# Patient Record
Sex: FEMALE | Race: WHITE | NOT HISPANIC OR LATINO | Employment: FULL TIME | URBAN - METROPOLITAN AREA
[De-identification: names, ages, dates, MRNs, and addresses within clinical notes are randomized per-mention and may not be internally consistent; named-entity substitution may affect disease eponyms.]

---

## 2017-02-06 ENCOUNTER — OFFICE VISIT (OUTPATIENT)
Dept: RHEUMATOLOGY | Facility: CLINIC | Age: 52
End: 2017-02-06
Payer: COMMERCIAL

## 2017-02-06 ENCOUNTER — HOSPITAL ENCOUNTER (OUTPATIENT)
Dept: RADIOLOGY | Facility: HOSPITAL | Age: 52
Discharge: HOME | End: 2017-02-06
Attending: INTERNAL MEDICINE
Payer: COMMERCIAL

## 2017-02-06 VITALS
SYSTOLIC BLOOD PRESSURE: 125 MMHG | WEIGHT: 187.38 LBS | BODY MASS INDEX: 34.48 KG/M2 | HEIGHT: 62 IN | DIASTOLIC BLOOD PRESSURE: 80 MMHG | HEART RATE: 99 BPM

## 2017-02-06 DIAGNOSIS — E78.2 COMBINED HYPERLIPIDEMIA ASSOCIATED WITH TYPE 2 DIABETES MELLITUS: ICD-10-CM

## 2017-02-06 DIAGNOSIS — M06.4 INFLAMMATORY POLYARTHRITIS: ICD-10-CM

## 2017-02-06 DIAGNOSIS — M79.7 FIBROMYALGIA: ICD-10-CM

## 2017-02-06 DIAGNOSIS — R76.8 POSITIVE ANA (ANTINUCLEAR ANTIBODY): ICD-10-CM

## 2017-02-06 DIAGNOSIS — R91.1 PULMONARY NODULE: ICD-10-CM

## 2017-02-06 DIAGNOSIS — E11.69 COMBINED HYPERLIPIDEMIA ASSOCIATED WITH TYPE 2 DIABETES MELLITUS: ICD-10-CM

## 2017-02-06 DIAGNOSIS — M06.4 INFLAMMATORY POLYARTHRITIS: Primary | ICD-10-CM

## 2017-02-06 DIAGNOSIS — Z72.0 TOBACCO USE: ICD-10-CM

## 2017-02-06 PROCEDURE — 2022F DILAT RTA XM EVC RTNOPTHY: CPT | Mod: S$GLB,,, | Performed by: INTERNAL MEDICINE

## 2017-02-06 PROCEDURE — 71020 XR CHEST PA AND LATERAL: CPT | Mod: TC,PO

## 2017-02-06 PROCEDURE — 3074F SYST BP LT 130 MM HG: CPT | Mod: S$GLB,,, | Performed by: INTERNAL MEDICINE

## 2017-02-06 PROCEDURE — 3060F POS MICROALBUMINURIA REV: CPT | Mod: S$GLB,,, | Performed by: INTERNAL MEDICINE

## 2017-02-06 PROCEDURE — 99205 OFFICE O/P NEW HI 60 MIN: CPT | Mod: 25,S$GLB,, | Performed by: INTERNAL MEDICINE

## 2017-02-06 PROCEDURE — 77077 JOINT SURVEY SINGLE VIEW: CPT | Mod: TC,PO

## 2017-02-06 PROCEDURE — 3079F DIAST BP 80-89 MM HG: CPT | Mod: S$GLB,,, | Performed by: INTERNAL MEDICINE

## 2017-02-06 PROCEDURE — 90732 PPSV23 VACC 2 YRS+ SUBQ/IM: CPT | Mod: S$GLB,,, | Performed by: INTERNAL MEDICINE

## 2017-02-06 PROCEDURE — 90471 IMMUNIZATION ADMIN: CPT | Mod: S$GLB,,, | Performed by: INTERNAL MEDICINE

## 2017-02-06 PROCEDURE — 99999 PR PBB SHADOW E&M-EST. PATIENT-LVL III: CPT | Mod: PBBFAC,,, | Performed by: INTERNAL MEDICINE

## 2017-02-06 PROCEDURE — 3044F HG A1C LEVEL LT 7.0%: CPT | Mod: S$GLB,,, | Performed by: INTERNAL MEDICINE

## 2017-02-06 PROCEDURE — 77077 JOINT SURVEY SINGLE VIEW: CPT | Mod: 26,,, | Performed by: RADIOLOGY

## 2017-02-06 PROCEDURE — 71020 XR CHEST PA AND LATERAL: CPT | Mod: 26,,, | Performed by: RADIOLOGY

## 2017-02-06 NOTE — PROGRESS NOTES
Administered 0.5cc Pneumococcal  Vaccination to  left Deltoid. Pt. Tolerated well. No acute reaction noted to site. Pt. Instructed on S/S to report. Pt. Verbalized understanding.    Lot: [id number]  Exp: 11/17

## 2017-02-06 NOTE — LETTER
February 6, 2017      Debora Hills MD  [id number] [place] [address] Dr Carissa [place] [address]           [facility] - Rheumatology  [address] [facility] [place] [address]  Phone: [phone]  Fax: [phone]          Patient: Diana Escobar   MR Number: [id number]   YOB: 1965   Date of Visit: 2/6/2017       Dear Dr. Debora Hills:    Thank you for referring Diana Escobar to me for evaluation. Attached you will find relevant portions of my assessment and plan of care.    If you have questions, please do not hesitate to call me. I look forward to following Diana Escobar along with you.    Sincerely,    Amber Engle MD    Enclosure  CC:  No Recipients    If you would like to receive this communication electronically, please contact [email or web address] or [phone] to request more information on [employer] access.    For providers and/or their staff who would like to refer a patient to [facility], please contact us through our one-stop-shop provider referral line, [facility], at [phone].    If you feel you have received this communication in error or would no longer like to receive these types of communications, please e-mail [email or web address]

## 2017-02-06 NOTE — MR AVS SNAPSHOT
[facility] Rheumatology  [address] [facility] [place] [address]  Phone: [phone]  Fax: [phone]                  Diana Escobar   2017 1:30 PM   Office Visit    Description:  Female : 1965   Provider:  Amber Engle MD   Department:  [facility] - Rheumatology           Reason for Visit     chronic hand pain     rt leg pain           Diagnoses this Visit        Comments    Inflammatory polyarthritis    -  Primary     Combined hyperlipidemia associated with type 2 diabetes mellitus         Tobacco use         Pulmonary nodule                To Do List           Future Appointments        Provider Department Dept Phone    2017 3:45 PM [facility] XR2 [facility] [phone]    2017 8:20 AM SPECIMEN, [facility] [phone]    2017 8:45 AM LABORATORY, [facility] [phone]    3/2/2017 11:30 AM Amber Engle MD [facility] Rheumatology [phone]    2017 8:20 AM [facility], [address] [facility] [phone]      Goals (5 Years of Data)     None      [facility]     [facility] Nurse Care Line -  Assistance  Registered nurses in the [facility] provide clinical advisement, health education, appointment booking, and other advisory services.  Call for this free service at [phone].             Medications           Message regarding Medications     Verify the changes and/or additions to your medication regime listed below are the same as discussed with your clinician today.  If any of these changes or additions are incorrect, please notify your healthcare provider.             Verify that the below list of medications is an accurate representation of the medications you are currently taking.  If none reported, the list may be blank. If incorrect, please contact your healthcare provider. Carry this list with you in case of emergency.           Current  "Medications     albuterol 90 mcg/actuation inhaler Inhale 2 puffs into the lungs every 6 (six) hours as needed for Wheezing.    amlodipine (NORVASC) 5 MG tablet Take 5 mg by mouth.    blood sugar diagnostic Strp Glucose testing twice daily.    blood-glucose meter Misc Use as directed.    gabapentin (NEURONTIN) 300 MG capsule Take 1 capsule (300 mg total) by mouth 2 (two) times daily.    insulin needles, disposable, (PEN NEEDLE) 31 X 1/4 " Ndle Twice daily injections.    lancets (LANCETS,ULTRA THIN) Misc Glucose testing daily.    levothyroxine (SYNTHROID) 50 MCG tablet Take 1 tablet (50 mcg total) by mouth once daily.    metformin (GLUCOPHAGE-XR) 500 MG 24 hr tablet Take 1 tablet (500 mg total) by mouth 2 (two) times daily with meals.    pravastatin (PRAVACHOL) 20 MG tablet Take 1 tablet (20 mg total) by mouth once daily.    venlafaxine (EFFEXOR-XR) 150 MG Cp24 Take 1 capsule (150 mg total) by mouth once daily.    hydrocodone-acetaminophen 7.5-325mg (NORCO) 7.5-325 mg per tablet Take 1 tablet by mouth every 6 (six) hours as needed for Pain.           Clinical Reference Information           Your Vitals Were     BP Pulse Height Weight BMI    125/80 99 5' 2" (1.575 m) 85 kg (187 lb 6.3 oz) 34.27 kg/m2      Blood Pressure          Most Recent Value    BP  125/80      Allergies as of 2/6/2017     Mobic [Meloxicam]    Topamax [Topiramate]      Immunizations Administered on Date of Encounter - 2/6/2017     Name Date Dose VIS Date Route    Pneumococcal Polysaccharide - 23 Valent  Incomplete 0.5 mL 4/24/2015 Intramuscular      Orders Placed During Today's Visit      Normal Orders This Visit    Pneumococcal Polysaccharide Vaccine (23 Valent) (SQ/IM)     Future Labs/Procedures Expected by Expires    Cyclic citrul peptide antibody, IgG  2/6/2017 4/7/2018    HEPATITIS PANEL, ACUTE  2/6/2017 4/7/2018    Quantiferon Gold TB  2/6/2017 4/7/2018    Rheumatoid factor  2/6/2017 2/6/2018    X-Ray Chest PA And Lateral  2/6/2017 2/6/2018 "    XR Arthritis Survey  2/6/2017 2/6/2018    Recurring Lab Work Interval Expires    C-reactive protein  Every 12 Weeks until 4/7/2018 4/7/2018    CBC auto differential   2/6/2021    Comprehensive metabolic panel   2/6/2021    Sedimentation rate, manual   2/6/2021      [facility] Sign-Up     Activating your [facility] account is as easy as 1-2-3!     1) Visit [email or web address], select Sign Up Now, enter this activation code and your date of birth, then select Next.  Activation code not generated  Current Patient Portal Status: Account disabled      2) Create a username and password to use when you visit [facility] in the future and select a security question in case you lose your password and select Next.    3) Enter your e-mail address and click Sign Up!    Additional Information  If you have questions, please e-mail [email or web address] or call [phone] to talk to our [facility] staff. Remember, [facility] is NOT to be used for urgent needs. For medical emergencies, dial 911.         Smoking Cessation     If you would like to quit smoking:   You may be eligible for free services if you are a [place] resident and started smoking cigarettes before September 1, 1988.  Call the Smoking Cessation Trust ([facility]) toll free at (688) [phone] or (806) 126-4104.   Call 8-374-QUIT-NOW if you do not meet the above criteria.            Language Assistance Services     ATTENTION: Language assistance services are available, free of charge. Please call [phone].      ATENCIÓN: Si habla español, tiene a duarte disposición servicios gratuitos de asistencia lingüística. Llame al [phone].     [facility] Ý: N?u b?n nói Ti?ng Vi?t, có các d?ch v? h? tr? ngôn ng? mi?n phí dành cho b?n. G?i s? [phone].         [facility] complies with applicable Federal civil rights laws and does not discriminate on the basis of race, color, national origin, age, disability, or sex.

## 2017-02-06 NOTE — PROGRESS NOTES
Subjective:       Patient ID: Diana Escobar is a 51 y.o. female.    Chief Complaint: chronic hand pain and rt leg pain    HPI   Referred by PCP for chronic hand pain but her symptoms actually got much worse 1 month ago. She was previously seen by Dr. Cook and was found to be CCP positive but had no joint symptoms at the time. She was diagnosed with fibromyalgia.  She also has hypertension, active tobacco user, NIDDM, HL, asthma, hypothyroidism, depression.    She was in her usual state of health (works at [employer]) until about 1 month ago she developed stiffness in the hands worse in the morning and then pain throughout the day. She isn't able to make a fist in the morning. Morning stiffness lasts a couple of hours. She has numbness in her R hand over the thumb, 2nd finger with writing. She has noticed swelling that occurs in between MCPS and PIPS. Also developed knots in her DIP joints. No pain or swelling around the wrist but she does have CMC joint pain. She also has new R calf pain that hurts all day and at night it throbs and she cannot sleep. She has tried tylenol arthritis without improvement. She tried naproxen and advil and they don't work for the hands or the leg. She tried heat and helps with the R leg some. Hasn't used any creams.     She did have trigger finger in both of her thumbs at one time. She had injections to them done by orthopedic hand surgery.    She has chronic low back pain with radiation into the R hip. She cannot lay on the right hip anymore. If she lays on her side it makes the pain shoot into the leg.  She had a pain management doctor for her back in [place]. She has had epidural injections and also had a nerve ablation.     She had next surgery 6 months ago with ruptured disk.     Review of Systems   Constitutional: Positive for fatigue. Negative for activity change and unexpected weight change.   HENT: Positive for mouth sores.         Ringing in the ears, new    Fever blisters on  "the gums, new. None in buccal or pallete.        Eyes:        Dry eyes for years, seeing eye doctor. She may get on restasis.     No dry mouth       Respiratory: Negative.  Negative for shortness of breath (has chronic asthma).    Cardiovascular: Positive for leg swelling (chronic). Negative for chest pain.   Endocrine:        No allopecia     Genitourinary: Negative for dysuria and hematuria.   Musculoskeletal: Positive for arthralgias and myalgias.   Skin: Negative for color change and rash.   Neurological: Positive for weakness (she can  a cup of coffee and it will fall. ) and numbness (left 4th and 5th digit numb since surgery, R hand new numbness in the thumb and 2nd digit).   Hematological: Negative for adenopathy.   Psychiatric/Behavioral: The patient is nervous/anxious (depressed due to decreased a bility to be active ).              Social hx:  [employer]  3 healthy children  Smoker    No fmhx of rheumatoid arthritis, IBD, psoriasis, SLE    Objective:     Visit Vitals    /80    Pulse 99    Ht 5' 2" (1.575 m)    Wt 85 kg (187 lb 6.3 oz)    BMI 34.27 kg/m2        Physical Exam   Vitals reviewed.  Constitutional: She is oriented to person, place, and time and well-developed, well-nourished, and in no distress. No distress.   HENT:   Head: Normocephalic and atraumatic.   Right Ear: External ear normal.   Left Ear: External ear normal.   Mouth/Throat: Oropharynx is clear and moist.   Eyes: Pupils are equal, round, and reactive to light. Right eye exhibits no discharge. Left eye exhibits no discharge. No scleral icterus.   Eyes injected    Neck: Neck supple.   Slight decrease ROM   Cardiovascular: Normal rate, regular rhythm and normal heart sounds.    Pulmonary/Chest: Effort normal and breath sounds normal. No respiratory distress.   Abdominal: Soft. She exhibits no distension (mild tenderness epigastrium). There is tenderness.   Neurological: She is alert and oriented to person, place, and time. " No cranial nerve deficit. She exhibits normal muscle tone.   Skin: Skin is warm and dry. No rash noted. She is not diaphoretic. No erythema.     Psychiatric: Mood, memory, affect and judgment normal.   Musculoskeletal: Normal range of motion. She exhibits tenderness. She exhibits no edema or deformity.   Left hand swollen and tender MCP 2-5, PIP 2-5  Right hand diffuse tender MCPs with mild swelling 2-3  Bilateral CMC joint pain and tenderness  Elbows shoulders, knees move normal  Swelling of the toes with +MTP squeeze test             LABS   Ref. Range 11/10/2016 09:34   Sodium Latest Ref Range: 136 - 145 mmol/L 144   Potassium Latest Ref Range: 3.5 - 5.1 mmol/L 4.3   Chloride Latest Ref Range: 95 - 110 mmol/L 106   CO2 Latest Ref Range: 23 - 29 mmol/L 30 (H)   Anion Gap Latest Ref Range: 8 - 16 mmol/L 8   BUN, Bld Latest Ref Range: 6 - 20 mg/dL 10   Creatinine Latest Ref Range: 0.5 - 1.4 mg/dL 0.8   eGFR if non African American Latest Ref Range: >60 mL/min/1.73 m^2 >60.0   eGFR if African American Latest Ref Range: >60 mL/min/1.73 m^2 >60.0   Glucose Latest Ref Range: 70 - 110 mg/dL 115 (H)   Calcium Latest Ref Range: 8.7 - 10.5 mg/dL 9.5   Alkaline Phosphatase Latest Ref Range: 55 - 135 U/L 89   Total Protein Latest Ref Range: 6.0 - 8.4 g/dL 7.2   Albumin Latest Ref Range: 3.5 - 5.2 g/dL 4.0   Total Bilirubin Latest Ref Range: 0.1 - 1.0 mg/dL 0.4   AST Latest Ref Range: 10 - 40 U/L 16   ALT Latest Ref Range: 10 - 44 U/L 20   Triglycerides Latest Ref Range: 30 - 150 mg/dL 190 (H)     Outside records reviewed  +ccp 32.76  +jomar 1:160 speckled   Negative proflie  -scl 70, centromere   crp 0.2     Outside Imaging Reviewed    MRI c spine without contrast   DJD, DDD    CT Chest without Contrast 5/2015  The lungs are essentially clear and free of infiltrates.  There is a  small 3 mm pleural-based nodule laterally in the left lower lobe but there are  no significant pulmonary nodule identified.  No significant  interstitial lung  disease.  There are no mediastinal masses or adenopathy.  The adrenal glands  are not enlarged.    I personally viewed the xrays with my impressions below:  Foot xrays with calcaneal spurs, bunions    c-spine xray 2012 normal     5/2016 cxr wnl    Assessment:       1. Inflammatory polyarthritis    2. Combined hyperlipidemia associated with type 2 diabetes mellitus    3. Tobacco use    4. Pulmonary nodule    5. Positive STEVE (antinuclear antibody)    6. Fibromyalgia        High suspicion for rheumatoid arthritis, will complete work-up.     DM/HL- all will be worse with prednisone therapy    Tobacco use- associated with rheumatoid arthritis     Pulmonary nodule 5/2016 - smoker, needs monitoring by PCP    Positive STEVE- negative profile checked by Dr. Cook. No evidence of SLE but will obtain further labwork.     Fibromyalgia- not active today, arthritis is active     Plan:   cxr  Arthritis survey  Cbc, cmp, sed rate, crp, rheumatoid factor, ccp  Acute hepatitis panel   Quant gold at next vsiit     Discussed likely RA diagnosis and gave hand out on RA. Pathogenesis discussed and so was the association with smoking.   Hand out given on MTX, once we get bloodwork I will likely start prednisone 10mg daily and then MTX in 2 weeks    Monitor for CTD evolution given positive TSEVE    Pneumovax today    Quit smoking!    RTC in 2 weeks with quant gold    MB      This was a complex visit. Multiple issues were addressed. 60 minutes spent,   including time needed to review the records, the patient evaluation,   documentations, face-to-face discussion with the patient. More than 50% of the   time was spent on counseling and coordination of care. Level V visit.

## 2017-02-07 ENCOUNTER — LAB VISIT (OUTPATIENT)
Dept: LAB | Facility: HOSPITAL | Age: 52
End: 2017-02-07
Attending: INTERNAL MEDICINE
Payer: COMMERCIAL

## 2017-02-07 DIAGNOSIS — M06.4 INFLAMMATORY POLYARTHRITIS: ICD-10-CM

## 2017-02-07 PROCEDURE — 86480 TB TEST CELL IMMUN MEASURE: CPT

## 2017-02-07 PROCEDURE — 36415 COLL VENOUS BLD VENIPUNCTURE: CPT | Mod: PO

## 2017-02-08 ENCOUNTER — TELEPHONE (OUTPATIENT)
Dept: RHEUMATOLOGY | Facility: CLINIC | Age: 52
End: 2017-02-08

## 2017-02-08 DIAGNOSIS — M00.89: Primary | ICD-10-CM

## 2017-02-08 DIAGNOSIS — A74.89: Primary | ICD-10-CM

## 2017-02-08 DIAGNOSIS — M13.0 POLYARTHRITIS: ICD-10-CM

## 2017-02-08 RX ORDER — METHYLPREDNISOLONE 4 MG/1
TABLET ORAL
Qty: 1 PACKAGE | Refills: 0 | Status: SHIPPED | OUTPATIENT
Start: 2017-02-08 | End: 2017-02-15 | Stop reason: DRUGHIGH

## 2017-02-08 NOTE — TELEPHONE ENCOUNTER
Called her to report negative sed rate, crp, normal inflammatory markers. She did have some swelling on exam I believe so she may be seronegative.   I will give her medrol dosepack trial  She will let me know how it does  i will still see her in 2-4 weeks.   MB

## 2017-02-09 LAB
MITOGEN NIL: >10 IU/ML
NIL: 0.03 IU/ML
TB ANTIGEN NIL: 0 IU/ML
TB ANTIGEN: 0.03 IU/ML
TB GOLD: NEGATIVE

## 2017-02-13 DIAGNOSIS — E78.5 HYPERLIPIDEMIA WITH TARGET LDL LESS THAN 100: ICD-10-CM

## 2017-02-13 RX ORDER — METFORMIN HYDROCHLORIDE 500 MG/1
TABLET, EXTENDED RELEASE ORAL
Qty: 60 TABLET | Refills: 6 | Status: SHIPPED | OUTPATIENT
Start: 2017-02-13 | End: 2017-10-08 | Stop reason: SDUPTHER

## 2017-02-13 RX ORDER — PRAVASTATIN SODIUM 20 MG/1
TABLET ORAL
Qty: 30 TABLET | Refills: 6 | Status: SHIPPED | OUTPATIENT
Start: 2017-02-13 | End: 2017-12-30 | Stop reason: SDUPTHER

## 2017-02-14 ENCOUNTER — PATIENT MESSAGE (OUTPATIENT)
Dept: RHEUMATOLOGY | Facility: CLINIC | Age: 52
End: 2017-02-14

## 2017-02-15 DIAGNOSIS — M19.90 INFLAMMATORY ARTHRITIS: Primary | ICD-10-CM

## 2017-02-15 RX ORDER — PREDNISONE 5 MG/1
10 TABLET ORAL DAILY
Qty: 60 TABLET | Refills: 0 | Status: SHIPPED | OUTPATIENT
Start: 2017-02-15 | End: 2017-02-25

## 2017-02-15 NOTE — PROGRESS NOTES
She responded to medrol dosepack with improved swelling and pain in the hands.  Will start prednisone 10mg until follow-up to discuss dmard therapy next visit 3/2  MB

## 2017-03-01 ENCOUNTER — TELEPHONE (OUTPATIENT)
Dept: RHEUMATOLOGY | Facility: CLINIC | Age: 52
End: 2017-03-01

## 2017-03-01 NOTE — TELEPHONE ENCOUNTER
----- Message from Elaine Olivera sent at 3/1/2017  8:46 AM CST -----  Contact: [employer]  Calling regarding pt diagnose code and clinical notes, please call Oralia @ [phone].

## 2017-03-02 ENCOUNTER — OFFICE VISIT (OUTPATIENT)
Dept: RHEUMATOLOGY | Facility: CLINIC | Age: 52
End: 2017-03-02
Payer: COMMERCIAL

## 2017-03-02 ENCOUNTER — LAB VISIT (OUTPATIENT)
Dept: LAB | Facility: HOSPITAL | Age: 52
End: 2017-03-02
Attending: INTERNAL MEDICINE
Payer: COMMERCIAL

## 2017-03-02 ENCOUNTER — TELEPHONE (OUTPATIENT)
Dept: INTERNAL MEDICINE | Facility: CLINIC | Age: 52
End: 2017-03-02

## 2017-03-02 VITALS
HEART RATE: 92 BPM | SYSTOLIC BLOOD PRESSURE: 130 MMHG | BODY MASS INDEX: 35.09 KG/M2 | DIASTOLIC BLOOD PRESSURE: 93 MMHG | WEIGHT: 190.69 LBS | HEIGHT: 62 IN

## 2017-03-02 DIAGNOSIS — E78.2 COMBINED HYPERLIPIDEMIA ASSOCIATED WITH TYPE 2 DIABETES MELLITUS: ICD-10-CM

## 2017-03-02 DIAGNOSIS — R76.8 POSITIVE ANA (ANTINUCLEAR ANTIBODY): ICD-10-CM

## 2017-03-02 DIAGNOSIS — E11.69 COMBINED HYPERLIPIDEMIA ASSOCIATED WITH TYPE 2 DIABETES MELLITUS: ICD-10-CM

## 2017-03-02 DIAGNOSIS — Z72.0 TOBACCO USE: ICD-10-CM

## 2017-03-02 DIAGNOSIS — M06.4 INFLAMMATORY POLYARTHRITIS: ICD-10-CM

## 2017-03-02 DIAGNOSIS — M06.09 RHEUMATOID ARTHRITIS OF MULTIPLE SITES WITH NEGATIVE RHEUMATOID FACTOR: Primary | ICD-10-CM

## 2017-03-02 DIAGNOSIS — I15.2 HYPERTENSION ASSOCIATED WITH DIABETES: ICD-10-CM

## 2017-03-02 DIAGNOSIS — Z86.73 HX-TIA (TRANSIENT ISCHEMIC ATTACK): ICD-10-CM

## 2017-03-02 DIAGNOSIS — M79.7 FIBROMYALGIA: ICD-10-CM

## 2017-03-02 DIAGNOSIS — E11.59 HYPERTENSION ASSOCIATED WITH DIABETES: ICD-10-CM

## 2017-03-02 DIAGNOSIS — Z79.899 HIGH RISK MEDICATION USE: ICD-10-CM

## 2017-03-02 DIAGNOSIS — M06.09 RHEUMATOID ARTHRITIS OF MULTIPLE SITES WITH NEGATIVE RHEUMATOID FACTOR: ICD-10-CM

## 2017-03-02 LAB
ALBUMIN SERPL BCP-MCNC: 4.1 G/DL
ALP SERPL-CCNC: 80 U/L
ALT SERPL W/O P-5'-P-CCNC: 26 U/L
ANION GAP SERPL CALC-SCNC: 10 MMOL/L
AST SERPL-CCNC: 15 U/L
BASOPHILS # BLD AUTO: 0.06 K/UL
BASOPHILS NFR BLD: 0.7 %
BILIRUB SERPL-MCNC: 0.3 MG/DL
BUN SERPL-MCNC: 13 MG/DL
C3 SERPL-MCNC: 158 MG/DL
C4 SERPL-MCNC: 28 MG/DL
CALCIUM SERPL-MCNC: 9.9 MG/DL
CHLORIDE SERPL-SCNC: 102 MMOL/L
CO2 SERPL-SCNC: 30 MMOL/L
CREAT SERPL-MCNC: 0.8 MG/DL
CRP SERPL-MCNC: 1.7 MG/L
DIFFERENTIAL METHOD: ABNORMAL
EOSINOPHIL # BLD AUTO: 0.1 K/UL
EOSINOPHIL NFR BLD: 1.3 %
ERYTHROCYTE [DISTWIDTH] IN BLOOD BY AUTOMATED COUNT: 14.4 %
ERYTHROCYTE [SEDIMENTATION RATE] IN BLOOD BY WESTERGREN METHOD: 2 MM/HR
EST. GFR  (AFRICAN AMERICAN): >60 ML/MIN/1.73 M^2
EST. GFR  (NON AFRICAN AMERICAN): >60 ML/MIN/1.73 M^2
GLUCOSE SERPL-MCNC: 126 MG/DL
HCT VFR BLD AUTO: 47.1 %
HGB BLD-MCNC: 15.1 G/DL
LYMPHOCYTES # BLD AUTO: 1.6 K/UL
LYMPHOCYTES NFR BLD: 18.1 %
MCH RBC QN AUTO: 31.9 PG
MCHC RBC AUTO-ENTMCNC: 32.1 %
MCV RBC AUTO: 100 FL
MONOCYTES # BLD AUTO: 0.3 K/UL
MONOCYTES NFR BLD: 3.9 %
NEUTROPHILS # BLD AUTO: 6.6 K/UL
NEUTROPHILS NFR BLD: 76 %
PLATELET # BLD AUTO: 159 K/UL
PMV BLD AUTO: 12.2 FL
POTASSIUM SERPL-SCNC: 4.3 MMOL/L
PROT SERPL-MCNC: 7.6 G/DL
RBC # BLD AUTO: 4.73 M/UL
SODIUM SERPL-SCNC: 142 MMOL/L
WBC # BLD AUTO: 8.68 K/UL

## 2017-03-02 PROCEDURE — 2022F DILAT RTA XM EVC RTNOPTHY: CPT | Mod: S$GLB,,, | Performed by: INTERNAL MEDICINE

## 2017-03-02 PROCEDURE — 86160 COMPLEMENT ANTIGEN: CPT

## 2017-03-02 PROCEDURE — 3044F HG A1C LEVEL LT 7.0%: CPT | Mod: S$GLB,,, | Performed by: INTERNAL MEDICINE

## 2017-03-02 PROCEDURE — 99215 OFFICE O/P EST HI 40 MIN: CPT | Mod: S$GLB,,, | Performed by: INTERNAL MEDICINE

## 2017-03-02 PROCEDURE — 3060F POS MICROALBUMINURIA REV: CPT | Mod: S$GLB,,, | Performed by: INTERNAL MEDICINE

## 2017-03-02 PROCEDURE — 86880 COOMBS TEST DIRECT: CPT

## 2017-03-02 PROCEDURE — 3075F SYST BP GE 130 - 139MM HG: CPT | Mod: S$GLB,,, | Performed by: INTERNAL MEDICINE

## 2017-03-02 PROCEDURE — 86147 CARDIOLIPIN ANTIBODY EA IG: CPT | Mod: 59

## 2017-03-02 PROCEDURE — 99999 PR PBB SHADOW E&M-EST. PATIENT-LVL III: CPT | Mod: PBBFAC,,, | Performed by: INTERNAL MEDICINE

## 2017-03-02 PROCEDURE — 85651 RBC SED RATE NONAUTOMATED: CPT | Mod: PO

## 2017-03-02 PROCEDURE — 86160 COMPLEMENT ANTIGEN: CPT | Mod: 59

## 2017-03-02 PROCEDURE — 36415 COLL VENOUS BLD VENIPUNCTURE: CPT | Mod: PO

## 2017-03-02 PROCEDURE — 3080F DIAST BP >= 90 MM HG: CPT | Mod: S$GLB,,, | Performed by: INTERNAL MEDICINE

## 2017-03-02 PROCEDURE — 1160F RVW MEDS BY RX/DR IN RCRD: CPT | Mod: S$GLB,,, | Performed by: INTERNAL MEDICINE

## 2017-03-02 PROCEDURE — 85025 COMPLETE CBC W/AUTO DIFF WBC: CPT | Mod: PO

## 2017-03-02 PROCEDURE — 80053 COMPREHEN METABOLIC PANEL: CPT | Mod: PO

## 2017-03-02 PROCEDURE — 86140 C-REACTIVE PROTEIN: CPT

## 2017-03-02 PROCEDURE — 86162 COMPLEMENT TOTAL (CH50): CPT

## 2017-03-02 PROCEDURE — 86146 BETA-2 GLYCOPROTEIN ANTIBODY: CPT | Mod: 59

## 2017-03-02 RX ORDER — MAGNESIUM 250 MG
TABLET ORAL 2 TIMES DAILY
COMMUNITY
End: 2018-08-21 | Stop reason: ALTCHOICE

## 2017-03-02 RX ORDER — PREDNISONE 5 MG/1
7.5 TABLET ORAL DAILY
Qty: 135 TABLET | Refills: 0 | Status: SHIPPED | OUTPATIENT
Start: 2017-03-02 | End: 2017-03-12

## 2017-03-02 RX ORDER — METHOTREXATE 2.5 MG/1
15 TABLET ORAL
Qty: 24 TABLET | Refills: 0 | Status: SHIPPED | OUTPATIENT
Start: 2017-03-02 | End: 2017-03-31 | Stop reason: SDUPTHER

## 2017-03-02 RX ORDER — PREDNISONE 10 MG/1
10 TABLET ORAL 2 TIMES DAILY
COMMUNITY
End: 2017-03-02 | Stop reason: ALTCHOICE

## 2017-03-02 RX ORDER — BUTALBITAL, ACETAMINOPHEN AND CAFFEINE 50; 325; 40 MG/1; MG/1; MG/1
1 TABLET ORAL EVERY 4 HOURS PRN
COMMUNITY
End: 2017-06-08

## 2017-03-02 RX ORDER — UBIDECARENONE 75 MG
500 CAPSULE ORAL DAILY
COMMUNITY
End: 2018-08-21 | Stop reason: ALTCHOICE

## 2017-03-02 RX ORDER — CLOPIDOGREL BISULFATE 75 MG/1
75 TABLET ORAL DAILY
COMMUNITY

## 2017-03-02 RX ORDER — GLUCOSAMINE HCL 500 MG
1 TABLET ORAL DAILY
COMMUNITY
End: 2018-08-21 | Stop reason: ALTCHOICE

## 2017-03-02 RX ORDER — FOLIC ACID 1 MG/1
1 TABLET ORAL DAILY
Qty: 90 TABLET | Refills: 1 | Status: SHIPPED | OUTPATIENT
Start: 2017-03-02 | End: 2017-04-27 | Stop reason: SDUPTHER

## 2017-03-02 NOTE — TELEPHONE ENCOUNTER
----- Message from Elizabeth Eckert sent at 3/2/2017  8:44 AM CST -----  Contact: [employer] ( Tari farrell)  Oralia ( Tari farrell) is requesting a call from nurse to f/u with clinical notes on the pt.            Please call Oralia farrell) back at [phone]

## 2017-03-02 NOTE — TELEPHONE ENCOUNTER
Called zac with disability and patient had a stroke last week so they are looking for ICD codes to apply for shortterm disability. I discussed that I have an appointment with the patient today and will fax them my notes if possible.     Claim number   [id number]    [phone]    Attn: zac

## 2017-03-02 NOTE — PATIENT INSTRUCTIONS
1. Methotrexate 6 tablets weekly, folic acid daily  2. Watch for side effects, let me know if there are problems.   3. Read hand out of the next medicine we may have to use and we may have to start plaquenil.   4. Decrease prednisone to 7.5mg  For the next month until the next set of labs.   5. Buy over the counter Vitamin D3/D2 and start taking 2000IU daily. No calcium tablets. Take calcium through your diet.

## 2017-03-02 NOTE — MR AVS SNAPSHOT
[facility] Rheumatology  [address] [facility] [place] [address]  Phone: [phone]  Fax: [phone]                  Diana Escobar   3/2/2017 11:30 AM   Office Visit    Description:  Female : 1965   Provider:  Amber Engle MD   Department:  [facility] - Rheumatology           Reason for Visit     inflammatory polyarthritis     Positive STEVE     Fibromyalgia           Diagnoses this Visit        Comments    Rheumatoid arthritis of multiple sites with negative rheumatoid factor    -  Primary     Positive STEVE (antinuclear antibody)                To Do List           Future Appointments        Provider Department Dept Phone    3/2/2017 12:50 PM SPECIMEN, [facility] [phone]    3/3/2017 9:40 AM Jory Alcocer DO [facility] - Internal Medicine [phone]    3/30/2017 11:10 AM LABORATORY, [address] [facility] [phone]    2017 10:40 AM LABORATORY, [address] [facility] [phone]    2017 8:20 AM LABORATORY, [address] [facility] [phone]      Goals (5 Years of Data)     None       These Medications        Disp Refills Start End    predniSONE (DELTASONE) 5 MG tablet 135 tablet 0 3/2/2017 3/12/2017    Take 1.5 tablets (7.5 mg total) by mouth once daily. - Oral    Pharmacy: [facility] [address] [facility] [place], [place] - [address] [address] Ph #: [phone]       methotrexate 2.5 MG Tab 24 tablet 0 3/2/2017 3/2/2018    Take 6 tablets (15 mg total) by mouth every 7 days. - Oral    Pharmacy: [facility] [address] [facility] [place], [place] [facility] [address] [address] Ph #: [phone]       folic acid (FOLVITE) 1 MG tablet 90 tablet 1 3/2/2017 3/2/2018    Take 1 tablet (1 mg total) by mouth once daily. - Oral    Pharmacy: [facility] [address] [facility] [place], [place] [facility] [address] [address] [facility] Ph #: [phone]         [facility] On Call     [facility] On Call Nurse Care Line -  Assistance  Registered nurses in the [facility]  "Center provide clinical advisement, health education, appointment booking, and other advisory services.  Call for this free service at [phone].             Medications           Message regarding Medications     Verify the changes and/or additions to your medication regime listed below are the same as discussed with your clinician today.  If any of these changes or additions are incorrect, please notify your healthcare provider.        START taking these NEW medications        Refills    predniSONE (DELTASONE) 5 MG tablet 0    Sig: Take 1.5 tablets (7.5 mg total) by mouth once daily.    Class: Normal    Route: Oral    methotrexate 2.5 MG Tab 0    Sig: Take 6 tablets (15 mg total) by mouth every 7 days.    Class: Normal    Route: Oral    folic acid (FOLVITE) 1 MG tablet 1    Sig: Take 1 tablet (1 mg total) by mouth once daily.    Class: Normal    Route: Oral           Verify that the below list of medications is an accurate representation of the medications you are currently taking.  If none reported, the list may be blank. If incorrect, please contact your healthcare provider. Carry this list with you in case of emergency.           Current Medications     albuterol 90 mcg/actuation inhaler Inhale 2 puffs into the lungs every 6 (six) hours as needed for Wheezing.    amlodipine (NORVASC) 5 MG tablet Take 5 mg by mouth.    blood sugar diagnostic Strp Glucose testing twice daily.    blood-glucose meter Misc Use as directed.    butalbital-acetaminophen-caffeine -40 mg (FIORICET, ESGIC) -40 mg per tablet Take 1 tablet by mouth every 4 (four) hours as needed for Pain.    clopidogrel (PLAVIX) 75 mg tablet Take 75 mg by mouth once daily.    cyanocobalamin 500 MCG tablet Take 500 mcg by mouth once daily.    gabapentin (NEURONTIN) 300 MG capsule Take 1 capsule (300 mg total) by mouth 2 (two) times daily.    insulin needles, disposable, (PEN NEEDLE) 31 X 1/4 " Ndle Twice daily injections.    lancets " (LANCETS,ULTRA THIN) Misc Glucose testing daily.    levothyroxine (SYNTHROID) 50 MCG tablet Take 1 tablet (50 mcg total) by mouth once daily.    magnesium 250 mg Tab Take by mouth 2 (two) times daily.    metformin (GLUCOPHAGE-XR) 500 MG 24 hr tablet TAKE ONE TABLET BY MOUTH TWICE DAILY WITH MEALS    pravastatin (PRAVACHOL) 20 MG tablet TAKE ONE TABLET BY MOUTH ONCE DAILY    UBIDECARENONE (COENZYME Q10) 100 mg Tab Take 1 tablet by mouth once daily. Take 300 mg daily    venlafaxine (EFFEXOR-XR) 150 MG Cp24 Take 1 capsule (150 mg total) by mouth once daily.    folic acid (FOLVITE) 1 MG tablet Take 1 tablet (1 mg total) by mouth once daily.    hydrocodone-acetaminophen 7.5-325mg (NORCO) 7.5-325 mg per tablet Take 1 tablet by mouth every 6 (six) hours as needed for Pain.    methotrexate 2.5 MG Tab Take 6 tablets (15 mg total) by mouth every 7 days.    predniSONE (DELTASONE) 5 MG tablet Take 1.5 tablets (7.5 mg total) by mouth once daily.           Clinical Reference Information           Your Vitals Were     BP                   130/93           Blood Pressure          Most Recent Value    BP  (!)  130/93      Allergies as of 3/2/2017     Mobic [Meloxicam]    Topamax [Topiramate]      Immunizations Administered on Date of Encounter - 3/2/2017     None      Orders Placed During Today's Visit     Future Labs/Procedures Expected by Expires    BETA-2 GLYCOPROTEIN ANTIBODIES  3/2/2017 5/1/2018    Cardiolipin antibody  3/2/2017 5/1/2018    Complement, total  3/2/2017 5/1/2018    Direct antiglobulin test  3/2/2017 5/1/2018    DXA Bone Density Spine And Hip  3/2/2017 3/2/2018    Recurring Lab Work Interval Expires    C3 complement   5/1/2018    C4 complement   5/1/2018    Protein / creatinine ratio, urine   5/1/2018    Urinalysis   5/1/2018      Instructions    1. Methotrexate 6 tablets weekly, folic acid daily  2. Watch for side effects, let me know if there are problems.   3. Read hand out of the next medicine we may have to  use and we may have to start plaquenil.   4. Decrease prednisone to 7.5mg  For the next month until the next set of labs.   5. Buy over the counter Vitamin D3/D2 and start taking 2000IU daily. No calcium tablets. Take calcium through your diet.          Smoking Cessation     If you would like to quit smoking:   You may be eligible for free services if you are a [place] resident and started smoking cigarettes before September 1, 1988.  Call the Smoking Cessation Trust ([facility]) toll free at ([phone] or [phone].   Call -369-QUIT-NOW if you do not meet the above criteria.            Language Assistance Services     ATTENTION: Language assistance services are available, free of charge. Please call [phone].      ATENCIÓN: Si letila vaishnavi, tiene a duarte disposición servicios gratuitos de asistencia lingüística. Llame al [phone].     CHÚ Ý: N?u b?n nói Ti?ng Vi?t, có các d?ch v? h? tr? ngôn ng? mi?n phí dành cho b?n. G?i s? [phone].         [facility] - Rheumatology complies with applicable Federal civil rights laws and does not discriminate on the basis of race, color, national origin, age, disability, or sex.

## 2017-03-03 ENCOUNTER — OFFICE VISIT (OUTPATIENT)
Dept: INTERNAL MEDICINE | Facility: CLINIC | Age: 52
End: 2017-03-03
Payer: COMMERCIAL

## 2017-03-03 VITALS
HEIGHT: 62 IN | BODY MASS INDEX: 35.01 KG/M2 | TEMPERATURE: 99 F | SYSTOLIC BLOOD PRESSURE: 122 MMHG | DIASTOLIC BLOOD PRESSURE: 72 MMHG | WEIGHT: 190.25 LBS | HEART RATE: 94 BPM | OXYGEN SATURATION: 98 %

## 2017-03-03 DIAGNOSIS — R51.9 INTERMITTENT HEADACHE: Primary | ICD-10-CM

## 2017-03-03 DIAGNOSIS — Z86.73 HISTORY OF TIA (TRANSIENT ISCHEMIC ATTACK): ICD-10-CM

## 2017-03-03 DIAGNOSIS — E66.9 OBESITY (BMI 30.0-34.9): ICD-10-CM

## 2017-03-03 DIAGNOSIS — I15.2 HYPERTENSION ASSOCIATED WITH DIABETES: ICD-10-CM

## 2017-03-03 DIAGNOSIS — E11.69 COMBINED HYPERLIPIDEMIA ASSOCIATED WITH TYPE 2 DIABETES MELLITUS: ICD-10-CM

## 2017-03-03 DIAGNOSIS — E11.59 HYPERTENSION ASSOCIATED WITH DIABETES: ICD-10-CM

## 2017-03-03 DIAGNOSIS — E78.2 COMBINED HYPERLIPIDEMIA ASSOCIATED WITH TYPE 2 DIABETES MELLITUS: ICD-10-CM

## 2017-03-03 DIAGNOSIS — Z72.0 TOBACCO USE: ICD-10-CM

## 2017-03-03 LAB
CARDIOLIPIN IGG SER IA-ACNC: <9.4 GPL
CARDIOLIPIN IGM SER IA-ACNC: <9.4 MPL
DAT IGG-SP REAG RBC-IMP: NORMAL

## 2017-03-03 PROCEDURE — 3074F SYST BP LT 130 MM HG: CPT | Mod: S$GLB,,, | Performed by: INTERNAL MEDICINE

## 2017-03-03 PROCEDURE — 99214 OFFICE O/P EST MOD 30 MIN: CPT | Mod: S$GLB,,, | Performed by: INTERNAL MEDICINE

## 2017-03-03 PROCEDURE — 2022F DILAT RTA XM EVC RTNOPTHY: CPT | Mod: S$GLB,,, | Performed by: INTERNAL MEDICINE

## 2017-03-03 PROCEDURE — 3044F HG A1C LEVEL LT 7.0%: CPT | Mod: S$GLB,,, | Performed by: INTERNAL MEDICINE

## 2017-03-03 PROCEDURE — 3060F POS MICROALBUMINURIA REV: CPT | Mod: S$GLB,,, | Performed by: INTERNAL MEDICINE

## 2017-03-03 PROCEDURE — 3078F DIAST BP <80 MM HG: CPT | Mod: S$GLB,,, | Performed by: INTERNAL MEDICINE

## 2017-03-03 PROCEDURE — 99999 PR PBB SHADOW E&M-EST. PATIENT-LVL III: CPT | Mod: PBBFAC,,, | Performed by: INTERNAL MEDICINE

## 2017-03-03 PROCEDURE — 1160F RVW MEDS BY RX/DR IN RCRD: CPT | Mod: S$GLB,,, | Performed by: INTERNAL MEDICINE

## 2017-03-03 RX ORDER — LANCETS
EACH MISCELLANEOUS
Qty: 50 EACH | Refills: 11 | Status: SHIPPED | OUTPATIENT
Start: 2017-03-03 | End: 2018-02-14 | Stop reason: SDUPTHER

## 2017-03-03 RX ORDER — CHOLECALCIFEROL (VITAMIN D3) 25 MCG
2000 TABLET ORAL DAILY
COMMUNITY
End: 2019-05-08

## 2017-03-03 RX ORDER — DEXTROSE 4 G
TABLET,CHEWABLE ORAL
Qty: 1 EACH | Refills: 0 | Status: SHIPPED | OUTPATIENT
Start: 2017-03-03 | End: 2018-02-14 | Stop reason: SDUPTHER

## 2017-03-03 NOTE — MR AVS SNAPSHOT
[facility] - Internal Medicine  [address]  [place] [address]  Phone: [phone]  Fax: [phone]                  Diana Escobar   3/3/2017 9:40 AM   Office Visit    Description:  Female : 1965   Provider:  Jory Alcocer DO   Department:  [facility] - Internal Medicine           Reason for Visit     Hospital Follow Up     Medication Refill           Diagnoses this Visit        Comments    Intermittent headache    -  Primary     History of TIA (transient ischemic attack)         Hypertension associated with diabetes         Combined hyperlipidemia associated with type 2 diabetes mellitus         Tobacco use         Obesity (BMI 30.0-34.9)                To Do List           Future Appointments        Provider Department Dept Phone    3/30/2017 11:10 AM LABORATORY, [address] [facility] [phone]    2017 10:40 AM LABORATORY, [address] [facility] [phone]    2017 8:20 AM LABORATORY, [address] [facility] [phone]    2017 9:00 AM Jory Alcocer DO [facility] - Internal Medicine [phone]    2017 9:00 AM [facility] [phone]      Goals (5 Years of Data)     None       These Medications        Disp Refills Start End    blood-glucose meter Misc 1 each 0 3/3/2017     Use as directed.    Pharmacy: [facility] [place], [place] - [address] [address] Ph #: [phone]       blood sugar diagnostic Strp 50 strip 11 3/3/2017     Glucose testing daily.    Pharmacy: [facility] [place]  [address] [facility] Ph #: [phone]       lancets (LANCETS,ULTRA THIN) Misc 50 each 11 3/3/2017     Glucose testing daily.    Pharmacy: [facility] [place]  [address] [facility] Ph #: [phone]         [facility]     [facility] On Call Nurse Care Line -  Assistance  Registered nurses in the [facility] Center provide clinical advisement,  health education, appointment booking, and other advisory services.  Call for this free service at [phone].             Medications           Message regarding Medications     Verify the changes and/or additions to your medication regime listed below are the same as discussed with your clinician today.  If any of these changes or additions are incorrect, please notify your healthcare provider.        CHANGE how you are taking these medications     Start Taking Instead of    blood sugar diagnostic Strp blood sugar diagnostic Strp    Dosage:  Glucose testing daily. Dosage:  Glucose testing twice daily.    Reason for Change:  Reorder       STOP taking these medications     hydrocodone-acetaminophen 7.5-325mg (NORCO) 7.5-325 mg per tablet Take 1 tablet by mouth every 6 (six) hours as needed for Pain.           Verify that the below list of medications is an accurate representation of the medications you are currently taking.  If none reported, the list may be blank. If incorrect, please contact your healthcare provider. Carry this list with you in case of emergency.           Current Medications     albuterol 90 mcg/actuation inhaler Inhale 2 puffs into the lungs every 6 (six) hours as needed for Wheezing.    amlodipine (NORVASC) 5 MG tablet Take 5 mg by mouth.    butalbital-acetaminophen-caffeine -40 mg (FIORICET, ESGIC) -40 mg per tablet Take 1 tablet by mouth every 4 (four) hours as needed for Pain.    clopidogrel (PLAVIX) 75 mg tablet Take 75 mg by mouth once daily.    cyanocobalamin 500 MCG tablet Take 500 mcg by mouth once daily.    folic acid (FOLVITE) 1 MG tablet Take 1 tablet (1 mg total) by mouth once daily.    gabapentin (NEURONTIN) 300 MG capsule Take 1 capsule (300 mg total) by mouth 2 (two) times daily.    levothyroxine (SYNTHROID) 50 MCG tablet Take 1 tablet (50 mcg total) by mouth once daily.    magnesium 250 mg Tab Take by mouth 2 (two) times daily.    metformin (GLUCOPHAGE-XR) 500 MG  "24 hr tablet TAKE ONE TABLET BY MOUTH TWICE DAILY WITH MEALS    methotrexate 2.5 MG Tab Take 6 tablets (15 mg total) by mouth every 7 days.    pravastatin (PRAVACHOL) 20 MG tablet TAKE ONE TABLET BY MOUTH ONCE DAILY    predniSONE (DELTASONE) 5 MG tablet Take 1.5 tablets (7.5 mg total) by mouth once daily.    pyridoxine, vitamin B6, (VITAMIN B-6) 200 mg Tab Take 200 mg by mouth once daily.    RIBOFLAVIN (VITAMIN B-2 ORAL) Take 100 mg by mouth once daily.    UBIDECARENONE (COENZYME Q10) 100 mg Tab Take 1 tablet by mouth once daily. Take 300 mg daily    venlafaxine (EFFEXOR-XR) 150 MG Cp24 Take 1 capsule (150 mg total) by mouth once daily.    vitamin D 1000 units Tab Take 2,000 Units by mouth once daily.    blood sugar diagnostic Strp Glucose testing daily.    blood-glucose meter Misc Use as directed.    insulin needles, disposable, (PEN NEEDLE) 31 X 1/4 " Ndle Twice daily injections.    lancets (LANCETS,ULTRA THIN) Misc Glucose testing daily.           Clinical Reference Information           Your Vitals Were     BP Pulse Temp Height    122/72 (BP Location: Left arm, Patient Position: Sitting, BP Method: Manual) 94 99.3 °F (37.4 °C) (Tympanic) 5' 2" (1.575 m)    Weight SpO2 BMI    86.3 kg (190 lb 4.1 oz) 98% 34.8 kg/m2      Blood Pressure          Most Recent Value    BP  122/72      Allergies as of 3/3/2017     Mobic [Meloxicam]    Topamax [Topiramate]      Immunizations Administered on Date of Encounter - 3/3/2017     None      Smoking Cessation     If you would like to quit smoking:   You may be eligible for free services if you are a [place] resident and started smoking cigarettes before September 1, 1988.  Call the Smoking Cessation Trust ([facility]) toll free at (254) 613-1747 or [phone] [phone].   Call 1-800-QUIT-NOW if you do not meet the above criteria.            Language Assistance Services     ATTENTION: Language assistance services are available, free of charge. Please call [phone].      ATENCIÓN: Si " habla vaishnavi, tiene a duarte disposición servicios gratuitos de asistencia lingüística. Llame al [phone].     CHÚ Ý: N?u b?n nói Ti?ng Vi?t, có các d?ch v? h? tr? ngôn ng? mi?n phí dành cho b?n. G?i s? [phone].         [facility] complies with applicable Federal civil rights laws and does not discriminate on the basis of race, color, national origin, age, disability, or sex.

## 2017-03-03 NOTE — PROGRESS NOTES
Subjective:       Patient ID: Diana Escobar is a 51 y.o. female.    Chief Complaint: Hospital Follow Up    HPI Comments: Diana Escobar  51 y.o. White female    Patient presents with:  Hospital Follow Up    HPI: Here today for a hospital follow up. She was hospitalized at [facility] from 2/21 to 2/24/17 for a headache and TIA.   She continues to have an intermittent headache. She was prescribed Fioricet and states it helps at times. Her headache is on the right side of her head.   When she presented to the hospital she had right face, arm and leg numbness and tingling. Those symptoms have resolved. She has [facility] up [facility]. Imaging done at [facility] was negative for a CVA or carotid artery disease. She was started on Plavix.   HTN--stable on amlodipine.   Diabetes--controlled on metformin.   HLD--compliant with pravastatin.                      LDLCALC                  117.0               11/10/2016            She continues to smoke. She wants to quit. She was in smoking cessation but could not go to appointments.     Past Medical History:  Asthma  Chronic low back pain  Degenerative disc disease, lumbar  Depression  Diabetes mellitus  Fibromyalgia  High cholesterol  Hypertension  Hypothyroidism  MRSA (methicillin resistant Staphylococcus aur*  TIA  Vitamin D deficiency disease    Current Outpatient Prescriptions on File Prior to Visit:  albuterol 90 mcg/actuation inhaler, Inhale 2 puffs into the lungs every 6 (six) hours as needed for Wheezing., Disp: 18 g, Rfl: 6  amlodipine (NORVASC) 5 MG tablet, Take 5 mg by mouth., Disp: , Rfl:   butalbital-acetaminophen-caffeine -40 mg (FIORICET, ESGIC) -40 mg per tablet, Take 1 tablet by mouth every 4 (four) hours as needed for Pain., Disp: , Rfl:   clopidogrel (PLAVIX) 75 mg tablet, Take 75 mg by mouth once daily., Disp: , Rfl:   cyanocobalamin 500 MCG tablet, Take 500 mcg by mouth once daily., Disp: , Rfl:   folic acid  "(FOLVITE) 1 MG tablet, Take 1 tablet (1 mg total) by mouth once daily., Disp: 90 tablet, Rfl: 1  gabapentin (NEURONTIN) 300 MG capsule, Take 1 capsule (300 mg total) by mouth 2 (two) times daily., Disp: 60 capsule, Rfl: 3  levothyroxine (SYNTHROID) 50 MCG tablet, Take 1 tablet (50 mcg total) by mouth once daily., Disp: 30 tablet, Rfl: 11  magnesium 250 mg Tab, Take by mouth 2 (two) times daily., Disp: , Rfl:   metformin (GLUCOPHAGE-XR) 500 MG 24 hr tablet, TAKE ONE TABLET BY MOUTH TWICE DAILY WITH MEALS, Disp: 60 tablet, Rfl: 6  methotrexate 2.5 MG Tab, Take 6 tablets (15 mg total) by mouth every 7 days., Disp: 24 tablet, Rfl: 0  pravastatin (PRAVACHOL) 20 MG tablet, TAKE ONE TABLET BY MOUTH ONCE DAILY, Disp: 30 tablet, Rfl: 6  predniSONE (DELTASONE) 5 MG tablet, Take 1.5 tablets (7.5 mg total) by mouth once daily., Disp: 135 tablet, Rfl: 0  UBIDECARENONE (COENZYME Q10) 100 mg Tab, Take 1 tablet by mouth once daily. Take 300 mg daily, Disp: , Rfl:   venlafaxine (EFFEXOR-XR) 150 MG Cp24, Take 1 capsule (150 mg total) by mouth once daily., Disp: 30 capsule, Rfl: 3  insulin needles, disposable, (PEN NEEDLE) 31 X 1/4 " Ndle, Twice daily injections., Disp: 100 each, Rfl: 6    Allergies:  Review of patient's allergies indicates:   -- Mobic (meloxicam)    -- Topamax (topiramate)       Review of Systems   Constitutional: Negative for fever and unexpected weight change.   Eyes: Negative for visual disturbance.   Respiratory: Negative for shortness of breath.    Cardiovascular: Negative for chest pain.   Gastrointestinal: Negative for abdominal pain.   Genitourinary: Negative for dysuria.   Musculoskeletal: Negative for gait problem.   Neurological: Positive for headaches. Negative for dizziness, weakness and numbness.       Objective:      Physical Exam   Constitutional: She is oriented to person, place, and time. She appears well-developed and well-nourished. No distress.   Eyes: EOM are normal. Pupils are equal, round, and " reactive to light. No scleral icterus.   Neck: No tracheal deviation present.   Cardiovascular: Normal rate, regular rhythm and normal heart sounds.    Pulmonary/Chest: Effort normal and breath sounds normal. No respiratory distress.   Abdominal: Soft. Bowel sounds are normal.   Neurological: She is alert and oriented to person, place, and time. She has normal strength. No cranial nerve deficit or sensory deficit. She exhibits normal muscle tone. Gait normal.   Skin: Skin is warm and dry.   Psychiatric: She has a normal mood and affect.   Vitals reviewed.      Assessment:       1. Intermittent headache    2. History of TIA (transient ischemic attack)    3. Hypertension associated with diabetes    4. Combined hyperlipidemia associated with type 2 diabetes mellitus    5. Tobacco use    6. Obesity (BMI 30.0-34.9)        Plan:       Diana was seen today for hospital follow up.    Diagnoses and all orders for this visit:    Intermittent headache  -     Monitor  -     Fioricet as needed  -     F/U with neurology    History of TIA (transient ischemic attack)  -     Continue Plavix and pravastatin   -     Need to improve lipids  -     Smoking cessation    Hypertension associated with diabetes  -     blood-glucose meter Misc; Use as directed.  -     blood sugar diagnostic Strp; Glucose testing daily.  -     lancets (LANCETS,ULTRA THIN) Misc; Glucose testing daily.    Combined hyperlipidemia associated with type 2 diabetes mellitus  -     blood-glucose meter Misc; Use as directed.  -     blood sugar diagnostic Strp; Glucose testing daily.  -     lancets (LANCETS,ULTRA THIN) Misc; Glucose testing daily.  -     LDL goal of less than 100 (or even 70)    Tobacco use  -     Discussed smoking cessation  -     Recommend trying program again    Obesity (BMI 30.0-34.9)  -     Counseled regarding diet, exercise and weight loss    RTC as previously scheduled.          Received MPI report from [facility]: normal study done on 3/16/17. See  scanned document.

## 2017-03-03 NOTE — LETTER
March 3, 2017                 [facility]  Internal Medicine  [address] [place] [address]  Phone: [phone]  Fax: [phone]   March 3, 2017     Patient: Diana Escobar   YOB: 1965   Date of Visit: 3/3/2017       To Whom it May Concern:    iDana Escobar was seen in my clinic on 3/3/2017. She may return to work on 3/11/2017 and may return with no restrictions.    If you have any questions or concerns, please don't hesitate to call.    Sincerely,         Jory Alcocer, DO

## 2017-03-04 LAB
B2 GLYCOPROT1 IGA SER QL: <9 SAU
B2 GLYCOPROT1 IGG SER QL: <9 SGU
B2 GLYCOPROT1 IGM SER QL: <9 SMU

## 2017-03-06 LAB — CH50 SERPL-ACNC: 149 CAE (ref 54–144)

## 2017-03-08 ENCOUNTER — HOSPITAL ENCOUNTER (OUTPATIENT)
Facility: HOSPITAL | Age: 52
Discharge: HOME | End: 2017-03-09
Attending: EMERGENCY MEDICINE | Admitting: INTERNAL MEDICINE
Payer: COMMERCIAL

## 2017-03-08 DIAGNOSIS — E78.2 COMBINED HYPERLIPIDEMIA ASSOCIATED WITH TYPE 2 DIABETES MELLITUS: ICD-10-CM

## 2017-03-08 DIAGNOSIS — R20.0 SENSORY LOSS: ICD-10-CM

## 2017-03-08 DIAGNOSIS — R53.1 RIGHT SIDED WEAKNESS: ICD-10-CM

## 2017-03-08 DIAGNOSIS — R68.84 JAW PAIN: Primary | ICD-10-CM

## 2017-03-08 DIAGNOSIS — R20.0 NUMBNESS: ICD-10-CM

## 2017-03-08 DIAGNOSIS — E11.69 COMBINED HYPERLIPIDEMIA ASSOCIATED WITH TYPE 2 DIABETES MELLITUS: ICD-10-CM

## 2017-03-08 DIAGNOSIS — E11.59 HYPERTENSION ASSOCIATED WITH DIABETES: ICD-10-CM

## 2017-03-08 DIAGNOSIS — Z72.0 TOBACCO USE: ICD-10-CM

## 2017-03-08 DIAGNOSIS — H53.2 DIPLOPIA: ICD-10-CM

## 2017-03-08 DIAGNOSIS — R20.2 PARESTHESIA: ICD-10-CM

## 2017-03-08 DIAGNOSIS — Z79.899 HIGH RISK MEDICATION USE: ICD-10-CM

## 2017-03-08 DIAGNOSIS — Z86.73 HX-TIA (TRANSIENT ISCHEMIC ATTACK): ICD-10-CM

## 2017-03-08 DIAGNOSIS — M79.7 FIBROMYALGIA: ICD-10-CM

## 2017-03-08 DIAGNOSIS — F33.9 MAJOR DEPRESSION, RECURRENT, CHRONIC: ICD-10-CM

## 2017-03-08 DIAGNOSIS — I15.2 HYPERTENSION ASSOCIATED WITH DIABETES: ICD-10-CM

## 2017-03-08 DIAGNOSIS — R91.1 PULMONARY NODULE: ICD-10-CM

## 2017-03-08 DIAGNOSIS — G45.9 TRANSIENT CEREBRAL ISCHEMIC ATTACK: ICD-10-CM

## 2017-03-08 DIAGNOSIS — R76.8 POSITIVE ANA (ANTINUCLEAR ANTIBODY): ICD-10-CM

## 2017-03-08 PROBLEM — R51.9 HA (HEADACHE): Status: ACTIVE | Noted: 2017-03-08

## 2017-03-08 LAB
ANION GAP SERPL CALC-SCNC: 9 MMOL/L
BASOPHILS # BLD AUTO: 0.05 K/UL
BASOPHILS NFR BLD: 0.6 %
BUN SERPL-MCNC: 15 MG/DL
CALCIUM SERPL-MCNC: 9.3 MG/DL
CHLORIDE SERPL-SCNC: 105 MMOL/L
CO2 SERPL-SCNC: 27 MMOL/L
CREAT SERPL-MCNC: 0.7 MG/DL
DIFFERENTIAL METHOD: ABNORMAL
EOSINOPHIL # BLD AUTO: 0.1 K/UL
EOSINOPHIL NFR BLD: 1.5 %
ERYTHROCYTE [DISTWIDTH] IN BLOOD BY AUTOMATED COUNT: 14.9 %
EST. GFR  (AFRICAN AMERICAN): >60 ML/MIN/1.73 M^2
EST. GFR  (NON AFRICAN AMERICAN): >60 ML/MIN/1.73 M^2
GLUCOSE SERPL-MCNC: 142 MG/DL
HCT VFR BLD AUTO: 44.9 %
HGB BLD-MCNC: 14.4 G/DL
INR PPP: 0.9
LYMPHOCYTES # BLD AUTO: 1.4 K/UL
LYMPHOCYTES NFR BLD: 16.4 %
MCH RBC QN AUTO: 31.2 PG
MCHC RBC AUTO-ENTMCNC: 32.1 %
MCV RBC AUTO: 97 FL
MONOCYTES # BLD AUTO: 0.4 K/UL
MONOCYTES NFR BLD: 4.7 %
NEUTROPHILS # BLD AUTO: 6.8 K/UL
NEUTROPHILS NFR BLD: 76.8 %
PLATELET # BLD AUTO: 163 K/UL
PMV BLD AUTO: 12.5 FL
POCT GLUCOSE: 76 MG/DL (ref 70–110)
POTASSIUM SERPL-SCNC: 4.3 MMOL/L
PROTHROMBIN TIME: 9.8 SEC
RBC # BLD AUTO: 4.62 M/UL
SODIUM SERPL-SCNC: 141 MMOL/L
TROPONIN I SERPL DL<=0.01 NG/ML-MCNC: 0.01 NG/ML
TROPONIN I SERPL DL<=0.01 NG/ML-MCNC: <0.006 NG/ML
WBC # BLD AUTO: 8.78 K/UL

## 2017-03-08 PROCEDURE — 99243 OFF/OP CNSLTJ NEW/EST LOW 30: CPT | Mod: GT,,, | Performed by: PSYCHIATRY & NEUROLOGY

## 2017-03-08 PROCEDURE — 93010 ELECTROCARDIOGRAM REPORT: CPT | Mod: ,,, | Performed by: INTERNAL MEDICINE

## 2017-03-08 PROCEDURE — 80048 BASIC METABOLIC PNL TOTAL CA: CPT

## 2017-03-08 PROCEDURE — G0378 HOSPITAL OBSERVATION PER HR: HCPCS

## 2017-03-08 PROCEDURE — 83036 HEMOGLOBIN GLYCOSYLATED A1C: CPT

## 2017-03-08 PROCEDURE — 25000003 PHARM REV CODE 250: Performed by: NURSE PRACTITIONER

## 2017-03-08 PROCEDURE — 84484 ASSAY OF TROPONIN QUANT: CPT | Mod: 91

## 2017-03-08 PROCEDURE — 85025 COMPLETE CBC W/AUTO DIFF WBC: CPT

## 2017-03-08 PROCEDURE — 93005 ELECTROCARDIOGRAM TRACING: CPT

## 2017-03-08 PROCEDURE — 84484 ASSAY OF TROPONIN QUANT: CPT

## 2017-03-08 PROCEDURE — 25000003 PHARM REV CODE 250: Performed by: EMERGENCY MEDICINE

## 2017-03-08 PROCEDURE — 85610 PROTHROMBIN TIME: CPT

## 2017-03-08 PROCEDURE — 36415 COLL VENOUS BLD VENIPUNCTURE: CPT

## 2017-03-08 PROCEDURE — 99285 EMERGENCY DEPT VISIT HI MDM: CPT | Mod: 25

## 2017-03-08 RX ORDER — CLOPIDOGREL BISULFATE 75 MG/1
75 TABLET ORAL DAILY
Status: DISCONTINUED | OUTPATIENT
Start: 2017-03-09 | End: 2017-03-09 | Stop reason: HOSPADM

## 2017-03-08 RX ORDER — NITROGLYCERIN 0.4 MG/1
0.4 TABLET SUBLINGUAL EVERY 5 MIN PRN
Status: DISCONTINUED | OUTPATIENT
Start: 2017-03-08 | End: 2017-03-09 | Stop reason: HOSPADM

## 2017-03-08 RX ORDER — INSULIN ASPART 100 [IU]/ML
0-5 INJECTION, SOLUTION INTRAVENOUS; SUBCUTANEOUS
Status: DISCONTINUED | OUTPATIENT
Start: 2017-03-08 | End: 2017-03-09 | Stop reason: HOSPADM

## 2017-03-08 RX ORDER — IPRATROPIUM BROMIDE AND ALBUTEROL SULFATE 2.5; .5 MG/3ML; MG/3ML
3 SOLUTION RESPIRATORY (INHALATION) EVERY 6 HOURS PRN
Status: DISCONTINUED | OUTPATIENT
Start: 2017-03-08 | End: 2017-03-09 | Stop reason: HOSPADM

## 2017-03-08 RX ORDER — DIPHENHYDRAMINE HCL 25 MG
25 CAPSULE ORAL
Status: DISCONTINUED | OUTPATIENT
Start: 2017-03-08 | End: 2017-03-08

## 2017-03-08 RX ORDER — PRAVASTATIN SODIUM 20 MG/1
20 TABLET ORAL DAILY
Status: DISCONTINUED | OUTPATIENT
Start: 2017-03-09 | End: 2017-03-09 | Stop reason: HOSPADM

## 2017-03-08 RX ORDER — ASPIRIN 325 MG
325 TABLET ORAL
Status: COMPLETED | OUTPATIENT
Start: 2017-03-08 | End: 2017-03-08

## 2017-03-08 RX ORDER — IBUPROFEN 200 MG
16 TABLET ORAL
Status: DISCONTINUED | OUTPATIENT
Start: 2017-03-08 | End: 2017-03-09 | Stop reason: HOSPADM

## 2017-03-08 RX ORDER — IBUPROFEN 200 MG
24 TABLET ORAL
Status: DISCONTINUED | OUTPATIENT
Start: 2017-03-08 | End: 2017-03-09 | Stop reason: HOSPADM

## 2017-03-08 RX ORDER — NITROGLYCERIN 0.4 MG/1
0.4 TABLET SUBLINGUAL
Status: COMPLETED | OUTPATIENT
Start: 2017-03-08 | End: 2017-03-08

## 2017-03-08 RX ORDER — LEVOTHYROXINE SODIUM 50 UG/1
50 TABLET ORAL
Status: DISCONTINUED | OUTPATIENT
Start: 2017-03-09 | End: 2017-03-09 | Stop reason: HOSPADM

## 2017-03-08 RX ORDER — GLUCAGON 1 MG
1 KIT INJECTION
Status: DISCONTINUED | OUTPATIENT
Start: 2017-03-08 | End: 2017-03-09 | Stop reason: HOSPADM

## 2017-03-08 RX ORDER — FAMOTIDINE 20 MG/1
20 TABLET, FILM COATED ORAL 2 TIMES DAILY
Status: DISCONTINUED | OUTPATIENT
Start: 2017-03-08 | End: 2017-03-09 | Stop reason: HOSPADM

## 2017-03-08 RX ORDER — BUTALBITAL, ACETAMINOPHEN AND CAFFEINE 50; 325; 40 MG/1; MG/1; MG/1
1 TABLET ORAL EVERY 4 HOURS PRN
Status: DISCONTINUED | OUTPATIENT
Start: 2017-03-08 | End: 2017-03-09 | Stop reason: HOSPADM

## 2017-03-08 RX ADMIN — NITROGLYCERIN 0.4 MG: 0.4 TABLET SUBLINGUAL at 01:03

## 2017-03-08 RX ADMIN — FAMOTIDINE 20 MG: 20 TABLET ORAL at 09:03

## 2017-03-08 RX ADMIN — BUTALBITAL, ACETAMINOPHEN, AND CAFFEINE 1 TABLET: 50; 325; 40 TABLET ORAL at 09:03

## 2017-03-08 RX ADMIN — ASPIRIN 325 MG ORAL TABLET 325 MG: 325 PILL ORAL at 01:03

## 2017-03-08 NOTE — ED NOTES
Patient sitting up in bed, no acute distress noted, awake, alert, and oriented x 3, calm, respirations even and unlabored, and skin is warm and dry. Patient verbalized understanding of status and plan of care. Patient denies needs at this time. Side rails up x 2, call light in reach, bed low and locked. Family at bedside. Will continue to monitor.

## 2017-03-08 NOTE — CONSULTS
[facility]  Tele-Consult    Consultation started: 3/8/2017 at 3:12 PM   Consulting Provider:  Slade  The patient location is [facility]  Spoke hospital nurse at bedside with patient assisting consultant.     Subjective:   Subjective   History of Present Illness:  Diana Escobar is a 51 y.o. female who presents with R sided weakness.    At 1030 this morning started having R sided numbness around her face, then marched down her arm to her hand, over 45 minutes to an hour. Also c/o difficulty w/  and holding objects with her right hand, walking difficulty because her right side is also heavy. Headache started slightly before these symptoms, described as an ice cream headache, in headache and severe for several minutes and then ease up and go away. Never had headaches like this prior to her TIA event a few weeks ago. History of migraine headaches 20 years ago, w/o aura.  These symptoms have occurred before, but more extreme, went to hospital a few weeks ago with right side weakness and sensory loss. There are no identified triggers or modifying factors. There have been no recurrent events. There are no other associated symptoms.    Wake up Stroke?: no  Last known normal:  1000  Recent bleeding noted: no  Does the patient take any Blood Thinners? no     H&P was obtained rom patient.  Past Medical History: TIA, DM, HTN, HLD, hypothyroidism    Medications: No current facility-administered medications for this encounter.     Current Outpatient Prescriptions:     amlodipine (NORVASC) 5 MG tablet, Take 5 mg by mouth., Disp: , Rfl:     clopidogrel (PLAVIX) 75 mg tablet, Take 75 mg by mouth once daily., Disp: , Rfl:     cyanocobalamin 500 MCG tablet, Take 500 mcg by mouth once daily., Disp: , Rfl:     folic acid (FOLVITE) 1 MG tablet, Take 1 tablet (1 mg total) by mouth once daily., Disp: 90 tablet, Rfl: 1    gabapentin (NEURONTIN) 300 MG capsule, Take 1 capsule  "(300 mg total) by mouth 2 (two) times daily., Disp: 60 capsule, Rfl: 3    levothyroxine (SYNTHROID) 50 MCG tablet, Take 1 tablet (50 mcg total) by mouth once daily., Disp: 30 tablet, Rfl: 11    magnesium 250 mg Tab, Take by mouth 2 (two) times daily., Disp: , Rfl:     metformin (GLUCOPHAGE-XR) 500 MG 24 hr tablet, TAKE ONE TABLET BY MOUTH TWICE DAILY WITH MEALS, Disp: 60 tablet, Rfl: 6    methotrexate 2.5 MG Tab, Take 6 tablets (15 mg total) by mouth every 7 days., Disp: 24 tablet, Rfl: 0    pravastatin (PRAVACHOL) 20 MG tablet, TAKE ONE TABLET BY MOUTH ONCE DAILY, Disp: 30 tablet, Rfl: 6    predniSONE (DELTASONE) 5 MG tablet, Take 1.5 tablets (7.5 mg total) by mouth once daily., Disp: 135 tablet, Rfl: 0    pyridoxine, vitamin B6, (VITAMIN B-6) 200 mg Tab, Take 200 mg by mouth once daily., Disp: , Rfl:     RIBOFLAVIN (VITAMIN B-2 ORAL), Take 100 mg by mouth once daily., Disp: , Rfl:     UBIDECARENONE (COENZYME Q10) 100 mg Tab, Take 1 tablet by mouth once daily. Take 300 mg daily, Disp: , Rfl:     venlafaxine (EFFEXOR-XR) 150 MG Cp24, Take 1 capsule (150 mg total) by mouth once daily., Disp: 30 capsule, Rfl: 3    vitamin D 1000 units Tab, Take 2,000 Units by mouth once daily., Disp: , Rfl:     albuterol 90 mcg/actuation inhaler, Inhale 2 puffs into the lungs every 6 (six) hours as needed for Wheezing., Disp: 18 g, Rfl: 6    blood sugar diagnostic Strp, Glucose testing daily., Disp: 50 strip, Rfl: 11    blood-glucose meter Misc, Use as directed., Disp: 1 each, Rfl: 0    butalbital-acetaminophen-caffeine -40 mg (FIORICET, ESGIC) -40 mg per tablet, Take 1 tablet by mouth every 4 (four) hours as needed for Pain., Disp: , Rfl:     insulin needles, disposable, (PEN NEEDLE) 31 X 1/4 " Ndle, Twice daily injections., Disp: 100 each, Rfl: 6    lancets (LANCETS,ULTRA THIN) Misc, Glucose testing daily., Disp: 50 each, Rfl: 11    Allergies:   Review of patient's allergies indicates:   Allergen " Reactions    Mobic [meloxicam]     Topamax [topiramate]        Objective:     Vitals:   Vitals:    17 1445   BP:    Pulse: 81   Resp: (!) 21   Temp:         Objective   Physical Exam:  General: well developed, well nourished   HENT: Head:normocephalic and atraumatic   HENT: Ears: right ear normal and left ear normal  Nose: normal nares and no discharge  Eyes:conjunctivae/corneas clear. PERRL.  Neck: normal, no obvious masses and trachea to midline  Lungs: normal respiratory effort  Cardiovascular: Heart: regular rate and rhythm   Cardiovascular: Extremities: no cyanosis, no edema and no clubbing  Abdomen: appears normal and not distended  Skin:  skin color and texture normal, no rash and no bruises  Musculoskeletal: normal range of motion in all extremities       Imaging Notes: No hemmorhage. No mass effect. No early infarct signs. Impression performed at: 1545    NIH Stroke Scale:  Interval: baseline (upon arrival/admit)  Level of Consciousness: 0 - alert  LOC Questions: 0 - answers both correctly  LOC Commands: 0 - performs both correctly  Best Gaze: 0 - normal  Visual: 0 - no visual loss  Facial Palsy: 0 - normal  Motor Left Arm: 0 - no drift  Motor Right Arm: 0 - no drift  Motor Left Le - no drift  Motor Right Le - no drift  Limb Ataxia: 0 - absent  Sensory: 1 - mild to moderate loss  Best Language: 0 - no aphasia  Dysarthria: 0 - normal articulation  Extinction and Inattention: 0 - no neglect  NIH Stroke Scale Total: 1        Assessment - Diagnosis - Goals:     Plan     Diagnosis/Impression: TIA/acute cerebrovascular insufficiency vs. migraine w/ aura vs. small vessel infarct    TPA Recommendation: TPA not recommended due to Outside of treament window    Recommendation: NPO until after pass dysphagia screen. Diagnostic studies: CTA Head to assess vasculature , CTA Neck/Arch to assess vasculature, HgbA1C to assess blood glucose levels, Lipid Profile to assess cholesterol levels, MRI head without  contrast to assess brain parenchyma, Trans-thoracic cardiac echo to assess cardiac function/status  Consults: Rehab Consult; Occupational Therapy, Rehab Consult; Physical Therapy and Rehab Consult; Speech Therapy  Antithrombotics: Aspirin: 325 mg oral daily  Statins: Atorvastatin- 40 mg oral daily    Disposition Recommendation:  admit to inpatient       Possible Interventional Revascularization Candidate? No; No significant neurological deficit    Recommended the emergency room physician to have a brief discussion with the patient and/or family if available regarding the risks and benefits of treatment, and to briefly document the occurrence of that discussion in his clinical encounter note.     The attending portion of this evaluation, treatment, and documentation was performed per Ayan Rosado via audiovisual.      Billing code:  (non-intervention mild to moderate stroke, TIA, some mimics)    · This patient has a critical neurological condition/illness, with some potential for high morbidity and mortality.  · There is a moderate probability for acute neurological change leading to clinical and possibly life-threatening deterioration requiring highest level of physician preparedness for urgent intervention.  · Care was coordinated with other physicians involved in the patient's care.  · Radiologic studies and laboratory data were reviewed and interpreted, and plan of care was re-assessed based on the results.  Diagnosis, treatment options and prognosis may have been discussed with the patient and/or family members or caregiver.      Consultation ended: 3/8/2017 at 1600    Kwesi Rosado MD  Vascular Neurology

## 2017-03-08 NOTE — ED PROVIDER NOTES
SCRIBE #1 NOTE: I, Lawrence Gonzalez, am scribing for, and in the presence of, Compa Cervantes MD. I have scribed the entire note.      History      Chief Complaint   Patient presents with    Numbness     pt c/o numbness/tingling to RUE, dizziness, and sensitivity to sound; x30 min; pmhx TIA 2weeks ago       Review of patient's allergies indicates:   Allergen Reactions    Mobic [meloxicam]     Topamax [topiramate]         HPI   HPI    3/8/2017, 1:38 PM   History obtained from the patient      History of Present Illness: Diana Escobar is a 51 y.o. female patient who presents to the Emergency Department for numbness which onset gradually 2 hours PTA. Symptoms are constant and moderate in severity. Sx are exacerbated by nothing and relieved by nothing. Pt reports a numb sensation to her RUE and R side of her face. Associated sxs include R sided jaw pain and heaviness to her RLE. Pt states she has these same sxs 2 weeks ago ad reported to [facility] where she was admitted for 4 days and dx with a TIA via neurologist. Patient denies any fever, N/V/D, chills, weakness, dizziness, lightheadedness, speech difficulty, HA, gait problem, CP, SOB, abd pain and all other sxs at this time. Pt has Hx of smoking, DM, HTN, and cardiac dysrhythmia. No further complaints or concerns at this time.       Arrival mode: Personal vehicle     PCP: Jory Alcocer DO       Past Medical History:  Past Medical History:   Diagnosis Date    Asthma     Chronic low back pain     Degenerative disc disease, lumbar     Depression     Diabetes mellitus     Fibromyalgia     High cholesterol     Hypertension     Hypothyroidism     MRSA (methicillin resistant Staphylococcus aureus) carrier     Stroke     TIA    Vitamin D deficiency disease        Past Surgical History:  Past Surgical History:   Procedure Laterality Date    HYSTERECTOMY      left ankle surgery      NECK SURGERY  06/2016    ROTATOR CUFF REPAIR      TONSILLECTOMY            Family History:  Family History   Problem Relation Age of Onset    Cancer Mother     Stroke Mother     Heart disease Mother     Diabetes Mother     Heart disease Father     COPD Father        Social History:  Social History     Social History Main Topics    Smoking status: Current Some Day Smoker     Packs/day: 0.50     Types: Cigarettes    Smokeless tobacco: Never Used    Alcohol use No    Drug use: No    Sexual activity: Yes       ROS   Review of Systems   Constitutional: Negative for chills and fever.   HENT: Negative for congestion and sore throat.         (+)R sided jaw pain   Respiratory: Negative for chest tightness and shortness of breath.    Cardiovascular: Negative for chest pain.   Gastrointestinal: Negative for abdominal pain, nausea and vomiting.   Musculoskeletal: Negative for back pain and neck pain.   Skin: Negative for rash.   Neurological: Positive for numbness (Rue/R face). Negative for dizziness, speech difficulty, weakness, light-headedness and headaches.        (+)heaviness to RLE   Psychiatric/Behavioral: Negative for agitation and confusion.   All other systems reviewed and are negative.      Physical Exam    Initial Vitals   BP Pulse Resp Temp SpO2   03/08/17 1238 03/08/17 1238 03/08/17 1238 03/08/17 1238 03/08/17 1238   138/91 100 18 98 °F (36.7 °C) 92 %      Physical Exam  Nursing Notes and Vital Signs Reviewed.  Constitutional: Patient is in no acute distress. Awake and alert. Well-developed and well-nourished.  Head: Atraumatic. Normocephalic.  Eyes: PERRL. EOM intact. Conjunctivae are not pale. No scleral icterus.  ENT: Mucous membranes are moist. Oropharynx is clear and symmetric.    Neck: Supple. Full ROM. No lymphadenopathy.  Cardiovascular: Regular rate. Regular rhythm. No murmurs, rubs, or gallops. Distal pulses are 2+ and symmetric.  Pulmonary/Chest: No respiratory distress. Clear to auscultation bilaterally. No wheezing, rales, or rhonchi.  Abdominal: Soft and  "non-distended.  There is no tenderness.  No rebound, guarding, or rigidity. Good bowel sounds.  Musculoskeletal: Moves all extremities. No obvious deformities. No edema. No calf tenderness.  Skin: Warm and dry.  Neurological: Patient is alert and oriented to person, place and time. Pupils ERRL and EOM normal. Cranial nerves II-XII are intact. Strength is full bilaterally; it is equal and 5/5 in bilateral upper and lower extremities. There is no pronator drift of outstretched arms. Decreased sensation noted to the R upper aspect of the forehead.  No dysmetria on finger to nose, heel to shin. Pt is able to lift both legs off of the bed. Slight drift noted to the RLE when leg was lifted off of the bed, but the RLE did not touch the bed. Speech is clear and normal.   Psychiatric: Normal affect. Good eye contact. Appropriate in content.    ED Course    Procedures  ED Vital Signs:  Vitals:    03/08/17 1238 03/08/17 1316 03/08/17 1344 03/08/17 1445   BP: (!) 138/91 123/77 124/78    Pulse: 100 86 88 81   Resp: 18 17 19 (!) 21   Temp: 98 °F (36.7 °C)      TempSrc: Oral      SpO2: (!) 92% 95% 98% 95%   Weight: 85.3 kg (188 lb)      Height: 5' 2" (1.575 m)          Abnormal Lab Results:  Labs Reviewed   BASIC METABOLIC PANEL - Abnormal; Notable for the following:        Result Value    Glucose 142 (*)     All other components within normal limits   CBC W/ AUTO DIFFERENTIAL - Abnormal; Notable for the following:     MCH 31.2 (*)     RDW 14.9 (*)     Gran% 76.8 (*)     Lymph% 16.4 (*)     All other components within normal limits   PROTIME-INR   TROPONIN I        All Lab Results:  Results for orders placed or performed during the hospital encounter of 03/08/17   Basic metabolic panel   Result Value Ref Range    Sodium 141 136 - 145 mmol/L    Potassium 4.3 3.5 - 5.1 mmol/L    Chloride 105 95 - 110 mmol/L    CO2 27 23 - 29 mmol/L    Glucose 142 (H) 70 - 110 mg/dL    BUN, Bld 15 6 - 20 mg/dL    Creatinine 0.7 0.5 - 1.4 mg/dL    " Calcium 9.3 8.7 - 10.5 mg/dL    Anion Gap 9 8 - 16 mmol/L    eGFR if African American >60 >60 mL/min/1.73 m^2    eGFR if non African American >60 >60 mL/min/1.73 m^2   Protime-INR   Result Value Ref Range    Prothrombin Time 9.8 9.0 - 12.5 sec    INR 0.9 0.8 - 1.2   Troponin I   Result Value Ref Range    Troponin I <0.006 0.000 - 0.026 ng/mL   CBC auto differential   Result Value Ref Range    WBC 8.78 3.90 - 12.70 K/uL    RBC 4.62 4.00 - 5.40 M/uL    Hemoglobin 14.4 12.0 - 16.0 g/dL    Hematocrit 44.9 37.0 - 48.5 %    MCV 97 82 - 98 fL    MCH 31.2 (H) 27.0 - 31.0 pg    MCHC 32.1 32.0 - 36.0 %    RDW 14.9 (H) 11.5 - 14.5 %    Platelets 163 150 - 350 K/uL    MPV 12.5 9.2 - 12.9 fL    Gran # 6.8 1.8 - 7.7 K/uL    Lymph # 1.4 1.0 - 4.8 K/uL    Mono # 0.4 0.3 - 1.0 K/uL    Eos # 0.1 0.0 - 0.5 K/uL    Baso # 0.05 0.00 - 0.20 K/uL    Gran% 76.8 (H) 38.0 - 73.0 %    Lymph% 16.4 (L) 18.0 - 48.0 %    Mono% 4.7 4.0 - 15.0 %    Eosinophil% 1.5 0.0 - 8.0 %    Basophil% 0.6 0.0 - 1.9 %    Differential Method Automated          Imaging Results:  Imaging Results         X-Ray Chest 1 View (Final result) Result time:  03/08/17 14:04:31    Final result by Kriss Combs MD (03/08/17 14:04:31)    Impression:     No acute cardiopulmonary disease.            Electronically signed by: KRISS COMBS MD  Date:     03/08/17  Time:    14:04     Narrative:    Exam: XR CHEST 1 VIEW    Clinical History: Anesthesias of the skin.     Findings:     The lungs are clear. The cardiac silhouette is within normal limits.            CT Head Without Contrast (Edited Result - FINAL) Result time:  03/08/17 13:14:04    Addendum 1 of 1 by Kriss Combs MD (03/08/17 13:14:04)    Exam: Findings reported to Dr. Wyman over the phone at 1310 hours on March 8, 2017.      Electronically signed by: KRISS COMBS MD  Date:     03/08/17  Time:    13:14           Final result by Kriss Combs MD (03/08/17 13:06:04)    Impression:             No CT evidence of acute  intracranial abnormality.        All CT scans at this facility use dose modulation, iterative reconstruction and/or weight based dosing when appropriate to reduce radiation dose to as low as reasonably achievable.       Electronically signed by: KRISS COMBS MD  Date:     03/08/17  Time:    13:06     Narrative:    O. Exam: CT HEAD WITHOUT CONTRAST    Clinical History:   Acute head pain.  Initial encounter.headache       Technique: Axial CT imaging was performed through the head without intravenous contrast.     Findings:    No hydrocephalus, midline shift, mass effect, or acute intracranial hemorrhage. Cortical sulcal pattern and gray-white matter differentiation is normal. Basilar cisterns and posterior fossa are normal. The visualized paranasal sinuses and mastoid air cells are clear. The skull is intact.                      The Emergency Provider reviewed the vital signs and test results, which are outlined above.    ED Discussion     1:09 PM: Dr. Cervantes discussed the pt's case with Dr. Combs (Radiology) who states pt's CT is negative for a stroke.    1:21 PM: Pt states her numbness completely resolved as she was having the CT done. Pt is stating she is not allergic to ASA.    2:51 PM: Re-evaluated pt. Pt is laying comfortably in ED bed and in NAD.  Pt states the jaw pain was relieved with nitro. Pt states the decreased sensation/numbness to the R side of her face is still improving. Pt still reports heaviness to her RLE.  D/w pt all pertinent results. D/w pt plan to get medical records from [facility] when pt was admitted and tele stroke consult will be ordered. D/w pt any concerns expressed at this time. Answered all questions. Pt expresses understanding at this time.    3:31 PM: Dr. Cervantes discussed the pt's case with Dr. Rosado (Neurology at [facility] in [place]) who recommends no interventional of TPA at this time.    3:43 PM: Re-evaluated pt. I have discussed test results, shared treatment plan, and the need  for admission with patient and family at bedside. Pt and family express understanding at this time and agree with all information. All questions answered. Pt and family have no further questions or concerns at this time. Pt is ready for admit.    3:53 PM: Discussed case with Stacia ([facility]). Dr. Fermin agrees with current care and management of pt and accepts admission.   Admitting Service: Hospital medicine   Admitting Physician: Dr. Fermin  Admit to: Obs      ED Medication(s):  Medications   aspirin tablet 325 mg (325 mg Oral Given 3/8/17 1339)   nitroGLYCERIN SL tablet 0.4 mg (0.4 mg Sublingual Given 3/8/17 1339)       New Prescriptions    No medications on file             Medical Decision Making    Medical Decision Making:   Clinical Tests:   Lab Tests: Reviewed and Ordered  Radiological Study: Ordered and Reviewed  Medical Tests: Ordered and Reviewed     Additional MDM:   Smoking Cessation: The patient was counseled on tobacco related  health complications.        Scribe Attestation:   Scribe #1: I performed the above scribed service and the documentation accurately describes the services I performed. I attest to the accuracy of the note.    Attending:   Physician Attestation Statement for Scribe #1: I, Compa Cervantes MD, personally performed the services described in this documentation, as scribed by Lawrence Gonzalez, in my presence, and it is both accurate and complete.          Clinical Impression       ICD-10-CM ICD-9-CM   1. Jaw pain R68.84 784.92   2. Numbness R20.0 782.0   3. Sensory loss R20.0 782.0       Disposition:   Disposition: Placed in Observation  Condition: Stable         Compa Cervantes MD  03/08/17 [id number]

## 2017-03-08 NOTE — IP AVS SNAPSHOT
[address] [place] [address]           Patient Discharge Instructions     Our goal is to set you up for success. This packet includes information on your condition, medications, and your home care. It will help you to care for yourself so you don't get sicker and need to go back to the hospital.     Please ask your nurse if you have any questions.        There are many details to remember when preparing to leave the hospital. Here is what you will need to do:    1. Take your medicine. If you are prescribed medications, review your Medication List in the following pages. You may have new medications to  at the pharmacy and others that you'll need to stop taking. Review the instructions for how and when to take your medications. Talk with your doctor or nurses if you are unsure of what to do.     2. Go to your follow-up appointments. Specific follow-up information is listed in the following pages. Your may be contacted by a transition nurse or clinical provider about future appointments. Be sure we have all of the phone numbers to reach you, if needed. Please contact your provider's office if you are unable to make an appointment.     3. Watch for warning signs. Your doctor or nurse will give you detailed warning signs to watch for and when to call for assistance. These instructions may also include educational information about your condition. If you experience any of warning signs to your health, call your doctor.               ** Verify the list of medication(s) below is accurate and up to date. Carry this with you in case of emergency. If your medications have changed, please notify your healthcare provider.             Medication List      CONTINUE taking these medications        Additional Info                      albuterol 90 mcg/actuation inhaler   Quantity:  18 g   Refills:  6   Dose:  2 puff    Instructions:  Inhale 2 puffs into the lungs every 6  (six) hours as needed for Wheezing.     Begin Date    AM    Noon    PM    Bedtime       amlodipine 5 MG tablet   Commonly known as:  NORVASC   Refills:  0   Dose:  5 mg    Instructions:  Take 5 mg by mouth.     Begin Date    AM    Noon    PM    Bedtime       blood sugar diagnostic Strp   Quantity:  50 strip   Refills:  11    Instructions:  Glucose testing daily.     Begin Date    AM    Noon    PM    Bedtime       blood-glucose meter Misc   Quantity:  1 each   Refills:  0    Instructions:  Use as directed.     Begin Date    AM    Noon    PM    Bedtime       butalbital-acetaminophen-caffeine -40 mg -40 mg per tablet   Commonly known as:  FIORICET, ESGIC   Refills:  0   Dose:  1 tablet    Last time this was given:  1 tablet on 3/8/2017  9:13 PM   Instructions:  Take 1 tablet by mouth every 4 (four) hours as needed for Pain.     Begin Date    AM    Noon    PM    Bedtime       clopidogrel 75 mg tablet   Commonly known as:  PLAVIX   Refills:  0   Dose:  75 mg    Last time this was given:  75 mg on 3/9/2017  9:10 AM   Instructions:  Take 75 mg by mouth once daily.     Begin Date    AM    Noon    PM    Bedtime       coenzyme Q10 100 mg Tab   Refills:  0   Dose:  1 tablet    Instructions:  Take 1 tablet by mouth once daily. Take 300 mg daily     Begin Date    AM    Noon    PM    Bedtime       cyanocobalamin 500 MCG tablet   Refills:  0   Dose:  500 mcg    Instructions:  Take 500 mcg by mouth once daily.     Begin Date    AM    Noon    PM    Bedtime       folic acid 1 MG tablet   Commonly known as:  FOLVITE   Quantity:  90 tablet   Refills:  1   Dose:  1 mg    Instructions:  Take 1 tablet (1 mg total) by mouth once daily.     Begin Date    AM    Noon    PM    Bedtime       gabapentin 300 MG capsule   Commonly known as:  NEURONTIN   Quantity:  60 capsule   Refills:  3   Dose:  300 mg    Instructions:  Take 1 capsule (300 mg total) by mouth 2 (two) times daily.     Begin Date    AM    Noon    PM    Bedtime        "lancets Misc   Commonly known as:  LANCETS,ULTRA THIN   Quantity:  50 each   Refills:  11    Instructions:  Glucose testing daily.     Begin Date    AM    Noon    PM    Bedtime       levothyroxine 50 MCG tablet   Commonly known as:  SYNTHROID   Quantity:  30 tablet   Refills:  11   Dose:  50 mcg    Last time this was given:  50 mcg on 3/9/2017  6:00 AM   Instructions:  Take 1 tablet (50 mcg total) by mouth once daily.     Begin Date    AM    Noon    PM    Bedtime       magnesium 250 mg Tab   Refills:  0    Instructions:  Take by mouth 2 (two) times daily.     Begin Date    AM    Noon    PM    Bedtime       metformin 500 MG 24 hr tablet   Commonly known as:  GLUCOPHAGE-XR   Quantity:  60 tablet   Refills:  6    Instructions:  TAKE ONE TABLET BY MOUTH TWICE DAILY WITH MEALS     Begin Date    AM    Noon    PM    Bedtime       methotrexate 2.5 MG Tab   Quantity:  24 tablet   Refills:  0   Dose:  15 mg    Instructions:  Take 6 tablets (15 mg total) by mouth every 7 days.     Begin Date    AM    Noon    PM    Bedtime       pen needle, diabetic 31 gauge x 1/4" Ndle   Commonly known as:  PEN NEEDLE   Quantity:  100 each   Refills:  6    Instructions:  Twice daily injections.     Begin Date    AM    Noon    PM    Bedtime       pravastatin 20 MG tablet   Commonly known as:  PRAVACHOL   Quantity:  30 tablet   Refills:  6    Last time this was given:  20 mg on 3/9/2017  9:10 AM   Instructions:  TAKE ONE TABLET BY MOUTH ONCE DAILY     Begin Date    AM    Noon    PM    Bedtime       predniSONE 5 MG tablet   Commonly known as:  DELTASONE   Quantity:  135 tablet   Refills:  0   Dose:  7.5 mg    Instructions:  Take 1.5 tablets (7.5 mg total) by mouth once daily.     Begin Date    AM    Noon    PM    Bedtime       venlafaxine 150 MG Cp24   Commonly known as:  EFFEXOR-XR   Quantity:  30 capsule   Refills:  3   Dose:  150 mg    Instructions:  Take 1 capsule (150 mg total) by mouth once daily.     Begin Date    AM    Noon    PM    " Bedtime       VITAMIN B-2 ORAL   Refills:  0   Dose:  100 mg    Instructions:  Take 100 mg by mouth once daily.     Begin Date    AM    Noon    PM    Bedtime       VITAMIN B-6 200 mg Tab   Refills:  0   Dose:  200 mg   Generic drug:  pyridoxine (vitamin B6)    Instructions:  Take 200 mg by mouth once daily.     Begin Date    AM    Noon    PM    Bedtime       vitamin D 1000 units Tab   Refills:  0   Dose:  2000 Units    Instructions:  Take 2,000 Units by mouth once daily.     Begin Date    AM    Noon    PM    Bedtime                  Please bring to all follow up appointments:    1. A copy of your discharge instructions.  2. All medicines you are currently taking in their original bottles.  3. Identification and insurance card.    Please arrive 15 minutes ahead of scheduled appointment time.    Please call 24 hours in advance if you must reschedule your appointment and/or time.        Your Scheduled Appointments     Mar 13, 2017  3:20 PM CDT   Hospital Follow Up with DO Mahamed Mott - Internal Medicine ([facility])    [address] [place] [id number]   [phone]            Mar 30, 2017 11:10 AM CDT   Non-Fasting Lab with [facility]ATORY, [address] [facility]    [address] [place] [id number]   [phone]            Apr 27, 2017 10:40 AM CDT   Non-Fasting Lab with LABORATORY, [address] [facility]    [address] [place] [id number]   [phone]            May 05, 2017  8:20 AM CDT   Fasting Lab with LABORATORY, [address] [facility]    [address] [place] [id number]   [phone]            May 08, 2017  9:00 AM CDT   Established Patient Visit with DO [facility] - Internal Medicine ([facility])    [address] [place] [id number]   [phone]              Follow-up Information     Follow up with  Jory Alcocer DO. Schedule an appointment as soon as possible for a visit in 3 days.    Specialty:  Internal Medicine    Why:  hospital follow up    Contact information:    [address] [place] [id number] [phone]          Follow up with KRISTEN Lao MD. Schedule an appointment as soon as possible for a visit on 3/17/2017.    Specialty:  Cardiology    Why:  Appt scheduled at 1:45PM hospital follow up (evaluation for implantable loop recorder for arrhythmias)    Contact information:    [address]  [address]  [facility]  [place] [id number] [phone]          Follow up with Junior Craft MD. Schedule an appointment as soon as possible for a visit in 1 week.    Specialty:  Neurology    Why:  hospital follow up    Contact information:    [address]  [address]  [place] [id number] [phone]          Discharge Instructions     Future Orders    Activity as tolerated     Call MD for:  difficulty breathing or increased cough     Call MD for:  increased confusion or weakness     Call MD for:  persistent dizziness, light-headedness, or visual disturbances     Diet general     Questions:    Total calories:      Fat restriction, if any:      Protein restriction, if any:      Na restriction, if any:      Fluid restriction:      Additional restrictions:          Primary Diagnosis     Your primary diagnosis was:  Weakness Of Right Side Of Body      Admission Information     Date & Time Provider Department CSN    3/8/2017 12:40 PM Babar Fermin MD [facility] [id number]      Care Providers     Provider Role Specialty Primary office phone    Babar Fermin MD Attending Provider Internal Medicine [phone]    Ayan Rosado MD Consulting Physician  Neurology [phone]    Babar Fermin MD Consulting Physician  Internal Medicine [phone]    Agustin Haskins MD Consulting Physician  Neurology [phone]    Babar JOYNER  "MD Jeny Team Attending  Internal Medicine [phone]      Your Vitals Were     BP Pulse Temp Resp Height Weight    110/65 (BP Location: Right arm, Patient Position: Lying, BP Method: Automatic) 78 98 °F (36.7 °C) (Oral) 18 5' 2" (1.575 m) 89.7 kg (197 lb 11.2 oz)    SpO2 BMI             92% 36.16 kg/m2         Recent Lab Values        5/12/2014 11/19/2014 3/2/2015 9/4/2015 5/26/2016 11/10/2016            4:16 AM  8:55 AM  9:14 AM  9:11 AM 11:00 AM  9:34 AM      A1C 5.9 6.4 (H) 6.0 6.5 (H) 5.9 6.2      Comment for A1C at  9:34 AM on 11/10/2016:  According to ADA guidelines, hemoglobin A1C <7.0% represents  optimal control in non-pregnant diabetic patients.  Different  metrics may apply to specific populations.   Standards of Medical Care in Diabetes - 2016.  For the purpose of screening for the presence of diabetes:  <5.7%     Consistent with the absence of diabetes  5.7-6.4%  Consistent with increasing risk for diabetes   (prediabetes)  >or=6.5%  Consistent with diabetes  Currently no consensus exists for use of hemoglobin A1C  for diagnosis of diabetes for children.        Pending Labs     Order Current Status    Hemoglobin A1c In process      Allergies as of 3/9/2017        Reactions    Mobic [Meloxicam]     Topamax [Topiramate]       [facility]     [facility] Care Line - 24/7 Assistance  Unless otherwise directed by your provider, please contact [facility], our nurse care line that is available for 24/7 assistance.     Registered nurses in the [facility] provide clinical advisement, health education, appointment booking, and other advisory services.  Call for this free service at [phone].        Advance Directives     An advance directive is a document which, in the event you are no longer able to make decisions for yourself, tells your healthcare team what kind of treatment you do or do not want to receive, or who you would like to make those decisions for you.  If " you do not currently have an advance directive, [facility] encourages you to create one.  For more information call:  [phone]), [phone] ([phone]),  or log on to [email or web address].        Language Assistance Services     ATTENTION: Language assistance services are available, free of charge. Please call [phone].      ATENCIÓN: Si habla español, tiene a duarte disposición servicios gratuitos de asistencia lingüística. Llame al [phone].     CHÚ Ý: N?u b?n nói Ti?ng Vi?t, có các d?ch v? h? tr? ngôn ng? mi?n phí dành cho b?n. G?i s? [phone].        Diabetes Discharge Instructions                                    [facility] complies with applicable Federal civil rights laws and does not discriminate on the basis of race, color, national origin, age, disability, or sex.

## 2017-03-09 VITALS
DIASTOLIC BLOOD PRESSURE: 69 MMHG | OXYGEN SATURATION: 93 % | SYSTOLIC BLOOD PRESSURE: 105 MMHG | RESPIRATION RATE: 18 BRPM | HEIGHT: 62 IN | HEART RATE: 74 BPM | BODY MASS INDEX: 36.38 KG/M2 | TEMPERATURE: 98 F | WEIGHT: 197.69 LBS

## 2017-03-09 PROBLEM — R53.1 RIGHT SIDED WEAKNESS: Status: ACTIVE | Noted: 2017-03-09

## 2017-03-09 LAB
ALBUMIN SERPL BCP-MCNC: 3.7 G/DL
ALP SERPL-CCNC: 78 U/L
ALT SERPL W/O P-5'-P-CCNC: 21 U/L
ANION GAP SERPL CALC-SCNC: 9 MMOL/L
AST SERPL-CCNC: 14 U/L
BASOPHILS # BLD AUTO: 0.04 K/UL
BASOPHILS NFR BLD: 0.4 %
BILIRUB SERPL-MCNC: 0.2 MG/DL
BUN SERPL-MCNC: 13 MG/DL
CALCIUM SERPL-MCNC: 9.3 MG/DL
CHLORIDE SERPL-SCNC: 104 MMOL/L
CO2 SERPL-SCNC: 30 MMOL/L
CREAT SERPL-MCNC: 0.7 MG/DL
DIASTOLIC DYSFUNCTION: NO
DIFFERENTIAL METHOD: ABNORMAL
EOSINOPHIL # BLD AUTO: 0.2 K/UL
EOSINOPHIL NFR BLD: 2.3 %
ERYTHROCYTE [DISTWIDTH] IN BLOOD BY AUTOMATED COUNT: 15 %
EST. GFR  (AFRICAN AMERICAN): >60 ML/MIN/1.73 M^2
EST. GFR  (NON AFRICAN AMERICAN): >60 ML/MIN/1.73 M^2
GLUCOSE SERPL-MCNC: 81 MG/DL
HCT VFR BLD AUTO: 46 %
HGB BLD-MCNC: 14.9 G/DL
LYMPHOCYTES # BLD AUTO: 3.6 K/UL
LYMPHOCYTES NFR BLD: 38.9 %
MCH RBC QN AUTO: 31.8 PG
MCHC RBC AUTO-ENTMCNC: 32.4 %
MCV RBC AUTO: 98 FL
MITRAL VALVE REGURGITATION: NORMAL
MONOCYTES # BLD AUTO: 0.7 K/UL
MONOCYTES NFR BLD: 7.5 %
NEUTROPHILS # BLD AUTO: 4.7 K/UL
NEUTROPHILS NFR BLD: 50.9 %
PLATELET # BLD AUTO: 160 K/UL
PMV BLD AUTO: 12.3 FL
POCT GLUCOSE: 102 MG/DL (ref 70–110)
POCT GLUCOSE: 176 MG/DL (ref 70–110)
POTASSIUM SERPL-SCNC: 4.5 MMOL/L
PROT SERPL-MCNC: 6.7 G/DL
RBC # BLD AUTO: 4.69 M/UL
RETIRED EF AND QEF - SEE NOTES: 60 (ref 55–65)
SODIUM SERPL-SCNC: 143 MMOL/L
TROPONIN I SERPL DL<=0.01 NG/ML-MCNC: 0.01 NG/ML
WBC # BLD AUTO: 9.21 K/UL

## 2017-03-09 PROCEDURE — G0378 HOSPITAL OBSERVATION PER HR: HCPCS

## 2017-03-09 PROCEDURE — 80053 COMPREHEN METABOLIC PANEL: CPT

## 2017-03-09 PROCEDURE — 25000003 PHARM REV CODE 250: Performed by: NURSE PRACTITIONER

## 2017-03-09 PROCEDURE — A9585 GADOBUTROL INJECTION: HCPCS | Performed by: INTERNAL MEDICINE

## 2017-03-09 PROCEDURE — 96374 THER/PROPH/DIAG INJ IV PUSH: CPT

## 2017-03-09 PROCEDURE — 82962 GLUCOSE BLOOD TEST: CPT

## 2017-03-09 PROCEDURE — 99204 OFFICE O/P NEW MOD 45 MIN: CPT | Mod: ,,, | Performed by: INTERNAL MEDICINE

## 2017-03-09 PROCEDURE — 36415 COLL VENOUS BLD VENIPUNCTURE: CPT

## 2017-03-09 PROCEDURE — 85025 COMPLETE CBC W/AUTO DIFF WBC: CPT

## 2017-03-09 PROCEDURE — 25500020 PHARM REV CODE 255: Performed by: INTERNAL MEDICINE

## 2017-03-09 PROCEDURE — 93306 TTE W/DOPPLER COMPLETE: CPT | Mod: 26,,, | Performed by: INTERNAL MEDICINE

## 2017-03-09 PROCEDURE — 99219 PR INITIAL OBSERVATION CARE,LEVL II: CPT | Mod: ,,, | Performed by: PSYCHIATRY & NEUROLOGY

## 2017-03-09 PROCEDURE — 84484 ASSAY OF TROPONIN QUANT: CPT

## 2017-03-09 RX ORDER — ALPRAZOLAM 1 MG/1
1 TABLET ORAL
Status: COMPLETED | OUTPATIENT
Start: 2017-03-09 | End: 2017-03-09

## 2017-03-09 RX ORDER — GADOBUTROL 604.72 MG/ML
8 INJECTION INTRAVENOUS
Status: COMPLETED | OUTPATIENT
Start: 2017-03-09 | End: 2017-03-09

## 2017-03-09 RX ADMIN — CLOPIDOGREL BISULFATE 75 MG: 75 TABLET ORAL at 09:03

## 2017-03-09 RX ADMIN — PRAVASTATIN SODIUM 20 MG: 20 TABLET ORAL at 09:03

## 2017-03-09 RX ADMIN — FAMOTIDINE 20 MG: 20 TABLET ORAL at 09:03

## 2017-03-09 RX ADMIN — LEVOTHYROXINE SODIUM 50 MCG: 50 TABLET ORAL at 06:03

## 2017-03-09 RX ADMIN — ALPRAZOLAM 1 MG: 1 TABLET ORAL at 11:03

## 2017-03-09 RX ADMIN — GADOBUTROL 8 ML: 604.72 INJECTION INTRAVENOUS at 12:03

## 2017-03-09 NOTE — ASSESSMENT & PLAN NOTE
-resolved post Nitro  -Cardiology consulted in ED  -ASA,Plavix, and Statin continued  -nitrates prn  -will continue to monitor  -will follow serial troponin results

## 2017-03-09 NOTE — CONSULTS
"[facility] -   Neurology  Consult Note    Patient Name: Diana Escobar  MRN: [id number]  Admission Date: 3/8/2017  Hospital Length of Stay: 0 days  Code Status: Full Code   Attending Provider: Babar Fermin MD   Consulting Provider: Agustin Haskins MD  Primary Care Physician: Jory Alcocer DO  Principal Problem:<principal problem not specified>    Consults  Subjective:     Chief Complaint:  Numbness of the right side of face and right arm with intermittent headache     HPI: The patient states that she has been experiencing intermittent sudden sharp jabs of pain that she feels primarily in the right temple area.  In fact, the patient had been admitted to [facility] for evaluation of these complaints together with an event characterized by numbness and weakness of the right side of the body primarily in the arm and hand but also in the right leg.  According to medical report received, the patient was extensively evaluated including MRI, MRA, carotid ultrasound, and echocardiogram for that event.  She was also seen in follow-up by the neurology consultant after discharge from the hospital who indicated that the most likely diagnosis was TIA.    The patient presents at this time with a very similar type of history.  The patient states that she noted the onset of numbness on the right side of the face that extended into the right shoulder and upper arm area.  At the same time, she felt that her right leg was heavy when attempting to stand and walk.  Because of the acute onset of the symptoms, she presented to the emergency room.  She was evaluated by urgency room as well as by the telestroke consultant.  TPA was not felt to be indicated as the patient seemed to be improving.  An emergent CT scan of the brain did not show any acute abnormality.    The patient states that she has been experiencing a sudden sharp headache pain which she describes as a "brain freeze" that she feels in " the right temple area intermittently.  The intense pain lasted perhaps 1 minute or less.  When this event occurred on this occasion she had another episode of the numbness and tingling that had precipitated a previous admission to the hospital.  The patient indicates that she also felt that there was a disturbance in vision in the right eye.  She describes this as spots in her right eye that is present even when her eyes are closed.  She however has not attempted to cover one eye or the other to determine if this is in her right eye or in her right visual field.  The patient also seems to have difficulty with verbal expression during the event.  The patient states that she thinks she knows the word that she wants to say but cannot articulate the word.  Because of the intensity of these symptoms on this occasion, is the reason that she came back to the hospital for evaluation.    On the morning of this consultation, the patient is now asymptomatic.  She denies any headache at this time.  She's not aware of any visual field disturbance.  She is not aware of any weakness, awkwardness, or clumsiness of the right side of the body.    Of significance in her past history is a history given of having had migraine headache that was present until the birth of her child about 20 years ago.  However the patient does not recall any neurological symptoms associated with her migraine history.    Past Medical History:   Diagnosis Date    Asthma     Chronic low back pain     Degenerative disc disease, lumbar     Depression     Diabetes mellitus     Fibromyalgia     High cholesterol     Hypertension     Hypothyroidism     MRSA (methicillin resistant Staphylococcus aureus) carrier     Stroke     TIA    Vitamin D deficiency disease        Past Surgical History:   Procedure Laterality Date    HYSTERECTOMY      left ankle surgery      NECK SURGERY  06/2016    ROTATOR CUFF REPAIR      TONSILLECTOMY         Review of  patient's allergies indicates:   Allergen Reactions    Mobic [meloxicam]     Topamax [topiramate]        Current Neurological Medications: See list below    No current facility-administered medications on file prior to encounter.      Current Outpatient Prescriptions on File Prior to Encounter   Medication Sig    amlodipine (NORVASC) 5 MG tablet Take 5 mg by mouth.    clopidogrel (PLAVIX) 75 mg tablet Take 75 mg by mouth once daily.    cyanocobalamin 500 MCG tablet Take 500 mcg by mouth once daily.    folic acid (FOLVITE) 1 MG tablet Take 1 tablet (1 mg total) by mouth once daily.    gabapentin (NEURONTIN) 300 MG capsule Take 1 capsule (300 mg total) by mouth 2 (two) times daily.    levothyroxine (SYNTHROID) 50 MCG tablet Take 1 tablet (50 mcg total) by mouth once daily.    magnesium 250 mg Tab Take by mouth 2 (two) times daily.    metformin (GLUCOPHAGE-XR) 500 MG 24 hr tablet TAKE ONE TABLET BY MOUTH TWICE DAILY WITH MEALS    methotrexate 2.5 MG Tab Take 6 tablets (15 mg total) by mouth every 7 days.    pravastatin (PRAVACHOL) 20 MG tablet TAKE ONE TABLET BY MOUTH ONCE DAILY    predniSONE (DELTASONE) 5 MG tablet Take 1.5 tablets (7.5 mg total) by mouth once daily.    pyridoxine, vitamin B6, (VITAMIN B-6) 200 mg Tab Take 200 mg by mouth once daily.    RIBOFLAVIN (VITAMIN B-2 ORAL) Take 100 mg by mouth once daily.    UBIDECARENONE (COENZYME Q10) 100 mg Tab Take 1 tablet by mouth once daily. Take 300 mg daily    venlafaxine (EFFEXOR-XR) 150 MG Cp24 Take 1 capsule (150 mg total) by mouth once daily.    vitamin D 1000 units Tab Take 2,000 Units by mouth once daily.    albuterol 90 mcg/actuation inhaler Inhale 2 puffs into the lungs every 6 (six) hours as needed for Wheezing.    blood sugar diagnostic Strp Glucose testing daily.    blood-glucose meter Misc Use as directed.    butalbital-acetaminophen-caffeine -40 mg (FIORICET, ESGIC) -40 mg per tablet Take 1 tablet by mouth every 4  "(four) hours as needed for Pain.    insulin needles, disposable, (PEN NEEDLE) 31 X 1/4 " Ndle Twice daily injections.    lancets (LANCETS,ULTRA THIN) Misc Glucose testing daily.      Family History     Problem Relation (Age of Onset)    COPD Father    Cancer Mother    Diabetes Mother    Heart disease Mother, Father    Stroke Mother        Social History Main Topics    Smoking status: Current Some Day Smoker     Packs/day: 0.50     Types: Cigarettes    Smokeless tobacco: Never Used    Alcohol use No    Drug use: No    Sexual activity: Yes     Review of Systems     ROS:  GENERAL: No fever, chills, fatigability or weight loss.  SKIN: No rashes, itching or changes in color or texture of skin.  HEAD: No  recent head trauma.  EYES:  No photophobia, ocular pain or diplopia.  EARS: Denies ear pain, discharge or vertigo.  NOSE: No loss of smell, no epistaxis or postnasal drip.  MOUTH & THROAT: No hoarseness or change in voice. No excessive gum bleeding.  NODES: Denies swollen glands.  CHEST: Denies NAVA, cyanosis, wheezing, cough and sputum production.  CARDIOVASCULAR: Denies chest pain, PND, orthopnea or reduced exercise tolerance.  ABDOMEN: Appetite fine. No weight loss. Denies diarrhea, abdominal pain, hematemesis or blood in stool.  URINARY: No flank pain, dysuria or hematuria.  PERIPHERAL VASCULAR: No claudication or cyanosis.  MUSCULOSKELETAL: No joint stiffness or swelling. Denies back pain.  The patient has been treated in the past for fibromyalgia.  NEUROLOGIC: No history of seizures, or unexplained loss of consciousness.      Objective:     Vital Signs (Most Recent):  Temp: 98.4 °F (36.9 °C) (03/09/17 0712)  Pulse: 69 (03/09/17 0712)  Resp: 18 (03/09/17 0712)  BP: 104/74 (03/09/17 0712)  SpO2: 95 % (03/09/17 0712) Vital Signs (24h Range):  Temp:  [97.5 °F (36.4 °C)-98.4 °F (36.9 °C)] 98.4 °F (36.9 °C)  Pulse:  [] 69  Resp:  [14-21] 18  SpO2:  [92 %-98 %] 95 %  BP: (104-146)/(61-91) 104/74     Weight: " 89.7 kg (197 lb 11.2 oz)  Body mass index is 36.16 kg/(m^2).    Physical Exam   PE:   APPEARANCE: Well nourished, well developed, in no acute distress.    HEAD: Normocephalic, atraumatic.  EYES: PERRL. EOMI.  Non-icteric sclerae.    EARS: TM's intact. Light reflex normal. No retraction or perforation.    NOSE: Mucosa pink. Airway clear.  MOUTH & THROAT: No tonsillar enlargement. No pharyngeal erythema or exudate. No stridor.  NECK: Supple. No bruits.  CHEST: Lungs clear to auscultation.  CARDIOVASCULAR: Regular rhythm without significant murmurs.  ABDOMEN: Bowel sounds normal. Not distended. Soft. No tenderness or masses.  MUSCULOSKELETAL:  No bony deformity seen.  Muscle tone and muscle mass are normal both upper and both lower extremities.  NEUROLOGIC:   Mental Status:  The patient is well oriented to person, time, place, and situation.  The patient is attentive to the environment and cooperative for the exam.  Cranial Nerves: II-XII grossly intact.  Visual fields are intact by confrontation.  The patient perceives the color red test object to be the same shade of red with each eye.  Fundoscopic exam is normal.  No hemorrhage, exudate or papilledema is present. The extraocular muscles are intact in the cardinal directions of gaze.  No ptosis is present.  Facial sensation is intact to temperature and light touch sensation in all 3 divisions bilaterally.  Masseter function is equally strong.  Facial features are symmetrical.  Hearing is intact to voice and finger rub.  Speech is normal in fluency, diction, and phrasing.  Tongue protrudes in the midline.    Gait and Station:  Romberg is negative.  Good alternate armswing with normal gait.  Motor:  No downdrift of either arm when held at shoulder level.  Manual muscle testing of proximal and distal muscles of both upper and lower extremities is normal. Muscle mass is normal.  Muscle tone is normal.  Sensory:  Intact both upper and lower extremities to pin prick,  touch, and vibration.  Cerebellar:  Finger to nose done well.  Alternating movements intact.  No involuntary movements or tremor seen.  Reflexes:  Stretch reflexes are 2+ both upper and lower extremities.  Plantar stimulation is flexor bilaterally and no pathological reflexes are seen             Significant Labs:   BMP:   Recent Labs  Lab 03/08/17  1327 03/09/17  0236   * 81    143   K 4.3 4.5    104   CO2 27 30*   BUN 15 13   CREATININE 0.7 0.7   CALCIUM 9.3 9.3     CBC:   Recent Labs  Lab 03/08/17  1327 03/09/17  0236   WBC 8.78 9.21   HGB 14.4 14.9   HCT 44.9 46.0    160     CMP:   Recent Labs  Lab 03/08/17  1327 03/09/17  0236   * 81    143   K 4.3 4.5    104   CO2 27 30*   BUN 15 13   CREATININE 0.7 0.7   CALCIUM 9.3 9.3   PROT  --  6.7   ALBUMIN  --  3.7   BILITOT  --  0.2   ALKPHOS  --  78   AST  --  14   ALT  --  21   ANIONGAP 9 9   EGFRNONAA >60 >60     All pertinent lab results from the past 24 hours have been reviewed.    Significant Imaging: I have reviewed all pertinent imaging results/findings within the past 24 hours.    Assessment and Plan:     1.  Transient right facial numbness and right-sided weakness with differential diagnosis to include complicated migraine and/ or TIA  Active Diagnoses:    Diagnosis Date Noted POA    Jaw pain [R68.84] 03/08/2017 Yes    Paresthesia [R20.2] 03/08/2017 Unknown    Right sided weakness [M62.81] 03/08/2017 Unknown    HA (headache) [R51] 03/08/2017 Unknown    Hx-TIA (transient ischemic attack) [Z86.73] 03/02/2017 Not Applicable    Tobacco use [Z72.0] 07/11/2016 Yes    Combined hyperlipidemia associated with type 2 diabetes mellitus [E11.69, E78.2] 11/26/2014 Yes    Hypertension associated with diabetes [E11.59, I10] 06/18/2014 Yes      Problems Resolved During this Admission:    Diagnosis Date Noted Date Resolved POA       VTE Risk Mitigation         Ordered     Medium Risk of VTE  Once      03/08/17 1602      Place sequential compression device  Until discontinued      03/08/17 7131        DISCUSSION:  Again the patient presents with a very similar type of presentation as she has recently done at [facility].  By her history, the same differential diagnosis is pertinent as was present in her previous admission.  The patient's diagnostic workup at that time was entirely negative for normal.  I do not think we need to repeat MRI or MRA at this time since this was done within the last 2 weeks.  The only study that may be missing is a transesophageal echocardiogram.  The patient certainly has multiple risk factors for stroke including obesity, smoking history, and family history of stroke and heart disease.  We would need to evaluate her lipid profile and consider adding a statin to her medications.  If the patient is not taking aspirin 81 mg daily this should also be added.  I did discuss with the patient that if the symptoms returned and were persistent without showing any signs of improvement that she should again come to the emergency room for consideration of TPA.    Thank you for your consult. I will follow-up with patient. Please contact us if you have any additional questions.    Agustin Haskins MD  Neurology  [facility] -

## 2017-03-09 NOTE — ASSESSMENT & PLAN NOTE
-resolved  -[place] [facility] [place]  -neurovascular checks  -will continue to monitor

## 2017-03-09 NOTE — H&P
[facility] Hospital Medicine  History & Physical    Patient Name: Diana Escobar  MRN: [id number]  Admission Date: 3/8/2017  Attending Physician: Babar Fermin MD   Primary Care Provider: Jory Alcocer DO         Patient information was obtained from patient and ER records.     Subjective:     Principal Problem: Right sided weakness    Chief Complaint:   Chief Complaint   Patient presents with    Numbness     pt c/o numbness/tingling to RUE, dizziness, and sensitivity to sound; x30 min; pmhx TIA 2weeks ago        HPI: Diana Escobar is a 51 y.o. female patient with PMHx of DM, HTN, HLD, Arthritis, Tobacco abuse, and fibromyalgia who presents to the Emergency Department for numbness to RUE and R side of face which onset gradually 2 hours PTA. Symptoms are constant and moderate in severity. Sx are exacerbated by nothing and relieved by nothing. Associated sxs include R sided jaw pain, R shoulder pain, HA, and heaviness to her RLE. Pt states that she was admitted to [facility] x 4 days for similar sxs presentation 2 weeks ago.  Pt diagnosed with a TIA per neurologist. Patient denies any fever, N/V/D, chills, weakness, dizziness, lightheadedness, speech difficulty, HA, gait problem, CP, SOB, abd pain and all other sxs at this time. Pt also reports cardiac dysrhythmia. Pt smokes approximately 1 pack of cigarettes daily.  Symptoms have since resolved with no further complaints or concerns at this time.  Pt reports compliance with prescribed medication regime and noncompliance with dietary restrictions.    Past Medical History:   Diagnosis Date    Asthma     Chronic low back pain     Degenerative disc disease, lumbar     Depression     Diabetes mellitus     Fibromyalgia     High cholesterol     Hypertension     Hypothyroidism     MRSA (methicillin resistant Staphylococcus aureus) carrier     Stroke     TIA    Vitamin D deficiency disease        Past Surgical History:   Procedure  Laterality Date    HYSTERECTOMY      left ankle surgery      NECK SURGERY  06/2016    ROTATOR CUFF REPAIR      TONSILLECTOMY         Review of patient's allergies indicates:   Allergen Reactions    Mobic [meloxicam]     Topamax [topiramate]        No current facility-administered medications on file prior to encounter.      Current Outpatient Prescriptions on File Prior to Encounter   Medication Sig    amlodipine (NORVASC) 5 MG tablet Take 5 mg by mouth.    clopidogrel (PLAVIX) 75 mg tablet Take 75 mg by mouth once daily.    cyanocobalamin 500 MCG tablet Take 500 mcg by mouth once daily.    folic acid (FOLVITE) 1 MG tablet Take 1 tablet (1 mg total) by mouth once daily.    gabapentin (NEURONTIN) 300 MG capsule Take 1 capsule (300 mg total) by mouth 2 (two) times daily.    levothyroxine (SYNTHROID) 50 MCG tablet Take 1 tablet (50 mcg total) by mouth once daily.    magnesium 250 mg Tab Take by mouth 2 (two) times daily.    metformin (GLUCOPHAGE-XR) 500 MG 24 hr tablet TAKE ONE TABLET BY MOUTH TWICE DAILY WITH MEALS    methotrexate 2.5 MG Tab Take 6 tablets (15 mg total) by mouth every 7 days.    pravastatin (PRAVACHOL) 20 MG tablet TAKE ONE TABLET BY MOUTH ONCE DAILY    predniSONE (DELTASONE) 5 MG tablet Take 1.5 tablets (7.5 mg total) by mouth once daily.    pyridoxine, vitamin B6, (VITAMIN B-6) 200 mg Tab Take 200 mg by mouth once daily.    RIBOFLAVIN (VITAMIN B-2 ORAL) Take 100 mg by mouth once daily.    UBIDECARENONE (COENZYME Q10) 100 mg Tab Take 1 tablet by mouth once daily. Take 300 mg daily    venlafaxine (EFFEXOR-XR) 150 MG Cp24 Take 1 capsule (150 mg total) by mouth once daily.    vitamin D 1000 units Tab Take 2,000 Units by mouth once daily.    albuterol 90 mcg/actuation inhaler Inhale 2 puffs into the lungs every 6 (six) hours as needed for Wheezing.    blood sugar diagnostic Strp Glucose testing daily.    blood-glucose meter Misc Use as directed.     "butalbital-acetaminophen-caffeine -40 mg (FIORICET, ESGIC) -40 mg per tablet Take 1 tablet by mouth every 4 (four) hours as needed for Pain.    insulin needles, disposable, (PEN NEEDLE) 31 X 1/4 " Ndle Twice daily injections.    lancets (LANCETS,ULTRA THIN) Misc Glucose testing daily.     Family History     Problem Relation (Age of Onset)    COPD Father    Cancer Mother    Diabetes Mother    Heart disease Mother, Father    Stroke Mother        Social History Main Topics    Smoking status: Current Some Day Smoker     Packs/day: 0.50     Types: Cigarettes    Smokeless tobacco: Never Used    Alcohol use No    Drug use: No    Sexual activity: Yes     Review of Systems   Constitutional: Negative for appetite change, chills, fatigue and fever.   HENT: Negative for congestion, sinus pressure, sore throat and trouble swallowing.    Eyes: Positive for visual disturbance. Negative for redness and itching.        R eye during HA   Respiratory: Negative for cough, chest tightness and shortness of breath.    Cardiovascular: Negative for chest pain, palpitations and leg swelling.   Gastrointestinal: Negative for abdominal pain, diarrhea, nausea and vomiting.   Endocrine: Negative for cold intolerance and heat intolerance.   Genitourinary: Negative for decreased urine volume, difficulty urinating, dysuria, flank pain, frequency and urgency.   Musculoskeletal: Positive for arthralgias and myalgias. Negative for back pain.   Skin: Negative for color change and wound.   Allergic/Immunologic: Negative for environmental allergies and food allergies.   Neurological: Positive for dizziness, numbness and headaches. Negative for syncope and weakness.        RLE and R side of face  HA located behind R eye   Hematological: Does not bruise/bleed easily.   Psychiatric/Behavioral: Negative for agitation, confusion and sleep disturbance. The patient is not nervous/anxious.      Objective:     Vital Signs (Most Recent):  Temp: " 98 °F (36.7 °C) (03/08/17 1238)  Pulse: 78 (03/08/17 1828)  Resp: 16 (03/08/17 1828)  BP: 118/74 (03/08/17 1828)  SpO2: 98 % (03/08/17 1828) Vital Signs (24h Range):  Temp:  [98 °F (36.7 °C)] 98 °F (36.7 °C)  Pulse:  [] 78  Resp:  [16-21] 16  SpO2:  [92 %-98 %] 98 %  BP: (118-138)/(74-91) 118/74     Weight: 85.3 kg (188 lb)  Body mass index is 34.39 kg/(m^2).    Physical Exam   Constitutional: She is oriented to person, place, and time. She appears well-developed and well-nourished.   HENT:   Head: Normocephalic.   Nose: Nose normal.   Mouth/Throat: Oropharynx is clear and moist.   Eyes: Conjunctivae and EOM are normal.   Neck: Normal range of motion. Neck supple. No JVD present.   Cardiovascular: Normal rate, regular rhythm, normal heart sounds and intact distal pulses.  Exam reveals no friction rub.    No murmur heard.  Pulmonary/Chest: Effort normal and breath sounds normal. No respiratory distress. She has no wheezes. She has no rales.   Abdominal: Soft. Bowel sounds are normal. She exhibits no distension. There is no tenderness.   Musculoskeletal: Normal range of motion. She exhibits no edema or tenderness.   Neurological: She is alert and oriented to person, place, and time.   Skin: Skin is warm and dry.   Psychiatric: She has a normal mood and affect. Her behavior is normal. Thought content normal.        Significant Labs:   CBC:   Recent Labs  Lab 03/08/17  1327   WBC 8.78   HGB 14.4   HCT 44.9        CMP:   Recent Labs  Lab 03/08/17  1327      K 4.3      CO2 27   *   BUN 15   CREATININE 0.7   CALCIUM 9.3   ANIONGAP 9   EGFRNONAA >60       Significant Imaging:   Imaging Results         X-Ray Chest 1 View (Final result) Result time:  03/08/17 14:04:31    Final result by Kriss Combs MD (03/08/17 14:04:31)    Impression:     No acute cardiopulmonary disease.            Electronically signed by: KRISS COMBS MD  Date:     03/08/17  Time:    14:04     Narrative:    Exam: XR CHEST  1 VIEW    Clinical History: Anesthesias of the skin.     Findings:     The lungs are clear. The cardiac silhouette is within normal limits.            CT Head Without Contrast (Edited Result - FINAL) Result time:  03/08/17 13:14:04    Addendum 1 of 1 by Kriss Combs MD (03/08/17 13:14:04)    Exam: Findings reported to Dr. Wyman over the phone at 1310 hours on March 8, 2017.      Electronically signed by: KRISS COMBS MD  Date:     03/08/17  Time:    13:14           Final result by Kriss Combs MD (03/08/17 13:06:04)    Impression:             No CT evidence of acute intracranial abnormality.        All CT scans at this facility use dose modulation, iterative reconstruction and/or weight based dosing when appropriate to reduce radiation dose to as low as reasonably achievable.       Electronically signed by: KRISS COMBS MD  Date:     03/08/17  Time:    13:06     Narrative:    O. Exam: CT HEAD WITHOUT CONTRAST    Clinical History:   Acute head pain.  Initial encounter.headache       Technique: Axial CT imaging was performed through the head without intravenous contrast.     Findings:    No hydrocephalus, midline shift, mass effect, or acute intracranial hemorrhage. Cortical sulcal pattern and gray-white matter differentiation is normal. Basilar cisterns and posterior fossa are normal. The visualized paranasal sinuses and mastoid air cells are clear. The skull is intact.                Assessment/Plan:     Hypertension associated with diabetes  -will allow permissive HTN    Combined hyperlipidemia associated with type 2 diabetes mellitus  -statin continued  -Accuchecks and SSI continued    Tobacco use  -Duonebs prn  -Nicotine patch prn  -pt counseled on smoking cessation with understanding verbalized    Hx-TIA (transient ischemic attack)  -dx 2 weeks ago with similar sxs presentation at [facility]   -ASA, Plavix, and Statin continued  -neuro checks  -CT of head showed no acute intracranial abnormality    Jaw  pain  -resolved post Nitro  -Cardiology consulted in ED  -ASA,Plavix, and Statin continued  -nitrates prn  -will continue to monitor  -will follow serial troponin results    Paresthesia  -resolved  -Case discussed with Neurology at [facility] with no TPA intervention recommended  -neurovascular checks  -will continue to monitor    Right sided weakness  --resolved  -pt admitted to Observation Unit  -Neurology consulted  -hx of multiple TIAs with most recent dx of TIA 2 weeks ago s/p admission to [facility] 2/21/17-2/24/17  -CTA of neck showed no significant stenosis during recent admit to [facility]  -MRI/MRA of brain unremarkable with no acute infarcts and image slightly degraded by motion ([facility])  -MRI of cervical spine showed significant artifact (C5-C7) with CT myelogram recommended ([facility])  -Echo on 02/21/17 showed EF 60% with normal LV function and systolic function ([facility])  -neuro checks  -ASA and Plavix continued  -orthostatic      HA (headache)  -Fiorcet continued  -sxs improvement noted with HA control  -CT of head unremarkable      VTE Risk Mitigation         Ordered     Medium Risk of VTE  Once      03/08/17 1602     Place sequential compression device  Until discontinued      03/08/17 1602        Twila Huitron NP  Department of Hospital Medicine   [facility]

## 2017-03-09 NOTE — PLAN OF CARE
Problem: Patient Care Overview  Goal: Plan of Care Review  Free of falls/injury during shift  Pain managed effectively w/ PRN meds  DM monitored and managed  NSR on telemetry  Neurologically intact  Serial troponin negative  Safety interventions in place

## 2017-03-09 NOTE — DISCHARGE SUMMARY
[facility] Hospital Medicine  Discharge Summary      Patient Name: Diana Escobar  MRN: [id number]  Admission Date: 3/8/2017  Hospital Length of Stay: 0 days  Discharge Date and Time:  03/09/2017 4:11 PM  Attending Physician: Babar Fermin MD   Discharging Provider: JONATHAN Bird  Primary Care Provider: Jory Alcocer DO      HPI:   Diana Escobar is a 51 y.o. female patient with PMHx of DM, HTN, HLD, Arthritis, Tobacco abuse, and fibromyalgia who presents to the Emergency Department for numbness to RUE and R side of face which onset gradually 2 hours PTA. Symptoms are constant and moderate in severity. Sx are exacerbated by nothing and relieved by nothing. Associated sxs include R sided jaw pain, R shoulder pain, HA, and heaviness to her RLE. Pt states that she was admitted to [facility] x 4 days for similar sxs presentation 2 weeks ago.  Pt diagnosed with a TIA per neurologist. Patient denies any fever, N/V/D, chills, weakness, dizziness, lightheadedness, speech difficulty, HA, gait problem, CP, SOB, abd pain and all other sxs at this time. Pt also reports cardiac dysrhythmia. Pt smokes approximately 1 pack of cigarettes daily.  Symptoms have since resolved with no further complaints or concerns at this time.  Pt reports compliance with prescribed medication regime and noncompliance with dietary restrictions.    * No surgery found *      Indwelling Lines/Drains at time of discharge:   Lines/Drains/Airways          No matching active lines, drains, or airways        Hospital Course:   Diana Escobar is a 51 year old female admitted for right sided weakness. Associated sxs include right sided jaw pain, right shoulder pain, HA, and heaviness to her RLE. Pt states that she was admitted to [facility] on 02/21/17 for similar symptom presentation.  Extensive workup for CVA, all imaging was unremarkable. Pt diagnosed with a TIA during that time. Pt presented to [facility] ED for same symptom compliant  this admission. CT head and MRI brain w/wo contrast both unremarkable. Cardiology and Neurology consulted. Cardiology recommending 2D ECHO, bubble study and being evaluated outpatient for implantable loop recorder for arrhythmias. 2D ECHO color doppler and bubble study unremarkable, EF 60-65%. Pt to follow up with Dr. Jesus Lao (cardiology) on 03/10/17 at 1:45 PM for hospital follow up. Pt will also follow up with Dr. Junior Craft (neurology) after discharge for hospital follow up. Symptoms have resolved since admission. Pt to continue home medications with addition of ASA. Pt seen and examined on the date of discharge and determined suitable for discharge.      Consults:   Consults         Status Ordering Provider     Inpatient consult to Cardiology  Once     Provider:  Iam Garay MD    Completed SUSHILA MORENO     Inpatient consult to Neurology  Once     Provider:  Agustin Haskins MD    Completed BEE CASTANON     Inpatient consult to Stroke Telemedicine  Once     Provider:  Ayan Rosado MD    Completed SUSHILA MORENO          Significant Diagnostic Studies: Labs:   BMP:   Recent Labs  Lab 03/08/17  1327 03/09/17  0236   * 81    143   K 4.3 4.5    104   CO2 27 30*   BUN 15 13   CREATININE 0.7 0.7   CALCIUM 9.3 9.3    and CBC   Recent Labs  Lab 03/08/17  1327 03/09/17  0236   WBC 8.78 9.21   HGB 14.4 14.9   HCT 44.9 46.0    160     Radiology:   Imaging Results         MRI Brain W WO Contrast (Final result) Result time:  03/09/17 14:51:13    Final result by Meño Braga MD (03/09/17 14:51:13)    Impression:        Minimal small vessel disease without evidence of acute ischemic changes.  No evidence of a mass or abnormal enhancement.  No orbital abnormality can be seen on images obtained through the orbits.  No abnormality identified in the cavernous sinus region, brainstem or occipital cortex.      Electronically signed by: MEÑO BRAGA MD  Date:      03/09/17  Time:    14:51     Narrative:    MRI of the brain with and without contrast.    Date: 03/09/17 12:14:48    Contrast: 8 ml of Gadavist.    History:  blurred vision to right eye    Standard multiplanar pre and post contrast sequences of the brain.      Several foci of increased signal in the deep white matter of both frontal lobes consistent with minimal small vessel disease.  No diffusion weighted sequence abnormality is identified.  After IV contrast and there is no evidence of abnormal enhancement.  There is no evidence of an abnormal mass or mass effect.            X-Ray Chest 1 View (Final result) Result time:  03/08/17 14:04:31    Final result by Kriss Combs MD (03/08/17 14:04:31)    Impression:     No acute cardiopulmonary disease.            Electronically signed by: KRISS COMBS MD  Date:     03/08/17  Time:    14:04     Narrative:    Exam: XR CHEST 1 VIEW    Clinical History: Anesthesias of the skin.     Findings:     The lungs are clear. The cardiac silhouette is within normal limits.            CT Head Without Contrast (Edited Result - FINAL) Result time:  03/08/17 13:14:04    Addendum 1 of 1 by Kriss Combs MD (03/08/17 13:14:04)    Exam: Findings reported to Dr. Wyman over the phone at 1310 hours on March 8, 2017.      Electronically signed by: KRISS COMBS MD  Date:     03/08/17  Time:    13:14           Final result by Kriss Combs MD (03/08/17 13:06:04)    Impression:             No CT evidence of acute intracranial abnormality.        All CT scans at this facility use dose modulation, iterative reconstruction and/or weight based dosing when appropriate to reduce radiation dose to as low as reasonably achievable.       Electronically signed by: KRISS COMBS MD  Date:     03/08/17  Time:    13:06     Narrative:    O. Exam: CT HEAD WITHOUT CONTRAST    Clinical History:   Acute head pain.  Initial encounter.headache       Technique: Axial CT imaging was performed through the head  without intravenous contrast.     Findings:    No hydrocephalus, midline shift, mass effect, or acute intracranial hemorrhage. Cortical sulcal pattern and gray-white matter differentiation is normal. Basilar cisterns and posterior fossa are normal. The visualized paranasal sinuses and mastoid air cells are clear. The skull is intact.              Pending Diagnostic Studies:     None        Final Active Diagnoses:    Diagnosis Date Noted POA    PRINCIPAL PROBLEM:  Right sided weakness [M62.81] 03/08/2017 Unknown    Jaw pain [R68.84] 03/08/2017 Yes    Paresthesia [R20.2] 03/08/2017 Unknown    HA (headache) [R51] 03/08/2017 Unknown    Hx-TIA (transient ischemic attack) [Z86.73] 03/02/2017 Not Applicable    Tobacco use [Z72.0] 07/11/2016 Yes    Combined hyperlipidemia associated with type 2 diabetes mellitus [E11.69, E78.2] 11/26/2014 Yes    Hypertension associated with diabetes [E11.59, I10] 06/18/2014 Yes      Problems Resolved During this Admission:    Diagnosis Date Noted Date Resolved POA        Discharged Condition: stable    Disposition: Home or Self Care    Follow Up:  Follow-up Information     Follow up with Jory Alcocer DO. Schedule an appointment as soon as possible for a visit in 3 days.    Specialty:  Internal Medicine    Why:  hospital follow up    Contact information:    [id number] [place] [address] [address] [place] [address] [phone]          Follow up with KRISTEN Lao MD. Schedule an appointment as soon as possible for a visit on 3/17/2017.    Specialty:  Cardiology    Why:  Appt scheduled at 1:45PM hospital follow up (evaluation for implantable loop recorder for arrhythmias)    Contact information:    [address]  [address]  [facility]  [place] [address] [phone]          Follow up with Junior Craft MD. Schedule an appointment as soon as possible for a visit in 1 week.    Specialty:  Neurology    Why:  hospital follow up    Contact information:     [address]  [address]  [facility] [id number] [phone]          Patient Instructions:     Diet general     Activity as tolerated     Call MD for:  increased confusion or weakness     Call MD for:  persistent dizziness, light-headedness, or visual disturbances     Call MD for:  difficulty breathing or increased cough       Medications:  Reconciled Home Medications:   Current Discharge Medication List      CONTINUE these medications which have NOT CHANGED    Details   amlodipine (NORVASC) 5 MG tablet Take 5 mg by mouth.      clopidogrel (PLAVIX) 75 mg tablet Take 75 mg by mouth once daily.      cyanocobalamin 500 MCG tablet Take 500 mcg by mouth once daily.      folic acid (FOLVITE) 1 MG tablet Take 1 tablet (1 mg total) by mouth once daily.  Qty: 90 tablet, Refills: 1      gabapentin (NEURONTIN) 300 MG capsule Take 1 capsule (300 mg total) by mouth 2 (two) times daily.  Qty: 60 capsule, Refills: 3    Associated Diagnoses: Muscle cramps at night      levothyroxine (SYNTHROID) 50 MCG tablet Take 1 tablet (50 mcg total) by mouth once daily.  Qty: 30 tablet, Refills: 11    Associated Diagnoses: Hypothyroidism (acquired)      magnesium 250 mg Tab Take by mouth 2 (two) times daily.      metformin (GLUCOPHAGE-XR) 500 MG 24 hr tablet TAKE ONE TABLET BY MOUTH TWICE DAILY WITH MEALS  Qty: 60 tablet, Refills: 6      methotrexate 2.5 MG Tab Take 6 tablets (15 mg total) by mouth every 7 days.  Qty: 24 tablet, Refills: 0    Associated Diagnoses: Rheumatoid arthritis of multiple sites with negative rheumatoid factor      pravastatin (PRAVACHOL) 20 MG tablet TAKE ONE TABLET BY MOUTH ONCE DAILY  Qty: 30 tablet, Refills: 6    Associated Diagnoses: Hyperlipidemia with target LDL less than 100      predniSONE (DELTASONE) 5 MG tablet Take 1.5 tablets (7.5 mg total) by mouth once daily.  Qty: 135 tablet, Refills: 0    Associated Diagnoses: Rheumatoid arthritis of multiple sites with negative rheumatoid factor     "  pyridoxine, vitamin B6, (VITAMIN B-6) 200 mg Tab Take 200 mg by mouth once daily.      RIBOFLAVIN (VITAMIN B-2 ORAL) Take 100 mg by mouth once daily.      UBIDECARENONE (COENZYME Q10) 100 mg Tab Take 1 tablet by mouth once daily. Take 300 mg daily      venlafaxine (EFFEXOR-XR) 150 MG Cp24 Take 1 capsule (150 mg total) by mouth once daily.  Qty: 30 capsule, Refills: 3    Associated Diagnoses: Major depression, recurrent, chronic      vitamin D 1000 units Tab Take 2,000 Units by mouth once daily.      albuterol 90 mcg/actuation inhaler Inhale 2 puffs into the lungs every 6 (six) hours as needed for Wheezing.  Qty: 18 g, Refills: 6      blood sugar diagnostic Strp Glucose testing daily.  Qty: 50 strip, Refills: 11    Associated Diagnoses: Hypertension associated with diabetes; Combined hyperlipidemia associated with type 2 diabetes mellitus      blood-glucose meter Misc Use as directed.  Qty: 1 each, Refills: 0    Associated Diagnoses: Hypertension associated with diabetes; Combined hyperlipidemia associated with type 2 diabetes mellitus      butalbital-acetaminophen-caffeine -40 mg (FIORICET, ESGIC) -40 mg per tablet Take 1 tablet by mouth every 4 (four) hours as needed for Pain.      insulin needles, disposable, (PEN NEEDLE) 31 X 1/4 " Ndle Twice daily injections.  Qty: 100 each, Refills: 6    Associated Diagnoses: Hypertension associated with diabetes      lancets (LANCETS,ULTRA THIN) Misc Glucose testing daily.  Qty: 50 each, Refills: 11    Associated Diagnoses: Hypertension associated with diabetes; Combined hyperlipidemia associated with type 2 diabetes mellitus           Time spent on the discharge of patient: 35 minutes    JONATHAN Bird  Department of Hospital Medicine  [facility]  "

## 2017-03-09 NOTE — ED NOTES
Patient sitting up in bed, no acute distress noted, awake, alert, and oriented x 3, calm, respirations even and unlabored, and skin is warm and dry. Patient verbalized understanding of status and plan of care. Patient denies needs at this time. Side rails up x 2, call light in reach, bed low and locked.  Will continue to monitor.

## 2017-03-09 NOTE — NURSING
Patient assessed for diabetes educational needs following chart review  She reports was diagnosed 1 year ago   She has a daughter who was diagnosed at the age of 10with Type 1 diabetes  And has attended diabetes education classes in the past  She has a home glucose meter and checks 2 times daily with averages 130-150  She is consistent with t aking her diabetes meds   She has good recall on teach back but reinforced info on target glucose values/hyper/hypoglycemia  answered questions, but no educational needs identified at this time

## 2017-03-09 NOTE — PROGRESS NOTES
Pt arrived to floor via stretcher per ER RN; awake and alert; ambulated to bed; oriented to room and call light; telemetry monitor applied; educated on hourly rounding; full assess per flowsheets

## 2017-03-09 NOTE — CONSULTS
[facility] -   Cardiology  Consult Note    Patient Name: Diana Escobar  MRN: [id number]  Admission Date: 3/8/2017  Hospital Length of Stay: 0 days  Code Status: Full Code   Attending Provider: Babar Fermin MD   Consulting Provider: Raul Rodriguez MD  Primary Care Physician: Jory Alcocer DO  Principal Problem:<principal problem not specified>    Patient information was obtained from patient, past medical records and ER records.     Inpatient consult to Cardiology  Consult performed by: RAUL RODRIGUEZ  Consult ordered by: SUSHILA MORENO  Reason for consult: Jaw discomfort        Subjective:     Chief Complaint:  Numbness     HPI: Pt seen for consultation of jaw discomfort.  Pt has h/o TIA, most recently few weeks ago, tx at [facility] with workup performed.  Dx with TIA, put on plavix.  No h/o cad, chf.  Now admitted with TIA like sxs.  sxs yesterday involved right facial numbness and inability to express words.  Also had right upper extremity/lower extremity weakness, numbness, heavy feeling.  sxs resolved.  No cp sxs.  ecg is normal.  Negative troponin levels.  Neuro consulted for possible CVA, out of window for tpa.  Reportedly also had some right jaw discomfort and sl ntg helped resolve it.  Smokes daily.  CT head no acute cva.    Past Medical History:   Diagnosis Date    Asthma     Chronic low back pain     Degenerative disc disease, lumbar     Depression     Diabetes mellitus     Fibromyalgia     High cholesterol     Hypertension     Hypothyroidism     MRSA (methicillin resistant Staphylococcus aureus) carrier     Stroke     TIA    Vitamin D deficiency disease        Past Surgical History:   Procedure Laterality Date    HYSTERECTOMY      left ankle surgery      NECK SURGERY  06/2016    ROTATOR CUFF REPAIR      TONSILLECTOMY         Review of patient's allergies indicates:   Allergen Reactions    Mobic [meloxicam]     Topamax [topiramate]        No current  "facility-administered medications on file prior to encounter.      Current Outpatient Prescriptions on File Prior to Encounter   Medication Sig    amlodipine (NORVASC) 5 MG tablet Take 5 mg by mouth.    clopidogrel (PLAVIX) 75 mg tablet Take 75 mg by mouth once daily.    cyanocobalamin 500 MCG tablet Take 500 mcg by mouth once daily.    folic acid (FOLVITE) 1 MG tablet Take 1 tablet (1 mg total) by mouth once daily.    gabapentin (NEURONTIN) 300 MG capsule Take 1 capsule (300 mg total) by mouth 2 (two) times daily.    levothyroxine (SYNTHROID) 50 MCG tablet Take 1 tablet (50 mcg total) by mouth once daily.    magnesium 250 mg Tab Take by mouth 2 (two) times daily.    metformin (GLUCOPHAGE-XR) 500 MG 24 hr tablet TAKE ONE TABLET BY MOUTH TWICE DAILY WITH MEALS    methotrexate 2.5 MG Tab Take 6 tablets (15 mg total) by mouth every 7 days.    pravastatin (PRAVACHOL) 20 MG tablet TAKE ONE TABLET BY MOUTH ONCE DAILY    predniSONE (DELTASONE) 5 MG tablet Take 1.5 tablets (7.5 mg total) by mouth once daily.    pyridoxine, vitamin B6, (VITAMIN B-6) 200 mg Tab Take 200 mg by mouth once daily.    RIBOFLAVIN (VITAMIN B-2 ORAL) Take 100 mg by mouth once daily.    UBIDECARENONE (COENZYME Q10) 100 mg Tab Take 1 tablet by mouth once daily. Take 300 mg daily    venlafaxine (EFFEXOR-XR) 150 MG Cp24 Take 1 capsule (150 mg total) by mouth once daily.    vitamin D 1000 units Tab Take 2,000 Units by mouth once daily.    albuterol 90 mcg/actuation inhaler Inhale 2 puffs into the lungs every 6 (six) hours as needed for Wheezing.    blood sugar diagnostic Strp Glucose testing daily.    blood-glucose meter Misc Use as directed.    butalbital-acetaminophen-caffeine -40 mg (FIORICET, ESGIC) -40 mg per tablet Take 1 tablet by mouth every 4 (four) hours as needed for Pain.    insulin needles, disposable, (PEN NEEDLE) 31 X 1/4 " Ndle Twice daily injections.    lancets (LANCETS,ULTRA THIN) Misc Glucose testing " daily.     Family History     Problem Relation (Age of Onset)    COPD Father    Cancer Mother    Diabetes Mother    Heart disease Mother, Father    Stroke Mother        Social History Main Topics    Smoking status: Current Some Day Smoker     Packs/day: 0.50     Types: Cigarettes    Smokeless tobacco: Never Used    Alcohol use No    Drug use: No    Sexual activity: Yes     Review of Systems   Constitution: Positive for malaise/fatigue.   HENT: Positive for headaches.    Eyes: Negative.    Cardiovascular: Negative.    Respiratory: Negative.    Endocrine: Negative.    Hematologic/Lymphatic: Negative.    Skin: Negative.    Musculoskeletal: Positive for arthritis.   Gastrointestinal: Negative.    Neurological: Positive for difficulty with concentration, focal weakness and numbness.   Psychiatric/Behavioral: Negative.    Allergic/Immunologic: Negative.      Objective:     Vital Signs (Most Recent):  Temp: 98.4 °F (36.9 °C) (03/09/17 0712)  Pulse: 69 (03/09/17 0712)  Resp: 18 (03/09/17 0712)  BP: 104/74 (03/09/17 0712)  SpO2: 95 % (03/09/17 0712) Vital Signs (24h Range):  Temp:  [97.5 °F (36.4 °C)-98.4 °F (36.9 °C)] 98.4 °F (36.9 °C)  Pulse:  [] 69  Resp:  [14-21] 18  SpO2:  [92 %-98 %] 95 %  BP: (104-146)/(61-91) 104/74     Weight: 89.7 kg (197 lb 11.2 oz)  Body mass index is 36.16 kg/(m^2).    SpO2: 95 %  O2 Device (Oxygen Therapy): room air    No intake or output data in the 24 hours ending 03/09/17 1034    Lines/Drains/Airways     Peripheral Intravenous Line                 Peripheral IV - Single Lumen 03/08/17 1327 Right Antecubital less than 1 day                Physical Exam   Constitutional: She is oriented to person, place, and time. She appears well-developed and well-nourished. No distress.   HENT:   Head: Normocephalic and atraumatic.   Eyes: EOM are normal. Pupils are equal, round, and reactive to light.   Neck: Normal range of motion. Neck supple. Normal carotid pulses, no hepatojugular reflux and  no JVD present. Carotid bruit is not present. No tracheal deviation present. No thyromegaly present.   Cardiovascular: Normal rate, regular rhythm, S1 normal, S2 normal and intact distal pulses.  Exam reveals no gallop, no S3 and no S4.    No murmur heard.  Pulses:       Radial pulses are 2+ on the right side, and 2+ on the left side.   Pulmonary/Chest: Effort normal and breath sounds normal. No respiratory distress. She has no wheezes. She has no rales.   Abdominal: Soft. Bowel sounds are normal. She exhibits no distension and no mass. There is no tenderness. There is no rebound and no guarding.   Musculoskeletal: She exhibits no edema.   Lymphadenopathy:     She has no cervical adenopathy.   Neurological: She is alert and oriented to person, place, and time.   Grossly intact   Skin: Skin is warm and dry. She is not diaphoretic.   Psychiatric: She has a normal mood and affect.   Nursing note and vitals reviewed.      Significant Labs:   CMP   Recent Labs  Lab 03/08/17  1327 03/09/17  0236    143   K 4.3 4.5    104   CO2 27 30*   * 81   BUN 15 13   CREATININE 0.7 0.7   CALCIUM 9.3 9.3   PROT  --  6.7   ALBUMIN  --  3.7   BILITOT  --  0.2   ALKPHOS  --  78   AST  --  14   ALT  --  21   ANIONGAP 9 9   ESTGFRAFRICA >60 >60   EGFRNONAA >60 >60   , CBC   Recent Labs  Lab 03/08/17  1327 03/09/17  0236   WBC 8.78 9.21   HGB 14.4 14.9   HCT 44.9 46.0    160    and Troponin   Recent Labs  Lab 03/08/17  1327 03/08/17 2023 03/09/17  0236   TROPONINI <0.006 0.008 0.010       I have reviewed all pertinent labs and cardiac studies.      Assessment and Plan:     Active Diagnoses:    Diagnosis Date Noted POA    Jaw pain [R68.84] 03/08/2017 Yes    Paresthesia [R20.2] 03/08/2017 Unknown    Right sided weakness [M62.81] 03/08/2017 Unknown    HA (headache) [R51] 03/08/2017 Unknown    Hx-TIA (transient ischemic attack) [Z86.73] 03/02/2017 Not Applicable    Tobacco use [Z72.0] 07/11/2016 Yes    Combined  hyperlipidemia associated with type 2 diabetes mellitus [E11.69, E78.2] 11/26/2014 Yes    Hypertension associated with diabetes [E11.59, I10] 06/18/2014 Yes      Problems Resolved During this Admission:    Diagnosis Date Noted Date Resolved POA       VTE Risk Mitigation         Ordered     Medium Risk of VTE  Once      03/08/17 1602     Place sequential compression device  Until discontinued      03/08/17 1602          SXS SUGGEST NEUROLOGICAL ETIOLOGY SUCH AS TIA.  NO TYPICAL ANGINAL SXS.  NORMAL ECG.  NORMAL TROPONIN LEVELS.  DO NOT THINK THERE IS ANY ACS INVOLVED AT PRESENT TIME.  WILL HAVE ECHO DONE TODAY WITH CONTRAST, BUBBLE STUDY.  AGREE WITH ASA, PLAVIX.  F/U WITH NEURO RECS.  MAY BENEFIT FROM IMPLANTABLE LOOP RECORDER TO EXCLUDE ARRHYTHMIAS.  WILL SET UP CONSULTATION WITH EP TO SEE IF THEY THINK IT WOULD BE BENEFICIAL OR NOT.  CONTINUE CURRENT MEDS.  F/U IN CLINIC AFTER DISCHARGE.       Thank you for your consult.    Iam Garay MD  Cardiology   [facility] -

## 2017-03-09 NOTE — ASSESSMENT & PLAN NOTE
-Chuck prn  -Nicotine patch prn  -pt counseled on smoking cessation with understanding verbalized

## 2017-03-09 NOTE — ASSESSMENT & PLAN NOTE
--resolved  -pt admitted to Observation Unit  -Neurology consulted  -hx of multiple TIAs with most recent dx of TIA 2 weeks ago s/p admission to [facility] 2/21/17-2/24/17  -CTA of neck showed no significant stenosis during recent admit to [facility]  -MRI/MRA of brain unremarkable with no acute infarcts and image slightly degraded by motion (OLOL)  -MRI of cervical spine showed significant artifact (C5-C7) with CT myelogram recommended ([facility])  -Echo on 02/21/17 showed EF 60% with normal LV function and systolic function (OLOL)  -neuro checks  -ASA and Plavix continued  -orthostatic

## 2017-03-09 NOTE — ASSESSMENT & PLAN NOTE
-dx 2 weeks ago with similar sxs presentation at [facility]   -ASA, Plavix, and Statin continued  -neuro checks  -CT of head showed no acute intracranial abnormality

## 2017-03-09 NOTE — PLAN OF CARE
CM met with patient with daughter at the bedside regarding discharge planning.  Patient reports she lives with her  and daughter who can assist in her care if warranted.  Patient reports address listed on facesheet is accurate.  Patient reports being hospitalized two weeks ago at [facility] for similar symptoms.  Patient reports she was discharged on 2/24/2017 after being in the hospital for 4 days.  Patient reports a strong family support.  Patient reports she has an appointment the Dr. Lao (cardiologist) scheduled for tomorrow.  No post hospital needs identified at this time.      03/09/17 1054   Discharge Assessment   Assessment Type Discharge Planning Assessment   Confirmed/corrected address and phone number on facesheet? Yes   Assessment information obtained from? Patient   Expected Length of Stay (days) 1   Communicated expected length of stay with patient/caregiver yes   Prior to hospitilization cognitive status: Alert/Oriented   Prior to hospitalization functional status: Independent   Current cognitive status: Alert/Oriented   Current Functional Status: Independent   Arrived From home or self-care   Lives With child(timi), adult;spouse   Able to Return to Prior Arrangements yes   Is patient able to care for self after discharge? Yes   How many people do you have in your home that can help with your care after discharge? 2   Who are your caregiver(s) and their phone number(s)? Zurdo Escobar [phone];  Demi (daughter) [phone]   Patient's perception of discharge disposition home or selfcare   Readmission Within The Last 30 Days no previous admission in last 30 days   Patient currently being followed by outpatient case management? No   Patient currently receives home health services? No   Does the patient currently use HME? No   Patient currently receives private duty nursing? No   Patient currently receives any other outside agency services? No   Equipment Currently Used at Home none   Do you have  any problems affording any of your prescribed medications? No   Is the patient taking medications as prescribed? yes   Do you have any financial concerns preventing you from receiving the healthcare you need? No   Does the patient have transportation to healthcare appointments? Yes   Transportation Available family or friend will provide   On Dialysis? No   Does the patient receive services at the [facility]mad[facility] Clinic? No   Are there any open cases? No   Discharge Plan A Home   Discharge Plan B Home   Patient/Family In Agreement With Plan yes

## 2017-03-09 NOTE — SUBJECTIVE & OBJECTIVE
Past Medical History:   Diagnosis Date    Asthma     Chronic low back pain     Degenerative disc disease, lumbar     Depression     Diabetes mellitus     Fibromyalgia     High cholesterol     Hypertension     Hypothyroidism     MRSA (methicillin resistant Staphylococcus aureus) carrier     Stroke     TIA    Vitamin D deficiency disease        Past Surgical History:   Procedure Laterality Date    HYSTERECTOMY      left ankle surgery      NECK SURGERY  06/2016    ROTATOR CUFF REPAIR      TONSILLECTOMY         Review of patient's allergies indicates:   Allergen Reactions    Mobic [meloxicam]     Topamax [topiramate]        No current facility-administered medications on file prior to encounter.      Current Outpatient Prescriptions on File Prior to Encounter   Medication Sig    amlodipine (NORVASC) 5 MG tablet Take 5 mg by mouth.    clopidogrel (PLAVIX) 75 mg tablet Take 75 mg by mouth once daily.    cyanocobalamin 500 MCG tablet Take 500 mcg by mouth once daily.    folic acid (FOLVITE) 1 MG tablet Take 1 tablet (1 mg total) by mouth once daily.    gabapentin (NEURONTIN) 300 MG capsule Take 1 capsule (300 mg total) by mouth 2 (two) times daily.    levothyroxine (SYNTHROID) 50 MCG tablet Take 1 tablet (50 mcg total) by mouth once daily.    magnesium 250 mg Tab Take by mouth 2 (two) times daily.    metformin (GLUCOPHAGE-XR) 500 MG 24 hr tablet TAKE ONE TABLET BY MOUTH TWICE DAILY WITH MEALS    methotrexate 2.5 MG Tab Take 6 tablets (15 mg total) by mouth every 7 days.    pravastatin (PRAVACHOL) 20 MG tablet TAKE ONE TABLET BY MOUTH ONCE DAILY    predniSONE (DELTASONE) 5 MG tablet Take 1.5 tablets (7.5 mg total) by mouth once daily.    pyridoxine, vitamin B6, (VITAMIN B-6) 200 mg Tab Take 200 mg by mouth once daily.    RIBOFLAVIN (VITAMIN B-2 ORAL) Take 100 mg by mouth once daily.    UBIDECARENONE (COENZYME Q10) 100 mg Tab Take 1 tablet by mouth once daily. Take 300 mg daily     "venlafaxine (EFFEXOR-XR) 150 MG Cp24 Take 1 capsule (150 mg total) by mouth once daily.    vitamin D 1000 units Tab Take 2,000 Units by mouth once daily.    albuterol 90 mcg/actuation inhaler Inhale 2 puffs into the lungs every 6 (six) hours as needed for Wheezing.    blood sugar diagnostic Strp Glucose testing daily.    blood-glucose meter Misc Use as directed.    butalbital-acetaminophen-caffeine -40 mg (FIORICET, ESGIC) -40 mg per tablet Take 1 tablet by mouth every 4 (four) hours as needed for Pain.    insulin needles, disposable, (PEN NEEDLE) 31 X 1/4 " Ndle Twice daily injections.    lancets (LANCETS,ULTRA THIN) Misc Glucose testing daily.     Family History     Problem Relation (Age of Onset)    COPD Father    Cancer Mother    Diabetes Mother    Heart disease Mother, Father    Stroke Mother        Social History Main Topics    Smoking status: Current Some Day Smoker     Packs/day: 0.50     Types: Cigarettes    Smokeless tobacco: Never Used    Alcohol use No    Drug use: No    Sexual activity: Yes     Review of Systems   Constitutional: Negative for appetite change, chills, fatigue and fever.   HENT: Negative for congestion, sinus pressure, sore throat and trouble swallowing.    Eyes: Positive for visual disturbance. Negative for redness and itching.        R eye during HA   Respiratory: Negative for cough, chest tightness and shortness of breath.    Cardiovascular: Negative for chest pain, palpitations and leg swelling.   Gastrointestinal: Negative for abdominal pain, diarrhea, nausea and vomiting.   Endocrine: Negative for cold intolerance and heat intolerance.   Genitourinary: Negative for decreased urine volume, difficulty urinating, dysuria, flank pain, frequency and urgency.   Musculoskeletal: Positive for arthralgias and myalgias. Negative for back pain.   Skin: Negative for color change and wound.   Allergic/Immunologic: Negative for environmental allergies and food allergies. "   Neurological: Positive for dizziness, numbness and headaches. Negative for syncope and weakness.        RLE and R side of face  HA located behind R eye   Hematological: Does not bruise/bleed easily.   Psychiatric/Behavioral: Negative for agitation, confusion and sleep disturbance. The patient is not nervous/anxious.      Objective:     Vital Signs (Most Recent):  Temp: 98 °F (36.7 °C) (03/08/17 1238)  Pulse: 78 (03/08/17 1828)  Resp: 16 (03/08/17 1828)  BP: 118/74 (03/08/17 1828)  SpO2: 98 % (03/08/17 1828) Vital Signs (24h Range):  Temp:  [98 °F (36.7 °C)] 98 °F (36.7 °C)  Pulse:  [] 78  Resp:  [16-21] 16  SpO2:  [92 %-98 %] 98 %  BP: (118-138)/(74-91) 118/74     Weight: 85.3 kg (188 lb)  Body mass index is 34.39 kg/(m^2).    Physical Exam   Constitutional: She is oriented to person, place, and time. She appears well-developed and well-nourished.   HENT:   Head: Normocephalic.   Nose: Nose normal.   Mouth/Throat: Oropharynx is clear and moist.   Eyes: Conjunctivae and EOM are normal.   Neck: Normal range of motion. Neck supple. No JVD present.   Cardiovascular: Normal rate, regular rhythm, normal heart sounds and intact distal pulses.  Exam reveals no friction rub.    No murmur heard.  Pulmonary/Chest: Effort normal and breath sounds normal. No respiratory distress. She has no wheezes. She has no rales.   Abdominal: Soft. Bowel sounds are normal. She exhibits no distension. There is no tenderness.   Musculoskeletal: Normal range of motion. She exhibits no edema or tenderness.   Neurological: She is alert and oriented to person, place, and time.   Skin: Skin is warm and dry.   Psychiatric: She has a normal mood and affect. Her behavior is normal. Thought content normal.        Significant Labs:   CBC:   Recent Labs  Lab 03/08/17  1327   WBC 8.78   HGB 14.4   HCT 44.9        CMP:   Recent Labs  Lab 03/08/17  1327      K 4.3      CO2 27   *   BUN 15   CREATININE 0.7   CALCIUM 9.3    ANIONGAP 9   EGFRNONAA >60       Significant Imaging:   Imaging Results         X-Ray Chest 1 View (Final result) Result time:  03/08/17 14:04:31    Final result by Kriss Combs MD (03/08/17 14:04:31)    Impression:     No acute cardiopulmonary disease.            Electronically signed by: KRISS COMBS MD  Date:     03/08/17  Time:    14:04     Narrative:    Exam: XR CHEST 1 VIEW    Clinical History: Anesthesias of the skin.     Findings:     The lungs are clear. The cardiac silhouette is within normal limits.            CT Head Without Contrast (Edited Result - FINAL) Result time:  03/08/17 13:14:04    Addendum 1 of 1 by Kriss Combs MD (03/08/17 13:14:04)    Exam: Findings reported to Dr. Wyman over the phone at 1310 hours on March 8, 2017.      Electronically signed by: KRISS COMBS MD  Date:     03/08/17  Time:    13:14           Final result by Kriss Combs MD (03/08/17 13:06:04)    Impression:             No CT evidence of acute intracranial abnormality.        All CT scans at this facility use dose modulation, iterative reconstruction and/or weight based dosing when appropriate to reduce radiation dose to as low as reasonably achievable.       Electronically signed by: KRISS COMBS MD  Date:     03/08/17  Time:    13:06     Narrative:    O. Exam: CT HEAD WITHOUT CONTRAST    Clinical History:   Acute head pain.  Initial encounter.headache       Technique: Axial CT imaging was performed through the head without intravenous contrast.     Findings:    No hydrocephalus, midline shift, mass effect, or acute intracranial hemorrhage. Cortical sulcal pattern and gray-white matter differentiation is normal. Basilar cisterns and posterior fossa are normal. The visualized paranasal sinuses and mastoid air cells are clear. The skull is intact.

## 2017-03-10 ENCOUNTER — TELEPHONE (OUTPATIENT)
Dept: CARDIOLOGY | Facility: CLINIC | Age: 52
End: 2017-03-10

## 2017-03-10 LAB
ESTIMATED AVG GLUCOSE: 134 MG/DL
HBA1C MFR BLD HPLC: 6.3 %

## 2017-03-10 NOTE — TELEPHONE ENCOUNTER
----- Message from JONATHAN Newby sent at 3/9/2017 10:44 AM CST -----  Patient needs to see Dr. Gale as new patient consult for loop recorder implant    Thanks

## 2017-03-11 DIAGNOSIS — F33.9 MAJOR DEPRESSION, RECURRENT, CHRONIC: ICD-10-CM

## 2017-03-13 ENCOUNTER — TELEPHONE (OUTPATIENT)
Dept: CARDIOLOGY | Facility: CLINIC | Age: 52
End: 2017-03-13

## 2017-03-13 NOTE — TELEPHONE ENCOUNTER
If she is already established with a Cardiologist, she needs to follow with them, unless she has plans on switching providers. I don't recall her telling us that she was established with another group

## 2017-03-13 NOTE — TELEPHONE ENCOUNTER
----- Message from Adri Miguel sent at 3/13/2017  7:10 AM CDT -----  Contact: Patient  Patient returned call. She can be contacted at [phone].    Thanks,  Adri

## 2017-03-13 NOTE — TELEPHONE ENCOUNTER
Returned pt call states she went to see her cardiologist Friday at [facility] states he ordered nuclear stress test and told pt to hold off on loop recorder implant once he receives stress test results.

## 2017-03-14 RX ORDER — VENLAFAXINE HYDROCHLORIDE 150 MG/1
CAPSULE, EXTENDED RELEASE ORAL
Qty: 30 CAPSULE | Refills: 6 | Status: SHIPPED | OUTPATIENT
Start: 2017-03-14 | End: 2017-10-27 | Stop reason: SDUPTHER

## 2017-03-23 ENCOUNTER — PATIENT MESSAGE (OUTPATIENT)
Dept: INTERNAL MEDICINE | Facility: CLINIC | Age: 52
End: 2017-03-23

## 2017-03-23 ENCOUNTER — PATIENT MESSAGE (OUTPATIENT)
Dept: RHEUMATOLOGY | Facility: CLINIC | Age: 52
End: 2017-03-23

## 2017-03-24 ENCOUNTER — PATIENT MESSAGE (OUTPATIENT)
Dept: RHEUMATOLOGY | Facility: CLINIC | Age: 52
End: 2017-03-24

## 2017-03-27 ENCOUNTER — PATIENT MESSAGE (OUTPATIENT)
Dept: INTERNAL MEDICINE | Facility: CLINIC | Age: 52
End: 2017-03-27

## 2017-03-28 ENCOUNTER — TELEPHONE (OUTPATIENT)
Dept: INTERNAL MEDICINE | Facility: CLINIC | Age: 52
End: 2017-03-28

## 2017-03-28 RX ORDER — LORAZEPAM 0.5 MG/1
0.5 TABLET ORAL EVERY 12 HOURS PRN
Qty: 30 TABLET | Refills: 0 | Status: SHIPPED | OUTPATIENT
Start: 2017-03-28 | End: 2018-02-14 | Stop reason: SDUPTHER

## 2017-03-28 NOTE — TELEPHONE ENCOUNTER
Pt was informed Dr. Alcocer recommended Lorazepam 0.5 mg every 12 hours as needed and offers her condolences and pt verbalized understanding

## 2017-03-28 NOTE — TELEPHONE ENCOUNTER
----- Message from Amy Costello sent at 3/27/2017 11:48 AM CDT -----  Contact: pt  Pt states that is very important that she speaks to someone concerning medication that she is currently taking...[phone] (home)

## 2017-03-28 NOTE — TELEPHONE ENCOUNTER
Verbal order giver to [place] pharmist at [facility] for Lorazepam 0.5 mg every 12 hours as needed, 30 tablets, no refills and verbalization was understood.

## 2017-03-28 NOTE — TELEPHONE ENCOUNTER
Pt states her grandmother  unexpected and her anxiety and stress level is high and would like to know is there anything you can prescribed to take along with her Effexor

## 2017-03-29 NOTE — TELEPHONE ENCOUNTER
Prescription should have been faxed yesterday (lorazepam). Please make sure it was sent. If not, call in.

## 2017-03-30 ENCOUNTER — LAB VISIT (OUTPATIENT)
Dept: LAB | Facility: HOSPITAL | Age: 52
End: 2017-03-30
Attending: INTERNAL MEDICINE
Payer: COMMERCIAL

## 2017-03-30 DIAGNOSIS — M06.4 INFLAMMATORY POLYARTHRITIS: ICD-10-CM

## 2017-03-30 LAB
ALBUMIN SERPL BCP-MCNC: 4 G/DL
ALP SERPL-CCNC: 81 U/L
ALT SERPL W/O P-5'-P-CCNC: 22 U/L
ANION GAP SERPL CALC-SCNC: 7 MMOL/L
AST SERPL-CCNC: 16 U/L
BASOPHILS # BLD AUTO: 0.12 K/UL
BASOPHILS NFR BLD: 1.3 %
BILIRUB SERPL-MCNC: 0.3 MG/DL
BUN SERPL-MCNC: 11 MG/DL
CALCIUM SERPL-MCNC: 9.4 MG/DL
CHLORIDE SERPL-SCNC: 104 MMOL/L
CO2 SERPL-SCNC: 30 MMOL/L
CREAT SERPL-MCNC: 0.7 MG/DL
CRP SERPL-MCNC: 2.1 MG/L
DIFFERENTIAL METHOD: ABNORMAL
EOSINOPHIL # BLD AUTO: 0.2 K/UL
EOSINOPHIL NFR BLD: 1.6 %
ERYTHROCYTE [DISTWIDTH] IN BLOOD BY AUTOMATED COUNT: 15.2 %
ERYTHROCYTE [SEDIMENTATION RATE] IN BLOOD BY WESTERGREN METHOD: 2 MM/HR
EST. GFR  (AFRICAN AMERICAN): >60 ML/MIN/1.73 M^2
EST. GFR  (NON AFRICAN AMERICAN): >60 ML/MIN/1.73 M^2
GLUCOSE SERPL-MCNC: 99 MG/DL
HCT VFR BLD AUTO: 43.8 %
HGB BLD-MCNC: 14.3 G/DL
LYMPHOCYTES # BLD AUTO: 3.1 K/UL
LYMPHOCYTES NFR BLD: 33.4 %
MCH RBC QN AUTO: 32.4 PG
MCHC RBC AUTO-ENTMCNC: 32.6 %
MCV RBC AUTO: 99 FL
MONOCYTES # BLD AUTO: 0.6 K/UL
MONOCYTES NFR BLD: 6.9 %
NEUTROPHILS # BLD AUTO: 5.2 K/UL
NEUTROPHILS NFR BLD: 56.8 %
PLATELET # BLD AUTO: 181 K/UL
PMV BLD AUTO: 11.7 FL
POTASSIUM SERPL-SCNC: 4.7 MMOL/L
PROT SERPL-MCNC: 7 G/DL
RBC # BLD AUTO: 4.42 M/UL
SODIUM SERPL-SCNC: 141 MMOL/L
WBC # BLD AUTO: 9.17 K/UL

## 2017-03-30 PROCEDURE — 85025 COMPLETE CBC W/AUTO DIFF WBC: CPT

## 2017-03-30 PROCEDURE — 85651 RBC SED RATE NONAUTOMATED: CPT

## 2017-03-30 PROCEDURE — 36415 COLL VENOUS BLD VENIPUNCTURE: CPT

## 2017-03-30 PROCEDURE — 80053 COMPREHEN METABOLIC PANEL: CPT

## 2017-03-30 PROCEDURE — 86140 C-REACTIVE PROTEIN: CPT

## 2017-03-31 ENCOUNTER — TELEPHONE (OUTPATIENT)
Dept: RHEUMATOLOGY | Facility: CLINIC | Age: 52
End: 2017-03-31

## 2017-03-31 DIAGNOSIS — M06.09 RHEUMATOID ARTHRITIS OF MULTIPLE SITES WITH NEGATIVE RHEUMATOID FACTOR: ICD-10-CM

## 2017-03-31 RX ORDER — METHOTREXATE 2.5 MG/1
20 TABLET ORAL
Qty: 32 TABLET | Refills: 3 | Status: SHIPPED | OUTPATIENT
Start: 2017-03-31 | End: 2017-04-27 | Stop reason: SDUPTHER

## 2017-03-31 NOTE — TELEPHONE ENCOUNTER
Called pt to discuss labs below:    Results for CHING RAGSDALE (MRN [id number]) as of 3/31/2017 09:19   Ref. Range 3/30/2017 11:22   WBC Latest Ref Range: 3.90 - 12.70 K/uL 9.17   RBC Latest Ref Range: 4.00 - 5.40 M/uL 4.42   Hemoglobin Latest Ref Range: 12.0 - 16.0 g/dL 14.3   Hematocrit Latest Ref Range: 37.0 - 48.5 % 43.8   MCV Latest Ref Range: 82 - 98 fL 99 (H)   MCH Latest Ref Range: 27.0 - 31.0 pg 32.4 (H)   MCHC Latest Ref Range: 32.0 - 36.0 % 32.6   RDW Latest Ref Range: 11.5 - 14.5 % 15.2 (H)   Platelets Latest Ref Range: 150 - 350 K/uL 181   MPV Latest Ref Range: 9.2 - 12.9 fL 11.7   Gran% Latest Ref Range: 38.0 - 73.0 % 56.8   Gran # Latest Ref Range: 1.8 - 7.7 K/uL 5.2   Lymph% Latest Ref Range: 18.0 - 48.0 % 33.4   Lymph # Latest Ref Range: 1.0 - 4.8 K/uL 3.1   Mono% Latest Ref Range: 4.0 - 15.0 % 6.9   Mono # Latest Ref Range: 0.3 - 1.0 K/uL 0.6   Eosinophil% Latest Ref Range: 0.0 - 8.0 % 1.6   Eos # Latest Ref Range: 0.0 - 0.5 K/uL 0.2   Basophil% Latest Ref Range: 0.0 - 1.9 % 1.3   Baso # Latest Ref Range: 0.00 - 0.20 K/uL 0.12   Sed Rate Latest Ref Range: 0 - 20 mm/Hr 2   Sodium Latest Ref Range: 136 - 145 mmol/L 141   Potassium Latest Ref Range: 3.5 - 5.1 mmol/L 4.7   Chloride Latest Ref Range: 95 - 110 mmol/L 104   CO2 Latest Ref Range: 23 - 29 mmol/L 30 (H)   Anion Gap Latest Ref Range: 8 - 16 mmol/L 7 (L)   BUN, Bld Latest Ref Range: 6 - 20 mg/dL 11   Creatinine Latest Ref Range: 0.5 - 1.4 mg/dL 0.7   eGFR if non African American Latest Ref Range: >60 mL/min/1.73 m^2 >60   eGFR if  Latest Ref Range: >60 mL/min/1.73 m^2 >60   Glucose Latest Ref Range: 70 - 110 mg/dL 99   Calcium Latest Ref Range: 8.7 - 10.5 mg/dL 9.4   Alkaline Phosphatase Latest Ref Range: 55 - 135 U/L 81   Total Protein Latest Ref Range: 6.0 - 8.4 g/dL 7.0   Albumin Latest Ref Range: 3.5 - 5.2 g/dL 4.0   Total Bilirubin Latest Ref Range: 0.1 - 1.0 mg/dL 0.3   AST Latest Ref Range: 10 - 40 U/L 16   ALT Latest  Ref Range: 10 - 44 U/L 22   CRP Latest Ref Range: 0.0 - 8.2 mg/L 2.1     She is taking 6 tablets of the MTX weekly (F), folic acid every day and added vitamin D.   Ulcers in the roof of her mouth  Joints doing well on 7mg of prednisone as well.    Plan:  Wait one week and she will let me know if ulcers go away  And then we will increase to 20mg MTX weekly, continue folic acid daily and Vitamin A for her ulcers.     MB

## 2017-04-03 ENCOUNTER — PATIENT MESSAGE (OUTPATIENT)
Dept: INTERNAL MEDICINE | Facility: CLINIC | Age: 52
End: 2017-04-03

## 2017-04-05 ENCOUNTER — PATIENT MESSAGE (OUTPATIENT)
Dept: RHEUMATOLOGY | Facility: CLINIC | Age: 52
End: 2017-04-05

## 2017-04-10 DIAGNOSIS — R25.2 MUSCLE CRAMPS AT NIGHT: ICD-10-CM

## 2017-04-10 RX ORDER — GABAPENTIN 300 MG/1
300 CAPSULE ORAL 2 TIMES DAILY
Qty: 60 CAPSULE | Refills: 3 | Status: CANCELLED | OUTPATIENT
Start: 2017-04-10 | End: 2018-04-10

## 2017-04-17 ENCOUNTER — TELEPHONE (OUTPATIENT)
Dept: RHEUMATOLOGY | Facility: CLINIC | Age: 52
End: 2017-04-17

## 2017-04-17 ENCOUNTER — HOSPITAL ENCOUNTER (EMERGENCY)
Facility: HOSPITAL | Age: 52
Discharge: HOME | End: 2017-04-17
Attending: EMERGENCY MEDICINE
Payer: COMMERCIAL

## 2017-04-17 VITALS
OXYGEN SATURATION: 93 % | RESPIRATION RATE: 18 BRPM | DIASTOLIC BLOOD PRESSURE: 82 MMHG | BODY MASS INDEX: 34.96 KG/M2 | HEIGHT: 62 IN | TEMPERATURE: 100 F | SYSTOLIC BLOOD PRESSURE: 132 MMHG | WEIGHT: 190 LBS | HEART RATE: 106 BPM

## 2017-04-17 DIAGNOSIS — R11.2 NAUSEA AND VOMITING, INTRACTABILITY OF VOMITING NOT SPECIFIED, UNSPECIFIED VOMITING TYPE: Primary | ICD-10-CM

## 2017-04-17 DIAGNOSIS — R19.7 DIARRHEA, UNSPECIFIED TYPE: ICD-10-CM

## 2017-04-17 PROCEDURE — 99283 EMERGENCY DEPT VISIT LOW MDM: CPT

## 2017-04-17 RX ORDER — PROMETHAZINE HYDROCHLORIDE 25 MG/1
25 TABLET ORAL EVERY 6 HOURS PRN
Qty: 15 TABLET | Refills: 0 | Status: SHIPPED | OUTPATIENT
Start: 2017-04-17 | End: 2018-08-21 | Stop reason: ALTCHOICE

## 2017-04-17 RX ORDER — DIPHENOXYLATE HYDROCHLORIDE AND ATROPINE SULFATE 2.5; .025 MG/1; MG/1
1 TABLET ORAL 4 TIMES DAILY PRN
Qty: 20 TABLET | Refills: 0 | Status: SHIPPED | OUTPATIENT
Start: 2017-04-17 | End: 2017-04-27

## 2017-04-17 NOTE — ED PROVIDER NOTES
SCRIBE #1 NOTE: I, Mahendra Chapa, am scribing for, and in the presence of, Manpreet Abad MD. I have scribed the entire note.      History      Chief Complaint   Patient presents with    GI Problem     nausea vomiting and diarrhea with low grade fever, pt reports her grandkids all had the stomach bug over the weekend, pt also reports cough       Review of patient's allergies indicates:   Allergen Reactions    Mobic [meloxicam]     Topamax [topiramate]         HPI   HPI    4/17/2017, 6:18 PM   History obtained from the patient      History of Present Illness: Diana Escobar is a 51 y.o. female patient who presents to the Emergency Department for an evaluation of emesis which onset suddenly yesterday. Symptoms are constant and moderate in severity. Exacerbated by nothing and relieved by nothing. Associated sxs include fever, nausea, and diarrhea. Patient denies any chills, diaphoresis, dysuria, hematuria, abd pain, CP, SOB, and all other sxs at this time. Pt reports  Her grand kids have a stomach bug. No further complaints or concerns at this time.     Arrival mode: Personal vehicle     PCP: Jory Alcocer DO       Past Medical History:  Past Medical History:   Diagnosis Date    Asthma     Chronic low back pain     Degenerative disc disease, lumbar     Depression     Diabetes mellitus     Fibromyalgia     High cholesterol     Hypertension     Hypothyroidism     MRSA (methicillin resistant Staphylococcus aureus) carrier     Stroke     TIA    Vitamin D deficiency disease        Past Surgical History:  Past Surgical History:   Procedure Laterality Date    HYSTERECTOMY      left ankle surgery      NECK SURGERY  06/2016    ROTATOR CUFF REPAIR      TONSILLECTOMY           Family History:  Family History   Problem Relation Age of Onset    Cancer Mother     Stroke Mother     Heart disease Mother     Diabetes Mother     Heart disease Father     COPD Father        Social History:  Social History      Social History Main Topics    Smoking status: Current Some Day Smoker     Packs/day: 0.50     Types: Cigarettes    Smokeless tobacco: Never Used    Alcohol use No    Drug use: No    Sexual activity: Yes       ROS   Review of Systems   Constitutional: Positive for fever.   HENT: Negative for sore throat.    Respiratory: Negative for cough and shortness of breath.    Cardiovascular: Negative for chest pain.   Gastrointestinal: Positive for diarrhea, nausea and vomiting. Negative for abdominal pain and constipation.   Genitourinary: Negative for dysuria and hematuria.   Musculoskeletal: Negative for back pain.   Skin: Negative for rash.   Neurological: Negative for weakness, light-headedness and headaches.   Hematological: Does not bruise/bleed easily.     Physical Exam    Initial Vitals   BP Pulse Resp Temp SpO2   04/17/17 1734 04/17/17 1734 04/17/17 1734 04/17/17 1734 04/17/17 1734   132/82 106 18 100.1 °F (37.8 °C) 93 %      Physical Exam  Nursing Notes and Vital Signs Reviewed.  Constitutional: Patient is in no acute distress. Awake and alert. Well-developed and well-nourished.  Head: Atraumatic. Normocephalic.  Eyes: PERRL. EOM intact. Conjunctivae are not pale. No scleral icterus.  ENT: Mucous membranes are moist. Oropharynx is clear and symmetric.    Neck: Supple. Full ROM. No lymphadenopathy.  Cardiovascular: Regular rate. Regular rhythm. No murmurs, rubs, or gallops. Distal pulses are 2+ and symmetric.  Pulmonary/Chest: No respiratory distress. Clear to auscultation bilaterally. No wheezing, rales, or rhonchi.  Abdominal: Soft and non-distended.  There is no tenderness.  No rebound, guarding, or rigidity. Good bowel sounds.  Genitourinary: No CVA tenderness  Musculoskeletal: Moves all extremities. No obvious deformities. No edema. No calf tenderness.  Skin: Warm and dry.  Neurological:  Alert, awake, and appropriate.  Normal speech.  No acute focal neurological deficits are appreciated.  Psychiatric:  "Normal affect. Good eye contact. Appropriate in content.    ED Course    Procedures  ED Vital Signs:  Vitals:    04/17/17 1734   BP: 132/82   Pulse: 106   Resp: 18   Temp: 100.1 °F (37.8 °C)   TempSrc: Oral   SpO2: (!) 93%   Weight: 86.2 kg (190 lb)   Height: 5' 2" (1.575 m)              The Emergency Provider reviewed the vital signs and test results, which are outlined above.    ED Discussion      6:21 PM: Reassessed pt at this time. Pt is awake, alert, and in NAD.Discussed with pt all pertinent ED information. Discussed pt dx of nausea and vomiting and plan of tx. Gave pt all f/u and return to the ED instructions. All questions and concerns were addressed at this time. Pt expresses understanding of information and instructions, and is comfortable with plan to discharge. Pt is stable for discharge.    I discussed with patient and/or family/caretaker that evaluation in the ED does not suggest any emergent or life threatening medical conditions requiring immediate intervention beyond what was provided in the ED, and I believe patient is safe for discharge.  Regardless, an unremarkable evaluation in the ED does not preclude the development or presence of a serious of life threatening condition. As such, patient was instructed to return immediately for any worsening or change in current symptoms.    Pre-hypertension/Hypertension: The pt has been informed that they may have pre-hypertension or hypertension based on a blood pressure reading in the ED. I recommend that the pt call the PCP listed on their discharge instructions or a physician of their choice this week to arrange f/u for further evaluation of possible pre-hypertension or hypertension.       ED Medication(s):  Medications - No data to display    Discharge Medication List as of 4/17/2017  6:24 PM      START taking these medications    Details   diphenoxylate-atropine 2.5-0.025 mg (LOMOTIL) 2.5-0.025 mg per tablet Take 1 tablet by mouth 4 (four) times daily " as needed for Diarrhea., Starting 4/17/2017, Until Thu 4/27/17, Print      promethazine (PHENERGAN) 25 MG tablet Take 1 tablet (25 mg total) by mouth every 6 (six) hours as needed for Nausea., Starting 4/17/2017, Until Discontinued, Print             Follow-up Information     Follow up with Jory Alcocer DO In 2 days.    Specialty:  Internal Medicine    Contact information:    [address] [place] [address] DR [place] [id number] [phone]              Medical Decision Making              Scribe Attestation:   Scribe #1: I performed the above scribed service and the documentation accurately describes the services I performed. I attest to the accuracy of the note.    Attending:   Physician Attestation Statement for Scribe #1: I, Manpreet Abad MD, personally performed the services described in this documentation, as scribed by Mahendra Chapa, in my presence, and it is both accurate and complete.          Clinical Impression       ICD-10-CM ICD-9-CM   1. Nausea and vomiting, intractability of vomiting not specified, unspecified vomiting type R11.2 787.01   2. Diarrhea, unspecified type R19.7 787.91       Disposition:   Disposition: Discharged  Condition: Stable         Manpreet Abad MD  04/17/17 2011

## 2017-04-17 NOTE — TELEPHONE ENCOUNTER
----- Message from Penny Dumont sent at 4/17/2017  9:35 AM CDT -----  Contact: Patient  Patient states she is not feeling well, nauseated, whole body hurts, coughing and sore throat. Patient would like to be advised, please call her back at   [phone]. Thank you

## 2017-04-17 NOTE — ED AVS SNAPSHOT
[facility] -   [address]  [place] [address]               Diana Escobar   2017  6:02 PM   ED    Description:  Female : 1965   Department:  [facility] -            Your Care was Coordinated By:     Provider Role From To    Manpreet Abad MD Attending Provider 17 2929 --      Reason for Visit     GI Problem           Diagnoses this Visit        Comments    Nausea and vomiting, intractability of vomiting not specified, unspecified vomiting type    -  Primary     Diarrhea, unspecified type           ED Disposition     ED Disposition Condition Comment    Discharge             To Do List           Follow-up Information     Follow up with Jory Alcocer DO In 2 days.    Specialty:  Internal Medicine    Contact information:    [address] [place] [address] DR [place] [address]  [phone]         These Medications        Disp Refills Start End    promethazine (PHENERGAN) 25 MG tablet 15 tablet 0 2017     Take 1 tablet (25 mg total) by mouth every 6 (six) hours as needed for Nausea. - Oral    Pharmacy: [facility] [place]  [address] [facility] Ph #: [phone]       diphenoxylate-atropine 2.5-0.025 mg (LOMOTIL) 2.5-0.025 mg per tablet 20 tablet 0 2017    Take 1 tablet by mouth 4 (four) times daily as needed for Diarrhea. - Oral    Pharmacy: [address] [place]  [address] [facility] Ph #: [phone]         [facility]     [facility] -  Assistance  Unless otherwise directed by your provider, please contact [facility], our nurse care line that is available for 24/7 assistance.     Registered nurses in the [facility] provide: appointment scheduling, clinical advisement, health education, and other advisory services.  Call: [phone] (toll free)               Medications           Message regarding Medications     Verify the changes  "and/or additions to your medication regime listed below are the same as discussed with your clinician today.  If any of these changes or additions are incorrect, please notify your healthcare provider.        START taking these NEW medications        Refills    promethazine (PHENERGAN) 25 MG tablet 0    Sig: Take 1 tablet (25 mg total) by mouth every 6 (six) hours as needed for Nausea.    Class: Print    Route: Oral    diphenoxylate-atropine 2.5-0.025 mg (LOMOTIL) 2.5-0.025 mg per tablet 0    Sig: Take 1 tablet by mouth 4 (four) times daily as needed for Diarrhea.    Class: Print    Route: Oral           Verify that the below list of medications is an accurate representation of the medications you are currently taking.  If none reported, the list may be blank. If incorrect, please contact your healthcare provider. Carry this list with you in case of emergency.           Current Medications     albuterol 90 mcg/actuation inhaler Inhale 2 puffs into the lungs every 6 (six) hours as needed for Wheezing.    amlodipine (NORVASC) 5 MG tablet Take 5 mg by mouth.    blood sugar diagnostic Strp Glucose testing daily.    blood-glucose meter Misc Use as directed.    butalbital-acetaminophen-caffeine -40 mg (FIORICET, ESGIC) -40 mg per tablet Take 1 tablet by mouth every 4 (four) hours as needed for Pain.    clopidogrel (PLAVIX) 75 mg tablet Take 75 mg by mouth once daily.    cyanocobalamin 500 MCG tablet Take 500 mcg by mouth once daily.    diphenoxylate-atropine 2.5-0.025 mg (LOMOTIL) 2.5-0.025 mg per tablet Take 1 tablet by mouth 4 (four) times daily as needed for Diarrhea.    folic acid (FOLVITE) 1 MG tablet Take 1 tablet (1 mg total) by mouth once daily.    gabapentin (NEURONTIN) 300 MG capsule Take 1 capsule (300 mg total) by mouth 2 (two) times daily.    insulin needles, disposable, (PEN NEEDLE) 31 X 1/4 " Ndle Twice daily injections.    lancets (LANCETS,ULTRA THIN) Misc Glucose testing daily.    " "levothyroxine (SYNTHROID) 50 MCG tablet Take 1 tablet (50 mcg total) by mouth once daily.    lorazepam (ATIVAN) 0.5 MG tablet Take 1 tablet (0.5 mg total) by mouth every 12 (twelve) hours as needed for Anxiety.    magnesium 250 mg Tab Take by mouth 2 (two) times daily.    metformin (GLUCOPHAGE-XR) 500 MG 24 hr tablet TAKE ONE TABLET BY MOUTH TWICE DAILY WITH MEALS    methotrexate 2.5 MG Tab Take 8 tablets (20 mg total) by mouth every 7 days.    pravastatin (PRAVACHOL) 20 MG tablet TAKE ONE TABLET BY MOUTH ONCE DAILY    promethazine (PHENERGAN) 25 MG tablet Take 1 tablet (25 mg total) by mouth every 6 (six) hours as needed for Nausea.    pyridoxine, vitamin B6, (VITAMIN B-6) 200 mg Tab Take 200 mg by mouth once daily.    RIBOFLAVIN (VITAMIN B-2 ORAL) Take 100 mg by mouth once daily.    UBIDECARENONE (COENZYME Q10) 100 mg Tab Take 1 tablet by mouth once daily. Take 300 mg daily    venlafaxine (EFFEXOR-XR) 150 MG Cp24 TAKE ONE CAPSULE BY MOUTH ONCE DAILY    vitamin D 1000 units Tab Take 2,000 Units by mouth once daily.           Clinical Reference Information           Your Vitals Were     BP Pulse Temp Resp Height Weight    132/82 (BP Location: Left arm, Patient Position: Sitting) 106 100.1 °F (37.8 °C) (Oral) 18 5' 2" (1.575 m) 86.2 kg (190 lb)    SpO2 BMI             93% 34.75 kg/m2         Allergies as of 4/17/2017        Reactions    Mobic [Meloxicam]     Topamax [Topiramate]       Immunizations Administered on Date of Encounter - 4/17/2017     None      ED Micro, Lab, POCT     None      ED Imaging Orders     None      Discharge References/Attachments     NAUSEA AND VOMITING, HOW TO CONTROL (ENGLISH)      Your Scheduled Appointments     Apr 27, 2017 10:40 AM CDT   Non-Fasting Lab with LABORATORY, [facility] PARKER   [facility]    [address] [facility] [address]   [phone]            May 05, 2017  8:20 AM CDT   Fasting Lab with LABORATORY, ALEAH PARKER "   [facility]    [address] [facility] [address]   [phone]            May 08, 2017  9:00 AM CDT   Established Patient Visit with Jory Alcocer DO   [facility] ([facility])    [address] [place] [address]   [phone]            Jun 06, 2017 10:15 AM CDT   Non-Fasting Lab with LABORATORY, [facility] ([facility]    [address] [facility] [address]   [phone]            Jun 06, 2017 11:00 AM CDT   Established Patient Visit with Amber Engle MD   [facility] ([facility])    [address] [facility] [address]   [phone]               [facility] -  complies with applicable Federal civil rights laws and does not discriminate on the basis of race, color, national origin, age, disability, or sex.        Language Assistance Services     ATTENTION: Language assistance services are available, free of charge. Please call [phone].      ATENCIÓN: Si habla español, tiene a duarte disposición servicios gratuitos de asistencia lingüística. Llame al [phone].     CHÚ Ý: N?u b?n nói Ti?ng Vi?t, có các d?ch v? h? tr? ngôn ng? mi?n phí dành cho b?n. G?i s? [phone].

## 2017-04-26 ENCOUNTER — PATIENT OUTREACH (OUTPATIENT)
Dept: ADMINISTRATIVE | Facility: HOSPITAL | Age: 52
End: 2017-04-26

## 2017-04-26 NOTE — LETTER
April 26, 2017    [facility]  [address], [place] [address]               [facility]  [address]  [facility] [address]  Phone: [phone] April 26, 2017     Patient: Diana Escobar    YOB: 1965   Date of Visit: 4/26/2017       To Whom It May Concern:    Please send most recent copy of Diabetic Eye Exam that states   Positive or Negative Retinopathy.    If you have any questions or concerns, please don't hesitate to call.     Sincerely,  Dr Jory Alcocer, DO Carmen BRENNER, JOSE ANGEL Care Coordination   [facility]  Phone [phone] ext [phone],  Fax [phone]

## 2017-04-27 ENCOUNTER — TELEPHONE (OUTPATIENT)
Dept: RHEUMATOLOGY | Facility: CLINIC | Age: 52
End: 2017-04-27

## 2017-04-27 ENCOUNTER — LAB VISIT (OUTPATIENT)
Dept: LAB | Facility: HOSPITAL | Age: 52
End: 2017-04-27
Attending: INTERNAL MEDICINE
Payer: COMMERCIAL

## 2017-04-27 DIAGNOSIS — M06.00 SERONEGATIVE RHEUMATOID ARTHRITIS: ICD-10-CM

## 2017-04-27 DIAGNOSIS — R05.9 COUGH: ICD-10-CM

## 2017-04-27 DIAGNOSIS — D75.89 MACROCYTOSIS: ICD-10-CM

## 2017-04-27 DIAGNOSIS — Z79.899 HIGH RISK MEDICATION USE: Primary | ICD-10-CM

## 2017-04-27 DIAGNOSIS — M06.09 RHEUMATOID ARTHRITIS OF MULTIPLE SITES WITH NEGATIVE RHEUMATOID FACTOR: ICD-10-CM

## 2017-04-27 DIAGNOSIS — M06.4 INFLAMMATORY POLYARTHRITIS: ICD-10-CM

## 2017-04-27 LAB
ALBUMIN SERPL BCP-MCNC: 3.8 G/DL
ALP SERPL-CCNC: 78 U/L
ALT SERPL W/O P-5'-P-CCNC: 25 U/L
ANION GAP SERPL CALC-SCNC: 10 MMOL/L
AST SERPL-CCNC: 19 U/L
BASOPHILS # BLD AUTO: 0.1 K/UL
BASOPHILS NFR BLD: 1 %
BILIRUB SERPL-MCNC: 0.3 MG/DL
BUN SERPL-MCNC: 11 MG/DL
CALCIUM SERPL-MCNC: 9.1 MG/DL
CHLORIDE SERPL-SCNC: 102 MMOL/L
CO2 SERPL-SCNC: 29 MMOL/L
CREAT SERPL-MCNC: 0.8 MG/DL
CRP SERPL-MCNC: 3.1 MG/L
DIFFERENTIAL METHOD: ABNORMAL
EOSINOPHIL # BLD AUTO: 0.2 K/UL
EOSINOPHIL NFR BLD: 1.9 %
ERYTHROCYTE [DISTWIDTH] IN BLOOD BY AUTOMATED COUNT: 16.3 %
ERYTHROCYTE [SEDIMENTATION RATE] IN BLOOD BY WESTERGREN METHOD: 3 MM/HR
EST. GFR  (AFRICAN AMERICAN): >60 ML/MIN/1.73 M^2
EST. GFR  (NON AFRICAN AMERICAN): >60 ML/MIN/1.73 M^2
GLUCOSE SERPL-MCNC: 117 MG/DL
HCT VFR BLD AUTO: 42.7 %
HGB BLD-MCNC: 14.1 G/DL
LYMPHOCYTES # BLD AUTO: 3.3 K/UL
LYMPHOCYTES NFR BLD: 33.4 %
MCH RBC QN AUTO: 33 PG
MCHC RBC AUTO-ENTMCNC: 33 %
MCV RBC AUTO: 100 FL
MONOCYTES # BLD AUTO: 0.7 K/UL
MONOCYTES NFR BLD: 7 %
NEUTROPHILS # BLD AUTO: 5.6 K/UL
NEUTROPHILS NFR BLD: 56.7 %
PLATELET # BLD AUTO: 226 K/UL
PMV BLD AUTO: 11.1 FL
POTASSIUM SERPL-SCNC: 4.5 MMOL/L
PROT SERPL-MCNC: 6.8 G/DL
RBC # BLD AUTO: 4.27 M/UL
SODIUM SERPL-SCNC: 141 MMOL/L
WBC # BLD AUTO: 9.89 K/UL

## 2017-04-27 PROCEDURE — 86140 C-REACTIVE PROTEIN: CPT

## 2017-04-27 PROCEDURE — 85025 COMPLETE CBC W/AUTO DIFF WBC: CPT

## 2017-04-27 PROCEDURE — 80053 COMPREHEN METABOLIC PANEL: CPT

## 2017-04-27 PROCEDURE — 36415 COLL VENOUS BLD VENIPUNCTURE: CPT

## 2017-04-27 PROCEDURE — 85651 RBC SED RATE NONAUTOMATED: CPT

## 2017-04-27 RX ORDER — FOLIC ACID 1 MG/1
2 TABLET ORAL DAILY
Qty: 180 TABLET | Refills: 2 | Status: SHIPPED | OUTPATIENT
Start: 2017-04-27 | End: 2018-02-01 | Stop reason: ALTCHOICE

## 2017-04-27 RX ORDER — METHOTREXATE 2.5 MG/1
25 TABLET ORAL
Qty: 40 TABLET | Refills: 3 | Status: SHIPPED | OUTPATIENT
Start: 2017-04-27 | End: 2017-06-06 | Stop reason: SDUPTHER

## 2017-04-27 RX ORDER — PREDNISONE 5 MG/1
7.5 TABLET ORAL DAILY
Qty: 145 TABLET | Refills: 0 | Status: SHIPPED | OUTPATIENT
Start: 2017-04-27 | End: 2017-08-18 | Stop reason: SDUPTHER

## 2017-04-27 NOTE — TELEPHONE ENCOUNTER
----- Message from Amber Engle MD sent at 4/27/2017 12:48 PM CDT -----  Can you schedule cxr for her before friday I just spoke with her [place] [facility].  Thank you  MB

## 2017-04-27 NOTE — TELEPHONE ENCOUNTER
Called to discuss labs, no toxicity noted  She is on MTX 8 tabs every Friday, folic acid 2mg and vitamin A for ulcers that resolved.   Went to ED with sore throat, GI illness and then developed nonproductive cough. She did not stop MTX.   Currently abdominal symptoms resolved but cough still persists, No runny nose  Shortness of breath occasionally but not a lot   No joint issues    Loop recorder put next week     PLAN:  1. Hold the methotrexate until she feels better and after she gets the loop recorder placed and then we will increase up to 25mg weekly, (5tabs am, 5 tabs in pm)  2. Continue prednisone 7.5mg daily for now and then drop to 5mg when she restarts the methotrexate  3. CXR for her cold   4. DEXA Pending    fup after

## 2017-04-28 ENCOUNTER — TELEPHONE (OUTPATIENT)
Dept: RHEUMATOLOGY | Facility: CLINIC | Age: 52
End: 2017-04-28

## 2017-04-28 ENCOUNTER — HOSPITAL ENCOUNTER (OUTPATIENT)
Dept: RADIOLOGY | Facility: HOSPITAL | Age: 52
Discharge: HOME | End: 2017-04-28
Attending: INTERNAL MEDICINE
Payer: COMMERCIAL

## 2017-04-28 DIAGNOSIS — R05.9 COUGH: ICD-10-CM

## 2017-04-28 PROCEDURE — 71020 XR CHEST PA AND LATERAL: CPT | Mod: 26,,, | Performed by: RADIOLOGY

## 2017-04-28 PROCEDURE — 71020 XR CHEST PA AND LATERAL: CPT | Mod: TC

## 2017-04-28 NOTE — TELEPHONE ENCOUNTER
----- Message from Amber Engle MD sent at 4/28/2017 12:09 PM CDT -----  Let her know cxr neg for pneumonia. Hold mtx until she recovers from this cold.

## 2017-05-01 DIAGNOSIS — R25.2 MUSCLE CRAMPS AT NIGHT: ICD-10-CM

## 2017-05-01 RX ORDER — GABAPENTIN 300 MG/1
300 CAPSULE ORAL 2 TIMES DAILY
Qty: 60 CAPSULE | Refills: 3 | Status: SHIPPED | OUTPATIENT
Start: 2017-05-01 | End: 2017-09-08 | Stop reason: SDUPTHER

## 2017-05-12 ENCOUNTER — PATIENT MESSAGE (OUTPATIENT)
Dept: INTERNAL MEDICINE | Facility: CLINIC | Age: 52
End: 2017-05-12

## 2017-05-12 ENCOUNTER — PATIENT MESSAGE (OUTPATIENT)
Dept: RHEUMATOLOGY | Facility: CLINIC | Age: 52
End: 2017-05-12

## 2017-05-30 ENCOUNTER — PATIENT OUTREACH (OUTPATIENT)
Dept: ADMINISTRATIVE | Facility: HOSPITAL | Age: 52
End: 2017-05-30

## 2017-05-30 NOTE — PROGRESS NOTES
Patient will and schedule an appointment with [facility]. Patient will back and I will request records.

## 2017-06-06 ENCOUNTER — OFFICE VISIT (OUTPATIENT)
Dept: RHEUMATOLOGY | Facility: CLINIC | Age: 52
End: 2017-06-06
Payer: COMMERCIAL

## 2017-06-06 ENCOUNTER — LAB VISIT (OUTPATIENT)
Dept: LAB | Facility: HOSPITAL | Age: 52
End: 2017-06-06
Attending: INTERNAL MEDICINE
Payer: COMMERCIAL

## 2017-06-06 VITALS
HEIGHT: 62 IN | HEART RATE: 78 BPM | BODY MASS INDEX: 33.34 KG/M2 | WEIGHT: 181.19 LBS | DIASTOLIC BLOOD PRESSURE: 72 MMHG | SYSTOLIC BLOOD PRESSURE: 104 MMHG

## 2017-06-06 DIAGNOSIS — M06.4 INFLAMMATORY POLYARTHRITIS: ICD-10-CM

## 2017-06-06 DIAGNOSIS — M06.4 INFLAMMATORY POLYARTHRITIS: Primary | ICD-10-CM

## 2017-06-06 DIAGNOSIS — Z79.899 HIGH RISK MEDICATION USE: ICD-10-CM

## 2017-06-06 DIAGNOSIS — M06.09 RHEUMATOID ARTHRITIS OF MULTIPLE SITES WITH NEGATIVE RHEUMATOID FACTOR: ICD-10-CM

## 2017-06-06 DIAGNOSIS — M85.80 OSTEOPENIA, UNSPECIFIED LOCATION: ICD-10-CM

## 2017-06-06 DIAGNOSIS — H04.123 DRY EYES, BILATERAL: ICD-10-CM

## 2017-06-06 DIAGNOSIS — M79.7 FIBROMYALGIA: ICD-10-CM

## 2017-06-06 DIAGNOSIS — M06.00 SERONEGATIVE RHEUMATOID ARTHRITIS: ICD-10-CM

## 2017-06-06 PROBLEM — R76.8 POSITIVE ANA (ANTINUCLEAR ANTIBODY): Status: RESOLVED | Noted: 2017-02-06 | Resolved: 2017-06-06

## 2017-06-06 LAB
ALBUMIN SERPL BCP-MCNC: 4.1 G/DL
ALP SERPL-CCNC: 73 U/L
ALT SERPL W/O P-5'-P-CCNC: 27 U/L
ANION GAP SERPL CALC-SCNC: 8 MMOL/L
AST SERPL-CCNC: 19 U/L
BASOPHILS # BLD AUTO: 0.08 K/UL
BASOPHILS NFR BLD: 0.7 %
BILIRUB SERPL-MCNC: 0.3 MG/DL
BUN SERPL-MCNC: 13 MG/DL
CALCIUM SERPL-MCNC: 9.4 MG/DL
CHLORIDE SERPL-SCNC: 105 MMOL/L
CO2 SERPL-SCNC: 30 MMOL/L
CREAT SERPL-MCNC: 0.8 MG/DL
CRP SERPL-MCNC: 1.6 MG/L
DIFFERENTIAL METHOD: ABNORMAL
EOSINOPHIL # BLD AUTO: 0.2 K/UL
EOSINOPHIL NFR BLD: 1.8 %
ERYTHROCYTE [DISTWIDTH] IN BLOOD BY AUTOMATED COUNT: 15.7 %
ERYTHROCYTE [SEDIMENTATION RATE] IN BLOOD BY WESTERGREN METHOD: 2 MM/HR
EST. GFR  (AFRICAN AMERICAN): >60 ML/MIN/1.73 M^2
EST. GFR  (NON AFRICAN AMERICAN): >60 ML/MIN/1.73 M^2
GLUCOSE SERPL-MCNC: 108 MG/DL
HCT VFR BLD AUTO: 44.5 %
HGB BLD-MCNC: 14.5 G/DL
LYMPHOCYTES # BLD AUTO: 2.3 K/UL
LYMPHOCYTES NFR BLD: 21.5 %
MCH RBC QN AUTO: 33.1 PG
MCHC RBC AUTO-ENTMCNC: 32.6 %
MCV RBC AUTO: 102 FL
MONOCYTES # BLD AUTO: 0.4 K/UL
MONOCYTES NFR BLD: 3.7 %
NEUTROPHILS # BLD AUTO: 7.9 K/UL
NEUTROPHILS NFR BLD: 72.3 %
PLATELET # BLD AUTO: 199 K/UL
PMV BLD AUTO: 11.2 FL
POTASSIUM SERPL-SCNC: 4.4 MMOL/L
PROT SERPL-MCNC: 7.1 G/DL
RBC # BLD AUTO: 4.38 M/UL
SODIUM SERPL-SCNC: 143 MMOL/L
WBC # BLD AUTO: 10.89 K/UL

## 2017-06-06 PROCEDURE — 99215 OFFICE O/P EST HI 40 MIN: CPT | Mod: S$GLB,,, | Performed by: INTERNAL MEDICINE

## 2017-06-06 PROCEDURE — 85025 COMPLETE CBC W/AUTO DIFF WBC: CPT | Mod: PO

## 2017-06-06 PROCEDURE — 99999 PR PBB SHADOW E&M-EST. PATIENT-LVL III: CPT | Mod: PBBFAC,,, | Performed by: INTERNAL MEDICINE

## 2017-06-06 PROCEDURE — 86140 C-REACTIVE PROTEIN: CPT

## 2017-06-06 PROCEDURE — 80053 COMPREHEN METABOLIC PANEL: CPT | Mod: PO

## 2017-06-06 PROCEDURE — 85651 RBC SED RATE NONAUTOMATED: CPT | Mod: PO

## 2017-06-06 PROCEDURE — 36415 COLL VENOUS BLD VENIPUNCTURE: CPT | Mod: PO

## 2017-06-06 RX ORDER — HYDROXYCHLOROQUINE SULFATE 200 MG/1
200 TABLET, FILM COATED ORAL 2 TIMES DAILY
Qty: 60 TABLET | Refills: 3 | Status: SHIPPED | OUTPATIENT
Start: 2017-06-06 | End: 2018-02-01 | Stop reason: ALTCHOICE

## 2017-06-06 RX ORDER — METHOTREXATE 2.5 MG/1
25 TABLET ORAL
Qty: 40 TABLET | Refills: 3 | Status: SHIPPED | OUTPATIENT
Start: 2017-06-06 | End: 2017-11-16 | Stop reason: SDUPTHER

## 2017-06-06 ASSESSMENT — ROUTINE ASSESSMENT OF PATIENT INDEX DATA (RAPID3): MDHAQ FUNCTION SCORE: .3

## 2017-06-06 NOTE — PROGRESS NOTES
"Subjective:       Patient ID: Diana Escobar is a 51 y.o. female.    Chief Complaint: Rheumatoid Arthritis; Positive STEVE; and Fibromyalgia    HPI   Here for routine 3 mo fup for seronegative newly diagnosed rheumatoid arthritis. On MTX 25mg po weekly (split dosing), folic acid and Vtiamin A for ulcers.  We are tapering her prednisone off.   She is also a smoker, hx of questionable TIA, DM, osteopenia.    Since the last visit:  She has been doing much better on MTX and prednisone 7.5mg daily (decreased from 10mg).  No morning stiffness.  Has some sore throat today.   No issues with methotrexate, oral ulcers resolved with vitamin A    Review of Systems   HENT: Positive for sore throat.    Eyes:        Dry eyes chronic     Respiratory: Negative for shortness of breath.    Genitourinary: Negative for hematuria.   Musculoskeletal:        Calf pain significantly improved since increasing gabapentin, starting magnesium, B12.    Skin: Negative for color change.   Hematological: Bruises/bleeds easily (bruising from plavix).           Objective:   /72   Pulse 78   Ht 5' 2" (1.575 m)   Wt 82.2 kg (181 lb 3.5 oz)   BMI 33.15 kg/m²      Physical Exam   Vitals reviewed.  Constitutional: She is oriented to person, place, and time and well-developed, well-nourished, and in no distress. No distress.   HENT:   Head: Normocephalic and atraumatic.   Right Ear: External ear normal.   Left Ear: External ear normal.   Oropharynx slightly dry     Eyes: Right eye exhibits no discharge. Left eye exhibits no discharge. No scleral icterus.   Conjunctiva slightly injected, dry    Neck: Neck supple.   Cardiovascular: Normal rate, regular rhythm and normal heart sounds.         Pulmonary/Chest: Effort normal. No respiratory distress.   Abdominal: Soft. There is no tenderness.   Lymphadenopathy:     She has no cervical adenopathy.   Neurological: She is alert and oriented to person, place, and time. No cranial nerve deficit. She " exhibits normal muscle tone.   Strength 5/5 in UE and LE proximal distributions   Skin: Skin is warm and dry. No rash noted. She is not diaphoretic. No erythema.     Psychiatric: Mood, memory, affect and judgment normal.   Musculoskeletal: Normal range of motion. She exhibits no edema, tenderness or deformity.   +bilateral MTP squeeze test  Pes planus and flat shoes  Tender R calf without swelling  Knees normal without swelling, FROM  Hips normal from  Tender lower back paraspinals  Shoulders move normally  Elbows normal  Wrists normal, nontender and not swollen  MCPS 2-4 with mild synovitis and tenderness (much improved from before, however). Tender PIPS without synovitis. DIPS nontender with possible early heberden nodes.           LABS REVIEWED  -RF, CCP  Hep serologies  Quant gold     Ref. Range 6/6/2017 09:20   WBC Latest Ref Range: 3.90 - 12.70 K/uL 10.89   RBC Latest Ref Range: 4.00 - 5.40 M/uL 4.38   Hemoglobin Latest Ref Range: 12.0 - 16.0 g/dL 14.5   Hematocrit Latest Ref Range: 37.0 - 48.5 % 44.5   MCV Latest Ref Range: 82 - 98 fL 102 (H)   MCH Latest Ref Range: 27.0 - 31.0 pg 33.1 (H)   MCHC Latest Ref Range: 32.0 - 36.0 % 32.6   RDW Latest Ref Range: 11.5 - 14.5 % 15.7 (H)   Platelets Latest Ref Range: 150 - 350 K/uL 199   MPV Latest Ref Range: 9.2 - 12.9 fL 11.2   Gran% Latest Ref Range: 38.0 - 73.0 % 72.3   Gran # Latest Ref Range: 1.8 - 7.7 K/uL 7.9 (H)   Lymph% Latest Ref Range: 18.0 - 48.0 % 21.5   Lymph # Latest Ref Range: 1.0 - 4.8 K/uL 2.3   Mono% Latest Ref Range: 4.0 - 15.0 % 3.7 (L)   Mono # Latest Ref Range: 0.3 - 1.0 K/uL 0.4   Eosinophil% Latest Ref Range: 0.0 - 8.0 % 1.8   Eos # Latest Ref Range: 0.0 - 0.5 K/uL 0.2   Basophil% Latest Ref Range: 0.0 - 1.9 % 0.7   Baso # Latest Ref Range: 0.00 - 0.20 K/uL 0.08   Sodium Latest Ref Range: 136 - 145 mmol/L 143   Potassium Latest Ref Range: 3.5 - 5.1 mmol/L 4.4   Chloride Latest Ref Range: 95 - 110 mmol/L 105   CO2 Latest Ref Range: 23 - 29  mmol/L 30 (H)   Anion Gap Latest Ref Range: 8 - 16 mmol/L 8   BUN, Bld Latest Ref Range: 6 - 20 mg/dL 13   Creatinine Latest Ref Range: 0.5 - 1.4 mg/dL 0.8   eGFR if non African American Latest Ref Range: >60 mL/min/1.73 m^2 >60   eGFR if  Latest Ref Range: >60 mL/min/1.73 m^2 >60   Glucose Latest Ref Range: 70 - 110 mg/dL 108   Calcium Latest Ref Range: 8.7 - 10.5 mg/dL 9.4   Alkaline Phosphatase Latest Ref Range: 55 - 135 U/L 73   Total Protein Latest Ref Range: 6.0 - 8.4 g/dL 7.1   Albumin Latest Ref Range: 3.5 - 5.2 g/dL 4.1   Total Bilirubin Latest Ref Range: 0.1 - 1.0 mg/dL 0.3   AST Latest Ref Range: 10 - 40 U/L 19   ALT Latest Ref Range: 10 - 44 U/L 27     I personally viewed the xrays with my impressions below:  CXR normal  Hx cervical surgery  xrays with mild degenerative change  No erosions    IMAGING REVIEWED   MRI Cervical without contrast limited study  MRI angiogram neck  . No significant vascular disease. Specifically, no evidence of vascular occlusion.    2. No acute intracranial abnormality.    3. Prominent lingular tonsillar tissues which could be reactive or inflammatory/infectious in etiology.    4. Minimal chronic paranasal sinus disease.    Carotid studies  FINDINGS:  Real-time sonographic imaging was obtained of the carotid systems using grayscale and color Doppler techniques. The vertebral arteries were assessed.     Velocities are as follows (cm/sec):    RIGHT:  ICA superior: 127  ICA mid: 144  ICA inferior: 118  ECA: 132  Bulb: 78  CCA superior: 89  CCA mid: 87  CCA inferior: 119  Vertebral: 65  ICA/CCA: 1.6    LEFT:  ICA superior: 76  ICA mid: 68  ICA inferior: 83  ECA: 107  Bulb: 47  CCA superior: 88  CCA mid: 85  CCA inferior: 102  Vertebral: 61  ICA/CCA: 0.9    COMMENTS:  No significant plaque is detected.    IMPRESSION:    By peak systolic velocity criteria, there is a 50-69% stenosis of the right internal carotid artery.    No hemodynamically significant stenosis of  the left internal carotid artery.    BMD REVIEWED  6/6/2017  Osteopenia with major frax 6%, hip 0.5====> low risk of fracture    Assessment:       1. Inflammatory polyarthritis    2. Rheumatoid arthritis of multiple sites with negative rheumatoid factor    3. Seronegative rheumatoid arthritis    4. Dry eyes, bilateral    5. Fibromyalgia    6. High risk medication use    7. Osteopenia, unspecified location        Seronegative RA with outside labs indicating prior positive STEVE, CCP (repeat CCP, RF negative, STEVE not done yet). Pattern is polyarticular in the small joints of the hands and feet. Normal inflammatory markers. Excellent response to 10mg of prednisone. Started MTX 3/2017 and titrated up to 25mg weekly po (split dosing) with folic acid daily and had to start VIt A 8000 OTC due to oral ulcers which have resolved.   Today, continues to have persistent disease activity although improved with less morning stiffness and no visible swelling on exam only with palpation  on MTX and prednisone 7.5mg daily.   Comorbidities: smoker, hx of tia    Dry eyes, dry mouth- previously told to be on restasis and she says it doesn't work. Has worsening dry eyes, dry mouth, hx of gland swelling. Perhaps her seronegative RA is part of an underlying greater Sjogren's syndrome. I will need to check serologies for this. May benefit from plaquenil.     FM- on gabapentin dosing 300mg TID. Monitor    High risk med use- no maximum dose of methotrexate CXR normal, BMD reviewed and she is osteopenic with low fracture risk so no indication for GIOP. ON otc vitamin d and calcium.    Plan:   Discussed osteopenia etiology  Continue t OTC vitamin D 2000 IU daily  Dietary calcium 1200mg daily    Check STEVE, SSA, SSB    Continue mtx 25mg weekly (split dosing and folic acid daily and Vitamin A daily for ulcer prevention    Start plaquenil 200mg daily with food x 1 week and then 400mg daily. IF tolerates then needs eye exam. Discussed side effects  and toxicities. She voiced understanding.     Stop MTX if infection or on antibiotics/antivirals    Decrease prednisone to 5mg daily in 1 month and then 2.5mg daily and then stay on this dose until seen in clinic by Dr. Parmar in 8 weeks.     Eye appointment schedule for baseline plaquenil toxicity and also for evaluation of Sjogren's    Drink less diet coke!  Quit smoking!    rtc in 2 mo with FAB4 and visit with Dr. Ghulam SULLIVAN        This was a complex visit. Multiple issues were addressed. 25 minutes spent,   including time needed to review the records, the patient evaluation,   documentations, face-to-face discussion with the patient. More than 50% of the   time was spent on counseling and coordination of care. Level V visit.

## 2017-06-06 NOTE — PATIENT INSTRUCTIONS
1. Plan 1: if we cannot add labs to today's draw, we will get STEVE test to check for lupus and Sjogren's.  2. Start the plaquenil medicine   Week one: 1 tablet daily with food  Week two: 2 tablet daily with food  3. Continue methotrexate (5tabs in the morning, 5 tabs in the evening). Continue the folic acid and the vitamin A.  4. Continue vitamin d OTC and calcium through diet 1200mg/day  5. Prednisone 7.5mg x 4 weeks and then decrease to 5mg afterward and then 4 weeks at 2.5mg and stay on this dose until seen in clinic in 8 weeks.   6. If you have an infection (fever 100.4 or more, diarrhea, poor po intake or you get prescribed an antibiotic or antiviral) STOP THE METHOTREXATE for that duration of time!  You can continue the folic acid and the plaquenil.   7. Go to the eye doctor for your baseline PLAQUENIL EYE EXAM and at the same time to get evaluated for SJOGREN's SYNDROME.

## 2017-06-08 ENCOUNTER — OFFICE VISIT (OUTPATIENT)
Dept: OPHTHALMOLOGY | Facility: CLINIC | Age: 52
End: 2017-06-08
Payer: COMMERCIAL

## 2017-06-08 DIAGNOSIS — H52.4 BILATERAL PRESBYOPIA: ICD-10-CM

## 2017-06-08 DIAGNOSIS — E11.9 DIABETES MELLITUS TYPE 2 WITHOUT RETINOPATHY: Primary | ICD-10-CM

## 2017-06-08 PROCEDURE — 99999 PR PBB SHADOW E&M-EST. PATIENT-LVL I: CPT | Mod: PBBFAC,,, | Performed by: OPTOMETRIST

## 2017-06-08 PROCEDURE — 92004 COMPRE OPH EXAM NEW PT 1/>: CPT | Mod: S$GLB,,, | Performed by: OPTOMETRIST

## 2017-06-08 PROCEDURE — 92015 DETERMINE REFRACTIVE STATE: CPT | Mod: S$GLB,,, | Performed by: OPTOMETRIST

## 2017-06-12 NOTE — PROGRESS NOTES
HPI     NIDDM exam. Plaquenil check. Blurred vision at near with glasses. Watery   eyes. New patient last eye exam 1 1/2 year. Update glasses RX.    Last edited by Sharri Retana on 6/8/2017  3:42 PM. (History)            Assessment /Plan     For exam results, see Encounter Report.    Diabetes mellitus type 2 without retinopathy    Bilateral presbyopia      No Background Diabetic Retinopathy    Dispense Final Rx for glasses.  RTC 1 year, she will schedule the 10-2 and mOCT to monitor Plaquenil toxicity within the next 2 months, I will call with the results.

## 2017-06-23 ENCOUNTER — PATIENT MESSAGE (OUTPATIENT)
Dept: RHEUMATOLOGY | Facility: CLINIC | Age: 52
End: 2017-06-23

## 2017-07-05 ENCOUNTER — PATIENT MESSAGE (OUTPATIENT)
Dept: RHEUMATOLOGY | Facility: CLINIC | Age: 52
End: 2017-07-05

## 2017-07-05 ENCOUNTER — PATIENT MESSAGE (OUTPATIENT)
Dept: INTERNAL MEDICINE | Facility: CLINIC | Age: 52
End: 2017-07-05

## 2017-07-25 DIAGNOSIS — E03.9 HYPOTHYROIDISM (ACQUIRED): ICD-10-CM

## 2017-07-25 RX ORDER — LEVOTHYROXINE SODIUM 50 UG/1
TABLET ORAL
Qty: 30 TABLET | Refills: 3 | Status: SHIPPED | OUTPATIENT
Start: 2017-07-25 | End: 2017-11-16 | Stop reason: SDUPTHER

## 2017-07-31 ENCOUNTER — TELEPHONE (OUTPATIENT)
Dept: INTERNAL MEDICINE | Facility: CLINIC | Age: 52
End: 2017-07-31

## 2017-07-31 NOTE — TELEPHONE ENCOUNTER
----- Message from Anisha Guevara sent at 7/31/2017  1:00 PM CDT -----  Contact: Pt   Pt needs orders in for labs. Please give pt a call at ..[phone] (home)

## 2017-08-01 ENCOUNTER — TELEPHONE (OUTPATIENT)
Dept: SMOKING CESSATION | Facility: CLINIC | Age: 52
End: 2017-08-01

## 2017-08-02 ENCOUNTER — TELEPHONE (OUTPATIENT)
Dept: SMOKING CESSATION | Facility: CLINIC | Age: 52
End: 2017-08-02

## 2017-08-03 ENCOUNTER — LAB VISIT (OUTPATIENT)
Dept: LAB | Facility: HOSPITAL | Age: 52
End: 2017-08-03
Attending: INTERNAL MEDICINE
Payer: COMMERCIAL

## 2017-08-03 DIAGNOSIS — I15.2 HYPERTENSION ASSOCIATED WITH DIABETES: ICD-10-CM

## 2017-08-03 DIAGNOSIS — E11.59 HYPERTENSION ASSOCIATED WITH DIABETES: ICD-10-CM

## 2017-08-03 DIAGNOSIS — E78.5 HYPERLIPIDEMIA LDL GOAL <100: ICD-10-CM

## 2017-08-03 LAB
ALBUMIN SERPL BCP-MCNC: 3.9 G/DL
ALP SERPL-CCNC: 68 U/L
ALT SERPL W/O P-5'-P-CCNC: 26 U/L
ANION GAP SERPL CALC-SCNC: 7 MMOL/L
AST SERPL-CCNC: 21 U/L
BILIRUB SERPL-MCNC: 0.3 MG/DL
BUN SERPL-MCNC: 10 MG/DL
CALCIUM SERPL-MCNC: 9.2 MG/DL
CHLORIDE SERPL-SCNC: 104 MMOL/L
CHOLEST/HDLC SERPL: 3.8 {RATIO}
CO2 SERPL-SCNC: 31 MMOL/L
CREAT SERPL-MCNC: 0.8 MG/DL
EST. GFR  (AFRICAN AMERICAN): >60 ML/MIN/1.73 M^2
EST. GFR  (NON AFRICAN AMERICAN): >60 ML/MIN/1.73 M^2
GLUCOSE SERPL-MCNC: 92 MG/DL
HDL/CHOLESTEROL RATIO: 26.1 %
HDLC SERPL-MCNC: 184 MG/DL
HDLC SERPL-MCNC: 48 MG/DL
LDLC SERPL CALC-MCNC: 98.2 MG/DL
NONHDLC SERPL-MCNC: 136 MG/DL
POTASSIUM SERPL-SCNC: 4.4 MMOL/L
PROT SERPL-MCNC: 6.8 G/DL
SODIUM SERPL-SCNC: 142 MMOL/L
TRIGL SERPL-MCNC: 189 MG/DL

## 2017-08-03 PROCEDURE — 83036 HEMOGLOBIN GLYCOSYLATED A1C: CPT

## 2017-08-03 PROCEDURE — 80061 LIPID PANEL: CPT

## 2017-08-03 PROCEDURE — 36415 COLL VENOUS BLD VENIPUNCTURE: CPT

## 2017-08-03 PROCEDURE — 80053 COMPREHEN METABOLIC PANEL: CPT

## 2017-08-04 LAB
ESTIMATED AVG GLUCOSE: 111 MG/DL
HBA1C MFR BLD HPLC: 5.5 %

## 2017-08-08 ENCOUNTER — TELEPHONE (OUTPATIENT)
Dept: SMOKING CESSATION | Facility: CLINIC | Age: 52
End: 2017-08-08

## 2017-08-09 ENCOUNTER — OFFICE VISIT (OUTPATIENT)
Dept: INTERNAL MEDICINE | Facility: CLINIC | Age: 52
End: 2017-08-09
Payer: COMMERCIAL

## 2017-08-09 VITALS
SYSTOLIC BLOOD PRESSURE: 122 MMHG | HEIGHT: 62 IN | HEART RATE: 95 BPM | TEMPERATURE: 98 F | WEIGHT: 190.5 LBS | RESPIRATION RATE: 16 BRPM | OXYGEN SATURATION: 96 % | BODY MASS INDEX: 35.06 KG/M2 | DIASTOLIC BLOOD PRESSURE: 80 MMHG

## 2017-08-09 DIAGNOSIS — E78.2 COMBINED HYPERLIPIDEMIA ASSOCIATED WITH TYPE 2 DIABETES MELLITUS: ICD-10-CM

## 2017-08-09 DIAGNOSIS — R20.2 PARESTHESIA OF BOTH FEET: ICD-10-CM

## 2017-08-09 DIAGNOSIS — E11.59 HYPERTENSION ASSOCIATED WITH DIABETES: ICD-10-CM

## 2017-08-09 DIAGNOSIS — M79.89 RIGHT LEG SWELLING: ICD-10-CM

## 2017-08-09 DIAGNOSIS — F33.9 MAJOR DEPRESSION, RECURRENT, CHRONIC: ICD-10-CM

## 2017-08-09 DIAGNOSIS — M79.604 RIGHT LEG PAIN: Primary | ICD-10-CM

## 2017-08-09 DIAGNOSIS — E11.69 COMBINED HYPERLIPIDEMIA ASSOCIATED WITH TYPE 2 DIABETES MELLITUS: ICD-10-CM

## 2017-08-09 DIAGNOSIS — I15.2 HYPERTENSION ASSOCIATED WITH DIABETES: ICD-10-CM

## 2017-08-09 PROCEDURE — 3044F HG A1C LEVEL LT 7.0%: CPT | Mod: S$GLB,,, | Performed by: INTERNAL MEDICINE

## 2017-08-09 PROCEDURE — 99214 OFFICE O/P EST MOD 30 MIN: CPT | Mod: S$GLB,,, | Performed by: INTERNAL MEDICINE

## 2017-08-09 PROCEDURE — 3079F DIAST BP 80-89 MM HG: CPT | Mod: S$GLB,,, | Performed by: INTERNAL MEDICINE

## 2017-08-09 PROCEDURE — 3008F BODY MASS INDEX DOCD: CPT | Mod: S$GLB,,, | Performed by: INTERNAL MEDICINE

## 2017-08-09 PROCEDURE — 99999 PR PBB SHADOW E&M-EST. PATIENT-LVL III: CPT | Mod: PBBFAC,,, | Performed by: INTERNAL MEDICINE

## 2017-08-09 PROCEDURE — 3074F SYST BP LT 130 MM HG: CPT | Mod: S$GLB,,, | Performed by: INTERNAL MEDICINE

## 2017-08-09 RX ORDER — VENLAFAXINE HYDROCHLORIDE 75 MG/1
75 CAPSULE, EXTENDED RELEASE ORAL DAILY
Qty: 30 CAPSULE | Refills: 0 | Status: SHIPPED | OUTPATIENT
Start: 2017-08-09 | End: 2017-09-06 | Stop reason: SDUPTHER

## 2017-08-10 ENCOUNTER — APPOINTMENT (OUTPATIENT)
Dept: RADIOLOGY | Facility: HOSPITAL | Age: 52
End: 2017-08-10
Attending: INTERNAL MEDICINE
Payer: COMMERCIAL

## 2017-08-10 DIAGNOSIS — M79.604 RIGHT LEG PAIN: ICD-10-CM

## 2017-08-10 DIAGNOSIS — M79.89 RIGHT LEG SWELLING: ICD-10-CM

## 2017-08-10 PROCEDURE — 93971 EXTREMITY STUDY: CPT | Mod: 26,,, | Performed by: RADIOLOGY

## 2017-08-10 PROCEDURE — 93971 EXTREMITY STUDY: CPT | Mod: TC,PO

## 2017-08-11 NOTE — PROGRESS NOTES
Subjective:       Patient ID: Diana Escobar is a 52 y.o. female.    Chief Complaint: Follow-up    Diana Escobar  52 y.o. White female    Patient presents with:  Follow-up    HPI: Here today to follow up on chronic conditions.  She has been having right leg pain and swelling for the past 2 weeks. She denies trauma. She denies recent travel. Her calf is not red or warm. She denies a prior history of a blood clot.   She is also having burning in both feet. She takes vitamin B 12. She has a history of diabetes. She takes gabapentin for fibromyalgia. She has occasional back pain. She denies weakness in her extremities.   Diabetes--well controlled.                    HGBA1C                   5.5                 08/03/2017            HTN--stable on amlodipine.   HLD--compliant with pravastatin.                       CHOL                     184                 08/03/2017                     HDL                      48                  08/03/2017                  LDLCALC                  98.2                08/03/2017                 TRIG                     189 (H)             08/03/2017            Depression--worsening. She is compliant with venlafaxine. She is not seeing a psychiatrist.       Past Medical History:  Asthma  Chronic low back pain  Degenerative disc disease, lumbar  Depression  2014: Diabetes mellitus      Comment: BS didn't check 06/08/2017  Fibromyalgia  High cholesterol  Hypertension  Hypothyroidism  MRSA (methicillin resistant Staphylococcus aur*  Stroke      Comment: TIA  Vitamin D deficiency disease    Current Outpatient Prescriptions on File Prior to Visit:  albuterol 90 mcg/actuation inhaler, Inhale 2 puffs into the lungs every 6 (six) hours as needed for Wheezing., Disp: 18 g, Rfl: 6  amlodipine (NORVASC) 5 MG tablet, Take 5 mg by mouth., Disp: , Rfl:   blood sugar diagnostic Strp, Glucose testing daily., Disp: 50 strip, Rfl: 11   blood-glucose meter Misc, Use as directed., Disp: 1 each,  "Rfl: 0  clopidogrel (PLAVIX) 75 mg tablet, Take 75 mg by mouth once daily., Disp: , Rfl:   cyanocobalamin 500 MCG tablet, Take 500 mcg by mouth once daily., Disp: , Rfl:   folic acid (FOLVITE) 1 MG tablet, Take 2 tablets (2 mg total) by mouth once daily., Disp: 180 tablet, Rfl: 2  gabapentin (NEURONTIN) 300 MG capsule, Take 1 capsule (300 mg total) by mouth 2 (two) times daily., Disp: 60 capsule, Rfl: 3  hydroxychloroquine (PLAQUENIL) 200 mg tablet, Take 1 tablet (200 mg total) by mouth 2 (two) times daily., Disp: 60 tablet, Rfl: 3  insulin needles, disposable, (PEN NEEDLE) 31 X 1/4 " Ndle, Twice daily injections., Disp: 100 each, Rfl: 6  lancets (LANCETS,ULTRA THIN) Misc, Glucose testing daily., Disp: 50 each, Rfl: 11  levothyroxine (SYNTHROID) 50 MCG tablet, TAKE ONE TABLET BY MOUTH ONCE DAILY, Disp: 30 tablet, Rfl: 3  lorazepam (ATIVAN) 0.5 MG tablet, Take 1 tablet (0.5 mg total) by mouth every 12 (twelve) hours as needed for Anxiety., Disp: 30 tablet, Rfl: 0  magnesium 250 mg Tab, Take by mouth 2 (two) times daily., Disp: , Rfl:   metformin (GLUCOPHAGE-XR) 500 MG 24 hr tablet, TAKE ONE TABLET BY MOUTH TWICE DAILY WITH MEALS, Disp: 60 tablet, Rfl: 6  methotrexate 2.5 MG Tab, Take 10 tablets (25 mg total) by mouth every 7 days. Take 5 tabs in AM and  5 tabs in PM on the same day, Disp: 40 tablet, Rfl: 3  pravastatin (PRAVACHOL) 20 MG tablet, TAKE ONE TABLET BY MOUTH ONCE DAILY, Disp: 30 tablet, Rfl: 6  predniSONE (DELTASONE) 5 MG tablet, Take 1.5 tablets (7.5 mg total) by mouth once daily., Disp: 145 tablet, Rfl: 0  promethazine (PHENERGAN) 25 MG tablet, Take 1 tablet (25 mg total) by mouth every 6 (six) hours as needed for Nausea., Disp: 15 tablet, Rfl: 0  pyridoxine, vitamin B6, (VITAMIN B-6) 200 mg Tab, Take 200 mg by mouth once daily., Disp: , Rfl:   RIBOFLAVIN (VITAMIN B-2 ORAL), Take 100 mg by mouth once daily., Disp: , Rfl:   UBIDECARENONE (COENZYME Q10) 100 mg Tab, Take 1 tablet by mouth once daily. Take " 300 mg daily, Disp: , Rfl:   venlafaxine (EFFEXOR-XR) 150 MG Cp24, TAKE ONE CAPSULE BY MOUTH ONCE DAILY, Disp: 30 capsule, Rfl: 6  vitamin D 1000 units Tab, Take 2,000 Units by mouth once daily., Disp: , Rfl:     Allergies:  Review of patient's allergies indicates:   -- Mobic (meloxicam)    -- Topamax (topiramate)    -- Adhesive -- Rash            Review of Systems   Constitutional: Negative for activity change and unexpected weight change.   HENT: Negative for hearing loss, rhinorrhea and trouble swallowing.    Eyes: Negative for discharge and visual disturbance.   Respiratory: Negative for chest tightness and wheezing.    Cardiovascular: Negative for chest pain and palpitations.   Gastrointestinal: Positive for diarrhea. Negative for blood in stool, constipation and vomiting.   Endocrine: Positive for polydipsia. Negative for polyuria.   Genitourinary: Negative for difficulty urinating, dysuria, hematuria and menstrual problem.   Musculoskeletal: Positive for arthralgias and joint swelling. Negative for neck pain.   Neurological: Positive for numbness. Negative for dizziness, weakness and headaches.   Psychiatric/Behavioral: Positive for confusion and dysphoric mood.       Objective:      Physical Exam   Constitutional: She is oriented to person, place, and time. She appears well-developed and well-nourished.   Eyes: No scleral icterus.   Neck: No tracheal deviation present.   Cardiovascular: Normal rate, regular rhythm, normal heart sounds and intact distal pulses.    Pulses:       Dorsalis pedis pulses are 2+ on the right side, and 2+ on the left side.   Pulmonary/Chest: Effort normal and breath sounds normal. No respiratory distress.   Abdominal: Soft. Bowel sounds are normal.   Musculoskeletal: She exhibits no edema.   Feet:   Right Foot:   Protective Sensation: 4 sites tested. 4 sites sensed.   Skin Integrity: Positive for callus. Negative for ulcer or erythema.   Left Foot:   Protective Sensation: 4 sites  tested. 4 sites sensed.   Skin Integrity: Positive for callus. Negative for ulcer or erythema.   Neurological: She is alert and oriented to person, place, and time.   Skin: Skin is warm and dry.   Psychiatric: She has a normal mood and affect.   Vitals reviewed.      Assessment:       1. Right leg pain    2. Right leg swelling    3. Paresthesia of both feet    4. Hypertension associated with diabetes    5. Combined hyperlipidemia associated with type 2 diabetes mellitus    6. Major depression, recurrent, chronic        Plan:       Diana was seen today for follow-up.    Diagnoses and all orders for this visit:    Right leg pain  -     US Lower Extremity Veins Right; Future    Right leg swelling  -     US Lower Extremity Veins Right; Future    Paresthesia of both feet  -     Consider increasing gabapentin    Hypertension associated with diabetes  -     Continue current management    Combined hyperlipidemia associated with type 2 diabetes mellitus  -     Continue current management    Major depression, recurrent, chronic  -     Increase venlafaxine (EFFEXOR-XR) 75 MG 24 hr capsule; Take 1 capsule (75 mg total) by mouth once daily. Total 225 mg daily.    F/U in 4 weeks for depression and paresthesias

## 2017-08-17 ENCOUNTER — TELEPHONE (OUTPATIENT)
Dept: CARDIOLOGY | Facility: CLINIC | Age: 52
End: 2017-08-17

## 2017-08-17 NOTE — TELEPHONE ENCOUNTER
----- Message from Zulma Saenz sent at 8/16/2017 11:31 AM CDT -----  Contact: patient  Calling concerning her US results. Please call patient ASAP @ [phone]. Thanks, [place]

## 2017-08-18 DIAGNOSIS — M06.00 SERONEGATIVE RHEUMATOID ARTHRITIS: ICD-10-CM

## 2017-08-18 RX ORDER — PREDNISONE 5 MG/1
7.5 TABLET ORAL DAILY
Qty: 145 TABLET | Refills: 0 | Status: SHIPPED | OUTPATIENT
Start: 2017-08-18 | End: 2018-02-01 | Stop reason: ALTCHOICE

## 2017-08-29 ENCOUNTER — TELEPHONE (OUTPATIENT)
Dept: RHEUMATOLOGY | Facility: CLINIC | Age: 52
End: 2017-08-29

## 2017-08-29 NOTE — TELEPHONE ENCOUNTER
----- Message from Elaine Olivera sent at 8/29/2017 11:34 AM CDT -----  Contact: pt  Pt need a order to get lab for appt on 10/10, if any questions call pt @ [phone].

## 2017-09-06 DIAGNOSIS — F33.9 MAJOR DEPRESSION, RECURRENT, CHRONIC: ICD-10-CM

## 2017-09-07 ENCOUNTER — PATIENT MESSAGE (OUTPATIENT)
Dept: RHEUMATOLOGY | Facility: CLINIC | Age: 52
End: 2017-09-07

## 2017-09-07 DIAGNOSIS — R25.2 MUSCLE CRAMPS AT NIGHT: ICD-10-CM

## 2017-09-08 RX ORDER — GABAPENTIN 300 MG/1
300 CAPSULE ORAL 2 TIMES DAILY
Qty: 60 CAPSULE | Refills: 0 | Status: SHIPPED | OUTPATIENT
Start: 2017-09-08 | End: 2017-10-16 | Stop reason: SDUPTHER

## 2017-09-08 RX ORDER — VENLAFAXINE HYDROCHLORIDE 75 MG/1
CAPSULE, EXTENDED RELEASE ORAL
Qty: 30 CAPSULE | Refills: 0 | Status: SHIPPED | OUTPATIENT
Start: 2017-09-08 | End: 2017-12-04 | Stop reason: DRUGHIGH

## 2017-10-09 RX ORDER — METFORMIN HYDROCHLORIDE 500 MG/1
TABLET, EXTENDED RELEASE ORAL
Qty: 60 TABLET | Refills: 6 | Status: SHIPPED | OUTPATIENT
Start: 2017-10-09 | End: 2018-02-14 | Stop reason: SDUPTHER

## 2017-10-10 ENCOUNTER — LAB VISIT (OUTPATIENT)
Dept: LAB | Facility: HOSPITAL | Age: 52
End: 2017-10-10
Attending: INTERNAL MEDICINE
Payer: COMMERCIAL

## 2017-10-10 ENCOUNTER — OFFICE VISIT (OUTPATIENT)
Dept: RHEUMATOLOGY | Facility: CLINIC | Age: 52
End: 2017-10-10
Payer: COMMERCIAL

## 2017-10-10 VITALS
DIASTOLIC BLOOD PRESSURE: 75 MMHG | SYSTOLIC BLOOD PRESSURE: 118 MMHG | WEIGHT: 199.31 LBS | BODY MASS INDEX: 36.45 KG/M2 | HEART RATE: 89 BPM

## 2017-10-10 DIAGNOSIS — M06.09 RHEUMATOID ARTHRITIS OF MULTIPLE SITES WITH NEGATIVE RHEUMATOID FACTOR: Primary | ICD-10-CM

## 2017-10-10 DIAGNOSIS — M79.18 MYOFACIAL MUSCLE PAIN: ICD-10-CM

## 2017-10-10 DIAGNOSIS — M06.4 INFLAMMATORY POLYARTHRITIS: ICD-10-CM

## 2017-10-10 DIAGNOSIS — Z79.899 HIGH RISK MEDICATION USE: ICD-10-CM

## 2017-10-10 LAB
ALBUMIN SERPL BCP-MCNC: 4 G/DL
ALP SERPL-CCNC: 79 U/L
ALT SERPL W/O P-5'-P-CCNC: 28 U/L
ANION GAP SERPL CALC-SCNC: 9 MMOL/L
AST SERPL-CCNC: 24 U/L
BASOPHILS # BLD AUTO: 0.09 K/UL
BASOPHILS NFR BLD: 1 %
BILIRUB SERPL-MCNC: 0.2 MG/DL
BUN SERPL-MCNC: 14 MG/DL
CALCIUM SERPL-MCNC: 9.9 MG/DL
CHLORIDE SERPL-SCNC: 103 MMOL/L
CO2 SERPL-SCNC: 30 MMOL/L
CREAT SERPL-MCNC: 0.9 MG/DL
CRP SERPL-MCNC: 1.6 MG/L
DIFFERENTIAL METHOD: ABNORMAL
EOSINOPHIL # BLD AUTO: 0.2 K/UL
EOSINOPHIL NFR BLD: 2.5 %
ERYTHROCYTE [DISTWIDTH] IN BLOOD BY AUTOMATED COUNT: 15.2 %
ERYTHROCYTE [SEDIMENTATION RATE] IN BLOOD BY WESTERGREN METHOD: 6 MM/HR
EST. GFR  (AFRICAN AMERICAN): >60 ML/MIN/1.73 M^2
EST. GFR  (NON AFRICAN AMERICAN): >60 ML/MIN/1.73 M^2
GLUCOSE SERPL-MCNC: 125 MG/DL
HCT VFR BLD AUTO: 40.5 %
HGB BLD-MCNC: 13.2 G/DL
LYMPHOCYTES # BLD AUTO: 3.1 K/UL
LYMPHOCYTES NFR BLD: 35.4 %
MCH RBC QN AUTO: 33.9 PG
MCHC RBC AUTO-ENTMCNC: 32.6 G/DL
MCV RBC AUTO: 104 FL
MONOCYTES # BLD AUTO: 0.5 K/UL
MONOCYTES NFR BLD: 5.2 %
NEUTROPHILS # BLD AUTO: 4.9 K/UL
NEUTROPHILS NFR BLD: 55.9 %
PLATELET # BLD AUTO: 199 K/UL
PMV BLD AUTO: 11.2 FL
POTASSIUM SERPL-SCNC: 4.1 MMOL/L
PROT SERPL-MCNC: 7.2 G/DL
RBC # BLD AUTO: 3.89 M/UL
SODIUM SERPL-SCNC: 142 MMOL/L
WBC # BLD AUTO: 8.72 K/UL

## 2017-10-10 PROCEDURE — 36415 COLL VENOUS BLD VENIPUNCTURE: CPT | Mod: PO

## 2017-10-10 PROCEDURE — 80053 COMPREHEN METABOLIC PANEL: CPT | Mod: PO

## 2017-10-10 PROCEDURE — 86140 C-REACTIVE PROTEIN: CPT

## 2017-10-10 PROCEDURE — 99999 PR PBB SHADOW E&M-EST. PATIENT-LVL III: CPT | Mod: PBBFAC,,, | Performed by: INTERNAL MEDICINE

## 2017-10-10 PROCEDURE — 85025 COMPLETE CBC W/AUTO DIFF WBC: CPT | Mod: PO

## 2017-10-10 PROCEDURE — 99215 OFFICE O/P EST HI 40 MIN: CPT | Mod: S$GLB,,, | Performed by: INTERNAL MEDICINE

## 2017-10-10 PROCEDURE — 85651 RBC SED RATE NONAUTOMATED: CPT | Mod: PO

## 2017-10-10 NOTE — ASSESSMENT & PLAN NOTE
She is on hypodensity immunosuppressive medications including methotrexate and Humira.  Check renal and hepatic panel to rule out toxicity from methotrexate.  Hold both medications if any infection.

## 2017-10-10 NOTE — ASSESSMENT & PLAN NOTE
Seronegative rheumatoid arthritis on multiple disease modifying agents including methotrexate, hydroxychloroquine and Humira.  Patient reports persistent pain with no response to any other medications.  Exam failed to show any evidence of synovitis/dactylitis/enthesitis.  High pain-likely secondary to myofascial pain rather than active rheumatoid arthritis.  Continue rheumatoid arthritis medications while getting treatment for myofascial pain from pain clinic specialist.  If no improvement consider MRI of the most painful joint to look for any evidence of synovitis.

## 2017-10-10 NOTE — ASSESSMENT & PLAN NOTE
Significant myofascial pain with multiple tender points on his arm.  Already on selective serotonin reuptake inhibitors and gabapentin.  Referred to Dr. Anna for further pain management.

## 2017-10-10 NOTE — PROGRESS NOTES
RHEUMATOLOGY CLINIC FOLLOW UP VISIT- first visit with me.  Chief complaints:-  My whole body hurts.    HPI:-  Diana Rubio a 52 y.o. pleasant female comes in for a follow up visit with above chief complaints.  She was diagnosed with seronegative rheumatoid arthritis by Dr. HAND in the rheumatology clinic and started on disease modifying agents.  She reports that none of the medications aren't helping and she is still in pain.  The pain lasts all day long wilson over the body associated with stiffness and inability to do any activities.  She denies any skin rash, Raynaud's phenomenon or sicca syndrome.  No adverse effects from medications.  No recurrent fever or chills.      Review of Systems   Constitutional: Positive for malaise/fatigue. Negative for chills and fever.   HENT: Negative for nosebleeds and sore throat.    Eyes: Negative for blurred vision, photophobia and redness.   Respiratory: Negative for cough, sputum production and shortness of breath.    Cardiovascular: Negative for chest pain and leg swelling.   Gastrointestinal: Negative for abdominal pain, constipation and diarrhea.   Genitourinary: Negative for dysuria, frequency and urgency.   Musculoskeletal: Positive for back pain, joint pain, myalgias and neck pain. Negative for falls.   Skin: Negative for itching and rash.   Neurological: Negative for dizziness, tremors, seizures, loss of consciousness, weakness and headaches.   Endo/Heme/Allergies: Negative for environmental allergies. Does not bruise/bleed easily.   Psychiatric/Behavioral: Negative for hallucinations and memory loss. The patient does not have insomnia.        Past Medical History:   Diagnosis Date    Asthma     Chronic low back pain     Degenerative disc disease, lumbar     Depression     Diabetes mellitus 2014    BS didn't check 06/08/2017    Fibromyalgia     High cholesterol     Hypertension     Hypothyroidism      MRSA (methicillin resistant Staphylococcus aureus) carrier     Stroke     TIA    Vitamin D deficiency disease        Past Surgical History:   Procedure Laterality Date    HYSTERECTOMY      left ankle surgery      loop recorder  05/2017    cardiac device    NECK SURGERY  06/2016    ROTATOR CUFF REPAIR      TONSILLECTOMY          Social History   Substance Use Topics    Smoking status: Current Some Day Smoker     Packs/day: 0.50     Types: Cigarettes    Smokeless tobacco: Never Used    Alcohol use No       Family History   Problem Relation Age of Onset    Cancer Mother     Stroke Mother     Heart disease Mother     Diabetes Mother     Cataracts Mother     Hypertension Mother     Heart disease Father     COPD Father     Hypertension Father     Diabetes Maternal Aunt        Review of patient's allergies indicates:   Allergen Reactions    Mobic [meloxicam]     Topamax [topiramate]     Adhesive Rash           Physical examination:-    Vitals:    10/10/17 1439   BP: 118/75   Pulse: 89   Weight: 90.4 kg (199 lb 4.7 oz)   PainSc:   5   PainLoc: Generalized       Physical Exam   Constitutional: She is oriented to person, place, and time and well-developed, well-nourished, and in no distress. No distress.   HENT:   Head: Normocephalic.   Mouth/Throat: Oropharynx is clear and moist.   Eyes: Conjunctivae and EOM are normal. Pupils are equal, round, and reactive to light.   Neck: Normal range of motion. Neck supple.   Cardiovascular: Normal rate and intact distal pulses.    Pulmonary/Chest: Effort normal. No respiratory distress.   Abdominal: Soft. There is no tenderness.   Musculoskeletal:   18/18 tender points. No synovitis over small joints of hands or feet.  No effusion over large joints.   Neurological: She is alert and oriented to person, place, and time. No cranial nerve deficit.   Skin: Skin is warm. No rash noted. No erythema.   Psychiatric: Mood and affect normal.   Nursing note and vitals  "reviewed.      Labs:-  Results for CHING RAGSDALE (MRN [id number]) as of 10/10/2017 16:32   Ref. Range 2017 11:18 3/2/2017 12:57 3/2/2017 13:57 2017 12:30   Anti-SSA Antibody Latest Ref Range: 0.00 - 19.99 EU    3.34   Anti-SSA Interpretation Latest Ref Range: Negative     Negative   Anti-SSB Antibody Latest Ref Range: 0.00 - 19.99 EU    0.57   Anti-SSB Interpretation Latest Ref Range: Negative     Negative   Complement (C-3) Latest Ref Range: 50 - 180 mg/dL  158     Complement (C-4) Latest Ref Range: 11 - 44 mg/dL  28     Complement,Total, Serum Latest Ref Range: 54 - 144    149 (H)    CCP Antibodies Latest Ref Range: <5.0 U/mL <0.5      Rheumatoid Factor Latest Ref Range: 0.0 - 15.0 IU/mL <10.0            Radiographs:-  Independent visualization of images done. Xray images shows mild degenerative disease without any evidence of erosion.  Some cystic changes found in the carpal bones.     Old and Outside medical records :-  Reviewed old and all outside medical records available in Care Everywhere.  Records from prior rheumatologist showed positive anti-CCP antibody.    Medication List with Changes/Refills   Current Medications    ALBUTEROL 90 MCG/ACTUATION INHALER    Inhale 2 puffs into the lungs every 6 (six) hours as needed for Wheezing.    AMLODIPINE (NORVASC) 5 MG TABLET    Take 5 mg by mouth.    BLOOD SUGAR DIAGNOSTIC STRP    Glucose testing daily.    BLOOD-GLUCOSE METER MISC    Use as directed.    CLOPIDOGREL (PLAVIX) 75 MG TABLET    Take 75 mg by mouth once daily.    CYANOCOBALAMIN 500 MCG TABLET    Take 500 mcg by mouth once daily.    FOLIC ACID (FOLVITE) 1 MG TABLET    Take 2 tablets (2 mg total) by mouth once daily.    GABAPENTIN (NEURONTIN) 300 MG CAPSULE    Take 1 capsule (300 mg total) by mouth 2 (two) times daily.    HYDROXYCHLOROQUINE (PLAQUENIL) 200 MG TABLET    Take 1 tablet (200 mg total) by mouth 2 (two) times daily.    INSULIN NEEDLES, DISPOSABLE, (PEN NEEDLE) 31 X 1/4 " NDLE    " Twice daily injections.    LANCETS (LANCETS,ULTRA THIN) MISC    Glucose testing daily.    LEVOTHYROXINE (SYNTHROID) 50 MCG TABLET    TAKE ONE TABLET BY MOUTH ONCE DAILY    LORAZEPAM (ATIVAN) 0.5 MG TABLET    Take 1 tablet (0.5 mg total) by mouth every 12 (twelve) hours as needed for Anxiety.    MAGNESIUM 250 MG TAB    Take by mouth 2 (two) times daily.    METFORMIN (GLUCOPHAGE-XR) 500 MG 24 HR TABLET    TAKE ONE TABLET BY MOUTH TWICE DAILY WITH MEALS    METHOTREXATE 2.5 MG TAB    Take 10 tablets (25 mg total) by mouth every 7 days. Take 5 tabs in AM and  5 tabs in PM on the same day    PRAVASTATIN (PRAVACHOL) 20 MG TABLET    TAKE ONE TABLET BY MOUTH ONCE DAILY    PREDNISONE (DELTASONE) 5 MG TABLET    Take 1.5 tablets (7.5 mg total) by mouth once daily.    PROMETHAZINE (PHENERGAN) 25 MG TABLET    Take 1 tablet (25 mg total) by mouth every 6 (six) hours as needed for Nausea.    PYRIDOXINE, VITAMIN B6, (VITAMIN B-6) 200 MG TAB    Take 200 mg by mouth once daily.    RIBOFLAVIN (VITAMIN B-2 ORAL)    Take 100 mg by mouth once daily.    UBIDECARENONE (COENZYME Q10) 100 MG TAB    Take 1 tablet by mouth once daily. Take 300 mg daily    VENLAFAXINE (EFFEXOR-XR) 150 MG CP24    TAKE ONE CAPSULE BY MOUTH ONCE DAILY    VENLAFAXINE (EFFEXOR-XR) 75 MG 24 HR CAPSULE    TAKE ONE CAPSULE BY MOUTH ONCE DAILY (TOTAL 225 MG DAILY)    VITAMIN D 1000 UNITS TAB    Take 2,000 Units by mouth once daily.       Assessment/Plans:-  FIRST VISIT WITH ME.   Rheumatoid arthritis of multiple sites with negative rheumatoid factor  Seronegative rheumatoid arthritis on multiple disease modifying agents including methotrexate, hydroxychloroquine and Humira.  Patient reports persistent pain with no response to any other medications.  Exam failed to show any evidence of synovitis/dactylitis/enthesitis.  High pain-likely secondary to myofascial pain rather than active rheumatoid arthritis.  Continue rheumatoid arthritis medications while getting treatment  for myofascial pain from pain clinic specialist.  If no improvement consider MRI of the most painful joint to look for any evidence of synovitis.    High risk medication use  She is on hypodensity immunosuppressive medications including methotrexate and Humira.  Check renal and hepatic panel to rule out toxicity from methotrexate.  Hold both medications if any infection.    Myofacial muscle pain  Significant myofascial pain with multiple tender points on his arm.  Already on selective serotonin reuptake inhibitors and gabapentin.  Referred to Dr. Anna for further pain management.  - Ambulatory referral to Pain Clinic     # Return in about 3 months (around 1/10/2018).    Disclaimer: This note was prepared using voice recognition system and is likely to have sound alike errors and is not proof read.  Please call me with any questions.

## 2017-10-16 DIAGNOSIS — R25.2 MUSCLE CRAMPS AT NIGHT: ICD-10-CM

## 2017-10-16 RX ORDER — GABAPENTIN 300 MG/1
CAPSULE ORAL
Qty: 60 CAPSULE | Refills: 0 | Status: SHIPPED | OUTPATIENT
Start: 2017-10-16 | End: 2017-11-16 | Stop reason: SDUPTHER

## 2017-10-17 NOTE — TELEPHONE ENCOUNTER
----- Message from Kami Neves sent at 10/17/2017  9:52 AM CDT -----  Contact: pt  She's calling stating that she had a referral to Dr. Anna's office and she is there now but they do not have an appointment for her, please advise [phone] (home)

## 2017-10-17 NOTE — TELEPHONE ENCOUNTER
Spoke with pt and she has an appointment with  today [facility]. Advised patient that was the wrong location and provided the correct address and whereabouts. Pt verbalized understanding.           Advised Dr. Anna's office of issue as well and pt is not scheduled until 10.30 am.

## 2017-10-24 ENCOUNTER — PATIENT OUTREACH (OUTPATIENT)
Dept: ADMINISTRATIVE | Facility: HOSPITAL | Age: 52
End: 2017-10-24

## 2017-10-24 NOTE — PROGRESS NOTES
Patient return my call. [facility] Patient has not schedule, but will next month. Patient given the fax number. Patient in agreement and vocalize understanding.

## 2017-10-24 NOTE — PROGRESS NOTES
I have attempted without success to contact this patient by phone to schedule annual mammogram exam. Patient not available, no answer.

## 2017-10-27 DIAGNOSIS — F33.9 MAJOR DEPRESSION, RECURRENT, CHRONIC: ICD-10-CM

## 2017-10-27 RX ORDER — VENLAFAXINE HYDROCHLORIDE 150 MG/1
CAPSULE, EXTENDED RELEASE ORAL
Qty: 30 CAPSULE | Refills: 6 | Status: SHIPPED | OUTPATIENT
Start: 2017-10-27 | End: 2018-02-14 | Stop reason: SDUPTHER

## 2017-11-16 ENCOUNTER — PATIENT MESSAGE (OUTPATIENT)
Dept: RHEUMATOLOGY | Facility: CLINIC | Age: 52
End: 2017-11-16

## 2017-11-16 DIAGNOSIS — R25.2 MUSCLE CRAMPS AT NIGHT: ICD-10-CM

## 2017-11-16 DIAGNOSIS — E03.9 HYPOTHYROIDISM (ACQUIRED): ICD-10-CM

## 2017-11-16 DIAGNOSIS — M06.09 RHEUMATOID ARTHRITIS OF MULTIPLE SITES WITH NEGATIVE RHEUMATOID FACTOR: ICD-10-CM

## 2017-11-16 DIAGNOSIS — M06.00 SERONEGATIVE RHEUMATOID ARTHRITIS: ICD-10-CM

## 2017-11-16 RX ORDER — METHOTREXATE 2.5 MG/1
25 TABLET ORAL
Qty: 40 TABLET | Refills: 1 | Status: SHIPPED | OUTPATIENT
Start: 2017-11-16 | End: 2018-02-01 | Stop reason: ALTCHOICE

## 2017-11-16 RX ORDER — GABAPENTIN 300 MG/1
CAPSULE ORAL
Qty: 60 CAPSULE | Refills: 0 | Status: SHIPPED | OUTPATIENT
Start: 2017-11-16 | End: 2017-12-15 | Stop reason: SDUPTHER

## 2017-11-16 RX ORDER — LEVOTHYROXINE SODIUM 50 UG/1
TABLET ORAL
Qty: 30 TABLET | Refills: 0 | Status: SHIPPED | OUTPATIENT
Start: 2017-11-16 | End: 2017-12-30 | Stop reason: SDUPTHER

## 2017-11-17 ENCOUNTER — LAB VISIT (OUTPATIENT)
Dept: LAB | Facility: HOSPITAL | Age: 52
End: 2017-11-17
Attending: FAMILY MEDICINE
Payer: COMMERCIAL

## 2017-11-17 DIAGNOSIS — Z12.31 SCREENING MAMMOGRAM, ENCOUNTER FOR: ICD-10-CM

## 2017-11-17 DIAGNOSIS — Z12.11 SCREEN FOR COLON CANCER: Primary | ICD-10-CM

## 2017-11-17 DIAGNOSIS — E03.8 OTHER SPECIFIED HYPOTHYROIDISM: ICD-10-CM

## 2017-11-17 LAB — TSH SERPL DL<=0.005 MIU/L-ACNC: 0.51 UIU/ML

## 2017-11-17 PROCEDURE — 84443 ASSAY THYROID STIM HORMONE: CPT

## 2017-11-17 PROCEDURE — 36415 COLL VENOUS BLD VENIPUNCTURE: CPT | Mod: PO

## 2017-11-21 ENCOUNTER — HOSPITAL ENCOUNTER (OUTPATIENT)
Dept: RADIOLOGY | Facility: HOSPITAL | Age: 52
Discharge: HOME | End: 2017-11-21
Attending: FAMILY MEDICINE
Payer: COMMERCIAL

## 2017-11-21 VITALS — WEIGHT: 199 LBS | HEIGHT: 62 IN | BODY MASS INDEX: 36.62 KG/M2

## 2017-11-21 DIAGNOSIS — Z12.31 SCREENING MAMMOGRAM, ENCOUNTER FOR: ICD-10-CM

## 2017-11-21 PROCEDURE — 77067 SCR MAMMO BI INCL CAD: CPT | Mod: 26,,, | Performed by: RADIOLOGY

## 2017-11-21 PROCEDURE — 77067 SCR MAMMO BI INCL CAD: CPT | Mod: TC

## 2017-12-01 ENCOUNTER — PATIENT MESSAGE (OUTPATIENT)
Dept: OPHTHALMOLOGY | Facility: CLINIC | Age: 52
End: 2017-12-01

## 2017-12-01 RX ORDER — POLYMYXIN B SULFATE AND TRIMETHOPRIM 1; 10000 MG/ML; [USP'U]/ML
1 SOLUTION OPHTHALMIC 4 TIMES DAILY
Qty: 10 ML | Refills: 0 | Status: SHIPPED | OUTPATIENT
Start: 2017-12-01 | End: 2017-12-10 | Stop reason: ALTCHOICE

## 2017-12-04 ENCOUNTER — OFFICE VISIT (OUTPATIENT)
Dept: OPHTHALMOLOGY | Facility: CLINIC | Age: 52
End: 2017-12-04
Payer: COMMERCIAL

## 2017-12-04 ENCOUNTER — OFFICE VISIT (OUTPATIENT)
Dept: INTERNAL MEDICINE | Facility: CLINIC | Age: 52
End: 2017-12-04
Payer: COMMERCIAL

## 2017-12-04 VITALS
WEIGHT: 201.5 LBS | SYSTOLIC BLOOD PRESSURE: 120 MMHG | DIASTOLIC BLOOD PRESSURE: 82 MMHG | TEMPERATURE: 98 F | HEART RATE: 101 BPM | BODY MASS INDEX: 37.08 KG/M2 | OXYGEN SATURATION: 97 % | HEIGHT: 62 IN

## 2017-12-04 DIAGNOSIS — Z00.00 ANNUAL PHYSICAL EXAM: Primary | ICD-10-CM

## 2017-12-04 DIAGNOSIS — E11.59 HYPERTENSION ASSOCIATED WITH DIABETES: ICD-10-CM

## 2017-12-04 DIAGNOSIS — E78.2 COMBINED HYPERLIPIDEMIA ASSOCIATED WITH TYPE 2 DIABETES MELLITUS: ICD-10-CM

## 2017-12-04 DIAGNOSIS — Z12.11 COLON CANCER SCREENING: ICD-10-CM

## 2017-12-04 DIAGNOSIS — I15.2 HYPERTENSION ASSOCIATED WITH DIABETES: ICD-10-CM

## 2017-12-04 DIAGNOSIS — E03.9 HYPOTHYROIDISM (ACQUIRED): ICD-10-CM

## 2017-12-04 DIAGNOSIS — E11.69 COMBINED HYPERLIPIDEMIA ASSOCIATED WITH TYPE 2 DIABETES MELLITUS: ICD-10-CM

## 2017-12-04 DIAGNOSIS — E66.01 SEVERE OBESITY WITH BODY MASS INDEX (BMI) OF 35.0 TO 39.9 WITH COMORBIDITY: ICD-10-CM

## 2017-12-04 DIAGNOSIS — B30.9 VIRAL CONJUNCTIVITIS OF BOTH EYES: Primary | ICD-10-CM

## 2017-12-04 PROCEDURE — 99999 PR PBB SHADOW E&M-EST. PATIENT-LVL I: CPT | Mod: PBBFAC,,, | Performed by: OPTOMETRIST

## 2017-12-04 PROCEDURE — 92012 INTRM OPH EXAM EST PATIENT: CPT | Mod: S$GLB,,, | Performed by: OPTOMETRIST

## 2017-12-04 PROCEDURE — 99396 PREV VISIT EST AGE 40-64: CPT | Mod: S$GLB,,, | Performed by: INTERNAL MEDICINE

## 2017-12-04 PROCEDURE — 99999 PR PBB SHADOW E&M-EST. PATIENT-LVL III: CPT | Mod: PBBFAC,,, | Performed by: INTERNAL MEDICINE

## 2017-12-04 RX ORDER — KETOTIFEN FUMARATE 0.35 MG/ML
1 SOLUTION/ DROPS OPHTHALMIC 2 TIMES DAILY
Qty: 5 ML | Refills: 12
Start: 2017-12-04 | End: 2018-08-21 | Stop reason: ALTCHOICE

## 2017-12-04 NOTE — LETTER
[facility]  Ophthalmology  [address] [place] [id number]  Phone: [phone]  Fax: [phone]   December 4, 2017     Patient: Diana Escobar   YOB: 1965   Date of Visit: 12/4/2017       To Whom it May Concern:    Diana Escobar was seen in my clinic on 12/4/2017. She may return to work on December 11th.    If you have any questions or concerns, please don't hesitate to call.    Sincerely,         Chris Mario, OD

## 2017-12-04 NOTE — PROGRESS NOTES
HPI     Patient called in on 12/01/17 c/oi conjunctivitis and Dr. Mario called   in polytrim QID OU  Patient states it is worse today and spreading to OS.        LAST SEEN 06/08/17 TRF  DM 2010            Last edited by YAMILETH Bermudez on 12/4/2017  3:31 PM. (History)            Assessment /Plan     For exam results, see Encounter Report.    Viral conjunctivitis of both eyes    Other orders  -     peg 400-propylene glycol, PF, (SYSTANE ULTRA, PF,) 0.4-0.3 % Dpet; Place 1 drop into both eyes 4 (four) times daily.  Dispense: 1 each  -     ketotifen (ZADITOR) 0.025 % (0.035 %) ophthalmic solution; Place 1 drop into both eyes 2 (two) times daily.  Dispense: 5 mL; Refill: 12      Positive PA nodes    D/C polytrim  Continue Sustane Ultra prn  Start Alaway bid OU to control itching.    Cool compresses.    Note for work this week.    RTC prn

## 2017-12-04 NOTE — PROGRESS NOTES
Subjective:       Patient ID: Diana Escobar is a 52 y.o. female.    Chief Complaint: Annual Exam    Diana Escobar  52 y.o. White female     Patient presents with:  Annual Exam    HPI: Here today for an annual physical.   Hypothyroidism--stable on levothyroxine. She has fatigue but it is chronic.                      TSH                      0.507               11/17/2017            HTN--stable on amlodipine. She denies symptoms.   Diabetes--stable on metformin.                    HGBA1C                   5.5                 08/03/2017            HLD--compliant with pravastatin.                  CHOL                     184                 08/03/2017                  HDL                      48                  08/03/2017                 LDLCALC                  98.2                08/03/2017                 TRIG                     189 (H)             08/03/2017                 Colonoscopy due on 08/05/2016  Influenza Vaccine due on 08/01/2017  Hemoglobin A1c due on 02/03/2018  Urine Microalbumin due on 03/02/2018  Eye Exam due on 06/12/2018  Lipid Panel due on 08/03/2018  Foot Exam due on 08/09/2018  Mammogram due on 11/21/2019  TETANUS VACCINE due on 11/10/2026  Pneumococcal PPSV23 (Medium Risk) Completed    Past Medical History:  Asthma  Chronic low back pain  Degenerative disc disease, lumbar  Depression  2014: Diabetes mellitus  Fibromyalgia  High cholesterol  Hypertension  Hypothyroidism  MRSA (methicillin resistant Staphylococcus aur*  Stroke      Comment: TIA  Vitamin D deficiency disease    Past Surgical History:  HYSTERECTOMY  left ankle surgery  05/2017: loop recorder      Comment: cardiac device  06/2016: NECK SURGERY  ROTATOR CUFF REPAIR  TONSILLECTOMY    Review of patient's family history indicates:    Cancer                         Mother                    Stroke                         Mother                    Heart disease                  Mother                    Diabetes                        Mother                    Cataracts                      Mother                    Hypertension                   Mother                    Breast cancer                  Mother                    Heart disease                  Father                    COPD                           Father                    Hypertension                   Father                    Diabetes                       Maternal Aunt             Breast cancer                  Sister                      Current Outpatient Prescriptions on File Prior to Visit:  albuterol 90 mcg/actuation inhaler, Inhale 2 puffs into the lungs every 6 (six) hours as needed for Wheezing., Disp: 18 g, Rfl: 6  amlodipine (NORVASC) 5 MG tablet, Take 5 mg by mouth., Disp: , Rfl:   blood sugar diagnostic Strp, Glucose testing daily., Disp: 50 strip, Rfl: 11   blood-glucose meter Misc, Use as directed., Disp: 1 each, Rfl: 0  clopidogrel (PLAVIX) 75 mg tablet, Take 75 mg by mouth once daily., Disp: , Rfl:   folic acid (FOLVITE) 1 MG tablet, Take 2 tablets (2 mg total) by mouth once daily., Disp: 180 tablet, Rfl: 2  gabapentin (NEURONTIN) 300 MG capsule, TAKE ONE CAPSULE BY MOUTH TWICE DAILY, Disp: 60 capsule, Rfl: 0  hydroxychloroquine (PLAQUENIL) 200 mg tablet, Take 1 tablet (200 mg total) by mouth 2 (two) times daily., Disp: 60 tablet, Rfl: 3  lancets (LANCETS,ULTRA THIN) Misc, Glucose testing daily., Disp: 50 each, Rfl: 11  levothyroxine (SYNTHROID) 50 MCG tablet, TAKE ONE TABLET BY MOUTH ONCE DAILY, Disp: 30 tablet, Rfl: 0  magnesium 250 mg Tab, Take by mouth 2 (two) times daily., Disp: , Rfl:   metformin (GLUCOPHAGE-XR) 500 MG 24 hr tablet, TAKE ONE TABLET BY MOUTH TWICE DAILY WITH MEALS, Disp: 60 tablet, Rfl: 6  methotrexate 2.5 MG Tab, Take 10 tablets (25 mg total) by mouth every 7 days. Take 5 tabs in AM and  5 tabs in PM on the same day, Disp: 40 tablet, Rfl: 1  polymyxin B sulf-trimethoprim (POLYTRIM) 10,000 unit- 1 mg/mL Drop, Place 1 drop into  "both eyes 4 (four) times daily., Disp: 10 mL, Rfl: 0  pravastatin (PRAVACHOL) 20 MG tablet, TAKE ONE TABLET BY MOUTH ONCE DAILY, Disp: 30 tablet, Rfl: 6  pyridoxine, vitamin B6, (VITAMIN B-6) 200 mg Tab, Take 200 mg by mouth once daily., Disp: , Rfl:   RIBOFLAVIN (VITAMIN B-2 ORAL), Take 100 mg by mouth once daily., Disp: , Rfl:   UBIDECARENONE (COENZYME Q10) 100 mg Tab, Take 1 tablet by mouth once daily. Take 300 mg daily, Disp: , Rfl:   venlafaxine (EFFEXOR-XR) 150 MG Cp24, TAKE ONE CAPSULE BY MOUTH ONCE DAILY, Disp: 30 capsule, Rfl: 6  vitamin D 1000 units Tab, Take 2,000 Units by mouth once daily., Disp: , Rfl:   cyanocobalamin 500 MCG tablet, Take 500 mcg by mouth once daily., Disp: , Rfl:   insulin needles, disposable, (PEN NEEDLE) 31 X 1/4 " Ndle, Twice daily injections., Disp: 100 each, Rfl: 6  ketotifen (ZADITOR) 0.025 % (0.035 %) ophthalmic solution, Place 1 drop into both eyes 2 (two) times daily., Disp: 5 mL, Rfl: 12  lorazepam (ATIVAN) 0.5 MG tablet, Take 1 tablet (0.5 mg total) by mouth every 12 (twelve) hours as needed for Anxiety., Disp: 30 tablet, Rfl: 0  peg 400-propylene glycol, PF, (SYSTANE ULTRA, PF,) 0.4-0.3 % Dpet, Place 1 drop into both eyes 4 (four) times daily., Disp: 1 each, Rfl:   predniSONE (DELTASONE) 5 MG tablet, Take 1.5 tablets (7.5 mg total) by mouth once daily., Disp: 145 tablet, Rfl: 0  promethazine (PHENERGAN) 25 MG tablet, Take 1 tablet (25 mg total) by mouth every 6 (six) hours as needed for Nausea., Disp: 15 tablet, Rfl: 0  venlafaxine (EFFEXOR-XR) 75 MG 24 hr capsule, TAKE ONE CAPSULE BY MOUTH ONCE DAILY (TOTAL 225 MG DAILY), Disp: 30 capsule, Rfl: 0    Allergies:  Review of patient's allergies indicates:   -- Mobic (meloxicam)    -- Topamax (topiramate)    -- Adhesive -- Rash                Review of Systems   Constitutional: Positive for fatigue. Negative for fever and unexpected weight change.   Eyes: Positive for discharge and redness. Negative for visual disturbance. "   Respiratory: Negative for shortness of breath.    Cardiovascular: Negative for chest pain and leg swelling.   Gastrointestinal: Positive for constipation. Negative for abdominal pain and diarrhea.   Genitourinary: Negative for difficulty urinating.   Musculoskeletal: Negative for gait problem.   Neurological: Negative for dizziness and headaches.   Psychiatric/Behavioral: Positive for dysphoric mood and sleep disturbance.       Objective:      Physical Exam   Constitutional: She is oriented to person, place, and time. She appears well-developed and well-nourished. No distress.   Eyes: Left eye exhibits discharge. Left conjunctiva is injected. No scleral icterus.   Neck: No tracheal deviation present. No thyromegaly present.   Cardiovascular: Normal rate, regular rhythm and normal heart sounds.    Pulmonary/Chest: Effort normal and breath sounds normal. No respiratory distress.   Abdominal: Soft. Bowel sounds are normal.   Lymphadenopathy:     She has no cervical adenopathy.   Neurological: She is alert and oriented to person, place, and time.   Skin: Skin is warm and dry.   Psychiatric: She has a normal mood and affect.   Vitals reviewed.      Assessment:       1. Annual physical exam    2. Hypothyroidism (acquired)    3. Hypertension associated with diabetes    4. Combined hyperlipidemia associated with type 2 diabetes mellitus    5. Severe obesity with body mass index (BMI) of 35.0 to 39.9 with comorbidity    6. Colon cancer screening        Plan:       Diana was seen today for annual exam.    Diagnoses and all orders for this visit:    Annual physical exam  -     Counseled regarding age appropriate screenings and immunizations  -     Counseled regarding lifestyle modifications    Hypothyroidism (acquired)  -     Continue levothyroxine    Hypertension associated with diabetes  -     Continue current management    Combined hyperlipidemia associated with type 2 diabetes mellitus  -      Continue current  management    Severe obesity with body mass index (BMI) of 35.0 to 39.9 with comorbidity  -     Lifestyle modifications discussed     Colon cancer screening  -     Case request GI: COLONOSCOPY    RTC in February as previously scheduled.

## 2017-12-05 ENCOUNTER — TELEPHONE (OUTPATIENT)
Dept: INTERNAL MEDICINE | Facility: CLINIC | Age: 52
End: 2017-12-05

## 2017-12-05 ENCOUNTER — HOSPITAL ENCOUNTER (EMERGENCY)
Facility: HOSPITAL | Age: 52
Discharge: HOME | End: 2017-12-05
Attending: EMERGENCY MEDICINE
Payer: COMMERCIAL

## 2017-12-05 ENCOUNTER — PATIENT MESSAGE (OUTPATIENT)
Dept: RHEUMATOLOGY | Facility: CLINIC | Age: 52
End: 2017-12-05

## 2017-12-05 ENCOUNTER — PATIENT MESSAGE (OUTPATIENT)
Dept: INTERNAL MEDICINE | Facility: CLINIC | Age: 52
End: 2017-12-05

## 2017-12-05 VITALS
TEMPERATURE: 99 F | RESPIRATION RATE: 18 BRPM | DIASTOLIC BLOOD PRESSURE: 90 MMHG | SYSTOLIC BLOOD PRESSURE: 147 MMHG | BODY MASS INDEX: 36.8 KG/M2 | OXYGEN SATURATION: 98 % | HEIGHT: 62 IN | WEIGHT: 200 LBS | HEART RATE: 97 BPM

## 2017-12-05 DIAGNOSIS — B30.9 ACUTE VIRAL CONJUNCTIVITIS OF BOTH EYES: Primary | ICD-10-CM

## 2017-12-05 PROCEDURE — 99283 EMERGENCY DEPT VISIT LOW MDM: CPT

## 2017-12-05 NOTE — ED PROVIDER NOTES
SCRIBE #1 NOTE: I, Corinne Mack, am scribing for, and in the presence of, Manpreet Abad MD. I have scribed the entire note.      History      Chief Complaint   Patient presents with    Eye Pain     pt dx with conjunctivits yesterday and states the pain and swelling has gotten worse        Review of patient's allergies indicates:   Allergen Reactions    Mobic [meloxicam]     Topamax [topiramate]     Adhesive Rash        HPI   HPI    12/5/2017, 12:53 PM   History obtained from the patient      History of Present Illness: Diana Escobar is a 52 y.o. female patient who presents to the Emergency Department for bilateral eye pain which onset gradually a few days ago. Symptoms are constant and moderate in severity. Pt was Dx with conjunctivitis yesterday. Pt reports that the pain and swelling have worsened. No mitigating or exacerbating factors reported. Associated sxs include facial swelling, eye redness, eye discharge, and photophobia. Patient denies any fever, chills, ear pain, drooling, congestion, dental pain, sinus pain, SOB, N/V, neck pain, HA, dizziness, and all other sxs at this time. Prior Tx includes abx eye drops with no relief. Pt has Hx of HTN and DM. No further complaints or concerns at this time.         Arrival mode: Personal vehicle     PCP: Jory Alcocer DO       Past Medical History:  Past Medical History:   Diagnosis Date    Asthma     Chronic low back pain     Degenerative disc disease, lumbar     Depression     Diabetes mellitus 2014    BS didn't check 06/08/2017    Fibromyalgia     High cholesterol     Hypertension     Hypothyroidism     MRSA (methicillin resistant Staphylococcus aureus) carrier     Stroke     TIA    Vitamin D deficiency disease        Past Surgical History:  Past Surgical History:   Procedure Laterality Date    HYSTERECTOMY      left ankle surgery      loop recorder  05/2017    cardiac device    NECK SURGERY  06/2016    ROTATOR CUFF REPAIR       TONSILLECTOMY           Family History:  Family History   Problem Relation Age of Onset    Cancer Mother     Stroke Mother     Heart disease Mother     Diabetes Mother     Cataracts Mother     Hypertension Mother     Breast cancer Mother     Heart disease Father     COPD Father     Hypertension Father     Diabetes Maternal Aunt     Breast cancer Sister        Social History:  Social History     Social History Main Topics    Smoking status: Current Some Day Smoker     Packs/day: 0.50     Types: Cigarettes    Smokeless tobacco: Never Used    Alcohol use No    Drug use: No    Sexual activity: Yes       ROS   Review of Systems   Constitutional: Negative for chills and fever.   HENT: Positive for facial swelling. Negative for congestion, dental problem, drooling, ear pain and sinus pain.    Eyes: Positive for photophobia, pain, discharge and redness.   Respiratory: Negative for cough and shortness of breath.    Cardiovascular: Negative for chest pain and leg swelling.   Gastrointestinal: Negative for abdominal pain, diarrhea, nausea and vomiting.   Musculoskeletal: Negative for back pain, neck pain and neck stiffness.   Skin: Negative for rash and wound.   Neurological: Negative for dizziness, light-headedness, numbness and headaches.   All other systems reviewed and are negative.    Physical Exam      Initial Vitals [12/05/17 1230]   BP Pulse Resp Temp SpO2   (!) 147/90 97 18 98.5 °F (36.9 °C) 98 %      MAP       109          Physical Exam  Nursing Notes and Vital Signs Reviewed.  Constitutional: Patient is in no apparent distress. Well-developed and well-nourished.  Head: Atraumatic. Normocephalic. Mild facial swelling, worse on the L than the R with no erythema, fluctuance, or induration.  Eyes: PERRL. EOM intact. No scleral icterus. Conjunctival injection bilaterally.   ENT: Mucous membranes are moist. Oropharynx is clear and symmetric.    Neck: Supple. Full ROM.   Cardiovascular: Regular rate.  "Regular rhythm.  Abdominal: Soft and non-distended.    Musculoskeletal: Moves all extremities. No obvious deformities.   Skin: Warm and dry.  Neurological:  Alert, awake, and appropriate.  Normal speech.  No acute focal neurological deficits are appreciated.  Psychiatric: Normal affect. Good eye contact. Appropriate in content.    ED Course    Procedures  ED Vital Signs:  Vitals:    12/05/17 1230   BP: (!) 147/90   Pulse: 97   Resp: 18   Temp: 98.5 °F (36.9 °C)   TempSrc: Oral   SpO2: 98%   Weight: 90.7 kg (200 lb)   Height: 5' 2" (1.575 m)       Abnormal Lab Results:  Labs Reviewed - No data to display     All Lab Results:  None    Imaging Results:  Imaging Results    None                 The Emergency Provider reviewed the vital signs and test results, which are outlined above.    ED Discussion     12:58 PM: Pt is awake, alert, and in no distress. Discussed pt dx and plan of tx. Gave pt all f/u and return to the ED instructions. All questions and concerns were addressed at this time. Pt expresses understanding of information and instructions, and is comfortable with plan to discharge. Pt is stable for discharge.    I discussed with patient and/or family/caretaker that evaluation in the ED does not suggest any emergent or life threatening medical conditions requiring immediate intervention beyond what was provided in the ED, and I believe patient is safe for discharge.  Regardless, an unremarkable evaluation in the ED does not preclude the development or presence of a serious of life threatening condition. As such, patient was instructed to return immediately for any worsening or change in current symptoms.      ED Medication(s):  Medications - No data to display    Discharge Medication List as of 12/5/2017 12:54 PM          Follow-up Information     Jory Alcocer DO.    Specialty:  Internal Medicine  Contact information:  [id number] [place] [address] DR Carissa ANGULO [address]  [phone]                     Medical " Decision Making              Scribe Attestation:   Scribe #1: I performed the above scribed service and the documentation accurately describes the services I performed. I attest to the accuracy of the note.    Attending:   Physician Attestation Statement for Scribe #1: I, Manpreet Abad MD, personally performed the services described in this documentation, as scribed by Corinne Mack, in my presence, and it is both accurate and complete.          Clinical Impression       ICD-10-CM ICD-9-CM   1. Acute viral conjunctivitis of both eyes B30.9 077.99       Disposition:   Disposition: Discharged  Condition: Stable         Manpreet Abad MD  12/05/17 6147

## 2017-12-05 NOTE — TELEPHONE ENCOUNTER
Called and spoke with pt. Pt stated she has pink eye she went and seen her eye doctor yesterday and he stated she needed to make an appt with her provider. She stated that it went to her right eye and is moving down her face and she can't keep her eye open. I stated you need to go to the Er, because that's severe. Pt stated ok she just wanted to make sure and she stated she needed her appt canceled today with Mr. Hsu. I stated we will cancel it.

## 2017-12-07 ENCOUNTER — HOSPITAL ENCOUNTER (EMERGENCY)
Facility: HOSPITAL | Age: 52
Discharge: HOME | End: 2017-12-07
Attending: EMERGENCY MEDICINE
Payer: COMMERCIAL

## 2017-12-07 ENCOUNTER — PATIENT MESSAGE (OUTPATIENT)
Dept: RHEUMATOLOGY | Facility: CLINIC | Age: 52
End: 2017-12-07

## 2017-12-07 VITALS
TEMPERATURE: 98 F | HEIGHT: 62 IN | HEART RATE: 88 BPM | SYSTOLIC BLOOD PRESSURE: 154 MMHG | BODY MASS INDEX: 36.8 KG/M2 | RESPIRATION RATE: 16 BRPM | WEIGHT: 200 LBS | OXYGEN SATURATION: 96 % | DIASTOLIC BLOOD PRESSURE: 74 MMHG

## 2017-12-07 DIAGNOSIS — H10.9 CONJUNCTIVITIS OF BOTH EYES, UNSPECIFIED CONJUNCTIVITIS TYPE: Primary | ICD-10-CM

## 2017-12-07 PROCEDURE — 99283 EMERGENCY DEPT VISIT LOW MDM: CPT

## 2017-12-07 RX ORDER — TOBRAMYCIN 3 MG/ML
1 SOLUTION/ DROPS OPHTHALMIC EVERY 4 HOURS
Qty: 5 ML | Refills: 0 | Status: SHIPPED | OUTPATIENT
Start: 2017-12-07 | End: 2017-12-10 | Stop reason: ALTCHOICE

## 2017-12-07 NOTE — ED PROVIDER NOTES
Encounter Date: 12/7/2017       History     Chief Complaint   Patient presents with    Eye Problem     patient states she has viral pink eye, patient seen in ED on the 5th, patient states no improvement      52 year old female with complaint of bilateral eye redness and itching X 7 days.  Pt reports that she was evaluated by eye doctor 4 days ago and diagnosed with viral conjunctivitis.  Pt not currently taking eye antibiotics.  Moderate eye redness and discharge.  Reports children were diagnosed with conjunctivitis and treated with antibiotic eye drops. Pt using an over the counter eye drop.           Review of patient's allergies indicates:   Allergen Reactions    Mobic [meloxicam]     Topamax [topiramate]     Adhesive Rash     Past Medical History:   Diagnosis Date    Asthma     Chronic low back pain     Degenerative disc disease, lumbar     Depression     Diabetes mellitus 2014    BS didn't check 06/08/2017    Fibromyalgia     High cholesterol     Hypertension     Hypothyroidism     MRSA (methicillin resistant Staphylococcus aureus) carrier     Stroke     TIA    Vitamin D deficiency disease      Past Surgical History:   Procedure Laterality Date    HYSTERECTOMY      left ankle surgery      loop recorder  05/2017    cardiac device    NECK SURGERY  06/2016    ROTATOR CUFF REPAIR      TONSILLECTOMY       Family History   Problem Relation Age of Onset    Cancer Mother     Stroke Mother     Heart disease Mother     Diabetes Mother     Cataracts Mother     Hypertension Mother     Breast cancer Mother     Heart disease Father     COPD Father     Hypertension Father     Diabetes Maternal Aunt     Breast cancer Sister      Social History   Substance Use Topics    Smoking status: Current Some Day Smoker     Packs/day: 0.50     Types: Cigarettes    Smokeless tobacco: Never Used    Alcohol use No     Review of Systems   Constitutional: Negative for fever.   HENT: Negative for sore  throat.    Eyes: Positive for discharge and redness.   Respiratory: Negative for shortness of breath.    Cardiovascular: Negative for chest pain.   Gastrointestinal: Negative for nausea.   Genitourinary: Negative for dysuria.   Musculoskeletal: Negative for back pain.   Skin: Negative for rash.   Neurological: Negative for weakness.   Hematological: Does not bruise/bleed easily.       Physical Exam     Initial Vitals [12/07/17 0846]   BP Pulse Resp Temp SpO2   (!) 154/74 88 16 98.2 °F (36.8 °C) 96 %      MAP       100.67         Physical Exam    Nursing note and vitals reviewed.  Constitutional: She appears well-developed and well-nourished.   HENT:   Head: Normocephalic and atraumatic.   Eyes: EOM are normal.   Bilateral conjunctival erythema with chemosis, PERRLA, no corneal abrasions or lesions noted   Neck: Normal range of motion. Neck supple.   Cardiovascular: Normal rate, regular rhythm, normal heart sounds and intact distal pulses.   Pulmonary/Chest: Breath sounds normal.   Abdominal: Soft. There is no tenderness. There is no rebound and no guarding.   Musculoskeletal: Normal range of motion.   Neurological: She is alert and oriented to person, place, and time. She has normal strength and normal reflexes.   Skin: Skin is warm and dry.   Psychiatric: She has a normal mood and affect. Her behavior is normal. Thought content normal.         ED Course   Procedures  Labs Reviewed - No data to display                            ED Course      Clinical Impression:   The encounter diagnosis was Conjunctivitis of both eyes, unspecified conjunctivitis type.                           Rowdy Leon NP  12/07/17 0906

## 2017-12-10 ENCOUNTER — HOSPITAL ENCOUNTER (EMERGENCY)
Facility: HOSPITAL | Age: 52
Discharge: HOME | End: 2017-12-10
Payer: COMMERCIAL

## 2017-12-10 VITALS
RESPIRATION RATE: 16 BRPM | OXYGEN SATURATION: 99 % | TEMPERATURE: 98 F | BODY MASS INDEX: 36.15 KG/M2 | DIASTOLIC BLOOD PRESSURE: 84 MMHG | WEIGHT: 197.63 LBS | SYSTOLIC BLOOD PRESSURE: 141 MMHG | HEART RATE: 76 BPM

## 2017-12-10 DIAGNOSIS — H10.33 ACUTE CONJUNCTIVITIS OF BOTH EYES, UNSPECIFIED ACUTE CONJUNCTIVITIS TYPE: Primary | ICD-10-CM

## 2017-12-10 PROCEDURE — 99283 EMERGENCY DEPT VISIT LOW MDM: CPT

## 2017-12-10 PROCEDURE — 25000003 PHARM REV CODE 250: Performed by: PHYSICIAN ASSISTANT

## 2017-12-10 RX ORDER — ERYTHROMYCIN 5 MG/G
OINTMENT OPHTHALMIC
Status: COMPLETED | OUTPATIENT
Start: 2017-12-10 | End: 2017-12-10

## 2017-12-10 RX ORDER — ERYTHROMYCIN 5 MG/G
OINTMENT OPHTHALMIC EVERY 6 HOURS
Qty: 1 TUBE | Refills: 0 | Status: SHIPPED | OUTPATIENT
Start: 2017-12-10 | End: 2017-12-17

## 2017-12-10 RX ORDER — PROPARACAINE HYDROCHLORIDE 5 MG/ML
1 SOLUTION/ DROPS OPHTHALMIC
Status: COMPLETED | OUTPATIENT
Start: 2017-12-10 | End: 2017-12-10

## 2017-12-10 RX ADMIN — ERYTHROMYCIN 1 INCH: 5 OINTMENT OPHTHALMIC at 08:12

## 2017-12-10 RX ADMIN — PROPARACAINE HYDROCHLORIDE 1 DROP: 5 SOLUTION/ DROPS OPHTHALMIC at 08:12

## 2017-12-11 ENCOUNTER — OFFICE VISIT (OUTPATIENT)
Dept: OPHTHALMOLOGY | Facility: CLINIC | Age: 52
End: 2017-12-11
Payer: COMMERCIAL

## 2017-12-11 DIAGNOSIS — B30.9 VIRAL CONJUNCTIVITIS OF BOTH EYES: Primary | ICD-10-CM

## 2017-12-11 DIAGNOSIS — H16.9 KERATITIS: ICD-10-CM

## 2017-12-11 PROCEDURE — 92012 INTRM OPH EXAM EST PATIENT: CPT | Mod: S$GLB,,, | Performed by: OPTOMETRIST

## 2017-12-11 RX ORDER — PREDNISOLONE ACETATE 10 MG/ML
1 SUSPENSION/ DROPS OPHTHALMIC EVERY 4 HOURS
Qty: 10 ML | Refills: 0 | Status: SHIPPED | OUTPATIENT
Start: 2017-12-11 | End: 2017-12-21

## 2017-12-11 NOTE — LETTER
[facility]  Ophthalmology  [address] [place] [id number]  Phone: [phone]  Fax: [phone]   December 11, 2017     Patient: Diana Escobar   YOB: 1965   Date of Visit: 12/11/2017       To Whom it May Concern:    Diana Escobar was seen in my clinic on 12/11/2017. She may return to work on 12/18/2017.    If you have any questions or concerns, please don't hesitate to call.    Sincerely,         Chris Mario, OD

## 2017-12-11 NOTE — ED PROVIDER NOTES
"SCRIBE #1 NOTE: ICatherine, am scribing for, and in the presence of, BLAIR Montes De Oca. I have scribed the entire note.     SCRIBE #2 NOTE: I, Emery Hunter, am scribing for, and in the presence of,  BLAIR Montes De Oca. I have scribed the remaining portions of the note not scribed by Scribe #1.     History      Chief Complaint   Patient presents with    Eye Pain     Pt reports dx of "pink eye" x1 wk ago. Reports worsening pain to eyes, also pain to mouth and nose.       Review of patient's allergies indicates:   Allergen Reactions    Mobic [meloxicam]     Topamax [topiramate]     Adhesive Rash        HPI   HPI    12/10/2017, 7:59 PM   History obtained from the patient      History of Present Illness: Diana Escobar is a 52 y.o. female patient who presents to the Emergency Department for conjunctivitis in both eyes which onset gradually a week ago. Pt was seen here 3 days ago and was prescribed tobramycin, but reports that her eyes are not responding to it and the drops "make her eyes burn". Symptoms are constant and moderate in severity.  No mitigating or exacerbating factors reported. Associated sxs include photophobia, eye pain, and eye discharge. Patient denies any fever, chills, Ha, visual disturbance, eye itching, and all other sxs at this time. No further complaints or concerns at this time.         Arrival mode: Personal vehicle     PCP: Jory Alcocer DO       Past Medical History:  Past Medical History:   Diagnosis Date    Asthma     Chronic low back pain     Degenerative disc disease, lumbar     Depression     Diabetes mellitus 2014    BS didn't check 06/08/2017    Fibromyalgia     High cholesterol     Hypertension     Hypothyroidism     MRSA (methicillin resistant Staphylococcus aureus) carrier     Stroke     TIA    Vitamin D deficiency disease        Past Surgical History:  Past Surgical History:   Procedure Laterality Date    HYSTERECTOMY      left ankle surgery "      loop recorder  05/2017    cardiac device    NECK SURGERY  06/2016    ROTATOR CUFF REPAIR      TONSILLECTOMY           Family History:  Family History   Problem Relation Age of Onset    Cancer Mother     Stroke Mother     Heart disease Mother     Diabetes Mother     Cataracts Mother     Hypertension Mother     Breast cancer Mother     Heart disease Father     COPD Father     Hypertension Father     Diabetes Maternal Aunt     Breast cancer Sister        Social History:  Social History     Social History Main Topics    Smoking status: Current Some Day Smoker     Packs/day: 0.50     Types: Cigarettes    Smokeless tobacco: Never Used    Alcohol use No    Drug use: No    Sexual activity: Yes       ROS   Review of Systems   Constitutional: Negative for chills and fever.   HENT: Negative for sore throat.    Eyes: Positive for photophobia, pain, discharge and redness. Negative for itching and visual disturbance.   Respiratory: Negative for shortness of breath.    Cardiovascular: Negative for chest pain.   Gastrointestinal: Negative for nausea.   Genitourinary: Negative for dysuria.   Musculoskeletal: Negative for back pain.   Skin: Negative for rash.   Neurological: Negative for weakness.   Hematological: Does not bruise/bleed easily.   All other systems reviewed and are negative.      Physical Exam      Initial Vitals [12/10/17 1944]   BP Pulse Resp Temp SpO2   (!) 153/90 89 20 98.4 °F (36.9 °C) 95 %      MAP       111          Physical Exam  Nursing Notes and Vital Signs Reviewed.  Constitutional: Patient is in no acute distress. Well-developed and well-nourished.  Head: Atraumatic. Normocephalic.  Eyes: PERRL. EOM intact. Conjunctivae are not pale. No scleral icterus. Diffuse bilat conjunctival injection with conjunctival edema. No hemorrhage. No pain with ocular movement.   ENT: Mucous membranes are moist. Oropharynx is clear and symmetric.    Neck: Supple. Full ROM. No  lymphadenopathy.  Cardiovascular: Regular rate. Regular rhythm. No murmurs, rubs, or gallops. Distal pulses are 2+ and symmetric.  Pulmonary/Chest: No respiratory distress. Clear to auscultation bilaterally. No wheezing or rales.  Abdominal: Soft and non-distended.  There is no tenderness.  No rebound, guarding, or rigidity.   Musculoskeletal: Moves all extremities. No obvious deformities. No edema.   Skin: Warm and dry.  Neurological:  Alert, awake, and appropriate.  Normal speech.  No acute focal neurological deficits are appreciated.  Psychiatric: Normal affect. Good eye contact. Appropriate in content.    ED Course    Procedures  ED Vital Signs:  Vitals:    12/10/17 1944 12/10/17 2146   BP: (!) 153/90 (!) 141/84   Pulse: 89 76   Resp: 20 16   Temp: 98.4 °F (36.9 °C) 98 °F (36.7 °C)   TempSrc: Oral Oral   SpO2: 95% 99%   Weight: 89.7 kg (197 lb 10.3 oz)               The Emergency Provider reviewed the vital signs and test results, which are outlined above.    ED Discussion     9:42 PM: Reassessed pt at this time.  Pt states her condition has improved at this time. Discussed with pt all pertinent ED information and results. Discussed pt dx and plan of tx. Gave pt all f/u and return to the ED instructions. All questions and concerns were addressed at this time. Pt expresses understanding of information and instructions, and is comfortable with plan to discharge. Pt is stable for discharge.        ED Medication(s):  Medications   proparacaine 0.5 % ophthalmic solution 1 drop (1 drop Both Eyes Given 12/10/17 2029)   erythromycin 5 mg/gram (0.5 %) ophthalmic ointment (1 inch Both Eyes Given 12/10/17 2029)       Discharge Medication List as of 12/10/2017  9:41 PM      START taking these medications    Details   erythromycin (ROMYCIN) ophthalmic ointment Place into both eyes every 6 (six) hours., Starting Sun 12/10/2017, Until Sun 12/17/2017, Print             Follow-up Information     [facility]. Schedule  an appointment as soon as possible for a visit in 1 day.    Specialty:  Ophthalmology  Contact information:  [address]  [facility] [address] [phone]  Additional information:  ([address] 1st floor           [facility]    Specialty:  Emergency Medicine  Why:  If symptoms worsen  Contact information:  [address]  [facility] [address] [phone]                   Medical Decision Making    Medical Decision Making:   History:   Old Medical Records: I decided to obtain old medical records.  Initial Assessment:   Pt here c/o bilateral eye irritation, redness, and drainage for the past week. She has been seen by her optometrist for this, diagnosed with viral conjunctivitis, treated but did not improve; subsequently seen in the ER for this, diagnosed with bacterial conjunctivitis and treated with Tobrex drop, but did not improve. Here for re-check and further management. She denies FB, splash, vision loss/change, or contacts.   ED Management:  Proparacaine drops provided complete and instant relief of bilateral eye discomfort per patient. Eyes copiously irrigated using normal saline. Erythromycin ophthalmic ointment applied in the ER and provided with Rx. Patient instructed to DC Tobrex and to follow up with [facility] in the morning for re-evaluation and further management.            Scribe Attestation:   Scribe #1: I performed the above scribed service and the documentation accurately describes the services I performed. I attest to the accuracy of the note.    Attending:   Physician Attestation Statement for Scribe #1: I, BLAIR Montes De Oca, personally performed the services described in this documentation, as scribed by Catherine Bagley, in my presence, and it is both accurate and complete.       Scribe Attestation:   Scribe #2: I performed the above scribed service and the documentation accurately describes the services I  performed. I attest to the accuracy of the note.    Attending Attestation:           Physician Attestation for Scribe:    Physician Attestation Statement for Scribe #2: I, BLAIR Montes De Oca, reviewed documentation, as scribed by Emery Hunter in my presence, and it is both accurate and complete. I also acknowledge and confirm the content of the note done by Scribe #1.          Clinical Impression       ICD-10-CM ICD-9-CM   1. Acute conjunctivitis of both eyes, unspecified acute conjunctivitis type H10.33 372.00       Disposition:   Disposition: Discharged  Condition: Stable         Maxime Hanson PA-C  12/10/17 2154

## 2017-12-13 ENCOUNTER — OFFICE VISIT (OUTPATIENT)
Dept: OPHTHALMOLOGY | Facility: CLINIC | Age: 52
End: 2017-12-13
Payer: COMMERCIAL

## 2017-12-13 DIAGNOSIS — B30.9 VIRAL CONJUNCTIVITIS OF BOTH EYES: Primary | ICD-10-CM

## 2017-12-13 DIAGNOSIS — H16.9 KERATITIS: ICD-10-CM

## 2017-12-13 PROCEDURE — 99999 PR PBB SHADOW E&M-EST. PATIENT-LVL I: CPT | Mod: PBBFAC,,, | Performed by: OPTOMETRIST

## 2017-12-13 PROCEDURE — 92012 INTRM OPH EXAM EST PATIENT: CPT | Mod: S$GLB,,, | Performed by: OPTOMETRIST

## 2017-12-13 NOTE — PROGRESS NOTES
HPI     Eyes feels better. Patient still having blurred vision, sensitive to   lights.  Still watery, red, crusty a little not as much.    Last edited by Sharri Retana on 12/13/2017  8:26 AM. (History)            Assessment /Plan     For exam results, see Encounter Report.    Viral conjunctivitis of both eyes    Keratitis      Mild improvement in symptoms  Stable IOP    Continue PF qid x 7 days, bid x 7 days, qday x 7 days then D/C    RTC if symptoms worsen.

## 2017-12-15 ENCOUNTER — PATIENT MESSAGE (OUTPATIENT)
Dept: OPHTHALMOLOGY | Facility: CLINIC | Age: 52
End: 2017-12-15

## 2017-12-15 DIAGNOSIS — R25.2 MUSCLE CRAMPS AT NIGHT: ICD-10-CM

## 2017-12-15 RX ORDER — GABAPENTIN 300 MG/1
CAPSULE ORAL
Qty: 60 CAPSULE | Refills: 0 | Status: SHIPPED | OUTPATIENT
Start: 2017-12-15 | End: 2018-01-16 | Stop reason: SDUPTHER

## 2017-12-30 DIAGNOSIS — E03.9 HYPOTHYROIDISM (ACQUIRED): ICD-10-CM

## 2017-12-30 DIAGNOSIS — E78.5 HYPERLIPIDEMIA WITH TARGET LDL LESS THAN 100: ICD-10-CM

## 2017-12-30 RX ORDER — LEVOTHYROXINE SODIUM 50 UG/1
TABLET ORAL
Qty: 30 TABLET | Refills: 11 | Status: SHIPPED | OUTPATIENT
Start: 2017-12-30 | End: 2018-02-14 | Stop reason: SDUPTHER

## 2017-12-30 RX ORDER — PRAVASTATIN SODIUM 20 MG/1
TABLET ORAL
Qty: 30 TABLET | Refills: 6 | Status: SHIPPED | OUTPATIENT
Start: 2017-12-30 | End: 2018-02-14 | Stop reason: SDUPTHER

## 2018-01-05 ENCOUNTER — PATIENT MESSAGE (OUTPATIENT)
Dept: INTERNAL MEDICINE | Facility: CLINIC | Age: 53
End: 2018-01-05

## 2018-01-16 DIAGNOSIS — R25.2 MUSCLE CRAMPS AT NIGHT: ICD-10-CM

## 2018-01-17 RX ORDER — GABAPENTIN 300 MG/1
CAPSULE ORAL
Qty: 60 CAPSULE | Refills: 0 | Status: SHIPPED | OUTPATIENT
Start: 2018-01-17 | End: 2018-02-01 | Stop reason: SDUPTHER

## 2018-01-25 ENCOUNTER — PATIENT MESSAGE (OUTPATIENT)
Dept: INTERNAL MEDICINE | Facility: CLINIC | Age: 53
End: 2018-01-25

## 2018-02-01 ENCOUNTER — PATIENT OUTREACH (OUTPATIENT)
Dept: ADMINISTRATIVE | Facility: HOSPITAL | Age: 53
End: 2018-02-01

## 2018-02-01 ENCOUNTER — OFFICE VISIT (OUTPATIENT)
Dept: RHEUMATOLOGY | Facility: CLINIC | Age: 53
End: 2018-02-01
Payer: COMMERCIAL

## 2018-02-01 VITALS
BODY MASS INDEX: 36.09 KG/M2 | DIASTOLIC BLOOD PRESSURE: 81 MMHG | HEART RATE: 81 BPM | SYSTOLIC BLOOD PRESSURE: 122 MMHG | WEIGHT: 197.31 LBS

## 2018-02-01 DIAGNOSIS — E11.69 COMBINED HYPERLIPIDEMIA ASSOCIATED WITH TYPE 2 DIABETES MELLITUS: ICD-10-CM

## 2018-02-01 DIAGNOSIS — I15.2 HYPERTENSION ASSOCIATED WITH DIABETES: Primary | ICD-10-CM

## 2018-02-01 DIAGNOSIS — M79.7 FIBROMYALGIA: Primary | ICD-10-CM

## 2018-02-01 DIAGNOSIS — E78.2 COMBINED HYPERLIPIDEMIA ASSOCIATED WITH TYPE 2 DIABETES MELLITUS: ICD-10-CM

## 2018-02-01 DIAGNOSIS — R25.2 MUSCLE CRAMPS AT NIGHT: ICD-10-CM

## 2018-02-01 DIAGNOSIS — M06.09 RHEUMATOID ARTHRITIS OF MULTIPLE SITES WITH NEGATIVE RHEUMATOID FACTOR: ICD-10-CM

## 2018-02-01 DIAGNOSIS — E11.59 HYPERTENSION ASSOCIATED WITH DIABETES: Primary | ICD-10-CM

## 2018-02-01 PROCEDURE — 99214 OFFICE O/P EST MOD 30 MIN: CPT | Mod: S$GLB,,, | Performed by: INTERNAL MEDICINE

## 2018-02-01 PROCEDURE — 99999 PR PBB SHADOW E&M-EST. PATIENT-LVL III: CPT | Mod: PBBFAC,,, | Performed by: INTERNAL MEDICINE

## 2018-02-01 PROCEDURE — 3008F BODY MASS INDEX DOCD: CPT | Mod: S$GLB,,, | Performed by: INTERNAL MEDICINE

## 2018-02-01 RX ORDER — OXYCODONE AND ACETAMINOPHEN 7.5; 325 MG/1; MG/1
TABLET ORAL
Refills: 0 | COMMUNITY
Start: 2018-01-14 | End: 2019-05-08

## 2018-02-01 RX ORDER — DICLOFENAC SODIUM 10 MG/G
GEL TOPICAL
COMMUNITY
End: 2020-02-05 | Stop reason: SDUPTHER

## 2018-02-01 RX ORDER — GABAPENTIN 300 MG/1
CAPSULE ORAL
Qty: 60 CAPSULE | Refills: 0 | Status: SHIPPED | OUTPATIENT
Start: 2018-02-01 | End: 2018-03-19 | Stop reason: SDUPTHER

## 2018-02-01 RX ORDER — TIZANIDINE 4 MG/1
TABLET ORAL
Refills: 0 | COMMUNITY
Start: 2018-01-14 | End: 2019-07-19

## 2018-02-01 ASSESSMENT — ROUTINE ASSESSMENT OF PATIENT INDEX DATA (RAPID3): MDHAQ FUNCTION SCORE: .8

## 2018-02-01 NOTE — PROGRESS NOTES
RHEUMATOLOGY CLINIC FOLLOW UP VISIT- first visit with me.  Chief complaints:-  To follow up for RA. .    HPI:-  Diana Rubio a 52 y.o. pleasant female comes in for a follow up visit with above chief complaints.  She was diagnosed with seronegative rheumatoid arthritis by Dr. HAND in the rheumatology clinic and started on disease modifying agents.  She reported that none of the medications are helping . Since exam failed to show any synovitis, I requested her to see  at [facility] for pain management and hold off on RA meds. She is off all medications now and on opioids for past 3 months. She reports doing really well with no pain today. She still has her good and bad days, but the good days are very frequent now.     Review of Systems   Constitutional: Negative for chills, fever and malaise/fatigue.   HENT: Negative for nosebleeds and sore throat.    Eyes: Negative for blurred vision, photophobia and redness.   Respiratory: Negative for cough, sputum production and shortness of breath.    Cardiovascular: Negative for chest pain and leg swelling.   Gastrointestinal: Negative for abdominal pain, constipation and diarrhea.   Genitourinary: Negative for dysuria, frequency and urgency.   Musculoskeletal: Negative for back pain, falls, joint pain, myalgias and neck pain.   Skin: Negative for itching and rash.   Neurological: Negative for dizziness, tremors, seizures, loss of consciousness, weakness and headaches.   Endo/Heme/Allergies: Negative for environmental allergies. Does not bruise/bleed easily.   Psychiatric/Behavioral: Negative for hallucinations and memory loss. The patient does not have insomnia.        Past Medical History:   Diagnosis Date    Asthma     Chronic low back pain     Degenerative disc disease, lumbar     Depression     Diabetes mellitus 2014    BS didn't check 06/08/2017    Fibromyalgia     High cholesterol     Hypertension      Hypothyroidism     MRSA (methicillin resistant Staphylococcus aureus) carrier     Stroke     TIA    Vitamin D deficiency disease        Past Surgical History:   Procedure Laterality Date    HYSTERECTOMY      left ankle surgery      loop recorder  05/2017    cardiac device    NECK SURGERY  06/2016    ROTATOR CUFF REPAIR      TONSILLECTOMY          Social History   Substance Use Topics    Smoking status: Current Some Day Smoker     Packs/day: 0.50     Types: Cigarettes    Smokeless tobacco: Never Used    Alcohol use No       Family History   Problem Relation Age of Onset    Cancer Mother     Stroke Mother     Heart disease Mother     Diabetes Mother     Cataracts Mother     Hypertension Mother     Breast cancer Mother     Heart disease Father     COPD Father     Hypertension Father     Diabetes Maternal Aunt     Breast cancer Sister        Review of patient's allergies indicates:   Allergen Reactions    Mobic [meloxicam]     Topamax [topiramate]     Adhesive Rash           Physical examination:-    Vitals:    02/01/18 1231   BP: 122/81   Pulse: 81   Weight: 89.5 kg (197 lb 5 oz)   PainSc: 0-No pain       Physical Exam   Constitutional: She is oriented to person, place, and time and well-developed, well-nourished, and in no distress. No distress.   HENT:   Head: Normocephalic.   Mouth/Throat: Oropharynx is clear and moist.   Eyes: Conjunctivae and EOM are normal. Pupils are equal, round, and reactive to light.   Neck: Normal range of motion. Neck supple.   Cardiovascular: Normal rate and intact distal pulses.    Pulmonary/Chest: Effort normal. No respiratory distress.   Abdominal: Soft. There is no tenderness.   Musculoskeletal:   Few tender points. No synovitis over small joints of hands or feet.  No effusion over large joints.   Neurological: She is alert and oriented to person, place, and time. No cranial nerve deficit.   Skin: Skin is warm. No rash noted. No erythema.   Psychiatric: Mood  "and affect normal.   Nursing note and vitals reviewed.      Labs:-  Results for CHING RAGSDALE (MRN [id number]) as of 10/10/2017 16:32   Ref. Range 2017 11:18 3/2/2017 12:57 3/2/2017 13:57 2017 12:30   Anti-SSA Antibody Latest Ref Range: 0.00 - 19.99 EU    3.34   Anti-SSA Interpretation Latest Ref Range: Negative     Negative   Anti-SSB Antibody Latest Ref Range: 0.00 - 19.99 EU    0.57   Anti-SSB Interpretation Latest Ref Range: Negative     Negative   Complement (C-3) Latest Ref Range: 50 - 180 mg/dL  158     Complement (C-4) Latest Ref Range: 11 - 44 mg/dL  28     Complement,Total, Serum Latest Ref Range: 54 - 144    149 (H)    CCP Antibodies Latest Ref Range: <5.0 U/mL <0.5      Rheumatoid Factor Latest Ref Range: 0.0 - 15.0 IU/mL <10.0            Radiographs:-  Independent visualization of images done. Xray images shows mild degenerative disease without any evidence of erosion.  Some cystic changes found in the carpal bones.     Old and Outside medical records :-  Reviewed old and all outside medical records available in Care Everywhere.  Records from prior rheumatologist showed positive anti-CCP antibody.    Medication List with Changes/Refills   Current Medications    ALBUTEROL 90 MCG/ACTUATION INHALER    Inhale 2 puffs into the lungs every 6 (six) hours as needed for Wheezing.    AMLODIPINE (NORVASC) 5 MG TABLET    Take 5 mg by mouth.    BLOOD SUGAR DIAGNOSTIC STRP    Glucose testing daily.    BLOOD-GLUCOSE METER MISC    Use as directed.    CLOPIDOGREL (PLAVIX) 75 MG TABLET    Take 75 mg by mouth once daily.    CYANOCOBALAMIN 500 MCG TABLET    Take 500 mcg by mouth once daily.    DICLOFENAC SODIUM 1 % GEL    Apply topically 4 (four) times daily.    GABAPENTIN (NEURONTIN) 300 MG CAPSULE    TAKE ONE CAPSULE BY MOUTH TWICE DAILY    INSULIN NEEDLES, DISPOSABLE, (PEN NEEDLE) 31 X 1/4 " NDLE    Twice daily injections.    KETOTIFEN (ZADITOR) 0.025 % (0.035 %) OPHTHALMIC SOLUTION    Place 1 drop into " both eyes 2 (two) times daily.    LANCETS (LANCETS,ULTRA THIN) MISC    Glucose testing daily.    LEVOTHYROXINE (SYNTHROID) 50 MCG TABLET    TAKE ONE TABLET BY MOUTH ONCE DAILY    LORAZEPAM (ATIVAN) 0.5 MG TABLET    Take 1 tablet (0.5 mg total) by mouth every 12 (twelve) hours as needed for Anxiety.    MAGNESIUM 250 MG TAB    Take by mouth 2 (two) times daily.    METFORMIN (GLUCOPHAGE-XR) 500 MG 24 HR TABLET    TAKE ONE TABLET BY MOUTH TWICE DAILY WITH MEALS    OXYCODONE-ACETAMINOPHEN (PERCOCET) 7.5-325 MG PER TABLET    TK 1 T PO TID    -PROPYLENE GLYCOL, PF, (SYSTANE ULTRA, PF,) 0.4-0.3 % DPET    Place 1 drop into both eyes 4 (four) times daily.    PRAVASTATIN (PRAVACHOL) 20 MG TABLET    TAKE ONE TABLET BY MOUTH ONCE DAILY    PROMETHAZINE (PHENERGAN) 25 MG TABLET    Take 1 tablet (25 mg total) by mouth every 6 (six) hours as needed for Nausea.    PYRIDOXINE, VITAMIN B6, (VITAMIN B-6) 200 MG TAB    Take 200 mg by mouth once daily.    RIBOFLAVIN (VITAMIN B-2 ORAL)    Take 100 mg by mouth once daily.    TIZANIDINE (ZANAFLEX) 4 MG TABLET    TK 1 T PO Q 12 H    UBIDECARENONE (COENZYME Q10) 100 MG TAB    Take 1 tablet by mouth once daily. Take 300 mg daily    VENLAFAXINE (EFFEXOR-XR) 150 MG CP24    TAKE ONE CAPSULE BY MOUTH ONCE DAILY    VITAMIN D 1000 UNITS TAB    Take 2,000 Units by mouth once daily.   Discontinued Medications    FOLIC ACID (FOLVITE) 1 MG TABLET    Take 2 tablets (2 mg total) by mouth once daily.    HYDROXYCHLOROQUINE (PLAQUENIL) 200 MG TABLET    Take 1 tablet (200 mg total) by mouth 2 (two) times daily.    METHOTREXATE 2.5 MG TAB    Take 10 tablets (25 mg total) by mouth every 7 days. Take 5 tabs in AM and  5 tabs in PM on the same day    PREDNISONE (DELTASONE) 5 MG TABLET    Take 1.5 tablets (7.5 mg total) by mouth once daily.       Assessment/Plans:-  Fibromyalgia  Well controlled on opioid therapy from . Continue follow up appointment with him.     Rheumatoid arthritis of multiple  sites with negative rheumatoid factor  Seronegative rheumatoid arthritis on multiple disease modifying agents including methotrexate, hydroxychloroquine and Humira, diagnosed by . Off all medications now( 2 months )  since there was no synovitis on exam and the pain was coming from her fibro. No synovitis on exam today. Continue to stay off rheumatoid arthritis medications and treat fibromyalgia with the help .        # Follow-up in about 6 months (around 8/1/2018).    Disclaimer: This note was prepared using voice recognition system and is likely to have sound alike errors and is not proof read.  Please call me with any questions.

## 2018-02-01 NOTE — ASSESSMENT & PLAN NOTE
Seronegative rheumatoid arthritis on multiple disease modifying agents including methotrexate, hydroxychloroquine and Humira, diagnosed by . Off all medications now( 2 months )  since there was no synovitis on exam and the pain was coming from her fibro. No synovitis on exam today. Continue to stay off rheumatoid arthritis medications and treat fibromyalgia with the help .

## 2018-02-04 ENCOUNTER — HOSPITAL ENCOUNTER (EMERGENCY)
Facility: HOSPITAL | Age: 53
Discharge: HOME | End: 2018-02-04
Attending: EMERGENCY MEDICINE
Payer: COMMERCIAL

## 2018-02-04 VITALS
WEIGHT: 196 LBS | HEIGHT: 62 IN | TEMPERATURE: 98 F | HEART RATE: 76 BPM | DIASTOLIC BLOOD PRESSURE: 84 MMHG | SYSTOLIC BLOOD PRESSURE: 138 MMHG | RESPIRATION RATE: 18 BRPM | OXYGEN SATURATION: 97 % | BODY MASS INDEX: 36.07 KG/M2

## 2018-02-04 DIAGNOSIS — R51.9 ACUTE NONINTRACTABLE HEADACHE, UNSPECIFIED HEADACHE TYPE: Primary | ICD-10-CM

## 2018-02-04 LAB
ALBUMIN SERPL BCP-MCNC: 4 G/DL
ALP SERPL-CCNC: 74 U/L
ALT SERPL W/O P-5'-P-CCNC: 20 U/L
ANION GAP SERPL CALC-SCNC: 12 MMOL/L
AST SERPL-CCNC: 20 U/L
BASOPHILS # BLD AUTO: 0.08 K/UL
BASOPHILS NFR BLD: 1 %
BILIRUB SERPL-MCNC: 0.2 MG/DL
BUN SERPL-MCNC: 14 MG/DL
CALCIUM SERPL-MCNC: 8.9 MG/DL
CHLORIDE SERPL-SCNC: 106 MMOL/L
CO2 SERPL-SCNC: 22 MMOL/L
CREAT SERPL-MCNC: 0.7 MG/DL
DIFFERENTIAL METHOD: ABNORMAL
EOSINOPHIL # BLD AUTO: 0.2 K/UL
EOSINOPHIL NFR BLD: 2.9 %
ERYTHROCYTE [DISTWIDTH] IN BLOOD BY AUTOMATED COUNT: 13.5 %
ERYTHROCYTE [SEDIMENTATION RATE] IN BLOOD BY WESTERGREN METHOD: 6 MM/HR
EST. GFR  (AFRICAN AMERICAN): >60 ML/MIN/1.73 M^2
EST. GFR  (NON AFRICAN AMERICAN): >60 ML/MIN/1.73 M^2
GLUCOSE SERPL-MCNC: 106 MG/DL
HCT VFR BLD AUTO: 45.3 %
HGB BLD-MCNC: 14.9 G/DL
LYMPHOCYTES # BLD AUTO: 3.1 K/UL
LYMPHOCYTES NFR BLD: 36.9 %
MCH RBC QN AUTO: 32.5 PG
MCHC RBC AUTO-ENTMCNC: 32.9 G/DL
MCV RBC AUTO: 99 FL
MONOCYTES # BLD AUTO: 0.7 K/UL
MONOCYTES NFR BLD: 8 %
NEUTROPHILS # BLD AUTO: 4.3 K/UL
NEUTROPHILS NFR BLD: 51.2 %
PLATELET # BLD AUTO: 153 K/UL
PMV BLD AUTO: 12.2 FL
POTASSIUM SERPL-SCNC: 3.9 MMOL/L
PROT SERPL-MCNC: 7.2 G/DL
RBC # BLD AUTO: 4.59 M/UL
SODIUM SERPL-SCNC: 140 MMOL/L
WBC # BLD AUTO: 8.37 K/UL

## 2018-02-04 PROCEDURE — 80053 COMPREHEN METABOLIC PANEL: CPT

## 2018-02-04 PROCEDURE — 99284 EMERGENCY DEPT VISIT MOD MDM: CPT | Mod: 25

## 2018-02-04 PROCEDURE — 96372 THER/PROPH/DIAG INJ SC/IM: CPT

## 2018-02-04 PROCEDURE — 85651 RBC SED RATE NONAUTOMATED: CPT

## 2018-02-04 PROCEDURE — 63600175 PHARM REV CODE 636 W HCPCS: Performed by: EMERGENCY MEDICINE

## 2018-02-04 PROCEDURE — 85025 COMPLETE CBC W/AUTO DIFF WBC: CPT

## 2018-02-04 RX ORDER — KETOROLAC TROMETHAMINE 30 MG/ML
15 INJECTION, SOLUTION INTRAMUSCULAR; INTRAVENOUS
Status: COMPLETED | OUTPATIENT
Start: 2018-02-04 | End: 2018-02-04

## 2018-02-04 RX ORDER — METHYLPREDNISOLONE ACETATE 40 MG/ML
40 INJECTION, SUSPENSION INTRA-ARTICULAR; INTRALESIONAL; INTRAMUSCULAR; SOFT TISSUE
Status: COMPLETED | OUTPATIENT
Start: 2018-02-04 | End: 2018-02-04

## 2018-02-04 RX ORDER — BUTALBITAL, ACETAMINOPHEN AND CAFFEINE 50; 325; 40 MG/1; MG/1; MG/1
1 TABLET ORAL EVERY 4 HOURS PRN
Qty: 20 TABLET | Refills: 0 | Status: SHIPPED | OUTPATIENT
Start: 2018-02-04 | End: 2018-03-06

## 2018-02-04 RX ADMIN — METHYLPREDNISOLONE ACETATE 40 MG: 40 INJECTION, SUSPENSION INTRA-ARTICULAR; INTRALESIONAL; INTRAMUSCULAR; SOFT TISSUE at 01:02

## 2018-02-04 RX ADMIN — KETOROLAC TROMETHAMINE 15 MG: 30 INJECTION, SOLUTION INTRAMUSCULAR at 02:02

## 2018-02-04 NOTE — ED PROVIDER NOTES
"SCRIBE #1 NOTE: I, Dailla Haynes, am scribing for, and in the presence of, Evgeny Mcpherson MD. I have scribed the entire note.      History      Chief Complaint   Patient presents with    Headache     woke up with right sided headache and jaw pain       Review of patient's allergies indicates:   Allergen Reactions    Mobic [meloxicam]     Topamax [topiramate]     Adhesive Rash        HPI   HPI    2/4/2018, 1:41 AM   History obtained from the patient      History of Present Illness: Diana Escobar is a 52 y.o. female patient who presents to the Emergency Department for HA which onset last night. Symptoms are constant and moderate in severity. Pt reports waking up with R sided HA pain. Pt describes sxs as "throbbing" and rates sxs 7/10. Pt states she has never felt sxs like this before. No mitigating or exacerbating factors reported. Associated sxs include bilateral shoulder pain. Patient denies any dizziness, fever, n/v/d, CP, SOB, weakness/numbness, and all other sxs at this time. No prior Tx. No further complaints or concerns at this time.         Arrival mode: Personal vehicle     PCP: Jory Alcocer DO       Past Medical History:  Past Medical History:   Diagnosis Date    Asthma     Chronic low back pain     Degenerative disc disease, lumbar     Depression     Diabetes mellitus 2014    BS didn't check 06/08/2017    Fibromyalgia     High cholesterol     Hypertension     Hypothyroidism     MRSA (methicillin resistant Staphylococcus aureus) carrier     Stroke     TIA    Vitamin D deficiency disease        Past Surgical History:  Past Surgical History:   Procedure Laterality Date    HYSTERECTOMY      left ankle surgery      loop recorder  05/2017    cardiac device    NECK SURGERY  06/2016    ROTATOR CUFF REPAIR      TONSILLECTOMY           Family History:  Family History   Problem Relation Age of Onset    Cancer Mother     Stroke Mother     Heart disease Mother     Diabetes Mother     " Cataracts Mother     Hypertension Mother     Breast cancer Mother     Heart disease Father     COPD Father     Hypertension Father     Diabetes Maternal Aunt     Breast cancer Sister        Social History:  Social History     Social History Main Topics    Smoking status: Current Some Day Smoker     Packs/day: 0.50     Types: Cigarettes    Smokeless tobacco: Never Used    Alcohol use No    Drug use: No    Sexual activity: Yes       ROS   Review of Systems   Constitutional: Negative for chills and fever.   HENT: Negative for sore throat.    Respiratory: Negative for shortness of breath.    Cardiovascular: Negative for chest pain.   Gastrointestinal: Negative for abdominal pain, diarrhea, nausea and vomiting.   Genitourinary: Negative for dysuria.   Musculoskeletal: Negative for back pain.        (+) Bilateral shoulder pain   Skin: Negative for rash.   Neurological: Positive for headaches. Negative for dizziness, weakness and numbness.   Hematological: Does not bruise/bleed easily.   All other systems reviewed and are negative.      Physical Exam      Initial Vitals [02/04/18 0113]   BP Pulse Resp Temp SpO2   (!) 157/99 79 18 97.9 °F (36.6 °C) 98 %      MAP       118.33          Physical Exam  Nursing Notes and Vital Signs Reviewed.  Constitutional: Patient is in no acute distress. Well-developed and well-nourished.  Head: Atraumatic. Normocephalic. R sided temporal pain  Eyes: PERRL. EOM intact. Conjunctivae are not pale. No scleral icterus.  ENT: Mucous membranes are moist. Oropharynx is clear and symmetric.    Neck: Supple. Full ROM. No lymphadenopathy.  Cardiovascular: Regular rate. Regular rhythm. No murmurs, rubs, or gallops. Distal pulses are 2+ and symmetric.  Pulmonary/Chest: No respiratory distress. Clear to auscultation bilaterally. No wheezing or rales.  Abdominal: Soft and non-distended.  There is no tenderness.  No rebound, guarding, or rigidity. Good bowel sounds.  Genitourinary: No CVA  "tenderness  Musculoskeletal: Moves all extremities. No obvious deformities. No edema. No calf tenderness.  Skin: Warm and dry.  Neurological:  Alert, awake, and appropriate.  Normal speech.  No acute focal neurological deficits are appreciated.  Psychiatric: Normal affect. Good eye contact. Appropriate in content.    ED Course    Procedures  ED Vital Signs:  Vitals:    02/04/18 0113 02/04/18 0318   BP: (!) 157/99 138/84   Pulse: 79 76   Resp: 18 18   Temp: 97.9 °F (36.6 °C)    TempSrc: Oral    SpO2: 98% 97%   Weight: 88.9 kg (196 lb)    Height: 5' 2" (1.575 m)        Abnormal Lab Results:  Labs Reviewed   CBC W/ AUTO DIFFERENTIAL - Abnormal; Notable for the following:        Result Value    MCV 99 (*)     MCH 32.5 (*)     All other components within normal limits   COMPREHENSIVE METABOLIC PANEL - Abnormal; Notable for the following:     CO2 22 (*)     All other components within normal limits   SEDIMENTATION RATE, MANUAL        All Lab Results:  Results for orders placed or performed during the hospital encounter of 02/04/18   CBC auto differential   Result Value Ref Range    WBC 8.37 3.90 - 12.70 K/uL    RBC 4.59 4.00 - 5.40 M/uL    Hemoglobin 14.9 12.0 - 16.0 g/dL    Hematocrit 45.3 37.0 - 48.5 %    MCV 99 (H) 82 - 98 fL    MCH 32.5 (H) 27.0 - 31.0 pg    MCHC 32.9 32.0 - 36.0 g/dL    RDW 13.5 11.5 - 14.5 %    Platelets 153 150 - 350 K/uL    MPV 12.2 9.2 - 12.9 fL    Gran # (ANC) 4.3 1.8 - 7.7 K/uL    Lymph # 3.1 1.0 - 4.8 K/uL    Mono # 0.7 0.3 - 1.0 K/uL    Eos # 0.2 0.0 - 0.5 K/uL    Baso # 0.08 0.00 - 0.20 K/uL    Gran% 51.2 38.0 - 73.0 %    Lymph% 36.9 18.0 - 48.0 %    Mono% 8.0 4.0 - 15.0 %    Eosinophil% 2.9 0.0 - 8.0 %    Basophil% 1.0 0.0 - 1.9 %    Differential Method Automated    Sedimentation rate, manual   Result Value Ref Range    Sed Rate 6 0 - 20 mm/Hr   Comprehensive metabolic panel   Result Value Ref Range    Sodium 140 136 - 145 mmol/L    Potassium 3.9 3.5 - 5.1 mmol/L    Chloride 106 95 - 110 " mmol/L    CO2 22 (L) 23 - 29 mmol/L    Glucose 106 70 - 110 mg/dL    BUN, Bld 14 6 - 20 mg/dL    Creatinine 0.7 0.5 - 1.4 mg/dL    Calcium 8.9 8.7 - 10.5 mg/dL    Total Protein 7.2 6.0 - 8.4 g/dL    Albumin 4.0 3.5 - 5.2 g/dL    Total Bilirubin 0.2 0.1 - 1.0 mg/dL    Alkaline Phosphatase 74 55 - 135 U/L    AST 20 10 - 40 U/L    ALT 20 10 - 44 U/L    Anion Gap 12 8 - 16 mmol/L    eGFR if African American >60 >60 mL/min/1.73 m^2    eGFR if non African American >60 >60 mL/min/1.73 m^2         Imaging Results:  Imaging Results          CT Head Without Contrast (In process)    Per [facility], pt's CT results no acute intracranial abnormality.                       The Emergency Provider reviewed the vital signs and test results, which are outlined above.    ED Discussion     3:33 AM: Reassessed pt at this time. Discussed with pt all pertinent ED information and results. Discussed pt dx and plan of tx. Gave pt all f/u and return to the ED instructions. All questions and concerns were addressed at this time. Pt expresses understanding of information and instructions, and is comfortable with plan to discharge. Pt is stable for discharge.    Patient's headache is either consistent with previous headache and/or lacks features concerning for emergent or life threatening condition.  I do not suspect SAH, meningitis, increased IC pressure, infectious, toxic, vascular, CNS, or other EMC.  I have discussed this at length with patient and/or family/caretaker.        ED Medication(s):  Medications   methylPREDNISolone acetate injection 40 mg (40 mg Intramuscular Given 2/4/18 0148)   ketorolac injection 15 mg (15 mg Intravenous Given 2/4/18 0237)       Discharge Medication List as of 2/4/2018  3:29 AM      START taking these medications    Details   butalbital-acetaminophen-caffeine -40 mg (FIORICET, ESGIC) -40 mg per tablet Take 1 tablet by mouth every 4 (four) hours as needed for Pain., Starting Sun 2/4/2018, Until Tue  3/6/2018, Print             Follow-up Information     Jory Alcocer DO In 2 days.    Specialty:  Internal Medicine  Contact information:  [address] [place] [address]  [phone]             [facility]    Specialty:  Emergency Medicine  Why:  As needed, If symptoms worsen  Contact information:  [address] [facility] [address] [address] [phone]                   Medical Decision Making    Medical Decision Making:   Clinical Tests:   Lab Tests: Ordered and Reviewed  Radiological Study: Ordered and Reviewed           Scribe Attestation:   Scribe #1: I performed the above scribed service and the documentation accurately describes the services I performed. I attest to the accuracy of the note.    Attending:   Physician Attestation Statement for Scribe #1: I, Evgeny Mcpherson MD, personally performed the services described in this documentation, as scribed by Dalila Haynes, in my presence, and it is both accurate and complete.          Clinical Impression       ICD-10-CM ICD-9-CM   1. Acute nonintractable headache, unspecified headache type R51 784.0       Disposition:   Disposition: Discharged  Condition: Stable         Evgeny Mcpherson MD  02/05/18 0331

## 2018-02-04 NOTE — ED NOTES
Pt reports that headache has improved. Appears in no distress. VSS. RR even and unlabored. Family at bedside

## 2018-02-06 ENCOUNTER — LAB VISIT (OUTPATIENT)
Dept: LAB | Facility: HOSPITAL | Age: 53
End: 2018-02-06
Attending: INTERNAL MEDICINE
Payer: COMMERCIAL

## 2018-02-06 DIAGNOSIS — E78.2 COMBINED HYPERLIPIDEMIA ASSOCIATED WITH TYPE 2 DIABETES MELLITUS: ICD-10-CM

## 2018-02-06 DIAGNOSIS — E11.69 COMBINED HYPERLIPIDEMIA ASSOCIATED WITH TYPE 2 DIABETES MELLITUS: ICD-10-CM

## 2018-02-06 DIAGNOSIS — I15.2 HYPERTENSION ASSOCIATED WITH DIABETES: ICD-10-CM

## 2018-02-06 DIAGNOSIS — E11.59 HYPERTENSION ASSOCIATED WITH DIABETES: ICD-10-CM

## 2018-02-06 DIAGNOSIS — E78.5 HYPERLIPIDEMIA LDL GOAL <100: ICD-10-CM

## 2018-02-06 LAB
ALBUMIN SERPL BCP-MCNC: 3.8 G/DL
ALP SERPL-CCNC: 85 U/L
ALT SERPL W/O P-5'-P-CCNC: 19 U/L
ANION GAP SERPL CALC-SCNC: 10 MMOL/L
AST SERPL-CCNC: 20 U/L
BILIRUB SERPL-MCNC: 0.4 MG/DL
BUN SERPL-MCNC: 13 MG/DL
CALCIUM SERPL-MCNC: 9.1 MG/DL
CHLORIDE SERPL-SCNC: 103 MMOL/L
CHOLEST SERPL-MCNC: 196 MG/DL
CHOLEST/HDLC SERPL: 4.3 {RATIO}
CO2 SERPL-SCNC: 29 MMOL/L
CREAT SERPL-MCNC: 0.7 MG/DL
CREAT UR-MCNC: 102 MG/DL
EST. GFR  (AFRICAN AMERICAN): >60 ML/MIN/1.73 M^2
EST. GFR  (NON AFRICAN AMERICAN): >60 ML/MIN/1.73 M^2
ESTIMATED AVG GLUCOSE: 108 MG/DL
GLUCOSE SERPL-MCNC: 88 MG/DL
HBA1C MFR BLD HPLC: 5.4 %
HDLC SERPL-MCNC: 46 MG/DL
HDLC SERPL: 23.5 %
LDLC SERPL CALC-MCNC: 118.6 MG/DL
MICROALBUMIN UR DL<=1MG/L-MCNC: 6 UG/ML
MICROALBUMIN/CREATININE RATIO: 5.9 UG/MG
NONHDLC SERPL-MCNC: 150 MG/DL
POTASSIUM SERPL-SCNC: 4 MMOL/L
PROT SERPL-MCNC: 7.1 G/DL
SODIUM SERPL-SCNC: 142 MMOL/L
TRIGL SERPL-MCNC: 157 MG/DL

## 2018-02-06 PROCEDURE — 80053 COMPREHEN METABOLIC PANEL: CPT

## 2018-02-06 PROCEDURE — 80061 LIPID PANEL: CPT

## 2018-02-06 PROCEDURE — 36415 COLL VENOUS BLD VENIPUNCTURE: CPT

## 2018-02-06 PROCEDURE — 82043 UR ALBUMIN QUANTITATIVE: CPT

## 2018-02-06 PROCEDURE — 83036 HEMOGLOBIN GLYCOSYLATED A1C: CPT

## 2018-02-14 ENCOUNTER — OFFICE VISIT (OUTPATIENT)
Dept: INTERNAL MEDICINE | Facility: CLINIC | Age: 53
End: 2018-02-14
Payer: COMMERCIAL

## 2018-02-14 VITALS
DIASTOLIC BLOOD PRESSURE: 82 MMHG | BODY MASS INDEX: 36.6 KG/M2 | OXYGEN SATURATION: 93 % | HEART RATE: 92 BPM | HEIGHT: 62 IN | SYSTOLIC BLOOD PRESSURE: 118 MMHG | TEMPERATURE: 98 F | WEIGHT: 198.88 LBS

## 2018-02-14 DIAGNOSIS — E11.59 HYPERTENSION ASSOCIATED WITH DIABETES: Primary | ICD-10-CM

## 2018-02-14 DIAGNOSIS — Z12.11 COLON CANCER SCREENING: ICD-10-CM

## 2018-02-14 DIAGNOSIS — F33.9 MAJOR DEPRESSION, RECURRENT, CHRONIC: ICD-10-CM

## 2018-02-14 DIAGNOSIS — F41.9 ANXIETY: ICD-10-CM

## 2018-02-14 DIAGNOSIS — E03.9 HYPOTHYROIDISM (ACQUIRED): ICD-10-CM

## 2018-02-14 DIAGNOSIS — E78.2 COMBINED HYPERLIPIDEMIA ASSOCIATED WITH TYPE 2 DIABETES MELLITUS: ICD-10-CM

## 2018-02-14 DIAGNOSIS — I15.2 HYPERTENSION ASSOCIATED WITH DIABETES: Primary | ICD-10-CM

## 2018-02-14 DIAGNOSIS — E66.01 SEVERE OBESITY (BMI 35.0-39.9) WITH COMORBIDITY: ICD-10-CM

## 2018-02-14 DIAGNOSIS — E11.69 COMBINED HYPERLIPIDEMIA ASSOCIATED WITH TYPE 2 DIABETES MELLITUS: ICD-10-CM

## 2018-02-14 PROCEDURE — 3008F BODY MASS INDEX DOCD: CPT | Mod: S$GLB,,, | Performed by: INTERNAL MEDICINE

## 2018-02-14 PROCEDURE — 99214 OFFICE O/P EST MOD 30 MIN: CPT | Mod: S$GLB,,, | Performed by: INTERNAL MEDICINE

## 2018-02-14 PROCEDURE — 99999 PR PBB SHADOW E&M-EST. PATIENT-LVL III: CPT | Mod: PBBFAC,,, | Performed by: INTERNAL MEDICINE

## 2018-02-14 RX ORDER — PRAVASTATIN SODIUM 20 MG/1
20 TABLET ORAL DAILY
Qty: 30 TABLET | Refills: 6 | Status: SHIPPED | OUTPATIENT
Start: 2018-02-14 | End: 2019-02-19

## 2018-02-14 RX ORDER — VENLAFAXINE HYDROCHLORIDE 150 MG/1
150 CAPSULE, EXTENDED RELEASE ORAL DAILY
Qty: 30 CAPSULE | Refills: 6 | Status: SHIPPED | OUTPATIENT
Start: 2018-02-14 | End: 2018-12-21 | Stop reason: SDUPTHER

## 2018-02-14 RX ORDER — LANCETS
EACH MISCELLANEOUS
Qty: 50 EACH | Refills: 11 | Status: SHIPPED | OUTPATIENT
Start: 2018-02-14 | End: 2020-11-24 | Stop reason: SDUPTHER

## 2018-02-14 RX ORDER — DEXTROSE 4 G
TABLET,CHEWABLE ORAL
Qty: 1 EACH | Refills: 0 | Status: SHIPPED | OUTPATIENT
Start: 2018-02-14

## 2018-02-14 RX ORDER — METFORMIN HYDROCHLORIDE 500 MG/1
500 TABLET, EXTENDED RELEASE ORAL 2 TIMES DAILY WITH MEALS
Qty: 60 TABLET | Refills: 6 | Status: SHIPPED | OUTPATIENT
Start: 2018-02-14 | End: 2018-10-27 | Stop reason: SDUPTHER

## 2018-02-14 RX ORDER — AMLODIPINE BESYLATE 5 MG/1
5 TABLET ORAL DAILY
Qty: 30 TABLET | Refills: 6 | Status: SHIPPED | OUTPATIENT
Start: 2018-02-14 | End: 2018-11-14 | Stop reason: SDUPTHER

## 2018-02-14 RX ORDER — LEVOTHYROXINE SODIUM 50 UG/1
50 TABLET ORAL DAILY
Qty: 30 TABLET | Refills: 6 | Status: SHIPPED | OUTPATIENT
Start: 2018-02-14 | End: 2019-04-05 | Stop reason: SDUPTHER

## 2018-02-14 RX ORDER — LORAZEPAM 0.5 MG/1
0.5 TABLET ORAL DAILY PRN
Qty: 30 TABLET | Refills: 0 | Status: SHIPPED | OUTPATIENT
Start: 2018-02-14 | End: 2018-08-21 | Stop reason: ALTCHOICE

## 2018-02-16 NOTE — PROGRESS NOTES
Subjective:       Patient ID: Diana Escobar is a 52 y.o. female.    Chief Complaint: Follow-up    Diana Escobar  52 y.o. White female    Patient presents with:  Follow-up    HPI: Here today to follow up on chronic conditions.  HTN--stable on amlodipine.   Diabetes--stable on metformin. She needs diabetic supplies.                 HGBA1C                   5.4                 02/06/2018            HLD--compliant with pravastatin.                       CHOL                     196                 02/06/2018                 HDL                      46                  02/06/2018               LDLCALC                  118.6               02/06/2018                 TRIG                     157 (H)             02/06/2018            Hypothyroidism--stable on levothyroxine.                      TSH                      0.507               11/17/2017            Depression--stable on venlafaxine.      Anxiety--seems to be worsening. She was taking lorazepam as needed in the past and feels she would benefit from this medication.     Past Medical History:  Asthma  Chronic low back pain  Degenerative disc disease, lumbar  Depression  2014: Diabetes mellitus  Fibromyalgia  High cholesterol  Hypertension  Hypothyroidism  MRSA (methicillin resistant Staphylococcus aur*  Stroke      Comment: TIA  Vitamin D deficiency disease    Current Outpatient Prescriptions:     albuterol 90 mcg/actuation inhaler, Inhale 2 puffs into the lungs every 6 (six) hours as needed for Wheezing., Disp: 18 g, Rfl: 6    amLODIPine (NORVASC) 5 MG tablet, Take 1 tablet (5 mg total) by mouth once daily., Disp: 30 tablet, Rfl: 6    clopidogrel (PLAVIX) 75 mg tablet, Take 75 mg by mouth once daily., Disp: , Rfl:     diclofenac sodium 1 % Gel, Apply topically 4 (four) times daily., Disp: , Rfl:     gabapentin (NEURONTIN) 300 MG capsule, TAKE ONE CAPSULE BY MOUTH TWICE DAILY, Disp: 60 capsule, Rfl: 0    levothyroxine (SYNTHROID) 50 MCG tablet, Take  "1 tablet (50 mcg total) by mouth once daily., Disp: 30 tablet, Rfl: 6    magnesium 250 mg Tab, Take by mouth 2 (two) times daily., Disp: , Rfl:     metFORMIN (GLUCOPHAGE-XR) 500 MG 24 hr tablet, Take 1 tablet (500 mg total) by mouth 2 (two) times daily with meals., Disp: 60 tablet, Rfl: 6    oxyCODONE-acetaminophen (PERCOCET) 7.5-325 mg per tablet, TK 1 T PO TID, Disp: , Rfl: 0    peg 400-propylene glycol, PF, (SYSTANE ULTRA, PF,) 0.4-0.3 % Dpet, Place 1 drop into both eyes 4 (four) times daily., Disp: 1 each, Rfl:     pravastatin (PRAVACHOL) 20 MG tablet, Take 1 tablet (20 mg total) by mouth once daily., Disp: 30 tablet, Rfl: 6    pyridoxine, vitamin B6, (VITAMIN B-6) 200 mg Tab, Take 200 mg by mouth once daily., Disp: , Rfl:     RIBOFLAVIN (VITAMIN B-2 ORAL), Take 100 mg by mouth once daily., Disp: , Rfl:     tiZANidine (ZANAFLEX) 4 MG tablet, TK 1 T PO Q 12 H, Disp: , Rfl: 0    UBIDECARENONE (COENZYME Q10) 100 mg Tab, Take 1 tablet by mouth once daily. Take 300 mg daily, Disp: , Rfl:     venlafaxine (EFFEXOR-XR) 150 MG Cp24, Take 1 capsule (150 mg total) by mouth once daily., Disp: 30 capsule, Rfl: 6    vitamin D 1000 units Tab, Take 2,000 Units by mouth once daily., Disp: , Rfl:     blood sugar diagnostic Strp, Glucose testing daily., Disp: 50 strip, Rfl: 11    blood-glucose meter Misc, Use as directed., Disp: 1 each, Rfl: 0    butalbital-acetaminophen-caffeine -40 mg (FIORICET, ESGIC) -40 mg per tablet, Take 1 tablet by mouth every 4 (four) hours as needed for Pain., Disp: 20 tablet, Rfl: 0    cyanocobalamin 500 MCG tablet, Take 500 mcg by mouth once daily., Disp: , Rfl:     insulin needles, disposable, (PEN NEEDLE) 31 X 1/4 " Ndle, Twice daily injections., Disp: 100 each, Rfl: 6    ketotifen (ZADITOR) 0.025 % (0.035 %) ophthalmic solution, Place 1 drop into both eyes 2 (two) times daily., Disp: 5 mL, Rfl: 12    lancets (LANCETS,ULTRA THIN) Misc, Glucose testing daily., Disp: 50 " each, Rfl: 11    LORazepam (ATIVAN) 0.5 MG tablet, Take 1 tablet (0.5 mg total) by mouth daily as needed for Anxiety., Disp: 30 tablet, Rfl: 0    promethazine (PHENERGAN) 25 MG tablet, Take 1 tablet (25 mg total) by mouth every 6 (six) hours as needed for Nausea., Disp: 15 tablet, Rfl: 0    Allergies:  Review of patient's allergies indicates:   -- Mobic (meloxicam)    -- Topamax (topiramate)    -- Adhesive -- Rash            Review of Systems   Constitutional: Negative for fever and unexpected weight change.   Respiratory: Negative for shortness of breath.    Cardiovascular: Negative for chest pain and leg swelling.   Gastrointestinal: Negative for abdominal pain.   Genitourinary: Negative for difficulty urinating.   Neurological: Negative for dizziness and headaches.   Psychiatric/Behavioral: Positive for dysphoric mood. The patient is nervous/anxious.        Objective:      Physical Exam   Constitutional: She is oriented to person, place, and time. She appears well-developed and well-nourished. No distress.   Eyes: No scleral icterus.   Cardiovascular: Normal rate, regular rhythm and normal heart sounds.    Pulmonary/Chest: Effort normal and breath sounds normal. No respiratory distress.   Abdominal: Soft. Bowel sounds are normal.   Neurological: She is alert and oriented to person, place, and time.   Skin: Skin is warm and dry.   Psychiatric: She has a normal mood and affect.   Vitals reviewed.      Assessment:       1. Hypertension associated with diabetes    2. Combined hyperlipidemia associated with type 2 diabetes mellitus    3. Hypothyroidism (acquired)    4. Major depression, recurrent, chronic    5. Anxiety    6. Severe obesity (BMI 35.0-39.9) with comorbidity    7. Colon cancer screening        Plan:       Diana was seen today for follow-up.    Diagnoses and all orders for this visit:    Hypertension associated with diabetes  -     Comprehensive metabolic panel; Standing  -     Hemoglobin A1c;  Standing  -     Lipid panel; Standing  -     blood sugar diagnostic Strp; Glucose testing daily.  -     blood-glucose meter Misc; Use as directed.  -     lancets (LANCETS,ULTRA THIN) Misc; Glucose testing daily.  -     metFORMIN (GLUCOPHAGE-XR) 500 MG 24 hr tablet; Take 1 tablet (500 mg total) by mouth 2 (two) times daily with meals.  -     amLODIPine (NORVASC) 5 MG tablet; Take 1 tablet (5 mg total) by mouth once daily.    Combined hyperlipidemia associated with type 2 diabetes mellitus  -     Comprehensive metabolic panel; Standing  -     Hemoglobin A1c; Standing  -     Lipid panel; Standing  -     blood sugar diagnostic Strp; Glucose testing daily.  -     blood-glucose meter Misc; Use as directed.  -     lancets (LANCETS,ULTRA THIN) Misc; Glucose testing daily.  -     pravastatin (PRAVACHOL) 20 MG tablet; Take 1 tablet (20 mg total) by mouth once daily.    Hypothyroidism (acquired)  -     TSH; Standing  -     levothyroxine (SYNTHROID) 50 MCG tablet; Take 1 tablet (50 mcg total) by mouth once daily.    Major depression, recurrent, chronic  -     venlafaxine (EFFEXOR-XR) 150 MG Cp24; Take 1 capsule (150 mg total) by mouth once daily.    Anxiety  -     LORazepam (ATIVAN) 0.5 MG tablet; Take 1 tablet (0.5 mg total) by mouth daily as needed for Anxiety.  -     Continue venlafaxine     Severe obesity (BMI 35.0-39.9) with comorbidity  -     Lifestyle modifications    Colon cancer screening  -     Case request GI: COLONOSCOPY

## 2018-02-23 ENCOUNTER — TELEPHONE (OUTPATIENT)
Dept: SMOKING CESSATION | Facility: CLINIC | Age: 53
End: 2018-02-23

## 2018-03-02 ENCOUNTER — TELEPHONE (OUTPATIENT)
Dept: SMOKING CESSATION | Facility: CLINIC | Age: 53
End: 2018-03-02

## 2018-03-07 ENCOUNTER — OFFICE VISIT (OUTPATIENT)
Dept: OPHTHALMOLOGY | Facility: CLINIC | Age: 53
End: 2018-03-07
Payer: COMMERCIAL

## 2018-03-07 DIAGNOSIS — H10.13 ALLERGIC CONJUNCTIVITIS, BILATERAL: Primary | ICD-10-CM

## 2018-03-07 PROCEDURE — 92012 INTRM OPH EXAM EST PATIENT: CPT | Mod: S$GLB,,, | Performed by: OPTOMETRIST

## 2018-03-07 RX ORDER — FLUOROMETHOLONE 1 MG/ML
1 SUSPENSION/ DROPS OPHTHALMIC 4 TIMES DAILY
Qty: 10 ML | Refills: 3 | Status: SHIPPED | OUTPATIENT
Start: 2018-03-07 | End: 2018-08-21 | Stop reason: ALTCHOICE

## 2018-03-07 NOTE — PROGRESS NOTES
HPI     Eyes hurt, photophobia night driving.  Thick discharge in morning, watery in the evening.  Blurry vision    Last edited by Chris Mario, OD on 3/7/2018  1:20 PM. (History)            Assessment /Plan     For exam results, see Encounter Report.    Allergic conjunctivitis, bilateral    Other orders  -     fluorometholone 0.1% (FML) 0.1 % DrpS; Place 1 drop into both eyes 4 (four) times daily.  Dispense: 10 mL; Refill: 3      FML qid x 5 days then bid x 5 days OU    RTC prn

## 2018-03-19 DIAGNOSIS — R25.2 MUSCLE CRAMPS AT NIGHT: ICD-10-CM

## 2018-03-20 RX ORDER — GABAPENTIN 300 MG/1
CAPSULE ORAL
Qty: 60 CAPSULE | Refills: 2 | Status: SHIPPED | OUTPATIENT
Start: 2018-03-20 | End: 2018-06-15 | Stop reason: SDUPTHER

## 2018-06-11 ENCOUNTER — OFFICE VISIT (OUTPATIENT)
Dept: OPHTHALMOLOGY | Facility: CLINIC | Age: 53
End: 2018-06-11
Payer: COMMERCIAL

## 2018-06-11 DIAGNOSIS — H52.4 BILATERAL PRESBYOPIA: ICD-10-CM

## 2018-06-11 DIAGNOSIS — E11.9 DIABETES MELLITUS TYPE 2 WITHOUT RETINOPATHY: Primary | ICD-10-CM

## 2018-06-11 PROCEDURE — 92014 COMPRE OPH EXAM EST PT 1/>: CPT | Mod: S$GLB,,, | Performed by: OPTOMETRIST

## 2018-06-11 PROCEDURE — 92015 DETERMINE REFRACTIVE STATE: CPT | Mod: S$GLB,,, | Performed by: OPTOMETRIST

## 2018-06-11 PROCEDURE — 99999 PR PBB SHADOW E&M-EST. PATIENT-LVL I: CPT | Mod: PBBFAC,,, | Performed by: OPTOMETRIST

## 2018-06-11 NOTE — LETTER
[facility] - Ophthalmology  Ophthalmology  [address] [facility] [address]  [place] [address]  Phone: [phone]  Fax: [phone]   June 11, 2018     Patient: Diana Escobar   YOB: 1965   Date of Visit: 6/11/2018       To Whom it May Concern:    Diana Escobar was seen in my clinic on 6/11/2018. She may return to work on 6/11/2018.    If you have any questions or concerns, please don't hesitate to call.    Sincerely,         Chris Mario, OD

## 2018-06-11 NOTE — Clinical Note
Schirmers test showed more than ample amount of tears produced in 5 minutes with anesthesia, 25/33mm No keratitis present however she does have a history of keratitis.

## 2018-06-15 DIAGNOSIS — R25.2 MUSCLE CRAMPS AT NIGHT: ICD-10-CM

## 2018-06-15 RX ORDER — GABAPENTIN 300 MG/1
CAPSULE ORAL
Qty: 60 CAPSULE | Refills: 2 | Status: SHIPPED | OUTPATIENT
Start: 2018-06-15 | End: 2018-09-04 | Stop reason: SDUPTHER

## 2018-06-21 ENCOUNTER — OFFICE VISIT (OUTPATIENT)
Dept: INTERNAL MEDICINE | Facility: CLINIC | Age: 53
End: 2018-06-21
Payer: COMMERCIAL

## 2018-06-21 VITALS
HEIGHT: 62 IN | DIASTOLIC BLOOD PRESSURE: 78 MMHG | WEIGHT: 196 LBS | TEMPERATURE: 99 F | BODY MASS INDEX: 36.07 KG/M2 | HEART RATE: 87 BPM | SYSTOLIC BLOOD PRESSURE: 118 MMHG | OXYGEN SATURATION: 96 %

## 2018-06-21 DIAGNOSIS — J06.9 UPPER RESPIRATORY TRACT INFECTION, UNSPECIFIED TYPE: ICD-10-CM

## 2018-06-21 DIAGNOSIS — J45.901 EXACERBATION OF ASTHMA, UNSPECIFIED ASTHMA SEVERITY, UNSPECIFIED WHETHER PERSISTENT: Primary | ICD-10-CM

## 2018-06-21 DIAGNOSIS — Z12.11 SCREENING FOR COLON CANCER: ICD-10-CM

## 2018-06-21 PROCEDURE — 99214 OFFICE O/P EST MOD 30 MIN: CPT | Mod: 25,S$GLB,, | Performed by: PHYSICIAN ASSISTANT

## 2018-06-21 PROCEDURE — 3078F DIAST BP <80 MM HG: CPT | Mod: CPTII,S$GLB,, | Performed by: PHYSICIAN ASSISTANT

## 2018-06-21 PROCEDURE — 3008F BODY MASS INDEX DOCD: CPT | Mod: CPTII,S$GLB,, | Performed by: PHYSICIAN ASSISTANT

## 2018-06-21 PROCEDURE — 3074F SYST BP LT 130 MM HG: CPT | Mod: CPTII,S$GLB,, | Performed by: PHYSICIAN ASSISTANT

## 2018-06-21 PROCEDURE — 99999 PR PBB SHADOW E&M-EST. PATIENT-LVL III: CPT | Mod: PBBFAC,,, | Performed by: PHYSICIAN ASSISTANT

## 2018-06-21 PROCEDURE — 96372 THER/PROPH/DIAG INJ SC/IM: CPT | Mod: S$GLB,,, | Performed by: PHYSICIAN ASSISTANT

## 2018-06-21 RX ORDER — PROMETHAZINE HYDROCHLORIDE AND DEXTROMETHORPHAN HYDROBROMIDE 6.25; 15 MG/5ML; MG/5ML
5 SYRUP ORAL 3 TIMES DAILY PRN
Qty: 240 ML | Refills: 0 | Status: SHIPPED | OUTPATIENT
Start: 2018-06-21 | End: 2018-07-07

## 2018-06-21 RX ORDER — ALBUTEROL SULFATE 90 UG/1
2 AEROSOL, METERED RESPIRATORY (INHALATION) EVERY 6 HOURS PRN
Qty: 18 G | Refills: 6 | Status: SHIPPED | OUTPATIENT
Start: 2018-06-21 | End: 2019-06-19 | Stop reason: SDUPTHER

## 2018-06-21 RX ORDER — METHYLPREDNISOLONE ACETATE 80 MG/ML
80 INJECTION, SUSPENSION INTRA-ARTICULAR; INTRALESIONAL; INTRAMUSCULAR; SOFT TISSUE
Status: COMPLETED | OUTPATIENT
Start: 2018-06-21 | End: 2018-06-21

## 2018-06-21 RX ADMIN — METHYLPREDNISOLONE ACETATE 80 MG: 80 INJECTION, SUSPENSION INTRA-ARTICULAR; INTRALESIONAL; INTRAMUSCULAR; SOFT TISSUE at 01:06

## 2018-06-21 NOTE — MEDICAL/APP STUDENT
Subjective:       Patient ID: Diana Escobar is a 52 y.o. female.    Chief Complaint: Shortness of Breath; Headache; and bumps in throat    51 yo female with past medical history of HTN, asthma, and diabetes presents with SOB and HA. Patient states symptoms started last night. She used an  Albuterol inhaler she has, no relief. She has pressure on her chest and some pain from coughing.   Patient would also like to get set up for colonoscopy.      Shortness of Breath   This is a new problem. The current episode started yesterday. The problem occurs constantly. The problem has been unchanged. Associated symptoms include chest pain, ear pain, headaches, a sore throat and sputum production (only 2 times, clear mucus). Pertinent negatives include no fever, rhinorrhea, swollen glands, vomiting or wheezing. The symptoms are aggravated by lying flat. She has tried beta agonist inhalers for the symptoms. The treatment provided no relief. Her past medical history is significant for asthma.   Headache    This is a new problem. The current episode started yesterday. The problem has been unchanged. The pain is located in the occipital region. The pain does not radiate. The quality of the pain is described as aching. Associated symptoms include coughing, ear pain, nausea, sinus pressure and a sore throat. Pertinent negatives include no blurred vision, drainage, facial sweating, fever, photophobia, rhinorrhea, swollen glands, visual change or vomiting. Nothing aggravates the symptoms. She has tried nothing for the symptoms. Her past medical history is significant for hypertension.     Review of Systems   Constitutional: Positive for chills and fatigue. Negative for fever.   HENT: Positive for ear pain, mouth sores, sinus pain, sinus pressure, sore throat and voice change. Negative for rhinorrhea.    Eyes: Negative for blurred vision, photophobia, discharge and itching.   Respiratory: Positive for cough, sputum production  (only 2 times, clear mucus), chest tightness and shortness of breath. Negative for wheezing.    Cardiovascular: Positive for chest pain.   Gastrointestinal: Positive for nausea. Negative for vomiting.   Neurological: Positive for headaches.       Objective:      Physical Exam   Constitutional: She appears well-developed and well-nourished. No distress.   HENT:   Head: Normocephalic and atraumatic.   Right Ear: Tympanic membrane, external ear and ear canal normal. No drainage, swelling or tenderness.   Left Ear: Tympanic membrane, external ear and ear canal normal. No drainage, swelling or tenderness.   Nose: No mucosal edema, rhinorrhea or sinus tenderness. Right sinus exhibits maxillary sinus tenderness. Right sinus exhibits no frontal sinus tenderness. Left sinus exhibits maxillary sinus tenderness. Left sinus exhibits no frontal sinus tenderness.   Mouth/Throat: Uvula is midline and mucous membranes are normal. Oral lesions (3-5 <1cm ulcers on R tongue, tenderness when chewing ) present. No oropharyngeal exudate. Tonsils are 0 on the right. Tonsils are 0 on the left. No tonsillar exudate.   Eyes: Conjunctivae and lids are normal. Right eye exhibits no discharge. Left eye exhibits no discharge.   Neck: Normal range of motion. Neck supple.   Tenderness on palpation of R cervical lymph nodes but no thyromegaly noted.   Cardiovascular: Normal rate, regular rhythm and normal heart sounds.  Exam reveals no gallop and no friction rub.    No murmur heard.  Pulmonary/Chest: Effort normal and breath sounds normal. No respiratory distress. She has no wheezes. She has no rales. She exhibits no tenderness.   Lymphadenopathy:     She has no cervical adenopathy.   Skin: She is not diaphoretic.   Nursing note and vitals reviewed.      Assessment:       No diagnosis found.    Plan:       Exacerbation of asthma, unspecified asthma severity, unspecified whether persistent    Upper respiratory tract infection, unspecified  type    Screening for colon cancer  -     Case request GI: COLONOSCOPY    Other orders  -     albuterol 90 mcg/actuation inhaler; Inhale 2 puffs into the lungs every 6 (six) hours as needed for Wheezing.  Dispense: 18 g; Refill: 6  -     methylPREDNISolone acetate injection 80 mg; Inject 1 mL (80 mg total) into the muscle one time.  -     promethazine-dextromethorphan (PROMETHAZINE-DM) 6.25-15 mg/5 mL Syrp; Take 5 mLs by mouth 3 (three) times daily as needed.  Dispense: 240 mL; Refill: 0

## 2018-06-21 NOTE — PROGRESS NOTES
Patient ID: Diana Escobar is a 52 y.o. female.     Chief Complaint: Shortness of Breath; Headache; and bumps in throat     53 yo female with past medical history of HTN, asthma, and diabetes presents with SOB and HA. Patient states symptoms started last night. She used an  Albuterol inhaler she has, no relief. She has pressure on her chest and some pain from coughing.   Patient would also like to get set up for colonoscopy.     Past Medical History:   Diagnosis Date    Asthma     Chronic low back pain     Degenerative disc disease, lumbar     Depression     Diabetes mellitus     DM (diabetes mellitus)     BS 93 06/10/2018    Fibromyalgia     Hyperlipidemia     Hypertension     Hypothyroidism     MRSA (methicillin resistant Staphylococcus aureus) carrier     Palpitations     Dr. Jesus Lao--cardiology    Stroke     TIA    Vitamin D deficiency disease      Shortness of Breath   This is a new problem. The current episode started yesterday. The problem occurs constantly. The problem has been unchanged. Associated symptoms include chest pain, ear pain, headaches, a sore throat and sputum production (only 2 times, clear mucus). Pertinent negatives include no fever, rhinorrhea, swollen glands, vomiting or wheezing. The symptoms are aggravated by lying flat. She has tried beta agonist inhalers for the symptoms. The treatment provided no relief. Her past medical history is significant for asthma.   Headache    This is a new problem. The current episode started yesterday. The problem has been unchanged. The pain is located in the occipital region. The pain does not radiate. The quality of the pain is described as aching. Associated symptoms include coughing, ear pain, nausea, sinus pressure and a sore throat. Pertinent negatives include no blurred vision, drainage, facial sweating, fever, photophobia, rhinorrhea, swollen glands, visual change or vomiting. Nothing aggravates the symptoms.  She has tried nothing for the symptoms. Her past medical history is significant for hypertension.      Review of Systems   Constitutional: Positive for chills and fatigue. Negative for fever.   HENT: Positive for ear pain, mouth sores, sinus pain, sinus pressure, sore throat and voice change. Negative for rhinorrhea.    Eyes: Negative for blurred vision, photophobia, discharge and itching.   Respiratory: Positive for cough, sputum production (only 2 times, clear mucus), chest tightness and shortness of breath. Negative for wheezing.    Cardiovascular: Positive for chest pain.   Gastrointestinal: Positive for nausea. Negative for vomiting.   Neurological: Positive for headaches.       Objective:   Physical Exam   Constitutional: She appears well-developed and well-nourished. No distress.   HENT:   Head: Normocephalic and atraumatic.   Right Ear: Tympanic membrane, external ear and ear canal normal. No drainage, swelling or tenderness.   Left Ear: Tympanic membrane, external ear and ear canal normal. No drainage, swelling or tenderness.   Nose: No mucosal edema, rhinorrhea or sinus tenderness. Right sinus exhibits maxillary sinus tenderness. Right sinus exhibits no frontal sinus tenderness. Left sinus exhibits maxillary sinus tenderness. Left sinus exhibits no frontal sinus tenderness.   Mouth/Throat: Uvula is midline and mucous membranes are normal. Oral lesions (3-5 <1cm ulcers on R tongue, tenderness when chewing ) present. No oropharyngeal exudate. Tonsils are 0 on the right. Tonsils are 0 on the left. No tonsillar exudate.   Eyes: Conjunctivae and lids are normal. Right eye exhibits no discharge. Left eye exhibits no discharge.   Neck: Normal range of motion. Neck supple.   Tenderness on palpation of R cervical lymph nodes but no thyromegaly noted.   Cardiovascular: Normal rate, regular rhythm and normal heart sounds.  Exam reveals no gallop and no friction rub.    No murmur heard.  Pulmonary/Chest: Effort normal  and breath sounds normal. No respiratory distress. She has no wheezes. She has no rales. She exhibits no tenderness.   Lymphadenopathy:     She has no cervical adenopathy.   Skin: She is not diaphoretic.   Nursing note and vitals reviewed.      Assessment:     Exacerbation of asthma, unspecified asthma severity, unspecified whether persistent     Upper respiratory tract infection, unspecified type     Screening for colon cancer       Plan:     Exacerbation of asthma, unspecified asthma severity, unspecified whether persistent     Upper respiratory tract infection, unspecified type     Screening for colon cancer    -     Case request GI: COLONOSCOPY     Other orders  -     albuterol 90 mcg/actuation inhaler; Inhale 2 puffs into the lungs every 6 (six) hours as needed for Wheezing.  Dispense: 18 g; Refill: 6  -     methylPREDNISolone acetate injection 80 mg; Inject 1 mL (80 mg total) into the muscle one time.  -     promethazine-dextromethorphan (PROMETHAZINE-DM) 6.25-15 mg/5 mL Syrp; Take 5 mLs by mouth 3 (three) times daily as needed.  Dispense: 240 mL; Refill: 0

## 2018-06-25 ENCOUNTER — DOCUMENTATION ONLY (OUTPATIENT)
Dept: ENDOSCOPY | Facility: HOSPITAL | Age: 53
End: 2018-06-25

## 2018-06-25 RX ORDER — SODIUM, POTASSIUM,MAG SULFATES 17.5-3.13G
SOLUTION, RECONSTITUTED, ORAL ORAL
Qty: 354 ML | Refills: 0 | Status: SHIPPED | OUTPATIENT
Start: 2018-06-25 | End: 2018-08-21 | Stop reason: ALTCHOICE

## 2018-06-25 NOTE — PROGRESS NOTES
6.25.2018 Per [employer], Person pressed touch tone key to speak with an endoscopy .  Scheduled.  Instructions to be mailed.

## 2018-07-19 ENCOUNTER — OFFICE VISIT (OUTPATIENT)
Dept: INTERNAL MEDICINE | Facility: CLINIC | Age: 53
End: 2018-07-19
Payer: COMMERCIAL

## 2018-07-19 ENCOUNTER — HOSPITAL ENCOUNTER (OUTPATIENT)
Dept: RADIOLOGY | Facility: HOSPITAL | Age: 53
Discharge: HOME | End: 2018-07-19
Attending: INTERNAL MEDICINE
Payer: COMMERCIAL

## 2018-07-19 VITALS
WEIGHT: 192.25 LBS | HEIGHT: 62 IN | DIASTOLIC BLOOD PRESSURE: 68 MMHG | SYSTOLIC BLOOD PRESSURE: 130 MMHG | HEART RATE: 90 BPM | OXYGEN SATURATION: 96 % | BODY MASS INDEX: 35.38 KG/M2 | TEMPERATURE: 98 F

## 2018-07-19 DIAGNOSIS — R10.2 SUPRAPUBIC ABDOMINAL PAIN: ICD-10-CM

## 2018-07-19 DIAGNOSIS — R10.13 EPIGASTRIC PAIN: ICD-10-CM

## 2018-07-19 DIAGNOSIS — R53.83 FATIGUE, UNSPECIFIED TYPE: ICD-10-CM

## 2018-07-19 DIAGNOSIS — R30.0 DYSURIA: Primary | ICD-10-CM

## 2018-07-19 LAB
BILIRUB UR QL STRIP: NEGATIVE
CLARITY UR REFRACT.AUTO: ABNORMAL
COLOR UR AUTO: YELLOW
GLUCOSE UR QL STRIP: NEGATIVE
HGB UR QL STRIP: NEGATIVE
KETONES UR QL STRIP: ABNORMAL
LEUKOCYTE ESTERASE UR QL STRIP: ABNORMAL
MICROSCOPIC COMMENT: NORMAL
NITRITE UR QL STRIP: NEGATIVE
PH UR STRIP: 5 [PH] (ref 5–8)
PROT UR QL STRIP: NEGATIVE
RBC #/AREA URNS AUTO: 1 /HPF (ref 0–4)
SP GR UR STRIP: 1.02 (ref 1–1.03)
SQUAMOUS #/AREA URNS AUTO: 1 /HPF
URN SPEC COLLECT METH UR: ABNORMAL
UROBILINOGEN UR STRIP-ACNC: ABNORMAL EU/DL
WBC #/AREA URNS AUTO: 4 /HPF (ref 0–5)

## 2018-07-19 PROCEDURE — 99999 PR PBB SHADOW E&M-EST. PATIENT-LVL III: CPT | Mod: PBBFAC,,, | Performed by: INTERNAL MEDICINE

## 2018-07-19 PROCEDURE — 81001 URINALYSIS AUTO W/SCOPE: CPT

## 2018-07-19 PROCEDURE — 3008F BODY MASS INDEX DOCD: CPT | Mod: CPTII,S$GLB,, | Performed by: INTERNAL MEDICINE

## 2018-07-19 PROCEDURE — 74018 RADEX ABDOMEN 1 VIEW: CPT | Mod: TC

## 2018-07-19 PROCEDURE — 99214 OFFICE O/P EST MOD 30 MIN: CPT | Mod: S$GLB,,, | Performed by: INTERNAL MEDICINE

## 2018-07-19 PROCEDURE — 3078F DIAST BP <80 MM HG: CPT | Mod: CPTII,S$GLB,, | Performed by: INTERNAL MEDICINE

## 2018-07-19 PROCEDURE — 74018 RADEX ABDOMEN 1 VIEW: CPT | Mod: 26,,, | Performed by: RADIOLOGY

## 2018-07-19 PROCEDURE — 3075F SYST BP GE 130 - 139MM HG: CPT | Mod: CPTII,S$GLB,, | Performed by: INTERNAL MEDICINE

## 2018-07-19 NOTE — LETTER
July 19, 2018                   [facility]  Internal Medicine  [address] [place] [id number]  Phone: [phone]  Fax: [phone]   July 19, 2018     Patient: Diana Escobar   YOB: 1965   Date of Visit: 7/19/2018       To Whom it May Concern:    Diana Escobar was seen in my clinic on 7/19/2018. She may return to work on 07/20/18.    If you have any questions or concerns, please don't hesitate to call.    Sincerely,                   Cleo Boggs LPN/Dr. Jory Alcocer

## 2018-07-20 ENCOUNTER — TELEPHONE (OUTPATIENT)
Dept: INTERNAL MEDICINE | Facility: CLINIC | Age: 53
End: 2018-07-20

## 2018-07-20 NOTE — TELEPHONE ENCOUNTER
----- Message from Aurora Pang sent at 7/20/2018  2:23 PM CDT -----  Contact: pt   Pt is requesting a call back from the nurse in regards to getting her test results please.  [phone] (home)

## 2018-07-23 ENCOUNTER — TELEPHONE (OUTPATIENT)
Dept: INTERNAL MEDICINE | Facility: CLINIC | Age: 53
End: 2018-07-23

## 2018-07-23 ENCOUNTER — PATIENT MESSAGE (OUTPATIENT)
Dept: INTERNAL MEDICINE | Facility: CLINIC | Age: 53
End: 2018-07-23

## 2018-07-23 DIAGNOSIS — R79.89 ABNORMAL THYROID BLOOD TEST: Primary | ICD-10-CM

## 2018-07-23 NOTE — PROGRESS NOTES
"Diana Escobar  52 y.o. White female    Chief Complaint   Patient presents with    Follow-up     [facility] 7/8     HPI: Presents to the clinic for an ER visit follow up. She went to [facility] on 7/8. She was treated for a UTI with Bactrim and Pyridium. She continues to have burning with urination and feels fatigued. She is also having epigastric and suprapubic discomfort.   She denies having a fever, nausea, vomiting, diarrhea or constipation.     Past Medical History:   Diagnosis Date    Asthma     Chronic low back pain     Degenerative disc disease, lumbar     Depression     Diabetes mellitus 2014    DM (diabetes mellitus) 2014    BS 93 06/10/2018    Fibromyalgia     Hyperlipidemia     Hypertension     Hypothyroidism     MRSA (methicillin resistant Staphylococcus aureus) carrier     Palpitations     Dr. Jesus Lao--cardiology    Stroke     TIA    Vitamin D deficiency disease        Current Outpatient Prescriptions:     albuterol 90 mcg/actuation inhaler, Inhale 2 puffs into the lungs every 6 (six) hours as needed for Wheezing., Disp: 18 g, Rfl: 6    amLODIPine (NORVASC) 5 MG tablet, Take 1 tablet (5 mg total) by mouth once daily., Disp: 30 tablet, Rfl: 6    blood sugar diagnostic Strp, Glucose testing daily., Disp: 50 strip, Rfl: 11    blood-glucose meter Misc, Use as directed., Disp: 1 each, Rfl: 0    clopidogrel (PLAVIX) 75 mg tablet, Take 75 mg by mouth once daily., Disp: , Rfl:     cyanocobalamin 500 MCG tablet, Take 500 mcg by mouth once daily., Disp: , Rfl:     diclofenac sodium 1 % Gel, Apply topically 4 (four) times daily., Disp: , Rfl:     fluorometholone 0.1% (FML) 0.1 % DrpS, Place 1 drop into both eyes 4 (four) times daily., Disp: 10 mL, Rfl: 3    gabapentin (NEURONTIN) 300 MG capsule, TAKE 1 CAPSULE BY MOUTH TWICE DAILY, Disp: 60 capsule, Rfl: 2    insulin needles, disposable, (PEN NEEDLE) 31 X 1/4 " Ndle, Twice daily injections., Disp: 100 each, Rfl: 6    ketotifen " (ZADITOR) 0.025 % (0.035 %) ophthalmic solution, Place 1 drop into both eyes 2 (two) times daily., Disp: 5 mL, Rfl: 12    lancets (LANCETS,ULTRA THIN) Misc, Glucose testing daily., Disp: 50 each, Rfl: 11    levothyroxine (SYNTHROID) 50 MCG tablet, Take 1 tablet (50 mcg total) by mouth once daily., Disp: 30 tablet, Rfl: 6    LORazepam (ATIVAN) 0.5 MG tablet, Take 1 tablet (0.5 mg total) by mouth daily as needed for Anxiety., Disp: 30 tablet, Rfl: 0    magnesium 250 mg Tab, Take by mouth 2 (two) times daily., Disp: , Rfl:     metFORMIN (GLUCOPHAGE-XR) 500 MG 24 hr tablet, Take 1 tablet (500 mg total) by mouth 2 (two) times daily with meals., Disp: 60 tablet, Rfl: 6    oxyCODONE-acetaminophen (PERCOCET) 7.5-325 mg per tablet, TK 1 T PO TID, Disp: , Rfl: 0    peg 400-propylene glycol, PF, (SYSTANE ULTRA, PF,) 0.4-0.3 % Dpet, Place 1 drop into both eyes 4 (four) times daily., Disp: 1 each, Rfl:     pravastatin (PRAVACHOL) 20 MG tablet, Take 1 tablet (20 mg total) by mouth once daily., Disp: 30 tablet, Rfl: 6    promethazine (PHENERGAN) 25 MG tablet, Take 1 tablet (25 mg total) by mouth every 6 (six) hours as needed for Nausea., Disp: 15 tablet, Rfl: 0    pyridoxine, vitamin B6, (VITAMIN B-6) 200 mg Tab, Take 200 mg by mouth once daily., Disp: , Rfl:     RIBOFLAVIN (VITAMIN B-2 ORAL), Take 100 mg by mouth once daily., Disp: , Rfl:     sodium,potassium,mag sulfates (SUPREP BOWEL PREP KIT) 17.5-3.13-1.6 gram SolR, Use as directed., Disp: 354 mL, Rfl: 0    tiZANidine (ZANAFLEX) 4 MG tablet, TK 1 T PO Q 12 H, Disp: , Rfl: 0    UBIDECARENONE (COENZYME Q10) 100 mg Tab, Take 1 tablet by mouth once daily. Take 300 mg daily, Disp: , Rfl:     venlafaxine (EFFEXOR-XR) 150 MG Cp24, Take 1 capsule (150 mg total) by mouth once daily., Disp: 30 capsule, Rfl: 6    vitamin D 1000 units Tab, Take 2,000 Units by mouth once daily., Disp: , Rfl:     Allergies:  Review of patient's allergies indicates:   Allergen Reactions     Mobic [meloxicam]     Topamax [topiramate]     Adhesive Rash     PHYSICAL EXAM:  VITAL SIGNS: Reviewed  GENERAL: Alert and oriented, no acute distress  HEART: Normal S1 and S2, regular rate and rhythm  LUNGS: Bilaterally clear to auscultation, respirations unlabored  ABDOMEN: Soft, mild epigastric and suprapubic tenderness     ASSESSMENT/PLAN:  Diana was seen today for follow-up.    Diagnoses and all orders for this visit:    Dysuria  -     Urinalysis    Fatigue, unspecified type  -     CBC auto differential; Future  -     Comprehensive metabolic panel; Future  -     TSH; Future  -     Vitamin B12; Future  -     Vitamin D; Future    Epigastric pain  -     X-Ray Abdomen AP 1 View; Future    Suprapubic abdominal pain  -     X-Ray Abdomen AP 1 View; Future    Labs and X-ray today.

## 2018-07-31 ENCOUNTER — PATIENT OUTREACH (OUTPATIENT)
Dept: ADMINISTRATIVE | Facility: HOSPITAL | Age: 53
End: 2018-07-31

## 2018-08-01 ENCOUNTER — HOSPITAL ENCOUNTER (OUTPATIENT)
Dept: RADIOLOGY | Facility: HOSPITAL | Age: 53
Discharge: HOME | End: 2018-08-01
Attending: INTERNAL MEDICINE
Payer: COMMERCIAL

## 2018-08-01 ENCOUNTER — OFFICE VISIT (OUTPATIENT)
Dept: RHEUMATOLOGY | Facility: CLINIC | Age: 53
End: 2018-08-01
Payer: COMMERCIAL

## 2018-08-01 VITALS
BODY MASS INDEX: 34.98 KG/M2 | DIASTOLIC BLOOD PRESSURE: 77 MMHG | WEIGHT: 190.06 LBS | HEART RATE: 91 BPM | HEIGHT: 62 IN | SYSTOLIC BLOOD PRESSURE: 111 MMHG

## 2018-08-01 DIAGNOSIS — M79.671 BILATERAL FOOT PAIN: Primary | ICD-10-CM

## 2018-08-01 DIAGNOSIS — M06.09 RHEUMATOID ARTHRITIS OF MULTIPLE SITES WITH NEGATIVE RHEUMATOID FACTOR: ICD-10-CM

## 2018-08-01 DIAGNOSIS — M79.7 FIBROMYALGIA: ICD-10-CM

## 2018-08-01 DIAGNOSIS — M79.671 BILATERAL FOOT PAIN: ICD-10-CM

## 2018-08-01 DIAGNOSIS — M79.672 BILATERAL FOOT PAIN: Primary | ICD-10-CM

## 2018-08-01 DIAGNOSIS — M79.672 BILATERAL FOOT PAIN: ICD-10-CM

## 2018-08-01 PROCEDURE — 3078F DIAST BP <80 MM HG: CPT | Mod: CPTII,S$GLB,, | Performed by: INTERNAL MEDICINE

## 2018-08-01 PROCEDURE — 73630 X-RAY EXAM OF FOOT: CPT | Mod: 26,50,, | Performed by: RADIOLOGY

## 2018-08-01 PROCEDURE — 3008F BODY MASS INDEX DOCD: CPT | Mod: CPTII,S$GLB,, | Performed by: INTERNAL MEDICINE

## 2018-08-01 PROCEDURE — 73630 X-RAY EXAM OF FOOT: CPT | Mod: 50,TC,FY,PO

## 2018-08-01 PROCEDURE — 3074F SYST BP LT 130 MM HG: CPT | Mod: CPTII,S$GLB,, | Performed by: INTERNAL MEDICINE

## 2018-08-01 PROCEDURE — 99999 PR PBB SHADOW E&M-EST. PATIENT-LVL III: CPT | Mod: PBBFAC,,, | Performed by: INTERNAL MEDICINE

## 2018-08-01 PROCEDURE — 99214 OFFICE O/P EST MOD 30 MIN: CPT | Mod: S$GLB,,, | Performed by: INTERNAL MEDICINE

## 2018-08-01 NOTE — ASSESSMENT & PLAN NOTE
Bilateral foot pain without any associated synovitis with some questionable enthesopathy on exam.  Repeat foot x-ray and refer to podiatry for orthotics and injections if needed.  If x-ray shows any suspicious findings of new bone formation or erosion will proceed with MRI.

## 2018-08-01 NOTE — ASSESSMENT & PLAN NOTE
Seronegative rheumatoid arthritis in remission.  She was diagnosed by Dr. HAND in the past and was on methotrexate, hydroxychloroquine and Humira which were discontinued after she went into remission.  No active synovitis on exam today.  Continue to monitor clinically.

## 2018-08-01 NOTE — PROGRESS NOTES
RHEUMATOLOGY CLINIC FOLLOW UP VISIT  Chief complaints:-  Both my feet hurts.    HPI:-  Diana Rubio a 52 y.o. pleasant female comes in for a follow up visit with above chief complaints.  She follows up in the Rheumatology Clinic for history of seronegative rheumatoid arthritis which went into remission.  She also has fibromyalgia for which she follows up with the interventional pain specialist.  In the past couple of months she reports worsening pain in bilateral foot especially over the midfoot region associated with swelling and stiffness.  The pain is usually at the end of the day.  The pain gets better with activities and gets worse with prolonged resting.  The pain is associated with morning stiffness.  She also complains of intermittent pain in other regions since she reduced opioid dosage from 3 pills a day to 2 pills a day.  As per advised by the pain clinic doctor she has increased the medication back to 3 pills a day and has been doing well since then.  No new skin rash.    Review of Systems   Constitutional: Positive for malaise/fatigue. Negative for chills and fever.   HENT: Negative for congestion and sore throat.    Eyes: Negative for blurred vision and redness.   Respiratory: Negative for cough and shortness of breath.    Cardiovascular: Negative for chest pain and leg swelling.   Gastrointestinal: Negative for abdominal pain.   Genitourinary: Negative for dysuria.   Musculoskeletal: Positive for back pain, joint pain, myalgias and neck pain. Negative for falls.   Skin: Negative for rash.   Neurological: Positive for sensory change. Negative for headaches.   Endo/Heme/Allergies: Does not bruise/bleed easily.   Psychiatric/Behavioral: Negative for memory loss. The patient does not have insomnia.        Past Medical History:   Diagnosis Date    Asthma     Chronic low back pain     Degenerative disc disease, lumbar     Depression      "Diabetes mellitus 2014    DM (diabetes mellitus) 2014    BS 93 06/10/2018    Fibromyalgia     Hyperlipidemia     Hypertension     Hypothyroidism     MRSA (methicillin resistant Staphylococcus aureus) carrier     Palpitations     Dr. Jesus Lao--cardiology    Stroke     TIA    Vitamin D deficiency disease        Past Surgical History:   Procedure Laterality Date    HYSTERECTOMY      left ankle surgery      loop recorder  05/2017    cardiac device    NECK SURGERY  06/2016    ROTATOR CUFF REPAIR      TONSILLECTOMY          Social History   Substance Use Topics    Smoking status: Current Some Day Smoker     Packs/day: 0.50     Types: Cigarettes    Smokeless tobacco: Never Used    Alcohol use No       Family History   Problem Relation Age of Onset    Cancer Mother     Stroke Mother     Heart disease Mother     Diabetes Mother     Cataracts Mother     Hypertension Mother     Breast cancer Mother     Heart disease Father     COPD Father     Hypertension Father     Diabetes Maternal Aunt     Breast cancer Sister        Review of patient's allergies indicates:   Allergen Reactions    Mobic [meloxicam]     Topamax [topiramate]     Adhesive Rash           Physical examination:-    Vitals:    08/01/18 1107   BP: 111/77   Pulse: 91   Weight: 86.2 kg (190 lb 0.6 oz)   Height: 5' 2" (1.575 m)   PainSc:   4   PainLoc: Leg       Physical Exam   Constitutional: She is oriented to person, place, and time and well-developed, well-nourished, and in no distress. No distress.   HENT:   Head: Normocephalic.   Mouth/Throat: Oropharynx is clear and moist.   Eyes: Conjunctivae and EOM are normal. Pupils are equal, round, and reactive to light.   Neck: Normal range of motion. Neck supple.   Cardiovascular: Normal rate and intact distal pulses.    Pulmonary/Chest: Effort normal. No respiratory distress.   Abdominal: Soft. There is no tenderness.   Musculoskeletal:   Few tender points. No synovitis over small " "joints of hands or feet.  No effusion over large joints.   Neurological: She is alert and oriented to person, place, and time. No cranial nerve deficit.   Skin: Skin is warm. No rash noted. No erythema.   Psychiatric: Mood and affect normal.   Nursing note and vitals reviewed.    Radiographs:-  Independent visualization of images done.  Bilateral foot x-ray from 2017 showed mild changes of degenerative arthritis and calcaneal and plantar enthesophytes.      Medication List with Changes/Refills   Current Medications    ALBUTEROL 90 MCG/ACTUATION INHALER    Inhale 2 puffs into the lungs every 6 (six) hours as needed for Wheezing.    AMLODIPINE (NORVASC) 5 MG TABLET    Take 1 tablet (5 mg total) by mouth once daily.    BLOOD SUGAR DIAGNOSTIC STRP    Glucose testing daily.    BLOOD-GLUCOSE METER MISC    Use as directed.    CLOPIDOGREL (PLAVIX) 75 MG TABLET    Take 75 mg by mouth once daily.    CYANOCOBALAMIN 500 MCG TABLET    Take 500 mcg by mouth once daily.    DICLOFENAC SODIUM 1 % GEL    Apply topically 4 (four) times daily.    FLUOROMETHOLONE 0.1% (FML) 0.1 % DRPS    Place 1 drop into both eyes 4 (four) times daily.    GABAPENTIN (NEURONTIN) 300 MG CAPSULE    TAKE 1 CAPSULE BY MOUTH TWICE DAILY    INSULIN NEEDLES, DISPOSABLE, (PEN NEEDLE) 31 X 1/4 " NDLE    Twice daily injections.    KETOTIFEN (ZADITOR) 0.025 % (0.035 %) OPHTHALMIC SOLUTION    Place 1 drop into both eyes 2 (two) times daily.    LANCETS (LANCETS,ULTRA THIN) MISC    Glucose testing daily.    LEVOTHYROXINE (SYNTHROID) 50 MCG TABLET    Take 1 tablet (50 mcg total) by mouth once daily.    LORAZEPAM (ATIVAN) 0.5 MG TABLET    Take 1 tablet (0.5 mg total) by mouth daily as needed for Anxiety.    MAGNESIUM 250 MG TAB    Take by mouth 2 (two) times daily.    METFORMIN (GLUCOPHAGE-XR) 500 MG 24 HR TABLET    Take 1 tablet (500 mg total) by mouth 2 (two) times daily with meals.    OXYCODONE-ACETAMINOPHEN (PERCOCET) 7.5-325 MG PER TABLET    TK 1 T PO TID    PEG " 400-PROPYLENE GLYCOL, PF, (SYSTANE ULTRA, PF,) 0.4-0.3 % DPET    Place 1 drop into both eyes 4 (four) times daily.    PRAVASTATIN (PRAVACHOL) 20 MG TABLET    Take 1 tablet (20 mg total) by mouth once daily.    PROMETHAZINE (PHENERGAN) 25 MG TABLET    Take 1 tablet (25 mg total) by mouth every 6 (six) hours as needed for Nausea.    PYRIDOXINE, VITAMIN B6, (VITAMIN B-6) 200 MG TAB    Take 200 mg by mouth once daily.    RIBOFLAVIN (VITAMIN B-2 ORAL)    Take 100 mg by mouth once daily.    SODIUM,POTASSIUM,MAG SULFATES (SUPREP BOWEL PREP KIT) 17.5-3.13-1.6 GRAM SOLR    Use as directed.    TIZANIDINE (ZANAFLEX) 4 MG TABLET    TK 1 T PO Q 12 H    UBIDECARENONE (COENZYME Q10) 100 MG TAB    Take 1 tablet by mouth once daily. Take 300 mg daily    VENLAFAXINE (EFFEXOR-XR) 150 MG CP24    Take 1 capsule (150 mg total) by mouth once daily.    VITAMIN D 1000 UNITS TAB    Take 2,000 Units by mouth once daily.       Assessment/Plans:-  # Bilateral foot pain  Bilateral foot pain without any associated synovitis with some questionable enthesopathy on exam.  Repeat foot x-ray and refer to podiatry for orthotics and injections if needed.  If x-ray shows any suspicious findings of new bone formation or erosion will proceed with MRI.  - Ambulatory referral to Podiatry  - X-Ray Foot Complete Bilateral; Future    # Rheumatoid arthritis of multiple sites with negative rheumatoid factor  Seronegative rheumatoid arthritis in remission.  She was diagnosed by Dr. HAND in the past and was on methotrexate, hydroxychloroquine and Humira which were discontinued after she went into remission.  No active synovitis on exam today.  Continue to monitor clinically.    # Fibromyalgia  Well controlled on [facility]  Continue follow up with pain specialist.     # Follow-up in about 6 months (around 2/1/2019).      Disclaimer: This note was prepared using voice recognition system and is likely to have sound  alike errors and is not proof read.  Please call me with any questions.

## 2018-08-01 NOTE — ASSESSMENT & PLAN NOTE
Well controlled on opioid therapy [facility].  Continue follow up with pain specialist.

## 2018-08-07 ENCOUNTER — TELEPHONE (OUTPATIENT)
Dept: INTERNAL MEDICINE | Facility: CLINIC | Age: 53
End: 2018-08-07

## 2018-08-07 NOTE — TELEPHONE ENCOUNTER
----- Message from Ludmila Anthony sent at 8/7/2018  2:37 PM CDT -----  Contact: pt  She's calling in regards to being worked into schedule , pls call pt back at [phone] (home)

## 2018-08-09 ENCOUNTER — OFFICE VISIT (OUTPATIENT)
Dept: INTERNAL MEDICINE | Facility: CLINIC | Age: 53
End: 2018-08-09
Payer: COMMERCIAL

## 2018-08-09 VITALS
SYSTOLIC BLOOD PRESSURE: 130 MMHG | HEART RATE: 88 BPM | HEIGHT: 62 IN | BODY MASS INDEX: 35.33 KG/M2 | TEMPERATURE: 99 F | WEIGHT: 192 LBS | DIASTOLIC BLOOD PRESSURE: 82 MMHG | OXYGEN SATURATION: 98 %

## 2018-08-09 DIAGNOSIS — F41.9 ANXIETY: ICD-10-CM

## 2018-08-09 DIAGNOSIS — L30.9 DERMATITIS: Primary | ICD-10-CM

## 2018-08-09 PROCEDURE — 99999 PR PBB SHADOW E&M-EST. PATIENT-LVL V: CPT | Mod: PBBFAC,,, | Performed by: INTERNAL MEDICINE

## 2018-08-09 PROCEDURE — 99214 OFFICE O/P EST MOD 30 MIN: CPT | Mod: S$GLB,,, | Performed by: INTERNAL MEDICINE

## 2018-08-09 PROCEDURE — 3079F DIAST BP 80-89 MM HG: CPT | Mod: CPTII,S$GLB,, | Performed by: INTERNAL MEDICINE

## 2018-08-09 PROCEDURE — 3075F SYST BP GE 130 - 139MM HG: CPT | Mod: CPTII,S$GLB,, | Performed by: INTERNAL MEDICINE

## 2018-08-09 PROCEDURE — 3008F BODY MASS INDEX DOCD: CPT | Mod: CPTII,S$GLB,, | Performed by: INTERNAL MEDICINE

## 2018-08-09 RX ORDER — BUSPIRONE HYDROCHLORIDE 5 MG/1
5 TABLET ORAL 2 TIMES DAILY
Qty: 60 TABLET | Refills: 1 | Status: SHIPPED | OUTPATIENT
Start: 2018-08-09 | End: 2018-08-21 | Stop reason: SDUPTHER

## 2018-08-09 RX ORDER — TRIAMCINOLONE ACETONIDE 1 MG/G
CREAM TOPICAL 2 TIMES DAILY
Qty: 15 G | Refills: 0 | Status: SHIPPED | OUTPATIENT
Start: 2018-08-09 | End: 2018-08-21 | Stop reason: ALTCHOICE

## 2018-08-09 NOTE — PATIENT INSTRUCTIONS
Buspirone tablets  What is this medicine?  BUSPIRONE (byluis enrique HERMILO buckner) is used to treat anxiety disorders.  How should I use this medicine?  Take this medicine by mouth with a glass of water. Follow the directions on the prescription label. You may take this medicine with or without food. To ensure that this medicine always works the same way for you, you should take it either always with or always without food. Take your doses at regular intervals. Do not take your medicine more often than directed. Do not stop taking except on the advice of your doctor or health care professional.  Talk to your pediatrician regarding the use of this medicine in children. Special care may be needed.  What side effects may I notice from receiving this medicine?  Side effects that you should report to your doctor or health care professional as soon as possible:  · blurred vision or other vision changes  · chest pain  · confusion  · difficulty breathing  · feelings of hostility or anger  · muscle aches and pains  · numbness or tingling in hands or feet  · ringing in the ears  · skin rash and itching  · vomiting  · weakness  Side effects that usually do not require medical attention (report to your doctor or health care professional if they continue or are bothersome):  · disturbed dreams, nightmares  · headache  · nausea  · restlessness or nervousness  · sore throat and nasal congestion  · stomach upset  What may interact with this medicine?  Do not take this medicine with any of the following medications:  · linezolid  · MAOIs like Carbex, Eldepryl, Marplan, Nardil, and Parnate  · methylene blue  · procarbazine  This medicine may also interact with the following medications:  · diazepam  · digoxin  · diltiazem  · erythromycin  · grapefruit juice  · haloperidol  · medicines for mental depression or mood problems  · medicines for seizures like carbamazepine, phenobarbital and phenytoin  · nefazodone  · other medications for  anxiety  · rifampin  · ritonavir  · some antifungal medicines like itraconazole, ketoconazole, and voriconazole  · verapamil  · warfarin  What if I miss a dose?  If you miss a dose, take it as soon as you can. If it is almost time for your next dose, take only that dose. Do not take double or extra doses.  Where should I keep my medicine?  Keep out of the reach of children.  Store at room temperature below 30 degrees C (86 degrees F). Protect from light. Keep container tightly closed. Throw away any unused medicine after the expiration date.  What should I tell my health care provider before I take this medicine?  They need to know if you have any of these conditions:  · kidney or liver disease  · an unusual or allergic reaction to buspirone, other medicines, foods, dyes, or preservatives  · pregnant or trying to get pregnant  · breast-feeding  What should I watch for while using this medicine?  Visit your doctor or health care professional for regular checks on your progress. It may take 1 to 2 weeks before your anxiety gets better.  You may get drowsy or dizzy. Do not drive, use machinery, or do anything that needs mental alertness until you know how this drug affects you. Do not stand or sit up quickly, especially if you are an older patient. This reduces the risk of dizzy or fainting spells. Alcohol can make you more drowsy and dizzy. Avoid alcoholic drinks.  NOTE:This sheet is a summary. It may not cover all possible information. If you have questions about this medicine, talk to your doctor, pharmacist, or health care provider. Copyright© 2017 [employer]

## 2018-08-13 ENCOUNTER — TELEPHONE (OUTPATIENT)
Dept: INTERNAL MEDICINE | Facility: CLINIC | Age: 53
End: 2018-08-13

## 2018-08-13 ENCOUNTER — TELEPHONE (OUTPATIENT)
Dept: GASTROENTEROLOGY | Facility: CLINIC | Age: 53
End: 2018-08-13

## 2018-08-13 NOTE — TELEPHONE ENCOUNTER
----- Message from Rajni Jeffries sent at 8/13/2018 12:56 PM CDT -----  Contact: pt  Returning a call to [facility]

## 2018-08-13 NOTE — PROGRESS NOTES
Diana Escobar  53 y.o.  White female    Chief Complaint   Patient presents with    Rash       HPI:  Presents to the clinic with complaint of a rash on her left wrist. The area is itchy and seems to be spreading. She denies contact with irritants or wearing jewelry around her wrist.   She is also concerned about her anxiety. She is taking venlafaxine 150 mg daily. She occasionally takes lorazepam but is having symptoms on a daily basis.     PMH: Reviewed    MEDS: Reviewed med card    ALLERGIES: Reviewed allergy card    PE: Reviewed vitals  GENERAL: Alert and oriented, no acute distress  HEART: Regular rate  LUNGS: Unlabored respirations  SKIN: Left dorsal wrist erythema with few papules, mostly macules, no blisters, no open lesions      ASSESSMENT/PLAN:    Diana was seen today for rash.    Diagnoses and all orders for this visit:    Dermatitis  -     triamcinolone acetonide 0.1% (KENALOG) 0.1 % cream; Apply topically 2 (two) times daily. for 10 days    Anxiety  -     busPIRone (BUSPAR) 5 MG Tab; Take 1 tablet (5 mg total) by mouth 2 (two) times daily. Discussed medication risks and benefits.   -     Advised to continue venlafaxine     RTC: 4 weeks

## 2018-08-13 NOTE — TELEPHONE ENCOUNTER
----- Message from Mervin Johansen sent at 8/13/2018  9:33 AM CDT -----  Contact: pt   Pt needs to cancel colonoscopy will call to cat at another time unable to get off work.

## 2018-08-14 ENCOUNTER — LAB VISIT (OUTPATIENT)
Dept: LAB | Facility: HOSPITAL | Age: 53
End: 2018-08-14
Attending: INTERNAL MEDICINE
Payer: COMMERCIAL

## 2018-08-14 DIAGNOSIS — E03.9 HYPOTHYROIDISM (ACQUIRED): ICD-10-CM

## 2018-08-14 DIAGNOSIS — E11.69 COMBINED HYPERLIPIDEMIA ASSOCIATED WITH TYPE 2 DIABETES MELLITUS: ICD-10-CM

## 2018-08-14 DIAGNOSIS — E78.2 COMBINED HYPERLIPIDEMIA ASSOCIATED WITH TYPE 2 DIABETES MELLITUS: ICD-10-CM

## 2018-08-14 DIAGNOSIS — E11.59 HYPERTENSION ASSOCIATED WITH DIABETES: ICD-10-CM

## 2018-08-14 DIAGNOSIS — I15.2 HYPERTENSION ASSOCIATED WITH DIABETES: ICD-10-CM

## 2018-08-14 LAB
ALBUMIN SERPL BCP-MCNC: 4.1 G/DL
ALP SERPL-CCNC: 93 U/L
ALT SERPL W/O P-5'-P-CCNC: 19 U/L
ANION GAP SERPL CALC-SCNC: 8 MMOL/L
AST SERPL-CCNC: 15 U/L
BILIRUB SERPL-MCNC: 0.3 MG/DL
BUN SERPL-MCNC: 11 MG/DL
CALCIUM SERPL-MCNC: 9.2 MG/DL
CHLORIDE SERPL-SCNC: 105 MMOL/L
CHOLEST SERPL-MCNC: 198 MG/DL
CHOLEST/HDLC SERPL: 4.5 {RATIO}
CO2 SERPL-SCNC: 28 MMOL/L
CREAT SERPL-MCNC: 0.7 MG/DL
EST. GFR  (AFRICAN AMERICAN): >60 ML/MIN/1.73 M^2
EST. GFR  (NON AFRICAN AMERICAN): >60 ML/MIN/1.73 M^2
ESTIMATED AVG GLUCOSE: 111 MG/DL
GLUCOSE SERPL-MCNC: 104 MG/DL
HBA1C MFR BLD HPLC: 5.5 %
HDLC SERPL-MCNC: 44 MG/DL
HDLC SERPL: 22.2 %
LDLC SERPL CALC-MCNC: 118 MG/DL
NONHDLC SERPL-MCNC: 154 MG/DL
POTASSIUM SERPL-SCNC: 4.3 MMOL/L
PROT SERPL-MCNC: 7.3 G/DL
SODIUM SERPL-SCNC: 141 MMOL/L
TRIGL SERPL-MCNC: 180 MG/DL
TSH SERPL DL<=0.005 MIU/L-ACNC: 0.68 UIU/ML

## 2018-08-14 PROCEDURE — 80053 COMPREHEN METABOLIC PANEL: CPT

## 2018-08-14 PROCEDURE — 80061 LIPID PANEL: CPT

## 2018-08-14 PROCEDURE — 83036 HEMOGLOBIN GLYCOSYLATED A1C: CPT

## 2018-08-14 PROCEDURE — 84443 ASSAY THYROID STIM HORMONE: CPT

## 2018-08-14 PROCEDURE — 36415 COLL VENOUS BLD VENIPUNCTURE: CPT

## 2018-08-21 ENCOUNTER — OFFICE VISIT (OUTPATIENT)
Dept: INTERNAL MEDICINE | Facility: CLINIC | Age: 53
End: 2018-08-21
Payer: COMMERCIAL

## 2018-08-21 VITALS
HEART RATE: 88 BPM | DIASTOLIC BLOOD PRESSURE: 74 MMHG | WEIGHT: 191.81 LBS | TEMPERATURE: 98 F | SYSTOLIC BLOOD PRESSURE: 130 MMHG | OXYGEN SATURATION: 95 % | HEIGHT: 62 IN | BODY MASS INDEX: 35.3 KG/M2

## 2018-08-21 DIAGNOSIS — E78.2 COMBINED HYPERLIPIDEMIA ASSOCIATED WITH TYPE 2 DIABETES MELLITUS: ICD-10-CM

## 2018-08-21 DIAGNOSIS — E11.59 HYPERTENSION ASSOCIATED WITH DIABETES: Primary | ICD-10-CM

## 2018-08-21 DIAGNOSIS — Z72.0 TOBACCO USE: ICD-10-CM

## 2018-08-21 DIAGNOSIS — E11.69 COMBINED HYPERLIPIDEMIA ASSOCIATED WITH TYPE 2 DIABETES MELLITUS: ICD-10-CM

## 2018-08-21 DIAGNOSIS — E03.9 HYPOTHYROIDISM (ACQUIRED): ICD-10-CM

## 2018-08-21 DIAGNOSIS — F41.9 ANXIETY: ICD-10-CM

## 2018-08-21 DIAGNOSIS — I15.2 HYPERTENSION ASSOCIATED WITH DIABETES: Primary | ICD-10-CM

## 2018-08-21 PROCEDURE — 3044F HG A1C LEVEL LT 7.0%: CPT | Mod: CPTII,S$GLB,, | Performed by: INTERNAL MEDICINE

## 2018-08-21 PROCEDURE — 3075F SYST BP GE 130 - 139MM HG: CPT | Mod: CPTII,S$GLB,, | Performed by: INTERNAL MEDICINE

## 2018-08-21 PROCEDURE — 3008F BODY MASS INDEX DOCD: CPT | Mod: CPTII,S$GLB,, | Performed by: INTERNAL MEDICINE

## 2018-08-21 PROCEDURE — 99214 OFFICE O/P EST MOD 30 MIN: CPT | Mod: S$GLB,,, | Performed by: INTERNAL MEDICINE

## 2018-08-21 PROCEDURE — 99999 PR PBB SHADOW E&M-EST. PATIENT-LVL III: CPT | Mod: PBBFAC,,, | Performed by: INTERNAL MEDICINE

## 2018-08-21 PROCEDURE — 3078F DIAST BP <80 MM HG: CPT | Mod: CPTII,S$GLB,, | Performed by: INTERNAL MEDICINE

## 2018-08-21 RX ORDER — BUSPIRONE HYDROCHLORIDE 5 MG/1
5 TABLET ORAL 2 TIMES DAILY
Qty: 60 TABLET | Refills: 6 | Status: SHIPPED | OUTPATIENT
Start: 2018-08-21 | End: 2019-10-17

## 2018-08-27 NOTE — PROGRESS NOTES
Subjective:       Patient ID: Diana Escobar is a 53 y.o. female.    Chief Complaint: Follow-up    Diana Escobar  53 y.o. White female    Patient presents with:  Follow-up    HPI: Here today to follow up on chronic conditions.  HTN--compliant with amlodipine. She denies symptoms.   Diabetes--stable on metformin.                    HGBA1C                   5.5                 08/14/2018            HLD--compliant with pravastatin.                     CHOL                     198                 08/14/2018                 HDL                      44                  08/14/2018                 LDLCALC                  118.0               08/14/2018                 TRIG                     180 (H)             08/14/2018            Hypothyroidism--stable on levothyroxine.                     TSH                      0.683               08/14/2018            Anxiety----stable on buspirone and venlafaxine.   She continues to smoke.         Past Medical History:  Asthma  Chronic low back pain  Degenerative disc disease, lumbar  Depression  2014: Diabetes mellitus  Fibromyalgia  Hyperlipidemia  Hypertension  Hypothyroidism  MRSA (methicillin resistant Staphylococcus aureus) carrier  Palpitations      Comment:  Dr. Jesus Lao--cardiology  Stroke      Comment:  TIA  Vitamin D deficiency disease    Current Outpatient Medications on File Prior to Visit:  albuterol 90 mcg/actuation inhaler, Inhale 2 puffs into the lungs every 6 (six) hours as needed for Wheezing., Disp: 18 g, Rfl: 6  amLODIPine (NORVASC) 5 MG tablet, Take 1 tablet (5 mg total) by mouth once daily., Disp: 30 tablet, Rfl: 6  blood sugar diagnostic Strp, Glucose testing daily., Disp: 50 strip, Rfl: 11   blood-glucose meter Misc, Use as directed., Disp: 1 each, Rfl: 0  clopidogrel (PLAVIX) 75 mg tablet, Take 75 mg by mouth once daily., Disp: , Rfl:   diclofenac sodium 1 % Gel, Apply topically 4 (four) times daily., Disp: , Rfl:   gabapentin (NEURONTIN) 300  "MG capsule, TAKE 1 CAPSULE BY MOUTH TWICE DAILY, Disp: 60 capsule, Rfl: 2  insulin needles, disposable, (PEN NEEDLE) 31 X 1/4 " Ndle, Twice daily injections., Disp: 100 each, Rfl: 6  lancets (LANCETS,ULTRA THIN) Misc, Glucose testing daily., Disp: 50 each, Rfl: 11  levothyroxine (SYNTHROID) 50 MCG tablet, Take 1 tablet (50 mcg total) by mouth once daily., Disp: 30 tablet, Rfl: 6  metFORMIN (GLUCOPHAGE-XR) 500 MG 24 hr tablet, Take 1 tablet (500 mg total) by mouth 2 (two) times daily with meals., Disp: 60 tablet, Rfl: 6  oxyCODONE-acetaminophen (PERCOCET) 7.5-325 mg per tablet, TK 1 T PO TID, Disp: , Rfl: 0  peg 400-propylene glycol, PF, (SYSTANE ULTRA, PF,) 0.4-0.3 % Dpet, Place 1 drop into both eyes 4 (four) times daily., Disp: 1 each, Rfl:   pravastatin (PRAVACHOL) 20 MG tablet, Take 1 tablet (20 mg total) by mouth once daily., Disp: 30 tablet, Rfl: 6  tiZANidine (ZANAFLEX) 4 MG tablet, TK 1 T PO Q 12 H, Disp: , Rfl: 0  venlafaxine (EFFEXOR-XR) 150 MG Cp24, Take 1 capsule (150 mg total) by mouth once daily., Disp: 30 capsule, Rfl: 6  vitamin D 1000 units Tab, Take 2,000 Units by mouth once daily., Disp: , Rfl:     Allergies:  Review of patient's allergies indicates:   -- Mobic (meloxicam)    -- Topamax (topiramate)    -- Adhesive -- Rash          Review of Systems   Constitutional: Negative for fever and unexpected weight change.   Respiratory: Negative for cough and shortness of breath.    Cardiovascular: Negative for chest pain and leg swelling.   Gastrointestinal: Negative for abdominal pain.   Genitourinary: Negative for difficulty urinating.   Musculoskeletal: Negative for gait problem.   Neurological: Negative for dizziness and headaches.   Psychiatric/Behavioral: The patient is nervous/anxious.        Objective:      Physical Exam   Constitutional: She is oriented to person, place, and time. She appears well-developed and well-nourished. No distress.   Eyes: No scleral icterus.   Neck: No tracheal deviation " present.   Cardiovascular: Normal rate, regular rhythm and normal heart sounds.   Pulses:       Dorsalis pedis pulses are 2+ on the right side, and 2+ on the left side.   Pulmonary/Chest: Effort normal and breath sounds normal. No respiratory distress.   Abdominal: Soft. Bowel sounds are normal.   Feet:   Right Foot:   Protective Sensation: 4 sites tested. 4 sites sensed.   Skin Integrity: Positive for callus. Negative for ulcer.   Left Foot:   Protective Sensation: 4 sites tested. 4 sites sensed.   Skin Integrity: Positive for callus. Negative for ulcer.   Neurological: She is alert and oriented to person, place, and time.   Skin: Skin is warm and dry.   Psychiatric: She has a normal mood and affect.   Vitals reviewed.      Assessment:       1. Hypertension associated with diabetes    2. Combined hyperlipidemia associated with type 2 diabetes mellitus    3. Hypothyroidism (acquired)    4. Anxiety    5. Tobacco use        Plan:       Diana was seen today for follow-up.    Diagnoses and all orders for this visit:    Hypertension associated with diabetes  -     Continue current management    Combined hyperlipidemia associated with type 2 diabetes mellitus  -     Continue current management    Hypothyroidism (acquired)  -     Continue current dose of levothyroxine    Anxiety  -     Continue venlafaxine  -     Refill busPIRone (BUSPAR) 5 MG Tab; Take 1 tablet (5 mg total) by mouth 2 (two) times daily.    Tobacco use  -     Discussed cessation    Labs and F/U with BLAIR Hickey in 6 months.

## 2018-09-04 DIAGNOSIS — R25.2 MUSCLE CRAMPS AT NIGHT: ICD-10-CM

## 2018-09-04 RX ORDER — GABAPENTIN 300 MG/1
CAPSULE ORAL
Qty: 60 CAPSULE | Refills: 2 | Status: SHIPPED | OUTPATIENT
Start: 2018-09-04 | End: 2018-12-04 | Stop reason: SDUPTHER

## 2018-09-17 ENCOUNTER — OFFICE VISIT (OUTPATIENT)
Dept: PODIATRY | Facility: CLINIC | Age: 53
End: 2018-09-17
Payer: COMMERCIAL

## 2018-09-17 VITALS
HEIGHT: 62 IN | WEIGHT: 190.94 LBS | SYSTOLIC BLOOD PRESSURE: 124 MMHG | HEART RATE: 84 BPM | BODY MASS INDEX: 35.14 KG/M2 | DIASTOLIC BLOOD PRESSURE: 82 MMHG

## 2018-09-17 DIAGNOSIS — M20.11 VALGUS DEFORMITY OF BOTH GREAT TOES: ICD-10-CM

## 2018-09-17 DIAGNOSIS — L60.9 DISEASE OF NAIL: ICD-10-CM

## 2018-09-17 DIAGNOSIS — E11.9 COMPREHENSIVE DIABETIC FOOT EXAMINATION, TYPE 2 DM, ENCOUNTER FOR: Primary | ICD-10-CM

## 2018-09-17 DIAGNOSIS — M21.42 PES PLANUS OF BOTH FEET: ICD-10-CM

## 2018-09-17 DIAGNOSIS — M21.41 PES PLANUS OF BOTH FEET: ICD-10-CM

## 2018-09-17 DIAGNOSIS — M19.079 OSTEOARTHRITIS OF ANKLE AND FOOT, UNSPECIFIED LATERALITY: ICD-10-CM

## 2018-09-17 DIAGNOSIS — M20.12 VALGUS DEFORMITY OF BOTH GREAT TOES: ICD-10-CM

## 2018-09-17 PROCEDURE — 3079F DIAST BP 80-89 MM HG: CPT | Mod: CPTII,S$GLB,, | Performed by: PODIATRIST

## 2018-09-17 PROCEDURE — 88302 TISSUE EXAM BY PATHOLOGIST: CPT | Performed by: PATHOLOGY

## 2018-09-17 PROCEDURE — 3074F SYST BP LT 130 MM HG: CPT | Mod: CPTII,S$GLB,, | Performed by: PODIATRIST

## 2018-09-17 PROCEDURE — 88312 SPECIAL STAINS GROUP 1: CPT | Mod: 26,,, | Performed by: PATHOLOGY

## 2018-09-17 PROCEDURE — 99999 PR PBB SHADOW E&M-EST. PATIENT-LVL III: CPT | Mod: PBBFAC,,, | Performed by: PODIATRIST

## 2018-09-17 PROCEDURE — 3008F BODY MASS INDEX DOCD: CPT | Mod: CPTII,S$GLB,, | Performed by: PODIATRIST

## 2018-09-17 PROCEDURE — 88302 TISSUE EXAM BY PATHOLOGIST: CPT | Mod: 26,,, | Performed by: PATHOLOGY

## 2018-09-17 PROCEDURE — 88312 SPECIAL STAINS GROUP 1: CPT | Performed by: PATHOLOGY

## 2018-09-17 PROCEDURE — 3044F HG A1C LEVEL LT 7.0%: CPT | Mod: CPTII,S$GLB,, | Performed by: PODIATRIST

## 2018-09-17 PROCEDURE — 99204 OFFICE O/P NEW MOD 45 MIN: CPT | Mod: S$GLB,,, | Performed by: PODIATRIST

## 2018-09-17 NOTE — PROGRESS NOTES
Subjective:     Patient ID: Diana Escobar is a 53 y.o. female.    Chief Complaint: Diabetic Foot Exam (PCP: ANYA Alcocer 8/21/2018 ); Foot Pain ( pt. c/o pain across the top of her feet and where the foot bends ); and Nail Problem (right great toenail )    Diana is a 53 y.o. female who presents to the clinic upon referral from Dr. Parmar  for evaluation and treatment of diabetic feet. Diana has a past medical history of Asthma, Chronic low back pain, Degenerative disc disease, lumbar, Depression, Diabetes mellitus (2014), Fibromyalgia, Hyperlipidemia, Hypertension, Hypothyroidism, MRSA (methicillin resistant Staphylococcus aureus) carrier, Palpitations, Stroke, and Vitamin D deficiency disease. Patient relates no major problem with feet. Only complaints today consist of pain at the ball of feet and right ingrown toenail. Patient states her blood sugar runs between 130-180mg/dl. Patient admits numbness and tingling in her big toes      PCP: Jory Alcocer, DO    Date Last Seen by PCP: 08/21/18    Current shoe gear: Tennis shoes    Hemoglobin A1C   Date Value Ref Range Status   08/14/2018 5.5 4.0 - 5.6 % Final     Comment:     ADA Screening Guidelines:  5.7-6.4%  Consistent with prediabetes  >or=6.5%  Consistent with diabetes  High levels of fetal hemoglobin interfere with the HbA1C  assay. Heterozygous hemoglobin variants (HbS, HgC, etc)do  not significantly interfere with this assay.   However, presence of multiple variants may affect accuracy.     02/06/2018 5.4 4.0 - 5.6 % Final     Comment:     According to ADA guidelines, hemoglobin A1c <7.0% represents  optimal control in non-pregnant diabetic patients. Different  metrics may apply to specific patient populations.   Standards of Medical Care in Diabetes-2016.  For the purpose of screening for the presence of diabetes:  <5.7%     Consistent with the absence of diabetes  5.7-6.4%  Consistent with increasing risk for diabetes   (prediabetes)  >or=6.5%   Consistent with diabetes  Currently, no consensus exists for use of hemoglobin A1c  for diagnosis of diabetes for children.  This Hemoglobin A1c assay has significant interference with fetal   hemoglobin   (HbF). The results are invalid for patients with abnormal amounts of   HbF,   including those with known Hereditary Persistence   of Fetal Hemoglobin. Heterozygous hemoglobin variants (HbAS, HbAC,   HbAD, HbAE, HbA2) do not significantly interfere with this assay;   however, presence of multiple variants in a sample may impact the %   interference.     08/03/2017 5.5 4.0 - 5.6 % Final     Comment:     According to ADA guidelines, hemoglobin A1c <7.0% represents  optimal control in non-pregnant diabetic patients. Different  metrics may apply to specific patient populations.   Standards of Medical Care in Diabetes-2016.  For the purpose of screening for the presence of diabetes:  <5.7%     Consistent with the absence of diabetes  5.7-6.4%  Consistent with increasing risk for diabetes   (prediabetes)  >or=6.5%  Consistent with diabetes  Currently, no consensus exists for use of hemoglobin A1c  for diagnosis of diabetes for children.  This Hemoglobin A1c assay has significant interference with fetal   hemoglobin   (HbF). The results are invalid for patients with abnormal amounts of   HbF,   including those with known Hereditary Persistence   of Fetal Hemoglobin. Heterozygous hemoglobin variants (HbAS, HbAC,   HbAD, HbAE, HbA2) do not significantly interfere with this assay;   however, presence of multiple variants in a sample may impact the %   interference.                  Patient Active Problem List   Diagnosis    Obesity    Diplopia    Hypertension associated with diabetes    Combined hyperlipidemia associated with type 2 diabetes mellitus    Major depression, recurrent, chronic    Tobacco use    Fibromyalgia    Nodule of right lung    Positive anti-CCP test    History of transient cerebral ischemia     "High risk medication use    Jaw pain    Paresthesia    Right sided weakness    HA (headache)    Seronegative rheumatoid arthritis    Osteopenia    Rheumatoid arthritis of multiple sites with negative rheumatoid factor    Myofacial muscle pain    Hypothyroidism (acquired)    Cervical spondylosis with radiculopathy    Bilateral foot pain          Medication List           Accurate as of 9/17/18  9:06 AM. If you have any questions, ask your nurse or doctor.               CONTINUE taking these medications    amLODIPine 5 MG tablet  Commonly known as:  NORVASC  Take 1 tablet (5 mg total) by mouth once daily.     blood sugar diagnostic Strp  Glucose testing daily.     blood-glucose meter Misc  Use as directed.     busPIRone 5 MG Tab  Commonly known as:  BUSPAR  Take 1 tablet (5 mg total) by mouth 2 (two) times daily.     clopidogrel 75 mg tablet  Commonly known as:  PLAVIX     diclofenac sodium 1 % Gel  Commonly known as:  VOLTAREN     gabapentin 300 MG capsule  Commonly known as:  NEURONTIN  TAKE 1 CAPSULE BY MOUTH TWICE DAILY     lancets Misc  Commonly known as:  LANCETS,ULTRA THIN  Glucose testing daily.     levothyroxine 50 MCG tablet  Commonly known as:  SYNTHROID  Take 1 tablet (50 mcg total) by mouth once daily.     metFORMIN 500 MG 24 hr tablet  Commonly known as:  GLUCOPHAGE-XR  Take 1 tablet (500 mg total) by mouth 2 (two) times daily with meals.     oxyCODONE-acetaminophen 7.5-325 mg per tablet  Commonly known as:  PERCOCET     peg 400-propylene glycol (PF) 0.4-0.3 % Dpet  Commonly known as:  SYSTANE ULTRA (PF)  Place 1 drop into both eyes 4 (four) times daily.     pen needle, diabetic 31 gauge x 1/4" Ndle  Commonly known as:  PEN NEEDLE  Twice daily injections.     pravastatin 20 MG tablet  Commonly known as:  PRAVACHOL  Take 1 tablet (20 mg total) by mouth once daily.     tiZANidine 4 MG tablet  Commonly known as:  ZANAFLEX     venlafaxine 150 MG Cp24  Commonly known as:  EFFEXOR-XR  Take 1 capsule " "(150 mg total) by mouth once daily.     VENTOLIN HFA 90 mcg/actuation inhaler  Generic drug:  albuterol  Inhale 2 puffs into the lungs every 6 (six) hours as needed for Wheezing.     vitamin D 1000 units Tab  Commonly known as:  VITAMIN D3            Review of patient's allergies indicates:   Allergen Reactions    Mobic [meloxicam]     Topamax [topiramate]     Adhesive Rash       Past Surgical History:   Procedure Laterality Date    HYSTERECTOMY      left ankle surgery      loop recorder  05/2017    cardiac device    NECK SURGERY  06/2016    ROTATOR CUFF REPAIR      TONSILLECTOMY         Family History   Problem Relation Age of Onset    Cancer Mother     Stroke Mother     Heart disease Mother     Diabetes Mother     Cataracts Mother     Hypertension Mother     Breast cancer Mother     Heart disease Father     COPD Father     Hypertension Father     Diabetes Maternal Aunt     Breast cancer Sister        Social History     Socioeconomic History    Marital status:      Spouse name: Not on file    Number of children: Not on file    Years of education: Not on file    Highest education level: Not on file   Social Needs    Financial resource strain: Not on file    Food insecurity - worry: Not on file    Food insecurity - inability: Not on file    Transportation needs - medical: Not on file    Transportation needs - non-medical: Not on file   Occupational History     Employer: [employer]     Employer: [employer]   Tobacco Use    Smoking status: Current Some Day Smoker     Packs/day: 0.50     Types: Cigarettes    Smokeless tobacco: Never Used   Substance and Sexual Activity    Alcohol use: No     Alcohol/week: 0.0 oz    Drug use: No    Sexual activity: Yes   Other Topics Concern    Not on file   Social History Narrative    Not on file       Vitals:    09/17/18 0810   BP: 124/82   Pulse: 84   Weight: 86.6 kg (190 lb 14.7 oz)   Height: 5' 2" (1.575 m)   PainSc:   4 "       Hemoglobin A1C   Date Value Ref Range Status   08/14/2018 5.5 4.0 - 5.6 % Final     Comment:     ADA Screening Guidelines:  5.7-6.4%  Consistent with prediabetes  >or=6.5%  Consistent with diabetes  High levels of fetal hemoglobin interfere with the HbA1C  assay. Heterozygous hemoglobin variants (HbS, HgC, etc)do  not significantly interfere with this assay.   However, presence of multiple variants may affect accuracy.     02/06/2018 5.4 4.0 - 5.6 % Final     Comment:     According to ADA guidelines, hemoglobin A1c <7.0% represents  optimal control in non-pregnant diabetic patients. Different  metrics may apply to specific patient populations.   Standards of Medical Care in Diabetes-2016.  For the purpose of screening for the presence of diabetes:  <5.7%     Consistent with the absence of diabetes  5.7-6.4%  Consistent with increasing risk for diabetes   (prediabetes)  >or=6.5%  Consistent with diabetes  Currently, no consensus exists for use of hemoglobin A1c  for diagnosis of diabetes for children.  This Hemoglobin A1c assay has significant interference with fetal   hemoglobin   (HbF). The results are invalid for patients with abnormal amounts of   HbF,   including those with known Hereditary Persistence   of Fetal Hemoglobin. Heterozygous hemoglobin variants (HbAS, HbAC,   HbAD, HbAE, HbA2) do not significantly interfere with this assay;   however, presence of multiple variants in a sample may impact the %   interference.     08/03/2017 5.5 4.0 - 5.6 % Final     Comment:     According to ADA guidelines, hemoglobin A1c <7.0% represents  optimal control in non-pregnant diabetic patients. Different  metrics may apply to specific patient populations.   Standards of Medical Care in Diabetes-2016.  For the purpose of screening for the presence of diabetes:  <5.7%     Consistent with the absence of diabetes  5.7-6.4%  Consistent with increasing risk for diabetes   (prediabetes)  >or=6.5%  Consistent with  diabetes  Currently, no consensus exists for use of hemoglobin A1c  for diagnosis of diabetes for children.  This Hemoglobin A1c assay has significant interference with fetal   hemoglobin   (HbF). The results are invalid for patients with abnormal amounts of   HbF,   including those with known Hereditary Persistence   of Fetal Hemoglobin. Heterozygous hemoglobin variants (HbAS, HbAC,   HbAD, HbAE, HbA2) do not significantly interfere with this assay;   however, presence of multiple variants in a sample may impact the %   interference.         Review of Systems   Constitutional: Negative for chills and fever.   Respiratory: Negative for shortness of breath.    Cardiovascular: Negative for chest pain, palpitations, orthopnea, claudication and leg swelling.   Gastrointestinal: Negative for diarrhea, nausea and vomiting.   Musculoskeletal: Negative for joint pain.   Skin: Negative for rash.   Neurological: Positive for tingling. Negative for dizziness, sensory change, focal weakness and weakness.   Psychiatric/Behavioral: Negative.              Objective:   PHYSICAL EXAM: Apperance: Alert and orient in no distress,well developed, and with good attention to grooming and body habits  Patient presents ambulating in flat tennis shoes.   LOWER EXTREMITY EXAM:  VASCULAR: Dorsalis pedis pulses 2/4 bilateral and Posterior Tibial pulses 2/4 bilateral. Capillary fill time <4 seconds bilateral. No edema observed bilateral. Varicosities absent bilateral. Skin temperature of the lower extremities is warm to warm, proximal to distal. Hair growth WNL bilateral.  DERMATOLOGICAL: No skin rashes, subcutaneous nodules, lesions, or ulcers observed bilateral. Nails 1,2,3,4,5 left and 2,3,4,5 right normal length and thickness. Right hallux nail thickened and discolored at the distal segment.  Webspaces 1,2,3,4 bilateral clean, dry and without evidence of break in skin integrity. Minimal hyperkeratotic tissue noted to bilateral medial hallux.    NEUROLOGICAL: Light touch, sharp-dull, proprioception all present and equal bilaterally.  Vibratory sensation diminished at right hallux and intact on left hallux. Protective sensation intact at all 10 sites as tested with a [place] 5.07 monofilament.   MUSCULOSKELETAL: Muscle strength is 5/5 for foot inverters, everters, plantarflexors, and dorsiflexors. Muscle tone is normal. Bilateral HAV noted. Pes planus noted bilateral.       TEST RESULTS: Radiographs of bilateral foot/ankle taken 8/1/18 reveals Bilateral hallux valgus deformity is present with prominent median eminence greater on the right.  Degenerative changes of the 1st MTP joints bilaterally present as well. There is mild right plantar and left dorsal calcaneal enthesophyte formation. No acute osseous or soft tissue abnormality.    Assessment:   Comprehensive diabetic foot examination, type 2 DM, encounter for    Osteoarthritis of ankle and foot, unspecified laterality    Disease of nail  -     Tissue Specimen To Pathology, Podiatry    Valgus deformity of both great toes    Pes planus of both feet          Plan:   Comprehensive diabetic foot examination, type 2 DM, encounter for    Osteoarthritis of ankle and foot, unspecified laterality    Disease of nail  -     Tissue Specimen To Pathology, Podiatry    Valgus deformity of both great toes    Pes planus of both feet      I counseled the patient on her conditions, regarding findings of my examination, my impressions, and usual treatment plan.   Greater than 50% of this visit spent on counseling and coordination of care.  Greater than 15 minutes of a 20 minute appointment spent on education about the diabetic foot, neuropathy, and prevention of limb loss.  Shoe inspection. Diabetic Foot Education. Patient reminded of the importance of good nutrition and blood sugar control to help prevent podiatric complications of diabetes. Patient instructed on proper foot hygeine. We discussed wearing proper  shoe gear, daily foot inspections, never walking without protective shoe gear, never putting sharp instruments to feet.    The patient and I reviewed the types of shoes she should be wearing, my recommendation includes generally the best time of the day for a shoe fitting is the afternoon, shoes with a wide toe box, very good cushion, and tennis shoes with removable inner soles.The patient and I reviewed my recommendations for over-the-counter orthotic inserts.   Patient instructed to spray all shoes with Lysol disinfectant spray and let dry before wearing. Patient instructed to wash all socks in hot water and bleach.  Discuss treatment options for nail fungus.  I explained that fungus lives in a warm dark moist environment and therefore patient should make every attempt to keep feet clean and dry.  We discussed drying feet thoroughly after shower particularly between the toes and then applying powder between the toes and in the shoes.    For fungal toenails I prescribed topical medication to be used daily for up to a year.  We also discussed oral Lamisil but I did not recommend it as a first line of treatment since it is an internal medicine that may potentially have side effects, including liver problems. Patient elects for topical treatment.   With patient's permission, nail clippings obtained for fungal nail culture.   Patient  will continue to monitor the areas daily, inspect feet, wear protective shoe gear when ambulatory, moisturizer to maintain skin integrity. Patient reminded of the importance of good nutrition and blood sugar control to help prevent podiatric complications of diabetes.  Patient to return 12 months or sooner if needed.                 Brad Scott DPM  [facility]

## 2018-09-17 NOTE — LETTER
September 17, 2018      Bryan Parmar MD  [address] [place] [address]           [facility]  [address] [place] [address]  Phone: [phone]  Fax: [phone]          Patient: Diana Escobar   MR Number: [id number]   YOB: 1965   Date of Visit: 9/17/2018       Dear Dr. Bryan Parmar:    Thank you for referring Diana Escobar to me for evaluation. Attached you will find relevant portions of my assessment and plan of care.    If you have questions, please do not hesitate to call me. I look forward to following Diana Escobar along with you.    Sincerely,    Brad Scott DPM    Enclosure  CC:  No Recipients    If you would like to receive this communication electronically, please contact [email or web address] or [phone] to request more information on [employer] access.    For providers and/or their staff who would like to refer a patient to [facility], please contact us through our one-stop-shop provider referral line, [facility] [place], operated [facility], at [phone].    If you feel you have received this communication in error or would no longer like to receive these types of communications, please e-mail [email or web address]

## 2018-10-08 ENCOUNTER — PATIENT MESSAGE (OUTPATIENT)
Dept: INTERNAL MEDICINE | Facility: CLINIC | Age: 53
End: 2018-10-08

## 2018-10-27 DIAGNOSIS — E11.59 HYPERTENSION ASSOCIATED WITH DIABETES: ICD-10-CM

## 2018-10-27 DIAGNOSIS — I15.2 HYPERTENSION ASSOCIATED WITH DIABETES: ICD-10-CM

## 2018-10-29 RX ORDER — METFORMIN HYDROCHLORIDE 500 MG/1
TABLET, EXTENDED RELEASE ORAL
Qty: 180 TABLET | Refills: 1 | Status: SHIPPED | OUTPATIENT
Start: 2018-10-29 | End: 2019-05-07 | Stop reason: SDUPTHER

## 2018-11-14 DIAGNOSIS — I15.2 HYPERTENSION ASSOCIATED WITH DIABETES: ICD-10-CM

## 2018-11-14 DIAGNOSIS — E11.59 HYPERTENSION ASSOCIATED WITH DIABETES: ICD-10-CM

## 2018-11-15 RX ORDER — AMLODIPINE BESYLATE 5 MG/1
TABLET ORAL
Qty: 90 TABLET | Refills: 1 | Status: SHIPPED | OUTPATIENT
Start: 2018-11-15 | End: 2019-06-11 | Stop reason: SDUPTHER

## 2018-12-04 DIAGNOSIS — R25.2 MUSCLE CRAMPS AT NIGHT: ICD-10-CM

## 2018-12-04 RX ORDER — GABAPENTIN 300 MG/1
CAPSULE ORAL
Qty: 60 CAPSULE | Refills: 2 | Status: SHIPPED | OUTPATIENT
Start: 2018-12-04 | End: 2019-02-26 | Stop reason: SDUPTHER

## 2018-12-21 DIAGNOSIS — F41.9 ANXIETY: ICD-10-CM

## 2018-12-21 DIAGNOSIS — F33.9 MAJOR DEPRESSION, RECURRENT, CHRONIC: ICD-10-CM

## 2018-12-21 RX ORDER — VENLAFAXINE HYDROCHLORIDE 150 MG/1
CAPSULE, EXTENDED RELEASE ORAL
Qty: 30 CAPSULE | Refills: 6 | Status: SHIPPED | OUTPATIENT
Start: 2018-12-21 | End: 2019-08-19 | Stop reason: SDUPTHER

## 2019-02-06 ENCOUNTER — OFFICE VISIT (OUTPATIENT)
Dept: RHEUMATOLOGY | Facility: CLINIC | Age: 54
End: 2019-02-06
Payer: COMMERCIAL

## 2019-02-06 VITALS
DIASTOLIC BLOOD PRESSURE: 78 MMHG | SYSTOLIC BLOOD PRESSURE: 118 MMHG | HEART RATE: 89 BPM | WEIGHT: 181.88 LBS | HEIGHT: 62 IN | BODY MASS INDEX: 33.47 KG/M2

## 2019-02-06 DIAGNOSIS — Z79.899 HIGH RISK MEDICATION USE: ICD-10-CM

## 2019-02-06 DIAGNOSIS — M06.09 RHEUMATOID ARTHRITIS OF MULTIPLE SITES WITH NEGATIVE RHEUMATOID FACTOR: Primary | ICD-10-CM

## 2019-02-06 DIAGNOSIS — G25.81 RESTLESS LEG SYNDROME: ICD-10-CM

## 2019-02-06 DIAGNOSIS — M79.7 FIBROMYALGIA: ICD-10-CM

## 2019-02-06 PROCEDURE — 99999 PR PBB SHADOW E&M-EST. PATIENT-LVL III: ICD-10-PCS | Mod: PBBFAC,,, | Performed by: INTERNAL MEDICINE

## 2019-02-06 PROCEDURE — 99215 PR OFFICE/OUTPT VISIT, EST, LEVL V, 40-54 MIN: ICD-10-PCS | Mod: S$GLB,,, | Performed by: INTERNAL MEDICINE

## 2019-02-06 PROCEDURE — 3074F SYST BP LT 130 MM HG: CPT | Mod: CPTII,S$GLB,, | Performed by: INTERNAL MEDICINE

## 2019-02-06 PROCEDURE — 3078F PR MOST RECENT DIASTOLIC BLOOD PRESSURE < 80 MM HG: ICD-10-PCS | Mod: CPTII,S$GLB,, | Performed by: INTERNAL MEDICINE

## 2019-02-06 PROCEDURE — 99215 OFFICE O/P EST HI 40 MIN: CPT | Mod: S$GLB,,, | Performed by: INTERNAL MEDICINE

## 2019-02-06 PROCEDURE — 3008F PR BODY MASS INDEX (BMI) DOCUMENTED: ICD-10-PCS | Mod: CPTII,S$GLB,, | Performed by: INTERNAL MEDICINE

## 2019-02-06 PROCEDURE — 3074F PR MOST RECENT SYSTOLIC BLOOD PRESSURE < 130 MM HG: ICD-10-PCS | Mod: CPTII,S$GLB,, | Performed by: INTERNAL MEDICINE

## 2019-02-06 PROCEDURE — 99999 PR PBB SHADOW E&M-EST. PATIENT-LVL III: CPT | Mod: PBBFAC,,, | Performed by: INTERNAL MEDICINE

## 2019-02-06 PROCEDURE — 3078F DIAST BP <80 MM HG: CPT | Mod: CPTII,S$GLB,, | Performed by: INTERNAL MEDICINE

## 2019-02-06 PROCEDURE — 3008F BODY MASS INDEX DOCD: CPT | Mod: CPTII,S$GLB,, | Performed by: INTERNAL MEDICINE

## 2019-02-06 RX ORDER — PRAMIPEXOLE DIHYDROCHLORIDE 0.12 MG/1
0.12 TABLET ORAL NIGHTLY
Qty: 30 TABLET | Refills: 2 | Status: SHIPPED | OUTPATIENT
Start: 2019-02-06 | End: 2019-05-08

## 2019-02-06 RX ORDER — ATORVASTATIN CALCIUM 40 MG/1
40 TABLET, FILM COATED ORAL
COMMUNITY
Start: 2018-11-29 | End: 2019-05-28

## 2019-02-06 RX ORDER — PREDNISONE 20 MG/1
20 TABLET ORAL DAILY
Qty: 10 TABLET | Refills: 0 | Status: SHIPPED | OUTPATIENT
Start: 2019-02-06 | End: 2019-02-11

## 2019-02-06 ASSESSMENT — ROUTINE ASSESSMENT OF PATIENT INDEX DATA (RAPID3): MDHAQ FUNCTION SCORE: .7

## 2019-02-06 NOTE — PROGRESS NOTES
RHEUMATOLOGY CLINIC FOLLOW UP VISIT  Chief complaints:-  To follow-up for arthritis.  My joints hurt.    HPI:-  Diana Rubio a 53 y.o. pleasant female comes in for a follow up visit with above chief complaints.  She follows up in the Rheumatology Clinic for seronegative rheumatoid arthritis, fibromyalgia and osteoarthritis.  Her rheumatoid arthritis has been in remission for more than 2 years off all disease modifying agents including methotrexate, hydroxychloroquine and Humira.  She denies any flare-up until last couple of weeks which she has noticed increased pain and swelling and stiffness around her joints.  She has been to orthopedic surgeon who has recommended her to see hand surgeon for worsening carpometacarpal arthritis.  She also complains of worsening restless leg syndrome causing insomnia.      Review of Systems   Constitutional: Negative for chills and fever.   HENT: Negative for congestion and sore throat.    Eyes: Negative for blurred vision and redness.   Respiratory: Negative for cough and shortness of breath.    Cardiovascular: Negative for chest pain and leg swelling.   Gastrointestinal: Negative for abdominal pain.   Genitourinary: Negative for dysuria.   Musculoskeletal: Positive for back pain, joint pain, myalgias and neck pain. Negative for falls.   Skin: Negative for rash.   Neurological: Negative for headaches.   Endo/Heme/Allergies: Does not bruise/bleed easily.   Psychiatric/Behavioral: Negative for memory loss. The patient does not have insomnia.        Past Medical History:   Diagnosis Date    Asthma     Chronic low back pain     Degenerative disc disease, lumbar     Depression     Diabetes mellitus 2014    Fibromyalgia     Hyperlipidemia     Hypertension     Hypothyroidism     MRSA (methicillin resistant Staphylococcus aureus) carrier     Palpitations     Dr. Jesus Lao--cardiology    Stroke     TIA    Vitamin  "D deficiency disease        Past Surgical History:   Procedure Laterality Date    HYSTERECTOMY      left ankle surgery      loop recorder  05/2017    cardiac device    NECK SURGERY  06/2016    ROTATOR CUFF REPAIR      TONSILLECTOMY          Social History     Tobacco Use    Smoking status: Current Some Day Smoker     Packs/day: 0.50     Types: Cigarettes    Smokeless tobacco: Never Used   Substance Use Topics    Alcohol use: No     Alcohol/week: 0.0 oz    Drug use: No       Family History   Problem Relation Age of Onset    Cancer Mother     Stroke Mother     Heart disease Mother     Diabetes Mother     Cataracts Mother     Hypertension Mother     Breast cancer Mother     Heart disease Father     COPD Father     Hypertension Father     Diabetes Maternal Aunt     Breast cancer Sister        Review of patient's allergies indicates:   Allergen Reactions    Mobic [meloxicam]     Topamax [topiramate]     Adhesive Rash           Physical examination:-    Vitals:    02/06/19 1031   BP: 118/78   Pulse: 89   Weight: 82.5 kg (181 lb 14.1 oz)   Height: 5' 2" (1.575 m)   PainSc:   2   PainLoc: Generalized       Physical Exam   Constitutional: She is oriented to person, place, and time and well-developed, well-nourished, and in no distress. No distress.   HENT:   Head: Normocephalic.   Mouth/Throat: Oropharynx is clear and moist.   Eyes: Conjunctivae and EOM are normal. Pupils are equal, round, and reactive to light.   Neck: Normal range of motion. Neck supple.   Cardiovascular: Normal rate and intact distal pulses.   Pulmonary/Chest: Effort normal. No respiratory distress.   Abdominal: Soft. There is no tenderness.   Musculoskeletal:   Soft tissue tender points from fibromyalgia confound size dimension of joint tenderness.  Mild fullness around MCPs.  Tenderness present over enthesis along with multiple soft tissue tender points.  No synovitis over small joints of feet.  No effusion over large joints. " "  Neurological: She is alert and oriented to person, place, and time. No cranial nerve deficit.   Skin: Skin is warm. No rash noted. No erythema.   Psychiatric: Mood and affect normal.   Nursing note and vitals reviewed.      Medication List with Changes/Refills   New Medications    PRAMIPEXOLE (MIRAPEX) 0.125 MG TABLET    Take 1 tablet (0.125 mg total) by mouth every evening.    PREDNISONE (DELTASONE) 20 MG TABLET    Take 1 tablet (20 mg total) by mouth once daily. for 10 days   Current Medications    ALBUTEROL 90 MCG/ACTUATION INHALER    Inhale 2 puffs into the lungs every 6 (six) hours as needed for Wheezing.    AMLODIPINE (NORVASC) 5 MG TABLET    TAKE 1 TABLET BY MOUTH ONCE DAILY    ATORVASTATIN (LIPITOR) 40 MG TABLET    Take 40 mg by mouth.    BLOOD SUGAR DIAGNOSTIC STRP    Glucose testing daily.    BLOOD-GLUCOSE METER MISC    Use as directed.    BUSPIRONE (BUSPAR) 5 MG TAB    Take 1 tablet (5 mg total) by mouth 2 (two) times daily.    CLOPIDOGREL (PLAVIX) 75 MG TABLET    Take 75 mg by mouth once daily.    DICLOFENAC SODIUM 1 % GEL    Apply topically 4 (four) times daily.    GABAPENTIN (NEURONTIN) 300 MG CAPSULE    TAKE 1 CAPSULE BY MOUTH TWICE DAILY    INSULIN NEEDLES, DISPOSABLE, (PEN NEEDLE) 31 X 1/4 " NDLE    Twice daily injections.    LANCETS (LANCETS,ULTRA THIN) MISC    Glucose testing daily.    LEVOTHYROXINE (SYNTHROID) 50 MCG TABLET    Take 1 tablet (50 mcg total) by mouth once daily.    METFORMIN (GLUCOPHAGE-XR) 500 MG 24 HR TABLET    TAKE 1 TABLET BY MOUTH TWICE DAILY WITH MEALS    OXYCODONE-ACETAMINOPHEN (PERCOCET) 7.5-325 MG PER TABLET    TK 1 T PO BID    -PROPYLENE GLYCOL, PF, (SYSTANE ULTRA, PF,) 0.4-0.3 % DPET    Place 1 drop into both eyes 4 (four) times daily.    PRAVASTATIN (PRAVACHOL) 20 MG TABLET    Take 1 tablet (20 mg total) by mouth once daily.    TIZANIDINE (ZANAFLEX) 4 MG TABLET    TK 1 T PO Q 12 H    VENLAFAXINE (EFFEXOR-XR) 150 MG CP24    TAKE 1 CAPSULE BY MOUTH ONCE DAILY    " VITAMIN D 1000 UNITS TAB    Take 2,000 Units by mouth once daily.       Assessment/Plans:-  # Rheumatoid arthritis of multiple sites with negative rheumatoid factor  History of seronegative rheumatoid arthritis without any active flare-up in the last 2 years.  Of methotrexate, Humira and Plaquenil for the past 2 years.  Recently she reports increased joint pain with stiffness.  Exam showed mild fullness but not significant enough to consider active synovitis.  Will give a prednisone trial to rule out any underlying synovitis and if no improvement in 5 days advised to follow-up with interventional pain specialist and anti-inflammatory medications.  - predniSONE (DELTASONE) 20 MG tablet; Take 1 tablet (20 mg total) by mouth once daily. for 10 days  Dispense: 10 tablet; Refill: 0    # Fibromyalgia  Intermittently active with some improvement on opioid therapy.  She is trying to wean off opioid medications.  Some improvement with gabapentin.  The most pressing symptom which was since her fibromyalgia is restless leg syndrome.  Will treat restless leg syndrome as explained below with medication therapy since she failed all conservative methods.    # Restless leg syndrome  Severely progressive restless leg syndrome with no improvement on conservative therapy.  Try pramipexole.  Discussed in detail about all adverse effects of the medication.  Start at the lowest dose possible 2-3 hours before bedtime.  Increase dose if needed in future.  - pramipexole (MIRAPEX) 0.125 MG tablet; Take 1 tablet (0.125 mg total) by mouth every evening.  Dispense: 30 tablet; Refill: 2     # Follow-up in about 3 months (around 5/6/2019).  Disclaimer: This note was prepared using voice recognition system and is likely to have sound alike errors and is not proof read.  Please call me with any questions.

## 2019-02-06 NOTE — ASSESSMENT & PLAN NOTE
History of seronegative rheumatoid arthritis without any active flare-up in the last 2 years.  Of methotrexate, Humira and Plaquenil for the past 2 years.  Recently she reports increased joint pain with stiffness.  Exam showed mild fullness but not significant enough to consider active synovitis.  Will give a prednisone trial to rule out any underlying synovitis and if no improvement in 5 days advised to follow-up with interventional pain specialist and anti-inflammatory medications.

## 2019-02-06 NOTE — ASSESSMENT & PLAN NOTE
Severely progressive restless leg syndrome with no improvement on conservative therapy.  Try pramipexole.  Discussed in detail about all adverse effects of the medication.  Start at the lowest dose possible 2-3 hours before bedtime.  Increase dose if needed in future.

## 2019-02-06 NOTE — ASSESSMENT & PLAN NOTE
Intermittently active with some improvement on opioid therapy.  She is trying to wean off opioid medications.  Some improvement with gabapentin.  The most pressing symptom which was since her fibromyalgia is restless leg syndrome.  Will treat restless leg syndrome as explained below with medication therapy since she failed all conservative methods.

## 2019-02-11 ENCOUNTER — PATIENT MESSAGE (OUTPATIENT)
Dept: RHEUMATOLOGY | Facility: CLINIC | Age: 54
End: 2019-02-11

## 2019-02-11 DIAGNOSIS — M06.09 RHEUMATOID ARTHRITIS OF MULTIPLE SITES WITH NEGATIVE RHEUMATOID FACTOR: Primary | ICD-10-CM

## 2019-02-11 RX ORDER — PREDNISONE 10 MG/1
TABLET ORAL
Qty: 60 TABLET | Refills: 0 | Status: SHIPPED | OUTPATIENT
Start: 2019-02-11 | End: 2019-05-08

## 2019-02-12 ENCOUNTER — PATIENT MESSAGE (OUTPATIENT)
Dept: RHEUMATOLOGY | Facility: CLINIC | Age: 54
End: 2019-02-12

## 2019-02-13 ENCOUNTER — LAB VISIT (OUTPATIENT)
Dept: LAB | Facility: HOSPITAL | Age: 54
End: 2019-02-13
Attending: INTERNAL MEDICINE
Payer: COMMERCIAL

## 2019-02-13 ENCOUNTER — OFFICE VISIT (OUTPATIENT)
Dept: OPHTHALMOLOGY | Facility: CLINIC | Age: 54
End: 2019-02-13
Payer: COMMERCIAL

## 2019-02-13 DIAGNOSIS — E78.2 COMBINED HYPERLIPIDEMIA ASSOCIATED WITH TYPE 2 DIABETES MELLITUS: ICD-10-CM

## 2019-02-13 DIAGNOSIS — E11.59 HYPERTENSION ASSOCIATED WITH DIABETES: ICD-10-CM

## 2019-02-13 DIAGNOSIS — E11.69 COMBINED HYPERLIPIDEMIA ASSOCIATED WITH TYPE 2 DIABETES MELLITUS: ICD-10-CM

## 2019-02-13 DIAGNOSIS — I15.2 HYPERTENSION ASSOCIATED WITH DIABETES: ICD-10-CM

## 2019-02-13 DIAGNOSIS — H52.4 BILATERAL PRESBYOPIA: Primary | ICD-10-CM

## 2019-02-13 LAB
ALBUMIN SERPL BCP-MCNC: 3.9 G/DL
ALP SERPL-CCNC: 90 U/L
ALT SERPL W/O P-5'-P-CCNC: 18 U/L
ANION GAP SERPL CALC-SCNC: 7 MMOL/L
AST SERPL-CCNC: 13 U/L
BILIRUB SERPL-MCNC: 0.4 MG/DL
BUN SERPL-MCNC: 12 MG/DL
CALCIUM SERPL-MCNC: 9.4 MG/DL
CHLORIDE SERPL-SCNC: 103 MMOL/L
CHOLEST SERPL-MCNC: 152 MG/DL
CHOLEST/HDLC SERPL: 3.4 {RATIO}
CO2 SERPL-SCNC: 31 MMOL/L
CREAT SERPL-MCNC: 0.7 MG/DL
EST. GFR  (AFRICAN AMERICAN): >60 ML/MIN/1.73 M^2
EST. GFR  (NON AFRICAN AMERICAN): >60 ML/MIN/1.73 M^2
ESTIMATED AVG GLUCOSE: 120 MG/DL
GLUCOSE SERPL-MCNC: 103 MG/DL
HBA1C MFR BLD HPLC: 5.8 %
HDLC SERPL-MCNC: 45 MG/DL
HDLC SERPL: 29.6 %
LDLC SERPL CALC-MCNC: 65.6 MG/DL
NONHDLC SERPL-MCNC: 107 MG/DL
POTASSIUM SERPL-SCNC: 4 MMOL/L
PROT SERPL-MCNC: 6.8 G/DL
SODIUM SERPL-SCNC: 141 MMOL/L
TRIGL SERPL-MCNC: 207 MG/DL

## 2019-02-13 PROCEDURE — 80061 LIPID PANEL: CPT

## 2019-02-13 PROCEDURE — 83036 HEMOGLOBIN GLYCOSYLATED A1C: CPT

## 2019-02-13 PROCEDURE — 92012 PR EYE EXAM, EST PATIENT,INTERMED: ICD-10-PCS | Mod: S$GLB,,, | Performed by: OPTOMETRIST

## 2019-02-13 PROCEDURE — 92012 INTRM OPH EXAM EST PATIENT: CPT | Mod: S$GLB,,, | Performed by: OPTOMETRIST

## 2019-02-13 PROCEDURE — 36415 COLL VENOUS BLD VENIPUNCTURE: CPT

## 2019-02-13 PROCEDURE — 80053 COMPREHEN METABOLIC PANEL: CPT

## 2019-02-13 NOTE — PROGRESS NOTES
HPI     Decrease near visual acuity with glasses x couple of months.  Last eye exam 06/11/2018 [facility].  Patient has to adjust glasses and see at near.  Glasses are 8 months old.    Last edited by Sharri Retana on 2/13/2019  2:53 PM. (History)            Assessment /Plan     For exam results, see Encounter Report.    Bilateral presbyopia      No corneal stain present, uses Systane to control dry eye symptoms.    Increase ADD, pt does , needs to see fine print.    RTC prn

## 2019-02-19 ENCOUNTER — LAB VISIT (OUTPATIENT)
Dept: LAB | Facility: HOSPITAL | Age: 54
End: 2019-02-19
Attending: INTERNAL MEDICINE
Payer: COMMERCIAL

## 2019-02-19 ENCOUNTER — OFFICE VISIT (OUTPATIENT)
Dept: INTERNAL MEDICINE | Facility: CLINIC | Age: 54
End: 2019-02-19
Payer: COMMERCIAL

## 2019-02-19 VITALS
HEIGHT: 62 IN | BODY MASS INDEX: 33.43 KG/M2 | OXYGEN SATURATION: 98 % | SYSTOLIC BLOOD PRESSURE: 120 MMHG | TEMPERATURE: 99 F | HEART RATE: 91 BPM | DIASTOLIC BLOOD PRESSURE: 76 MMHG | WEIGHT: 181.69 LBS

## 2019-02-19 DIAGNOSIS — I15.2 HYPERTENSION ASSOCIATED WITH DIABETES: ICD-10-CM

## 2019-02-19 DIAGNOSIS — E11.69 COMBINED HYPERLIPIDEMIA ASSOCIATED WITH TYPE 2 DIABETES MELLITUS: ICD-10-CM

## 2019-02-19 DIAGNOSIS — E11.59 HYPERTENSION ASSOCIATED WITH DIABETES: ICD-10-CM

## 2019-02-19 DIAGNOSIS — E11.69 COMBINED HYPERLIPIDEMIA ASSOCIATED WITH TYPE 2 DIABETES MELLITUS: Primary | ICD-10-CM

## 2019-02-19 DIAGNOSIS — E78.2 COMBINED HYPERLIPIDEMIA ASSOCIATED WITH TYPE 2 DIABETES MELLITUS: ICD-10-CM

## 2019-02-19 DIAGNOSIS — Z12.11 SCREENING FOR COLON CANCER: ICD-10-CM

## 2019-02-19 DIAGNOSIS — E78.2 COMBINED HYPERLIPIDEMIA ASSOCIATED WITH TYPE 2 DIABETES MELLITUS: Primary | ICD-10-CM

## 2019-02-19 LAB
ALBUMIN/CREAT UR: 6.7 UG/MG
CREAT UR-MCNC: 75 MG/DL
MICROALBUMIN UR DL<=1MG/L-MCNC: 5 UG/ML

## 2019-02-19 PROCEDURE — 3074F PR MOST RECENT SYSTOLIC BLOOD PRESSURE < 130 MM HG: ICD-10-PCS | Mod: CPTII,S$GLB,, | Performed by: PHYSICIAN ASSISTANT

## 2019-02-19 PROCEDURE — 3044F HG A1C LEVEL LT 7.0%: CPT | Mod: CPTII,S$GLB,, | Performed by: PHYSICIAN ASSISTANT

## 2019-02-19 PROCEDURE — 3078F PR MOST RECENT DIASTOLIC BLOOD PRESSURE < 80 MM HG: ICD-10-PCS | Mod: CPTII,S$GLB,, | Performed by: PHYSICIAN ASSISTANT

## 2019-02-19 PROCEDURE — 99999 PR PBB SHADOW E&M-EST. PATIENT-LVL IV: CPT | Mod: PBBFAC,,, | Performed by: PHYSICIAN ASSISTANT

## 2019-02-19 PROCEDURE — 3078F DIAST BP <80 MM HG: CPT | Mod: CPTII,S$GLB,, | Performed by: PHYSICIAN ASSISTANT

## 2019-02-19 PROCEDURE — 99999 PR PBB SHADOW E&M-EST. PATIENT-LVL IV: ICD-10-PCS | Mod: PBBFAC,,, | Performed by: PHYSICIAN ASSISTANT

## 2019-02-19 PROCEDURE — 3044F PR MOST RECENT HEMOGLOBIN A1C LEVEL <7.0%: ICD-10-PCS | Mod: CPTII,S$GLB,, | Performed by: PHYSICIAN ASSISTANT

## 2019-02-19 PROCEDURE — 3008F PR BODY MASS INDEX (BMI) DOCUMENTED: ICD-10-PCS | Mod: CPTII,S$GLB,, | Performed by: PHYSICIAN ASSISTANT

## 2019-02-19 PROCEDURE — 99214 OFFICE O/P EST MOD 30 MIN: CPT | Mod: S$GLB,,, | Performed by: PHYSICIAN ASSISTANT

## 2019-02-19 PROCEDURE — 3074F SYST BP LT 130 MM HG: CPT | Mod: CPTII,S$GLB,, | Performed by: PHYSICIAN ASSISTANT

## 2019-02-19 PROCEDURE — 82043 UR ALBUMIN QUANTITATIVE: CPT

## 2019-02-19 PROCEDURE — 3008F BODY MASS INDEX DOCD: CPT | Mod: CPTII,S$GLB,, | Performed by: PHYSICIAN ASSISTANT

## 2019-02-19 PROCEDURE — 99214 PR OFFICE/OUTPT VISIT, EST, LEVL IV, 30-39 MIN: ICD-10-PCS | Mod: S$GLB,,, | Performed by: PHYSICIAN ASSISTANT

## 2019-02-19 NOTE — PROGRESS NOTES
Subjective:       Patient ID: Diana Escobar is a 53 y.o. female.    Chief Complaint: Follow-up (PCP - [facility])    Diabetes   She presents for her follow-up diabetic visit. She has type 2 diabetes mellitus. Her disease course has been stable. There are no hypoglycemic associated symptoms. Pertinent negatives for hypoglycemia include no confusion. Pertinent negatives for diabetes include no chest pain, no fatigue, no polydipsia, no polyuria and no weakness. There are no hypoglycemic complications. Symptoms are stable. Risk factors for coronary artery disease include diabetes mellitus, dyslipidemia, family history, hypertension, obesity, post-menopausal and sedentary lifestyle. Current diabetic treatment includes diet. Her weight is fluctuating dramatically. She is following a generally healthy diet. Meal planning includes avoidance of concentrated sweets. She has not had a previous visit with a dietitian. She participates in exercise intermittently. There is no change in her home blood glucose trend. An ACE inhibitor/angiotensin II receptor blocker is not being taken. She does not see a podiatrist.Eye exam is current.   Hyperlipidemia   This is a chronic problem. The current episode started more than 1 year ago. The problem is controlled. Recent lipid tests were reviewed and are normal. Exacerbating diseases include diabetes and obesity. Pertinent negatives include no chest pain. Current antihyperlipidemic treatment includes statins and diet change. The current treatment provides moderate improvement of lipids. There are no compliance problems.  Risk factors for coronary artery disease include hypertension, dyslipidemia, family history, diabetes mellitus, obesity, post-menopausal and a sedentary lifestyle.   Hypertension   This is a chronic problem. The current episode started more than 1 year ago. The problem is unchanged. The problem is controlled. Pertinent negatives include no chest pain, neck pain or  palpitations. Risk factors for coronary artery disease include diabetes mellitus, dyslipidemia, family history, post-menopausal state and sedentary lifestyle. Past treatments include calcium channel blockers and lifestyle changes. The current treatment provides moderate improvement.     Past Medical History:   Diagnosis Date    Asthma     Chronic low back pain     Degenerative disc disease, lumbar     Depression     Diabetes mellitus 2014    DM (diabetes mellitus) 2014     am 02/13/2019    Fibromyalgia     Hyperlipidemia     Hypertension     Hypothyroidism     MRSA (methicillin resistant Staphylococcus aureus) carrier     Palpitations     Dr. Jesus Lao--cardiology    Stroke     TIA    Vitamin D deficiency disease        Review of Systems   Constitutional: Positive for activity change. Negative for fatigue and unexpected weight change.   HENT: Negative for hearing loss, rhinorrhea and trouble swallowing.    Eyes: Negative for discharge and visual disturbance.   Respiratory: Positive for wheezing. Negative for chest tightness.    Cardiovascular: Negative for chest pain and palpitations.   Gastrointestinal: Negative for blood in stool, constipation, diarrhea and vomiting.   Endocrine: Negative for polydipsia and polyuria.   Genitourinary: Positive for dysuria. Negative for difficulty urinating, hematuria and menstrual problem.   Musculoskeletal: Positive for arthralgias and joint swelling. Negative for neck pain.   Neurological: Negative for weakness.   Psychiatric/Behavioral: Negative for confusion and dysphoric mood.       Objective:      Physical Exam   Constitutional: She appears well-developed and well-nourished. No distress.   HENT:   Head: Normocephalic and atraumatic.   Neck: Neck supple. Carotid bruit is not present.   Cardiovascular: Normal rate and regular rhythm. Exam reveals no gallop and no friction rub.   No murmur heard.  Pulmonary/Chest: Effort normal and breath sounds normal.  No stridor. No respiratory distress. She has no wheezes. She has no rales. She exhibits no tenderness.   Lymphadenopathy:     She has no cervical adenopathy.   Skin: She is not diaphoretic.   Nursing note and vitals reviewed.      Lab Results   Component Value Date    WBC 9.35 07/19/2018    HGB 13.7 07/19/2018    HCT 44.8 07/19/2018     (H) 07/19/2018     07/19/2018     CMP  Sodium   Date Value Ref Range Status   02/13/2019 141 136 - 145 mmol/L Final     Potassium   Date Value Ref Range Status   02/13/2019 4.0 3.5 - 5.1 mmol/L Final     Chloride   Date Value Ref Range Status   02/13/2019 103 95 - 110 mmol/L Final     CO2   Date Value Ref Range Status   02/13/2019 31 (H) 23 - 29 mmol/L Final     Glucose   Date Value Ref Range Status   02/13/2019 103 70 - 110 mg/dL Final     BUN, Bld   Date Value Ref Range Status   02/13/2019 12 6 - 20 mg/dL Final     Creatinine   Date Value Ref Range Status   02/13/2019 0.7 0.5 - 1.4 mg/dL Final     Calcium   Date Value Ref Range Status   02/13/2019 9.4 8.7 - 10.5 mg/dL Final     Total Protein   Date Value Ref Range Status   02/13/2019 6.8 6.0 - 8.4 g/dL Final     Albumin   Date Value Ref Range Status   02/13/2019 3.9 3.5 - 5.2 g/dL Final     Total Bilirubin   Date Value Ref Range Status   02/13/2019 0.4 0.1 - 1.0 mg/dL Final     Comment:     For infants and newborns, interpretation of results should be based  on gestational age, weight and in agreement with clinical  observations.  Premature Infant recommended reference ranges:  Up to 24 hours.............<8.0 mg/dL  Up to 48 hours............<12.0 mg/dL  3-5 days..................<15.0 mg/dL  6-29 days.................<15.0 mg/dL       Alkaline Phosphatase   Date Value Ref Range Status   02/13/2019 90 55 - 135 U/L Final     AST   Date Value Ref Range Status   02/13/2019 13 10 - 40 U/L Final     ALT   Date Value Ref Range Status   02/13/2019 18 10 - 44 U/L Final     Anion Gap   Date Value Ref Range Status   02/13/2019  7 (L) 8 - 16 mmol/L Final     eGFR if    Date Value Ref Range Status   02/13/2019 >60.0 >60 mL/min/1.73 m^2 Final     eGFR if non    Date Value Ref Range Status   02/13/2019 >60.0 >60 mL/min/1.73 m^2 Final     Comment:     Calculation used to obtain the estimated glomerular filtration  rate (eGFR) is the CKD-EPI equation.        Lab Results   Component Value Date    CHOL 152 02/13/2019     Lab Results   Component Value Date    HDL 45 02/13/2019     Lab Results   Component Value Date    LDLCALC 65.6 02/13/2019     Lab Results   Component Value Date    TRIG 207 (H) 02/13/2019     Lab Results   Component Value Date    CHOLHDL 29.6 02/13/2019     Lab Results   Component Value Date    HGBA1C 5.8 (H) 02/13/2019     Assessment:       1. Combined hyperlipidemia associated with type 2 diabetes mellitus    2. Screening for colon cancer    3. Hypertension associated with diabetes        Plan:       Combined hyperlipidemia associated with type 2 diabetes mellitus  -     Microalbumin/creatinine urine ratio; Future; Expected date: 02/19/2019    Screening for colon cancer  -     Fecal Immunochemical Test (iFOBT); Future; Expected date: 02/19/2019    Hypertension associated with diabetes  -     Microalbumin/creatinine urine ratio; Future; Expected date: 02/19/2019

## 2019-02-26 DIAGNOSIS — R25.2 MUSCLE CRAMPS AT NIGHT: ICD-10-CM

## 2019-02-26 RX ORDER — GABAPENTIN 300 MG/1
CAPSULE ORAL
Qty: 60 CAPSULE | Refills: 2 | Status: SHIPPED | OUTPATIENT
Start: 2019-02-26 | End: 2019-06-26 | Stop reason: SDUPTHER

## 2019-03-04 ENCOUNTER — APPOINTMENT (OUTPATIENT)
Dept: LAB | Facility: HOSPITAL | Age: 54
End: 2019-03-04
Attending: PHYSICIAN ASSISTANT
Payer: COMMERCIAL

## 2019-04-05 DIAGNOSIS — E03.9 HYPOTHYROIDISM (ACQUIRED): ICD-10-CM

## 2019-04-08 RX ORDER — LEVOTHYROXINE SODIUM 50 UG/1
TABLET ORAL
Qty: 30 TABLET | Refills: 5 | Status: SHIPPED | OUTPATIENT
Start: 2019-04-08 | End: 2019-10-21 | Stop reason: SDUPTHER

## 2019-05-07 DIAGNOSIS — I15.2 HYPERTENSION ASSOCIATED WITH DIABETES: ICD-10-CM

## 2019-05-07 DIAGNOSIS — E11.59 HYPERTENSION ASSOCIATED WITH DIABETES: ICD-10-CM

## 2019-05-07 RX ORDER — METFORMIN HYDROCHLORIDE 500 MG/1
TABLET, EXTENDED RELEASE ORAL
Qty: 180 TABLET | Refills: 1 | Status: SHIPPED | OUTPATIENT
Start: 2019-05-07 | End: 2019-12-03 | Stop reason: SDUPTHER

## 2019-05-08 ENCOUNTER — OFFICE VISIT (OUTPATIENT)
Dept: RHEUMATOLOGY | Facility: CLINIC | Age: 54
End: 2019-05-08
Payer: COMMERCIAL

## 2019-05-08 ENCOUNTER — LAB VISIT (OUTPATIENT)
Dept: LAB | Facility: HOSPITAL | Age: 54
End: 2019-05-08
Attending: INTERNAL MEDICINE
Payer: COMMERCIAL

## 2019-05-08 VITALS
HEIGHT: 62 IN | BODY MASS INDEX: 32.99 KG/M2 | WEIGHT: 179.25 LBS | SYSTOLIC BLOOD PRESSURE: 132 MMHG | HEART RATE: 89 BPM | DIASTOLIC BLOOD PRESSURE: 90 MMHG

## 2019-05-08 DIAGNOSIS — M79.7 FIBROMYALGIA: ICD-10-CM

## 2019-05-08 DIAGNOSIS — M06.09 RHEUMATOID ARTHRITIS OF MULTIPLE SITES WITH NEGATIVE RHEUMATOID FACTOR: ICD-10-CM

## 2019-05-08 DIAGNOSIS — M06.09 RHEUMATOID ARTHRITIS OF MULTIPLE SITES WITH NEGATIVE RHEUMATOID FACTOR: Primary | ICD-10-CM

## 2019-05-08 DIAGNOSIS — M79.642 LEFT HAND PAIN: ICD-10-CM

## 2019-05-08 DIAGNOSIS — E66.9 OBESITY (BMI 30.0-34.9): ICD-10-CM

## 2019-05-08 DIAGNOSIS — G25.81 RESTLESS LEG SYNDROME: ICD-10-CM

## 2019-05-08 LAB
ALBUMIN SERPL BCP-MCNC: 4.3 G/DL (ref 3.5–5.2)
ALP SERPL-CCNC: 90 U/L (ref 55–135)
ALT SERPL W/O P-5'-P-CCNC: 17 U/L (ref 10–44)
ANION GAP SERPL CALC-SCNC: 11 MMOL/L (ref 8–16)
AST SERPL-CCNC: 15 U/L (ref 10–40)
BASOPHILS # BLD AUTO: 0.06 K/UL (ref 0–0.2)
BASOPHILS NFR BLD: 0.7 % (ref 0–1.9)
BILIRUB SERPL-MCNC: 0.3 MG/DL (ref 0.1–1)
BUN SERPL-MCNC: 12 MG/DL (ref 6–20)
CALCIUM SERPL-MCNC: 9.9 MG/DL (ref 8.7–10.5)
CHLORIDE SERPL-SCNC: 104 MMOL/L (ref 95–110)
CO2 SERPL-SCNC: 28 MMOL/L (ref 23–29)
CREAT SERPL-MCNC: 0.7 MG/DL (ref 0.5–1.4)
CRP SERPL-MCNC: 0.9 MG/L (ref 0–8.2)
DIFFERENTIAL METHOD: ABNORMAL
EOSINOPHIL # BLD AUTO: 0.2 K/UL (ref 0–0.5)
EOSINOPHIL NFR BLD: 2.5 % (ref 0–8)
ERYTHROCYTE [DISTWIDTH] IN BLOOD BY AUTOMATED COUNT: 13.6 % (ref 11.5–14.5)
ERYTHROCYTE [SEDIMENTATION RATE] IN BLOOD BY WESTERGREN METHOD: <2 MM/HR (ref 0–36)
EST. GFR  (AFRICAN AMERICAN): >60 ML/MIN/1.73 M^2
EST. GFR  (NON AFRICAN AMERICAN): >60 ML/MIN/1.73 M^2
GLUCOSE SERPL-MCNC: 85 MG/DL (ref 70–110)
HCT VFR BLD AUTO: 48.5 % (ref 37–48.5)
HGB BLD-MCNC: 15.2 G/DL (ref 12–16)
IMM GRANULOCYTES # BLD AUTO: 0.03 K/UL (ref 0–0.04)
IMM GRANULOCYTES NFR BLD AUTO: 0.3 % (ref 0–0.5)
LYMPHOCYTES # BLD AUTO: 2.8 K/UL (ref 1–4.8)
LYMPHOCYTES NFR BLD: 31 % (ref 18–48)
MCH RBC QN AUTO: 30.5 PG (ref 27–31)
MCHC RBC AUTO-ENTMCNC: 31.3 G/DL (ref 32–36)
MCV RBC AUTO: 97 FL (ref 82–98)
MONOCYTES # BLD AUTO: 0.7 K/UL (ref 0.3–1)
MONOCYTES NFR BLD: 7.3 % (ref 4–15)
NEUTROPHILS # BLD AUTO: 5.3 K/UL (ref 1.8–7.7)
NEUTROPHILS NFR BLD: 58.5 % (ref 38–73)
NRBC BLD-RTO: 0 /100 WBC
PLATELET # BLD AUTO: 193 K/UL (ref 150–350)
PMV BLD AUTO: 12 FL (ref 9.2–12.9)
POTASSIUM SERPL-SCNC: 4 MMOL/L (ref 3.5–5.1)
PROT SERPL-MCNC: 7.6 G/DL (ref 6–8.4)
RBC # BLD AUTO: 4.98 M/UL (ref 4–5.4)
SODIUM SERPL-SCNC: 143 MMOL/L (ref 136–145)
WBC # BLD AUTO: 9.12 K/UL (ref 3.9–12.7)

## 2019-05-08 PROCEDURE — 3008F BODY MASS INDEX DOCD: CPT | Mod: CPTII,S$GLB,, | Performed by: INTERNAL MEDICINE

## 2019-05-08 PROCEDURE — 3008F PR BODY MASS INDEX (BMI) DOCUMENTED: ICD-10-PCS | Mod: CPTII,S$GLB,, | Performed by: INTERNAL MEDICINE

## 2019-05-08 PROCEDURE — 3075F SYST BP GE 130 - 139MM HG: CPT | Mod: CPTII,S$GLB,, | Performed by: INTERNAL MEDICINE

## 2019-05-08 PROCEDURE — 86140 C-REACTIVE PROTEIN: CPT

## 2019-05-08 PROCEDURE — 85652 RBC SED RATE AUTOMATED: CPT

## 2019-05-08 PROCEDURE — 99999 PR PBB SHADOW E&M-EST. PATIENT-LVL III: CPT | Mod: PBBFAC,,, | Performed by: INTERNAL MEDICINE

## 2019-05-08 PROCEDURE — 3080F DIAST BP >= 90 MM HG: CPT | Mod: CPTII,S$GLB,, | Performed by: INTERNAL MEDICINE

## 2019-05-08 PROCEDURE — 3080F PR MOST RECENT DIASTOLIC BLOOD PRESSURE >= 90 MM HG: ICD-10-PCS | Mod: CPTII,S$GLB,, | Performed by: INTERNAL MEDICINE

## 2019-05-08 PROCEDURE — 85025 COMPLETE CBC W/AUTO DIFF WBC: CPT

## 2019-05-08 PROCEDURE — 3075F PR MOST RECENT SYSTOLIC BLOOD PRESS GE 130-139MM HG: ICD-10-PCS | Mod: CPTII,S$GLB,, | Performed by: INTERNAL MEDICINE

## 2019-05-08 PROCEDURE — 99214 OFFICE O/P EST MOD 30 MIN: CPT | Mod: S$GLB,,, | Performed by: INTERNAL MEDICINE

## 2019-05-08 PROCEDURE — 99999 PR PBB SHADOW E&M-EST. PATIENT-LVL III: ICD-10-PCS | Mod: PBBFAC,,, | Performed by: INTERNAL MEDICINE

## 2019-05-08 PROCEDURE — 80053 COMPREHEN METABOLIC PANEL: CPT

## 2019-05-08 PROCEDURE — 36415 COLL VENOUS BLD VENIPUNCTURE: CPT

## 2019-05-08 PROCEDURE — 99214 PR OFFICE/OUTPT VISIT, EST, LEVL IV, 30-39 MIN: ICD-10-PCS | Mod: S$GLB,,, | Performed by: INTERNAL MEDICINE

## 2019-05-08 RX ORDER — HYDROCODONE BITARTRATE AND ACETAMINOPHEN 5; 325 MG/1; MG/1
1 TABLET ORAL
COMMUNITY
Start: 2019-03-11 | End: 2019-07-19

## 2019-05-08 RX ORDER — FOLIC ACID 1 MG/1
1 TABLET ORAL DAILY
Qty: 90 TABLET | Refills: 0 | Status: SHIPPED | OUTPATIENT
Start: 2019-05-08 | End: 2019-07-19 | Stop reason: SDUPTHER

## 2019-05-08 RX ORDER — METHOTREXATE 2.5 MG/1
15 TABLET ORAL
Qty: 24 TABLET | Refills: 2 | Status: SHIPPED | OUTPATIENT
Start: 2019-05-08 | End: 2019-07-19 | Stop reason: SDUPTHER

## 2019-05-08 RX ORDER — PRAMIPEXOLE DIHYDROCHLORIDE 0.12 MG/1
0.12 TABLET ORAL NIGHTLY
Qty: 90 TABLET | Refills: 2 | Status: SHIPPED | OUTPATIENT
Start: 2019-05-08 | End: 2020-02-25

## 2019-05-09 ENCOUNTER — TELEPHONE (OUTPATIENT)
Dept: RHEUMATOLOGY | Facility: CLINIC | Age: 54
End: 2019-05-09

## 2019-05-09 NOTE — TELEPHONE ENCOUNTER
Called patient to inform her that the closed MRI is the only option at the local [facility] facilities. [place] was the only [facility] facility that performs the open MRI's, and that there is an open MRI here that does have a little more room than a completely closed MRI. Patient agreed and verbalized understanding. Patient agreed to come in on Friday, 05/24/2019 at 9:15am at [facility] location. Patient verbalized understanding. I also requested for her to bring in an imprint card for her loop recorder insertion, without her card an MRI cannot be performed. Patient verbalized understanding.

## 2019-05-09 NOTE — PROGRESS NOTES
RHEUMATOLOGY CLINIC FOLLOW UP VISIT  Chief complaints:-  To follow-up for arthritis.  My joints hurt.    HPI:-  Diana Rubio a 53 y.o. pleasant female comes in for a follow up visit with above chief complaints.  She follows up in the Rheumatology Clinic for seronegative rheumatoid arthritis, fibromyalgia and osteoarthritis.  Her rheumatoid arthritis has been in remission for more than 2 years off all disease modifying agents including methotrexate, hydroxychloroquine and Humira.  She denies any flare-up until last couple of months ago which she has noticed increased pain and swelling and stiffness around her joints.  Mirapex has been helping her restless leg syndrome.       Review of Systems   Constitutional: Negative for chills and fever.   HENT: Negative for congestion and sore throat.    Eyes: Negative for blurred vision and redness.   Respiratory: Negative for cough and shortness of breath.    Cardiovascular: Negative for chest pain and leg swelling.   Gastrointestinal: Negative for abdominal pain.   Genitourinary: Negative for dysuria.   Musculoskeletal: Positive for back pain, joint pain, myalgias and neck pain. Negative for falls.   Skin: Negative for rash.   Neurological: Negative for headaches.   Endo/Heme/Allergies: Does not bruise/bleed easily.   Psychiatric/Behavioral: Negative for memory loss. The patient does not have insomnia.        Past Medical History:   Diagnosis Date    Asthma     Chronic low back pain     Degenerative disc disease, lumbar     Depression     Diabetes mellitus 2014    DM (diabetes mellitus) 2014     am 02/13/2019    Fibromyalgia     Hyperlipidemia     Hypertension     Hypothyroidism     MRSA (methicillin resistant Staphylococcus aureus) carrier     Palpitations     Dr. Jesus Lao--cardiology    Stroke     TIA    Vitamin D deficiency disease        Past Surgical History:   Procedure Laterality Date  "   HYSTERECTOMY      left ankle surgery      loop recorder  05/2017    cardiac device    NECK SURGERY  06/2016    ROTATOR CUFF REPAIR      TONSILLECTOMY          Social History     Tobacco Use    Smoking status: Current Some Day Smoker     Packs/day: 0.50     Types: Cigarettes    Smokeless tobacco: Never Used   Substance Use Topics    Alcohol use: No     Alcohol/week: 0.0 oz    Drug use: No       Family History   Problem Relation Age of Onset    Cancer Mother     Stroke Mother     Heart disease Mother     Diabetes Mother     Cataracts Mother     Hypertension Mother     Breast cancer Mother     Heart disease Father     COPD Father     Hypertension Father     Diabetes Maternal Aunt     Breast cancer Sister        Review of patient's allergies indicates:   Allergen Reactions    Mobic [meloxicam]     Topamax [topiramate]     Adhesive Rash           Physical examination:-    Vitals:    05/08/19 1339   BP: (!) 132/90   Pulse: 89   Weight: 81.3 kg (179 lb 3.7 oz)   Height: 5' 2" (1.575 m)   PainSc: 0-No pain       Physical Exam   Constitutional: She is oriented to person, place, and time and well-developed, well-nourished, and in no distress. No distress.   HENT:   Head: Normocephalic.   Mouth/Throat: Oropharynx is clear and moist.   Eyes: Pupils are equal, round, and reactive to light. Conjunctivae and EOM are normal.   Neck: Normal range of motion. Neck supple.   Cardiovascular: Normal rate and intact distal pulses.   Pulmonary/Chest: Effort normal. No respiratory distress.   Abdominal: Soft. There is no tenderness.   Musculoskeletal:   Soft tissue tender points from fibromyalgia .  Mild fullness around MCPs.  Tenderness present over enthesis along with multiple soft tissue tender points.  No synovitis over small joints of feet.  No effusion over large joints.   Neurological: She is alert and oriented to person, place, and time. No cranial nerve deficit.   Skin: Skin is warm. No rash noted. No " "erythema.   Psychiatric: Mood and affect normal.   Nursing note and vitals reviewed.      Medication List with Changes/Refills   New Medications    FOLIC ACID (FOLVITE) 1 MG TABLET    Take 1 tablet (1 mg total) by mouth once daily.    METHOTREXATE 2.5 MG TAB    Take 6 tablets (15 mg total) by mouth every 7 days.   Current Medications    ALBUTEROL 90 MCG/ACTUATION INHALER    Inhale 2 puffs into the lungs every 6 (six) hours as needed for Wheezing.    AMLODIPINE (NORVASC) 5 MG TABLET    TAKE 1 TABLET BY MOUTH ONCE DAILY    ATORVASTATIN (LIPITOR) 40 MG TABLET    Take 40 mg by mouth.    BLOOD SUGAR DIAGNOSTIC STRP    Glucose testing daily.    BLOOD-GLUCOSE METER MISC    Use as directed.    BUSPIRONE (BUSPAR) 5 MG TAB    Take 1 tablet (5 mg total) by mouth 2 (two) times daily.    CLOPIDOGREL (PLAVIX) 75 MG TABLET    Take 75 mg by mouth once daily.    DICLOFENAC SODIUM 1 % GEL    Apply topically 4 (four) times daily.    GABAPENTIN (NEURONTIN) 300 MG CAPSULE    TAKE 1 CAPSULE BY MOUTH TWICE DAILY    HYDROCODONE-ACETAMINOPHEN (NORCO) 5-325 MG PER TABLET    Take 1 tablet by mouth.    INSULIN NEEDLES, DISPOSABLE, (PEN NEEDLE) 31 X 1/4 " NDLE    Twice daily injections.    LANCETS (LANCETS,ULTRA THIN) MISC    Glucose testing daily.    LEVOTHYROXINE (SYNTHROID) 50 MCG TABLET    TAKE 1 TABLET BY MOUTH ONCE DAILY    METFORMIN (GLUCOPHAGE-XR) 500 MG 24 HR TABLET    TAKE 1 TABLET BY MOUTH TWICE DAILY WITH MEALS    -PROPYLENE GLYCOL, PF, (SYSTANE ULTRA, PF,) 0.4-0.3 % DPET    Place 1 drop into both eyes 4 (four) times daily.    TIZANIDINE (ZANAFLEX) 4 MG TABLET    TK 1 T PO Q 12 H    VENLAFAXINE (EFFEXOR-XR) 150 MG CP24    TAKE 1 CAPSULE BY MOUTH ONCE DAILY   Changed and/or Refilled Medications    Modified Medication Previous Medication    PRAMIPEXOLE (MIRAPEX) 0.125 MG TABLET pramipexole (MIRAPEX) 0.125 MG tablet       Take 1 tablet (0.125 mg total) by mouth every evening.    Take 1 tablet (0.125 mg total) by mouth every " evening.   Discontinued Medications    OXYCODONE-ACETAMINOPHEN (PERCOCET) 7.5-325 MG PER TABLET    TK 1 T PO BID    PREDNISONE (DELTASONE) 10 MG TABLET    Take 20 mg once daily for 5 days, 15 mg once daily for 1 week, 10 mg once daily next week and 5 mg once daily thereafter.    VITAMIN D 1000 UNITS TAB    Take 2,000 Units by mouth once daily.       Assessment/Plans:-  # Rheumatoid arthritis of multiple sites with negative rheumatoid factor  History of seronegative rheumatoid arthritis without any active flare-up in the last 2 years.  Off methotrexate, Humira and Plaquenil for the past 2 years.  Recently she reports increased joint pain with stiffness.  Exam showed mild fullness but not significant enough to consider active synovitis.  No significant improvement with prendisone trial. MRI Left hand and start methotrexate.   - methotrexate 2.5 MG Tab; Take 6 tablets (15 mg total) by mouth every 7 days.  Dispense: 24 tablet; Refill: 2  - folic acid (FOLVITE) 1 MG tablet; Take 1 tablet (1 mg total) by mouth once daily.  Dispense: 90 tablet; Refill: 0  - CBC auto differential; Standing  - Comprehensive metabolic panel; Standing  - C-reactive protein; Standing  - Sedimentation rate; Standing    # Fibromyalgia  Stab;e . Continue gabapentin.     # Obesity (BMI 30.0-34.9)  Losing weight . Advised to try more stricter restriction of carbs.     # Restless leg syndrome  Significant improvement on mirapex. Continue the same.   - pramipexole (MIRAPEX) 0.125 MG tablet; Take 1 tablet (0.125 mg total) by mouth every evening.  Dispense: 90 tablet; Refill: 2    # Left hand pain  MRI hand since xray showed no significant abnormality.   - MRI Hand Fingers W WO Contrast Left; Future     # Follow up in about 3 months (around 8/8/2019).  Disclaimer: This note was prepared using voice recognition system and is likely to have sound alike errors and is not proof read.  Please call me with any questions.

## 2019-05-15 ENCOUNTER — PATIENT MESSAGE (OUTPATIENT)
Dept: RHEUMATOLOGY | Facility: CLINIC | Age: 54
End: 2019-05-15

## 2019-06-11 DIAGNOSIS — E11.59 HYPERTENSION ASSOCIATED WITH DIABETES: ICD-10-CM

## 2019-06-11 DIAGNOSIS — I15.2 HYPERTENSION ASSOCIATED WITH DIABETES: ICD-10-CM

## 2019-06-11 RX ORDER — AMLODIPINE BESYLATE 5 MG/1
TABLET ORAL
Qty: 90 TABLET | Refills: 1 | Status: SHIPPED | OUTPATIENT
Start: 2019-06-11 | End: 2020-01-31 | Stop reason: SDUPTHER

## 2019-06-19 ENCOUNTER — OFFICE VISIT (OUTPATIENT)
Dept: INTERNAL MEDICINE | Facility: CLINIC | Age: 54
End: 2019-06-19
Payer: COMMERCIAL

## 2019-06-19 ENCOUNTER — HOSPITAL ENCOUNTER (OUTPATIENT)
Dept: RADIOLOGY | Facility: HOSPITAL | Age: 54
Discharge: HOME | End: 2019-06-19
Attending: FAMILY MEDICINE
Payer: COMMERCIAL

## 2019-06-19 VITALS
HEIGHT: 62 IN | TEMPERATURE: 97 F | OXYGEN SATURATION: 95 % | WEIGHT: 184.75 LBS | HEART RATE: 104 BPM | BODY MASS INDEX: 34 KG/M2 | SYSTOLIC BLOOD PRESSURE: 122 MMHG | DIASTOLIC BLOOD PRESSURE: 80 MMHG

## 2019-06-19 DIAGNOSIS — M06.09 RHEUMATOID ARTHRITIS OF MULTIPLE SITES WITH NEGATIVE RHEUMATOID FACTOR: ICD-10-CM

## 2019-06-19 DIAGNOSIS — J40 BRONCHITIS: Primary | ICD-10-CM

## 2019-06-19 DIAGNOSIS — Z79.899 HIGH RISK MEDICATION USE: ICD-10-CM

## 2019-06-19 DIAGNOSIS — J32.9 SINUSITIS, UNSPECIFIED CHRONICITY, UNSPECIFIED LOCATION: ICD-10-CM

## 2019-06-19 DIAGNOSIS — J40 BRONCHITIS: ICD-10-CM

## 2019-06-19 LAB
BILIRUB UR QL STRIP: NEGATIVE
CLARITY UR REFRACT.AUTO: CLEAR
COLOR UR AUTO: YELLOW
GLUCOSE UR QL STRIP: NEGATIVE
HGB UR QL STRIP: NEGATIVE
KETONES UR QL STRIP: NEGATIVE
LEUKOCYTE ESTERASE UR QL STRIP: NEGATIVE
NITRITE UR QL STRIP: NEGATIVE
PH UR STRIP: 6 [PH] (ref 5–8)
PROT UR QL STRIP: NEGATIVE
SP GR UR STRIP: 1.02 (ref 1–1.03)
URN SPEC COLLECT METH UR: NORMAL

## 2019-06-19 PROCEDURE — 71046 X-RAY EXAM CHEST 2 VIEWS: CPT | Mod: 26,,, | Performed by: RADIOLOGY

## 2019-06-19 PROCEDURE — 99999 PR PBB SHADOW E&M-EST. PATIENT-LVL IV: CPT | Mod: PBBFAC,,, | Performed by: FAMILY MEDICINE

## 2019-06-19 PROCEDURE — 71046 XR CHEST PA AND LATERAL: ICD-10-PCS | Mod: 26,,, | Performed by: RADIOLOGY

## 2019-06-19 PROCEDURE — 3074F PR MOST RECENT SYSTOLIC BLOOD PRESSURE < 130 MM HG: ICD-10-PCS | Mod: CPTII,S$GLB,, | Performed by: FAMILY MEDICINE

## 2019-06-19 PROCEDURE — 71046 X-RAY EXAM CHEST 2 VIEWS: CPT | Mod: TC

## 2019-06-19 PROCEDURE — 3008F PR BODY MASS INDEX (BMI) DOCUMENTED: ICD-10-PCS | Mod: CPTII,S$GLB,, | Performed by: FAMILY MEDICINE

## 2019-06-19 PROCEDURE — 99999 PR PBB SHADOW E&M-EST. PATIENT-LVL IV: ICD-10-PCS | Mod: PBBFAC,,, | Performed by: FAMILY MEDICINE

## 2019-06-19 PROCEDURE — 3074F SYST BP LT 130 MM HG: CPT | Mod: CPTII,S$GLB,, | Performed by: FAMILY MEDICINE

## 2019-06-19 PROCEDURE — 3079F DIAST BP 80-89 MM HG: CPT | Mod: CPTII,S$GLB,, | Performed by: FAMILY MEDICINE

## 2019-06-19 PROCEDURE — 3008F BODY MASS INDEX DOCD: CPT | Mod: CPTII,S$GLB,, | Performed by: FAMILY MEDICINE

## 2019-06-19 PROCEDURE — 3079F PR MOST RECENT DIASTOLIC BLOOD PRESSURE 80-89 MM HG: ICD-10-PCS | Mod: CPTII,S$GLB,, | Performed by: FAMILY MEDICINE

## 2019-06-19 PROCEDURE — 99214 PR OFFICE/OUTPT VISIT, EST, LEVL IV, 30-39 MIN: ICD-10-PCS | Mod: S$GLB,,, | Performed by: FAMILY MEDICINE

## 2019-06-19 PROCEDURE — 99214 OFFICE O/P EST MOD 30 MIN: CPT | Mod: S$GLB,,, | Performed by: FAMILY MEDICINE

## 2019-06-19 PROCEDURE — 81003 URINALYSIS AUTO W/O SCOPE: CPT

## 2019-06-19 RX ORDER — FLUCONAZOLE 150 MG/1
150 TABLET ORAL DAILY
Qty: 1 TABLET | Refills: 1 | Status: SHIPPED | OUTPATIENT
Start: 2019-06-19 | End: 2019-06-20

## 2019-06-19 RX ORDER — AMOXICILLIN AND CLAVULANATE POTASSIUM 875; 125 MG/1; MG/1
1 TABLET, FILM COATED ORAL EVERY 12 HOURS
Qty: 20 TABLET | Refills: 0 | Status: SHIPPED | OUTPATIENT
Start: 2019-06-19 | End: 2019-07-19

## 2019-06-19 RX ORDER — ATORVASTATIN CALCIUM 40 MG/1
40 TABLET, FILM COATED ORAL DAILY
COMMUNITY

## 2019-06-19 RX ORDER — ALBUTEROL SULFATE 90 UG/1
2 AEROSOL, METERED RESPIRATORY (INHALATION) EVERY 6 HOURS PRN
Qty: 18 G | Refills: 6 | Status: SHIPPED | OUTPATIENT
Start: 2019-06-19 | End: 2020-11-24 | Stop reason: SDUPTHER

## 2019-06-19 NOTE — PROGRESS NOTES
Diana GTZ Shawn  06/19/2019  [id number]    Jory Alcocer DO  Patient Care Team:  Jory Alcocer DO as PCP - General (Internal Medicine)  SHANI Lao MD as Consulting Physician (Cardiology)  Tyra Ferrell LPN as Care Coordinator (Internal Medicine)  Has the patient seen any provider outside of the [facility] network since the last visit? (yes). If yes, HIPPA forms completed and records requested.        Visit Type:an urgent visit for a new problem    Chief Complaint:  Chief Complaint   Patient presents with    Cough    Dizziness    Nasal Congestion       History of Present Illness:  53 year old, PCP listed at Dr. Alcocer  This is my first visit with her.  Here for urgent visit for cough and dizziness. Nasal congestion. She reports right ear pain. She reports 1.5 weeks. She has cold sores. She is eating and drinking fine.  She reports she has taking Tylenol.  She is unsure if wheeze. Cough is dry.     Chart review shows she has HTN, DM, HLD, Hypothyroid, Anxiety.  She is followed by [facility] for seronegative RA, fibromyalgia and OA. She has been off DMARDs for 2 years until recently when she started having synovitis in her hand. She was started on Methotrexate.     Labs done in May- cmp cbc.   She had HgA1c in Feb, 5.8. BS are controlled.     She does smoke.   She has albuterol for wheeze.   Lab Results   Component Value Date    TSH 0.683 08/14/2018     She has labs schedule already in August for check with her PCP on chronic illness.        History:  Past Medical History:   Diagnosis Date    Asthma     Chronic low back pain     Degenerative disc disease, lumbar     Depression     Diabetes mellitus 2014    DM (diabetes mellitus) 2014     am 02/13/2019    Fibromyalgia     Hyperlipidemia     Hypertension     Hypothyroidism     MRSA (methicillin resistant Staphylococcus aureus) carrier     Palpitations     Dr. Jesus Lao--cardiology    Stroke     TIA    Vitamin D deficiency disease       Past Surgical History:   Procedure Laterality Date    HYSTERECTOMY      left ankle surgery      loop recorder  05/2017    cardiac device    NECK SURGERY  06/2016    ROTATOR CUFF REPAIR      TONSILLECTOMY       Family History   Problem Relation Age of Onset    Cancer Mother     Stroke Mother     Heart disease Mother     Diabetes Mother     Cataracts Mother     Hypertension Mother     Breast cancer Mother     Heart disease Father     COPD Father     Hypertension Father     Diabetes Maternal Aunt     Breast cancer Sister      Social History     Socioeconomic History    Marital status:      Spouse name: Not on file    Number of children: Not on file    Years of education: Not on file    Highest education level: Not on file   Occupational History     Employer: [employer]     Employer: [employer]   Social Needs    Financial resource strain: Not on file    Food insecurity:     Worry: Not on file     Inability: Not on file    Transportation needs:     Medical: Not on file     Non-medical: Not on file   Tobacco Use    Smoking status: Current Some Day Smoker     Packs/day: 0.50     Types: Cigarettes    Smokeless tobacco: Never Used   Substance and Sexual Activity    Alcohol use: No     Alcohol/week: 0.0 oz    Drug use: No    Sexual activity: Yes   Lifestyle    Physical activity:     Days per week: Not on file     Minutes per session: Not on file    Stress: Not on file   Relationships    Social connections:     Talks on phone: Not on file     Gets together: Not on file     Attends Orthodox service: Not on file     Active member of club or organization: Not on file     Attends meetings of clubs or organizations: Not on file     Relationship status: Not on file   Other Topics Concern    Not on file   Social History Narrative    Not on file     Patient Active Problem List   Diagnosis    Obesity    Diplopia    Hypertension associated with diabetes    Combined  "hyperlipidemia associated with type 2 diabetes mellitus    Major depression, recurrent, chronic    Tobacco use    Fibromyalgia    Nodule of right lung    Positive anti-CCP test    History of transient cerebral ischemia    High risk medication use    Jaw pain    Paresthesia    Right sided weakness    HA (headache)    Seronegative rheumatoid arthritis    Osteopenia    Rheumatoid arthritis of multiple sites with negative rheumatoid factor    Myofacial muscle pain    Hypothyroidism (acquired)    Cervical spondylosis with radiculopathy    Bilateral foot pain    Restless leg syndrome    Obesity (BMI 30.0-34.9)    Left hand pain     Review of patient's allergies indicates:   Allergen Reactions    Mobic [meloxicam]     Topamax [topiramate]     Adhesive Rash       The following were reviewed at this visit: active problem list, medication list, allergies, family history, social history, and health maintenance.    Medications:  Current Outpatient Medications on File Prior to Visit   Medication Sig Dispense Refill    amLODIPine (NORVASC) 5 MG tablet TAKE 1 TABLET BY MOUTH ONCE DAILY 90 tablet 1    atorvastatin (LIPITOR) 40 MG tablet Take 40 mg by mouth once daily.      blood sugar diagnostic Strp Glucose testing daily. 50 strip 11    blood-glucose meter Misc Use as directed. 1 each 0    busPIRone (BUSPAR) 5 MG Tab Take 1 tablet (5 mg total) by mouth 2 (two) times daily. 60 tablet 6    clopidogrel (PLAVIX) 75 mg tablet Take 75 mg by mouth once daily.      diclofenac sodium 1 % Gel Apply topically 4 (four) times daily.      folic acid (FOLVITE) 1 MG tablet Take 1 tablet (1 mg total) by mouth once daily. 90 tablet 0    gabapentin (NEURONTIN) 300 MG capsule TAKE 1 CAPSULE BY MOUTH TWICE DAILY 60 capsule 2    insulin needles, disposable, (PEN NEEDLE) 31 X 1/4 " Ndle Twice daily injections. 100 each 6    lancets (LANCETS,ULTRA THIN) Misc Glucose testing daily. 50 each 11    levothyroxine " (SYNTHROID) 50 MCG tablet TAKE 1 TABLET BY MOUTH ONCE DAILY 30 tablet 5    metFORMIN (GLUCOPHAGE-XR) 500 MG 24 hr tablet TAKE 1 TABLET BY MOUTH TWICE DAILY WITH MEALS 180 tablet 1    methotrexate 2.5 MG Tab Take 6 tablets (15 mg total) by mouth every 7 days. 24 tablet 2    pramipexole (MIRAPEX) 0.125 MG tablet Take 1 tablet (0.125 mg total) by mouth every evening. 90 tablet 2    tiZANidine (ZANAFLEX) 4 MG tablet TK 1 T PO Q 12 H  0    venlafaxine (EFFEXOR-XR) 150 MG Cp24 TAKE 1 CAPSULE BY MOUTH ONCE DAILY 30 capsule 6    HYDROcodone-acetaminophen (NORCO) 5-325 mg per tablet Take 1 tablet by mouth.      peg 400-propylene glycol, PF, (SYSTANE ULTRA, PF,) 0.4-0.3 % Dpet Place 1 drop into both eyes 4 (four) times daily. 1 each     [DISCONTINUED] albuterol 90 mcg/actuation inhaler Inhale 2 puffs into the lungs every 6 (six) hours as needed for Wheezing. 18 g 6     No current facility-administered medications on file prior to visit.        Medications have been reviewed and reconciled with patient at this visit.  Barriers to medications present (no)    Adverse reactions to current medications (no)    Over the counter medications reviewed (Yes ), and if needed added to active Medication list at this visit.     Exam:  Wt Readings from Last 3 Encounters:   06/19/19 83.8 kg (184 lb 11.9 oz)   05/08/19 81.3 kg (179 lb 3.7 oz)   02/19/19 82.4 kg (181 lb 10.5 oz)     Temp Readings from Last 3 Encounters:   06/19/19 97.1 °F (36.2 °C) (Tympanic)   02/19/19 98.7 °F (37.1 °C) (Tympanic)   08/21/18 97.6 °F (36.4 °C) (Tympanic)     BP Readings from Last 3 Encounters:   06/19/19 122/80   05/08/19 (!) 132/90   02/19/19 120/76     Pulse Readings from Last 3 Encounters:   06/19/19 104   05/08/19 89   02/19/19 91     Body mass index is 33.79 kg/m².      Review of Systems   Constitutional: Negative.  Negative for chills and fever.   HENT: Positive for congestion. Negative for sinus pain and sore throat.    Eyes: Negative for blurred  vision and double vision.   Respiratory: Positive for cough and sputum production. Negative for shortness of breath and wheezing.    Cardiovascular: Negative for chest pain, palpitations and leg swelling.   Gastrointestinal: Negative for abdominal pain, constipation, diarrhea, heartburn, nausea and vomiting.   Genitourinary: Negative.    Musculoskeletal: Negative.    Skin: Negative.  Negative for rash.   Neurological: Positive for dizziness.   Endo/Heme/Allergies: Negative.  Negative for polydipsia. Does not bruise/bleed easily.   Psychiatric/Behavioral: Negative for depression and substance abuse.     Physical Exam   Constitutional: She is oriented to person, place, and time. She appears well-developed and well-nourished. No distress.   HENT:   Head: Normocephalic and atraumatic.   Right Ear: External ear normal.   Left Ear: External ear normal.   Nose: Nose normal.   Mouth/Throat: Oropharynx is clear and moist. No oropharyngeal exudate.   Eyes: Pupils are equal, round, and reactive to light. Conjunctivae and EOM are normal. Right eye exhibits no discharge. Left eye exhibits no discharge.   Neck: Normal range of motion. Neck supple. No thyromegaly present.   Cardiovascular: Normal rate, regular rhythm, normal heart sounds and intact distal pulses.   No murmur heard.  Pulmonary/Chest: Effort normal. No respiratory distress. She has no wheezes. She has rhonchi in the right upper field and the right middle field.   Abdominal: Soft. Bowel sounds are normal. She exhibits no distension and no mass. There is no tenderness.   Musculoskeletal: Normal range of motion. She exhibits no edema.   Lymphadenopathy:     She has no cervical adenopathy.   Neurological: She is alert and oriented to person, place, and time. No cranial nerve deficit.   Skin: Capillary refill takes less than 2 seconds. She is not diaphoretic.   Psychiatric: She has a normal mood and affect. Her behavior is normal. Judgment and thought content normal.    Nursing note and vitals reviewed.      Laboratory Reviewed ({Yes)  Lab Results   Component Value Date    WBC 9.12 05/08/2019    HGB 15.2 05/08/2019    HCT 48.5 05/08/2019     05/08/2019    CHOL 152 02/13/2019    TRIG 207 (H) 02/13/2019    HDL 45 02/13/2019    ALT 17 05/08/2019    AST 15 05/08/2019     05/08/2019    K 4.0 05/08/2019     05/08/2019    CREATININE 0.7 05/08/2019    BUN 12 05/08/2019    CO2 28 05/08/2019    TSH 0.683 08/14/2018    INR 0.9 03/08/2017    HGBA1C 5.8 (H) 02/13/2019       Diana was seen today for cough, dizziness and nasal congestion.    Diagnoses and all orders for this visit:    Bronchitis  -     X-Ray Chest PA And Lateral; Future    Rheumatoid arthritis of multiple sites with negative rheumatoid factor  -     CBC auto differential; Future  -     Comprehensive metabolic panel; Future  -     X-Ray Chest PA And Lateral; Future  -     URINALYSIS    High risk medication use  -     Comprehensive metabolic panel; Future  -     URINALYSIS    Sinusitis, unspecified chronicity, unspecified location  -     CBC auto differential; Future  -     URINALYSIS    Other orders  -     amoxicillin-clavulanate 875-125mg (AUGMENTIN) 875-125 mg per tablet; Take 1 tablet by mouth every 12 (twelve) hours.  -     fluconazole (DIFLUCAN) 150 MG Tab; Take 1 tablet (150 mg total) by mouth once daily. for 1 day  -     albuterol (PROVENTIL/VENTOLIN HFA) 90 mcg/actuation inhaler; Inhale 2 puffs into the lungs every 6 (six) hours as needed for Wheezing.      Labs today  Eval for infection, due to Methotrexate  Rest  Fluids  WIll phone review results with release to portal            Care Plan/Goals: Reviewed  (N/A)  Goals     None          Follow up: No follow-ups on file.    After visit summary was printed and given to patient upon discharge today.  Patient goals and care plan are included in After Visit Summary.

## 2019-06-26 DIAGNOSIS — R25.2 MUSCLE CRAMPS AT NIGHT: ICD-10-CM

## 2019-06-26 RX ORDER — GABAPENTIN 300 MG/1
CAPSULE ORAL
Qty: 60 CAPSULE | Refills: 2 | Status: SHIPPED | OUTPATIENT
Start: 2019-06-26 | End: 2019-10-04 | Stop reason: SDUPTHER

## 2019-07-15 NOTE — PROGRESS NOTES
HPI     NIDDM exam.  Review 10-2 MOCT machine not working today.  Blurred vision at distance x couple of months.  Last eye exam 06/08/2017 TRF.   Patient no longer takes Plaquenil. Only took 3-4 months    Last edited by Chris Mario, OD on 6/11/2018 10:47 AM. (History)            Assessment /Plan     For exam results, see Encounter Report.    Diabetes mellitus type 2 without retinopathy    Bilateral presbyopia      No Background Diabetic Retinopathy    Schirmers test showed more than ample amount of tears produced in 5 minutes with anesthesia, 25/33mm  No keratitis present however she does have a history of keratitis.    Dispense Final Rx for glasses.  RTC 1 year  Discussed above and answered questions.                    0

## 2019-07-19 DIAGNOSIS — M06.09 RHEUMATOID ARTHRITIS OF MULTIPLE SITES WITH NEGATIVE RHEUMATOID FACTOR: ICD-10-CM

## 2019-07-19 PROBLEM — J34.89 NASAL OBSTRUCTION: Status: ACTIVE | Noted: 2019-07-19

## 2019-07-19 PROBLEM — J32.0 CHRONIC MAXILLARY SINUSITIS: Status: ACTIVE | Noted: 2019-07-19

## 2019-07-19 PROBLEM — J32.3 CHRONIC SPHENOIDAL SINUSITIS: Status: ACTIVE | Noted: 2019-07-19

## 2019-07-19 PROBLEM — J34.2 DEVIATED NASAL SEPTUM: Status: ACTIVE | Noted: 2019-07-19

## 2019-07-19 PROBLEM — J32.2 CHRONIC ETHMOIDAL SINUSITIS: Status: ACTIVE | Noted: 2019-07-19

## 2019-07-19 PROBLEM — J32.1 CHRONIC FRONTAL SINUSITIS: Status: ACTIVE | Noted: 2019-07-19

## 2019-07-19 PROBLEM — J34.3 NASAL TURBINATE HYPERTROPHY: Status: ACTIVE | Noted: 2019-07-19

## 2019-07-19 RX ORDER — METHOTREXATE 2.5 MG/1
TABLET ORAL
Qty: 24 TABLET | Refills: 2 | Status: SHIPPED | OUTPATIENT
Start: 2019-07-19 | End: 2020-01-17

## 2019-07-19 RX ORDER — FOLIC ACID 1 MG/1
TABLET ORAL
Qty: 90 TABLET | Refills: 0 | Status: SHIPPED | OUTPATIENT
Start: 2019-07-19 | End: 2020-01-17

## 2019-08-07 ENCOUNTER — OFFICE VISIT (OUTPATIENT)
Dept: INTERNAL MEDICINE | Facility: CLINIC | Age: 54
End: 2019-08-07
Payer: COMMERCIAL

## 2019-08-07 VITALS
TEMPERATURE: 99 F | OXYGEN SATURATION: 95 % | HEART RATE: 90 BPM | WEIGHT: 186.06 LBS | BODY MASS INDEX: 34.24 KG/M2 | HEIGHT: 62 IN | SYSTOLIC BLOOD PRESSURE: 120 MMHG | DIASTOLIC BLOOD PRESSURE: 66 MMHG

## 2019-08-07 DIAGNOSIS — J40 BRONCHITIS: Primary | ICD-10-CM

## 2019-08-07 DIAGNOSIS — F17.200 TOBACCO DEPENDENCE: ICD-10-CM

## 2019-08-07 PROCEDURE — 99214 OFFICE O/P EST MOD 30 MIN: CPT | Mod: S$GLB,,, | Performed by: PHYSICIAN ASSISTANT

## 2019-08-07 PROCEDURE — 99214 PR OFFICE/OUTPT VISIT, EST, LEVL IV, 30-39 MIN: ICD-10-PCS | Mod: S$GLB,,, | Performed by: PHYSICIAN ASSISTANT

## 2019-08-07 PROCEDURE — 3074F SYST BP LT 130 MM HG: CPT | Mod: CPTII,S$GLB,, | Performed by: PHYSICIAN ASSISTANT

## 2019-08-07 PROCEDURE — 3008F PR BODY MASS INDEX (BMI) DOCUMENTED: ICD-10-PCS | Mod: CPTII,S$GLB,, | Performed by: PHYSICIAN ASSISTANT

## 2019-08-07 PROCEDURE — 99999 PR PBB SHADOW E&M-EST. PATIENT-LVL V: ICD-10-PCS | Mod: PBBFAC,,, | Performed by: PHYSICIAN ASSISTANT

## 2019-08-07 PROCEDURE — 3078F PR MOST RECENT DIASTOLIC BLOOD PRESSURE < 80 MM HG: ICD-10-PCS | Mod: CPTII,S$GLB,, | Performed by: PHYSICIAN ASSISTANT

## 2019-08-07 PROCEDURE — 3008F BODY MASS INDEX DOCD: CPT | Mod: CPTII,S$GLB,, | Performed by: PHYSICIAN ASSISTANT

## 2019-08-07 PROCEDURE — 3078F DIAST BP <80 MM HG: CPT | Mod: CPTII,S$GLB,, | Performed by: PHYSICIAN ASSISTANT

## 2019-08-07 PROCEDURE — 99999 PR PBB SHADOW E&M-EST. PATIENT-LVL V: CPT | Mod: PBBFAC,,, | Performed by: PHYSICIAN ASSISTANT

## 2019-08-07 PROCEDURE — 3074F PR MOST RECENT SYSTOLIC BLOOD PRESSURE < 130 MM HG: ICD-10-PCS | Mod: CPTII,S$GLB,, | Performed by: PHYSICIAN ASSISTANT

## 2019-08-07 RX ORDER — PREDNISONE 10 MG/1
TABLET ORAL
Qty: 18 TABLET | Refills: 0 | Status: SHIPPED | OUTPATIENT
Start: 2019-08-07 | End: 2019-10-17

## 2019-08-07 RX ORDER — PROMETHAZINE HYDROCHLORIDE AND DEXTROMETHORPHAN HYDROBROMIDE 6.25; 15 MG/5ML; MG/5ML
5 SYRUP ORAL 3 TIMES DAILY PRN
Qty: 240 ML | Refills: 0 | Status: SHIPPED | OUTPATIENT
Start: 2019-08-07 | End: 2019-08-17

## 2019-08-07 NOTE — PROGRESS NOTES
Subjective:       Patient ID: Diana Escobar is a 54 y.o. female.    Chief Complaint: Cough and chest congestion    Cough   This is a new problem. The current episode started more than 1 month ago. The problem has been unchanged. The problem occurs every few minutes. The cough is non-productive. Associated symptoms include ear congestion, nasal congestion, postnasal drip, shortness of breath and wheezing. Pertinent negatives include no chest pain or chills. Nothing aggravates the symptoms. She has tried nothing for the symptoms.     Review of Systems   Constitutional: Negative for chills and fatigue.   HENT: Positive for congestion and postnasal drip.    Respiratory: Positive for cough, shortness of breath and wheezing. Negative for chest tightness.    Cardiovascular: Negative for chest pain.   Gastrointestinal: Negative for abdominal pain.       Objective:      Physical Exam   Constitutional: She appears well-developed and well-nourished. No distress.   HENT:   Head: Normocephalic and atraumatic.   Right Ear: Tympanic membrane and ear canal normal.   Left Ear: Tympanic membrane and ear canal normal.   Nose: Mucosal edema and rhinorrhea present.   Mouth/Throat: Uvula is midline. Posterior oropharyngeal edema and posterior oropharyngeal erythema present. No tonsillar exudate.   Neck: Neck supple.   Cardiovascular: Normal rate and regular rhythm. Exam reveals no gallop and no friction rub.   No murmur heard.  Pulmonary/Chest: Effort normal and breath sounds normal. No stridor. No respiratory distress. She has no wheezes. She has no rales. She exhibits no tenderness.   Abdominal: Soft. Bowel sounds are normal.   Lymphadenopathy:     She has no cervical adenopathy.   Skin: She is not diaphoretic.   Nursing note and vitals reviewed.      Assessment:       1. Bronchitis    2. Tobacco dependence        Plan:       Bronchitis  -     predniSONE (DELTASONE) 10 MG tablet; Take 3 daily for 3 days, then 2 daily for three  days, then 1 daily for three days.  Dispense: 18 tablet; Refill: 0  -     promethazine-dextromethorphan (PROMETHAZINE-DM) 6.25-15 mg/5 mL Syrp; Take 5 mLs by mouth 3 (three) times daily as needed.  Dispense: 240 mL; Refill: 0  -     Ambulatory consult to Pulmonology    Tobacco dependence  -     Ambulatory referral to Smoking Cessation Program  -     Ambulatory consult to Pulmonology

## 2019-08-08 ENCOUNTER — OFFICE VISIT (OUTPATIENT)
Dept: RHEUMATOLOGY | Facility: CLINIC | Age: 54
End: 2019-08-08
Payer: COMMERCIAL

## 2019-08-08 VITALS
HEIGHT: 62 IN | BODY MASS INDEX: 34.89 KG/M2 | DIASTOLIC BLOOD PRESSURE: 80 MMHG | HEART RATE: 103 BPM | SYSTOLIC BLOOD PRESSURE: 125 MMHG | WEIGHT: 189.63 LBS

## 2019-08-08 DIAGNOSIS — G25.81 RESTLESS LEG SYNDROME: ICD-10-CM

## 2019-08-08 DIAGNOSIS — M79.7 FIBROMYALGIA: ICD-10-CM

## 2019-08-08 DIAGNOSIS — Z79.899 HIGH RISK MEDICATION USE: ICD-10-CM

## 2019-08-08 DIAGNOSIS — M06.09 RHEUMATOID ARTHRITIS OF MULTIPLE SITES WITH NEGATIVE RHEUMATOID FACTOR: Primary | ICD-10-CM

## 2019-08-08 DIAGNOSIS — E66.9 OBESITY (BMI 30.0-34.9): ICD-10-CM

## 2019-08-08 PROCEDURE — 99214 PR OFFICE/OUTPT VISIT, EST, LEVL IV, 30-39 MIN: ICD-10-PCS | Mod: S$GLB,,, | Performed by: INTERNAL MEDICINE

## 2019-08-08 PROCEDURE — 3074F SYST BP LT 130 MM HG: CPT | Mod: CPTII,S$GLB,, | Performed by: INTERNAL MEDICINE

## 2019-08-08 PROCEDURE — 99999 PR PBB SHADOW E&M-EST. PATIENT-LVL III: ICD-10-PCS | Mod: PBBFAC,,, | Performed by: INTERNAL MEDICINE

## 2019-08-08 PROCEDURE — 99999 PR PBB SHADOW E&M-EST. PATIENT-LVL III: CPT | Mod: PBBFAC,,, | Performed by: INTERNAL MEDICINE

## 2019-08-08 PROCEDURE — 3079F DIAST BP 80-89 MM HG: CPT | Mod: CPTII,S$GLB,, | Performed by: INTERNAL MEDICINE

## 2019-08-08 PROCEDURE — 3079F PR MOST RECENT DIASTOLIC BLOOD PRESSURE 80-89 MM HG: ICD-10-PCS | Mod: CPTII,S$GLB,, | Performed by: INTERNAL MEDICINE

## 2019-08-08 PROCEDURE — 3008F BODY MASS INDEX DOCD: CPT | Mod: CPTII,S$GLB,, | Performed by: INTERNAL MEDICINE

## 2019-08-08 PROCEDURE — 3008F PR BODY MASS INDEX (BMI) DOCUMENTED: ICD-10-PCS | Mod: CPTII,S$GLB,, | Performed by: INTERNAL MEDICINE

## 2019-08-08 PROCEDURE — 3074F PR MOST RECENT SYSTOLIC BLOOD PRESSURE < 130 MM HG: ICD-10-PCS | Mod: CPTII,S$GLB,, | Performed by: INTERNAL MEDICINE

## 2019-08-08 PROCEDURE — 99214 OFFICE O/P EST MOD 30 MIN: CPT | Mod: S$GLB,,, | Performed by: INTERNAL MEDICINE

## 2019-08-08 ASSESSMENT — ROUTINE ASSESSMENT OF PATIENT INDEX DATA (RAPID3): MDHAQ FUNCTION SCORE: .9

## 2019-08-08 NOTE — PROGRESS NOTES
RHEUMATOLOGY CLINIC FOLLOW UP VISIT  Chief complaints:-  To follow up for rheumatoid arthritis.    HPI:-   Diana Rubio a 54 y.o. pleasant female comes in for a follow up visit. She follows up in the Rheumatology Clinic for seronegative rheumatoid arthritis, fibromyalgia and osteoarthritis.  Her rheumatoid arthritis was in remission for more than 2 years off all disease modifying agents including methotrexate, hydroxychloroquine and Humira.  She had a recent flare and was restarted on methotrexate.  Since restarting methotrexate she reports significant improvement without any joint pain or severe flare-ups.  No prolonged morning stiffness.   Mirapex has been helping her restless leg syndrome.      Review of Systems   Constitutional: Negative for chills and fever.   HENT: Negative for congestion and sore throat.    Eyes: Negative for blurred vision and redness.   Respiratory: Negative for cough and shortness of breath.    Cardiovascular: Negative for chest pain and leg swelling.   Gastrointestinal: Negative for abdominal pain.   Genitourinary: Negative for dysuria.   Musculoskeletal: Positive for back pain and joint pain. Negative for falls, myalgias and neck pain.   Skin: Negative for rash.   Neurological: Negative for headaches.   Endo/Heme/Allergies: Does not bruise/bleed easily.   Psychiatric/Behavioral: Negative for memory loss. The patient does not have insomnia.        Past Medical History:   Diagnosis Date    Asthma     Chronic low back pain     Degenerative disc disease, lumbar     Depression     Diabetes mellitus 2014    DM (diabetes mellitus) 2014     am 02/13/2019    Fibromyalgia     Hyperlipidemia     Hypertension     Hypothyroidism     MRSA (methicillin resistant Staphylococcus aureus) carrier     Palpitations     Dr. Jesus Lao--cardiology    Stroke     TIA    Vitamin D deficiency disease        Past Surgical History:  "  Procedure Laterality Date    HYSTERECTOMY      left ankle surgery      loop recorder  05/2017    cardiac device    NECK SURGERY  06/2016    ROTATOR CUFF REPAIR      TONSILLECTOMY          Social History     Tobacco Use    Smoking status: Current Some Day Smoker     Packs/day: 0.50     Types: Cigarettes    Smokeless tobacco: Never Used   Substance Use Topics    Alcohol use: No     Alcohol/week: 0.0 oz    Drug use: No       Family History   Problem Relation Age of Onset    Cancer Mother     Stroke Mother     Heart disease Mother     Diabetes Mother     Cataracts Mother     Hypertension Mother     Breast cancer Mother     Heart disease Father     COPD Father     Hypertension Father     Diabetes Maternal Aunt     Breast cancer Sister        Review of patient's allergies indicates:   Allergen Reactions    Mobic [meloxicam]     Topamax [topiramate]     Adhesive Rash       Vitals:    08/08/19 1504   BP: 125/80   Pulse: 103   Weight: 86 kg (189 lb 9.5 oz)   Height: 5' 2" (1.575 m)   PainSc:   2       Physical Exam   Constitutional: She is oriented to person, place, and time and well-developed, well-nourished, and in no distress. No distress.   HENT:   Head: Normocephalic.   Mouth/Throat: Oropharynx is clear and moist.   Eyes: Pupils are equal, round, and reactive to light. Conjunctivae and EOM are normal.   Neck: Normal range of motion. Neck supple.   Cardiovascular: Normal rate and intact distal pulses.   Pulmonary/Chest: Effort normal. No respiratory distress.   Abdominal: Soft. There is no tenderness.   Musculoskeletal:   No synovitis over small joints of hands or feet.  No effusion over large joints.   Neurological: She is alert and oriented to person, place, and time. No cranial nerve deficit.   Skin: Skin is warm. No rash noted. No erythema.   Psychiatric: Mood and affect normal.   Nursing note and vitals reviewed.      Medication List with Changes/Refills   Current Medications    ALBUTEROL " "(PROVENTIL/VENTOLIN HFA) 90 MCG/ACTUATION INHALER    Inhale 2 puffs into the lungs every 6 (six) hours as needed for Wheezing.    AMLODIPINE (NORVASC) 5 MG TABLET    TAKE 1 TABLET BY MOUTH ONCE DAILY    ATORVASTATIN (LIPITOR) 40 MG TABLET    Take 40 mg by mouth once daily.    AZELASTINE (ASTELIN) 137 MCG (0.1 %) NASAL SPRAY    2 sprays (274 mcg total) by Nasal route 2 (two) times daily.    BLOOD SUGAR DIAGNOSTIC STRP    Glucose testing daily.    BLOOD-GLUCOSE METER MISC    Use as directed.    BUDESONIDE-FORMOTEROL 160-4.5 MCG (SYMBICORT) 160-4.5 MCG/ACTUATION HFAA    Inhale 2 puffs into the lungs every 12 (twelve) hours. Controller    BUSPIRONE (BUSPAR) 5 MG TAB    Take 1 tablet (5 mg total) by mouth 2 (two) times daily.    CLOPIDOGREL (PLAVIX) 75 MG TABLET    Take 75 mg by mouth once daily.    DICLOFENAC SODIUM 1 % GEL    Apply topically 4 (four) times daily.    FLUTICASONE PROPIONATE (FLONASE) 50 MCG/ACTUATION NASAL SPRAY    USE 2 SPRAY(S) IN EACH NOSTRIL TWICE DAILY    FOLIC ACID (FOLVITE) 1 MG TABLET    TAKE 1 TABLET BY MOUTH ONCE DAILY    GABAPENTIN (NEURONTIN) 300 MG CAPSULE    TAKE 1 CAPSULE BY MOUTH TWICE DAILY    INSULIN NEEDLES, DISPOSABLE, (PEN NEEDLE) 31 X 1/4 " NDLE    Twice daily injections.    LANCETS (LANCETS,ULTRA THIN) MISC    Glucose testing daily.    LEVOTHYROXINE (SYNTHROID) 50 MCG TABLET    TAKE 1 TABLET BY MOUTH ONCE DAILY    METFORMIN (GLUCOPHAGE-XR) 500 MG 24 HR TABLET    TAKE 1 TABLET BY MOUTH TWICE DAILY WITH MEALS    METHOTREXATE 2.5 MG TAB     TAKE 6 TABLETS BY MOUTH EVERY 7 DAYS    MYRBETRIQ 25 MG TB24 ER TABLET    Take 25 mg by mouth once daily.    -PROPYLENE GLYCOL, PF, (SYSTANE ULTRA, PF,) 0.4-0.3 % DPET    Place 1 drop into both eyes 4 (four) times daily.    PRAMIPEXOLE (MIRAPEX) 0.125 MG TABLET    Take 1 tablet (0.125 mg total) by mouth every evening.    PREDNISONE (DELTASONE) 10 MG TABLET    Take 3 daily for 3 days, then 2 daily for three days, then 1 daily for three days. "    PROMETHAZINE-DEXTROMETHORPHAN (PROMETHAZINE-DM) 6.25-15 MG/5 ML SYRP    Take 5 mLs by mouth 3 (three) times daily as needed.    VENLAFAXINE (EFFEXOR-XR) 150 MG CP24    TAKE 1 CAPSULE BY MOUTH ONCE DAILY       Assessment/Plans:-  1. Rheumatoid arthritis of multiple sites with negative rheumatoid factor    2. Restless leg syndrome    3. Fibromyalgia    4. Obesity (BMI 30.0-34.9)    5. High risk medication use      Problem List Items Addressed This Visit        Neuro    Restless leg syndrome    Current Assessment & Plan     Significant improvement since Mirapex.  Continue the same.            Endocrine    Obesity (BMI 30.0-34.9)    Current Assessment & Plan     Advised low carb diet.            Orthopedic    Fibromyalgia    Current Assessment & Plan     Stable on opioid therapy along with gabapentin.  Continue follow-up with pain specialist.         Rheumatoid arthritis of multiple sites with negative rheumatoid factor - Primary    Current Assessment & Plan     Seronegative arthritis doing well on methotrexate monotherapy.  She has been on methotrexate, Humira on Plaquenil in the past which was discontinued 2 years ago because of remission.  Recently methotrexate was restarted and she has been doing well since then.  No synovitis on examination today.  She could not get MRI because of the 500 dollar co-pay per her insurance company.  Continue methotrexate.            Other    High risk medication use    Current Assessment & Plan     Hold methotrexate if any infection.  Safety labs today.             # Follow up in about 3 months (around 11/8/2019).      Disclaimer: This note was prepared using voice recognition system and is likely to have sound alike errors and is not proof read.  Please call me with any questions.

## 2019-08-08 NOTE — ASSESSMENT & PLAN NOTE
Seronegative arthritis doing well on methotrexate monotherapy.  She has been on methotrexate, Humira on Plaquenil in the past which was discontinued 2 years ago because of remission.  Recently methotrexate was restarted and she has been doing well since then.  No synovitis on examination today.  She could not get MRI because of the 500 dollar co-pay per her insurance company.  Continue methotrexate.

## 2019-08-13 ENCOUNTER — CLINICAL SUPPORT (OUTPATIENT)
Dept: SMOKING CESSATION | Facility: CLINIC | Age: 54
End: 2019-08-13
Payer: COMMERCIAL

## 2019-08-13 ENCOUNTER — OFFICE VISIT (OUTPATIENT)
Dept: PULMONOLOGY | Facility: CLINIC | Age: 54
End: 2019-08-13
Payer: COMMERCIAL

## 2019-08-13 VITALS
OXYGEN SATURATION: 94 % | SYSTOLIC BLOOD PRESSURE: 120 MMHG | WEIGHT: 188.5 LBS | RESPIRATION RATE: 18 BRPM | HEIGHT: 62 IN | HEART RATE: 100 BPM | DIASTOLIC BLOOD PRESSURE: 78 MMHG | BODY MASS INDEX: 34.69 KG/M2

## 2019-08-13 DIAGNOSIS — G47.33 OSA (OBSTRUCTIVE SLEEP APNEA): ICD-10-CM

## 2019-08-13 DIAGNOSIS — F17.210 NICOTINE DEPENDENCE, CIGARETTES, UNCOMPLICATED: Chronic | ICD-10-CM

## 2019-08-13 DIAGNOSIS — R91.1 NODULE OF LEFT LUNG: Chronic | ICD-10-CM

## 2019-08-13 DIAGNOSIS — E66.09 CLASS 1 OBESITY DUE TO EXCESS CALORIES WITH SERIOUS COMORBIDITY AND BODY MASS INDEX (BMI) OF 34.0 TO 34.9 IN ADULT: Chronic | ICD-10-CM

## 2019-08-13 DIAGNOSIS — R06.89 DYSPNEA AND RESPIRATORY ABNORMALITIES: Primary | ICD-10-CM

## 2019-08-13 DIAGNOSIS — R06.00 DYSPNEA AND RESPIRATORY ABNORMALITIES: Primary | ICD-10-CM

## 2019-08-13 DIAGNOSIS — F17.210 CIGARETTE NICOTINE DEPENDENCE, UNCOMPLICATED: Primary | ICD-10-CM

## 2019-08-13 PROCEDURE — 99205 PR OFFICE/OUTPT VISIT, NEW, LEVL V, 60-74 MIN: ICD-10-PCS | Mod: S$GLB,,, | Performed by: INTERNAL MEDICINE

## 2019-08-13 PROCEDURE — 3074F SYST BP LT 130 MM HG: CPT | Mod: CPTII,S$GLB,, | Performed by: INTERNAL MEDICINE

## 2019-08-13 PROCEDURE — 99999 PR PBB SHADOW E&M-EST. PATIENT-LVL III: ICD-10-PCS | Mod: PBBFAC,,, | Performed by: INTERNAL MEDICINE

## 2019-08-13 PROCEDURE — 99205 OFFICE O/P NEW HI 60 MIN: CPT | Mod: S$GLB,,, | Performed by: INTERNAL MEDICINE

## 2019-08-13 PROCEDURE — 3078F PR MOST RECENT DIASTOLIC BLOOD PRESSURE < 80 MM HG: ICD-10-PCS | Mod: CPTII,S$GLB,, | Performed by: INTERNAL MEDICINE

## 2019-08-13 PROCEDURE — 3074F PR MOST RECENT SYSTOLIC BLOOD PRESSURE < 130 MM HG: ICD-10-PCS | Mod: CPTII,S$GLB,, | Performed by: INTERNAL MEDICINE

## 2019-08-13 PROCEDURE — 3078F DIAST BP <80 MM HG: CPT | Mod: CPTII,S$GLB,, | Performed by: INTERNAL MEDICINE

## 2019-08-13 PROCEDURE — 99404 PREV MED CNSL INDIV APPRX 60: CPT | Mod: S$GLB,,,

## 2019-08-13 PROCEDURE — 99999 PR PBB SHADOW E&M-EST. PATIENT-LVL I: ICD-10-PCS | Mod: PBBFAC,,,

## 2019-08-13 PROCEDURE — 99404 PR PREVENT COUNSEL,INDIV,60 MIN: ICD-10-PCS | Mod: S$GLB,,,

## 2019-08-13 PROCEDURE — 99999 PR PBB SHADOW E&M-EST. PATIENT-LVL I: CPT | Mod: PBBFAC,,,

## 2019-08-13 PROCEDURE — 3008F BODY MASS INDEX DOCD: CPT | Mod: CPTII,S$GLB,, | Performed by: INTERNAL MEDICINE

## 2019-08-13 PROCEDURE — 99999 PR PBB SHADOW E&M-EST. PATIENT-LVL III: CPT | Mod: PBBFAC,,, | Performed by: INTERNAL MEDICINE

## 2019-08-13 PROCEDURE — 3008F PR BODY MASS INDEX (BMI) DOCUMENTED: ICD-10-PCS | Mod: CPTII,S$GLB,, | Performed by: INTERNAL MEDICINE

## 2019-08-13 RX ORDER — IBUPROFEN 200 MG
1 TABLET ORAL DAILY
Qty: 28 PATCH | Refills: 0 | Status: SHIPPED | OUTPATIENT
Start: 2019-08-13 | End: 2019-10-17

## 2019-08-13 RX ORDER — BUPROPION HYDROCHLORIDE 150 MG/1
150 TABLET, EXTENDED RELEASE ORAL 2 TIMES DAILY
Qty: 60 TABLET | Refills: 0 | Status: SHIPPED | OUTPATIENT
Start: 2019-08-13 | End: 2019-10-16 | Stop reason: SDUPTHER

## 2019-08-13 NOTE — PATIENT INSTRUCTIONS
Lung Anatomy  Your lungs take air in to give your body oxygen, which the body needs to work. Your lungs, like all the tissues in your body, are made up of billions of tiny specialized cells. Old lung cells die and are replaced by new, identical lung cells. This natural process helps ensure healthy lungs.    Date Last Reviewed: 11/1/2016  © 9334-6962 [employer]. [address] [place] [address], [place] [address]. All rights reserved. This information is not intended as a substitute for professional medical care. Always follow your healthcare professional's instructions.

## 2019-08-13 NOTE — Clinical Note
2nd time in program. The patient is smoking 22 cigarettes/day and did quit for 2 years while pregnant. She had gotten down to 4 cigarettes he first time. She wants to quit , but does not feel totally confident. She will use the 21 mg nicotine patch and 150 mg Wellbutrin SR QD x 7 days then BID. She wants to do individual at first then may join the group. The patient will continue individual sessions and medication monitoring by CTTS. Prescribed medication management will be by practitoner

## 2019-08-13 NOTE — LETTER
August 13, 2019      Ed Hsu III, PA-C  [address] [place] [address] [address] [place] [address]           [facility]  [address] [place] [address] [address] [address]  Phone: [phone]  Fax: [phone]          Patient: Diana Escobar   MR Number: [id number]   YOB: 1965   Date of Visit: 8/13/2019       Dear Ed Hsu III:    Thank you for referring Diana Escobar to me for evaluation. Attached you will find relevant portions of my assessment and plan of care.    If you have questions, please do not hesitate to call me. I look forward to following Diana Escobar along with you.    Sincerely,    Eric Boyle MD    Enclosure  CC:  No Recipients    If you would like to receive this communication electronically, please contact [email or web address] or [phone] to request more information on [employer] access.    For providers and/or their staff who would like to refer a patient to [facility], please contact us through our one-stop-shop provider referral line, [facility] , at [phone].    If you feel you have received this communication in error or would no longer like to receive these types of communications, please e-mail [email or web address]

## 2019-08-13 NOTE — PROGRESS NOTES
2nd time in program. The patient is smoking 22 cigarettes/day and did quit for 2 years while pregnant. She had gotten down to 4 cigarettes he first time. She wants to quit , but does not feel totally confident. She will use the 21 mg nicotine patch and 150 mg Wellbutrin SR QD x 7 days then BID. She wants to do individual at first then may join the group. The patient will continue individual sessions and medication monitoring by CTTS. Prescribed medication management will be by practitoner.

## 2019-08-13 NOTE — ASSESSMENT & PLAN NOTE
General weight loss/lifestyle modification strategies discussed (elicit support from others; identify saboteurs; non-food rewards, etc).  Behavioral treatment: Slim Fast.  Diet interventions: low calorie (1000 kCal/d) deficit diet.  Informal exercise measures discussed, e.g. taking stairs instead of elevator.  Regular aerobic exercise program discussed.

## 2019-08-13 NOTE — ASSESSMENT & PLAN NOTE
I assessed Diana Escobar  to be in a pre-contemplative stage with respect to tobacco use.     The patient was advised of the adverse effects of cigarette smoking including increased risk of cancer and respiratory diseases. she is  ready to stop smoking. Smoking cessation techniques were reviewed. These include removing cigarettes and smoking materials from environment and stress management. I advised patient to quit, and offered support.  Educational material distributed.  Discussed current use pattern.. I reviewed smoking cessation  treatments including  nicotine gum, nicotine patch and willpower.  Side effects of medications including the risk of exacerbating underlying heart disease, nausea, GI upset, insomnia, sleep disturbance and possible suicidal ideation discussed.  I spent approximately 15 minutes counseling the patient.

## 2019-08-13 NOTE — ASSESSMENT & PLAN NOTE
Etiology unclear.  Multifactorial etiology suspected.  Likely contributors to etiology (checked)    [x] Pulmonary airway disease    [x]  Pulmonary parenchymal disease  [] Pulmonary vascular disease   [] Pleural disease  [] Pulmonary vasculitis  [] Hypoventilation ( chest wall deformity, neuromuscular disease, obesity etc)  [] Anemia  [] Thyroid disease.  [] Cardiac illess  []         Sleep disorder    EVALUATION:  []        Complete PFT with bronchodilator.  []        Bronchial challenge with methacholine.   [x]        Stress test, pulmonary.  [x]        PULM - Arterial Blood Gases  []        Chest X Ray  [x]        CT scan of chest.   [x]        STEVE  [x]        Sedimentation rate  [x]        C-reactive protein  [x]        Anti-neutrophilic cytoplasmic antibody          PLAN:  Discussed diagnosis, its evaluation, treatment and usual course.  All questions answered.        Call if shortness of breath worsens, blood in sputum, change in character of cough, development of fever or chills, inability to maintain nutrition and hydration.     Re evaluate in six weeks with results.

## 2019-08-13 NOTE — ASSESSMENT & PLAN NOTE
Repeat CT chest.    Fleischner Society Guidelines:    High risk patient:   Smoking history, history of malignancy or risk factors for malignancy.( non-solid ground glass opacities and partially solid nodules may require longer follow up to exclude indolent adenocarcinoma)      Nodule size Low risk patient High risk patient   4 mm  No follow up Follow up CT in 12 months. If unchanged, no further follow up.   4 - 6 mm Follow up CT in 12 months; if unchanged, no further follow up.  Initial follow up CT in 6 - 12 months, then at 18 - 24 months if no change.      6 - 8 mm  Initial follow up CT at 6 - 12 months and at 18 months if no change.  Initial follow up CT at 3 - 6 months. Then at 9-12 months and 24 months if no change.      > 8 mm Initial follow up CT at 3, 6, 9 and 24 months, dynamic contrast enhanced CT, PET-CT and/or biopsy. Initial follow up CT at 3, 6, 9 and 24 months, dynamic contrast enhanced CT, PET-CT and/or biopsy.

## 2019-08-14 ENCOUNTER — TELEPHONE (OUTPATIENT)
Dept: PULMONOLOGY | Facility: HOSPITAL | Age: 54
End: 2019-08-14

## 2019-08-15 ENCOUNTER — HOSPITAL ENCOUNTER (EMERGENCY)
Facility: HOSPITAL | Age: 54
Discharge: HOME | End: 2019-08-16
Attending: EMERGENCY MEDICINE
Payer: COMMERCIAL

## 2019-08-15 DIAGNOSIS — G44.209 TENSION-TYPE HEADACHE, NOT INTRACTABLE, UNSPECIFIED CHRONICITY PATTERN: ICD-10-CM

## 2019-08-15 DIAGNOSIS — R00.2 PALPITATIONS: ICD-10-CM

## 2019-08-15 DIAGNOSIS — M43.6 TORTICOLLIS: Primary | ICD-10-CM

## 2019-08-15 LAB
ALBUMIN SERPL BCP-MCNC: 4 G/DL (ref 3.5–5.2)
ALP SERPL-CCNC: 93 U/L (ref 55–135)
ALT SERPL W/O P-5'-P-CCNC: 26 U/L (ref 10–44)
ANION GAP SERPL CALC-SCNC: 15 MMOL/L (ref 8–16)
ANISOCYTOSIS BLD QL SMEAR: SLIGHT
AST SERPL-CCNC: 18 U/L (ref 10–40)
BASOPHILS # BLD AUTO: 0.1 K/UL (ref 0–0.2)
BASOPHILS NFR BLD: 0.7 % (ref 0–1.9)
BILIRUB SERPL-MCNC: 0.3 MG/DL (ref 0.1–1)
BUN SERPL-MCNC: 16 MG/DL (ref 6–20)
CALCIUM SERPL-MCNC: 9 MG/DL (ref 8.7–10.5)
CHLORIDE SERPL-SCNC: 104 MMOL/L (ref 95–110)
CK SERPL-CCNC: 82 U/L (ref 20–180)
CO2 SERPL-SCNC: 24 MMOL/L (ref 23–29)
CREAT SERPL-MCNC: 0.7 MG/DL (ref 0.5–1.4)
DACRYOCYTES BLD QL SMEAR: ABNORMAL
DIFFERENTIAL METHOD: ABNORMAL
EOSINOPHIL # BLD AUTO: 0.3 K/UL (ref 0–0.5)
EOSINOPHIL NFR BLD: 1.8 % (ref 0–8)
ERYTHROCYTE [DISTWIDTH] IN BLOOD BY AUTOMATED COUNT: 16 % (ref 11.5–14.5)
EST. GFR  (AFRICAN AMERICAN): >60 ML/MIN/1.73 M^2
EST. GFR  (NON AFRICAN AMERICAN): >60 ML/MIN/1.73 M^2
GLUCOSE SERPL-MCNC: 114 MG/DL (ref 70–110)
HCT VFR BLD AUTO: 42.5 % (ref 37–48.5)
HGB BLD-MCNC: 13.5 G/DL (ref 12–16)
LYMPHOCYTES # BLD AUTO: 4.4 K/UL (ref 1–4.8)
LYMPHOCYTES NFR BLD: 31.1 % (ref 18–48)
MCH RBC QN AUTO: 32.2 PG (ref 27–31)
MCHC RBC AUTO-ENTMCNC: 31.8 G/DL (ref 32–36)
MCV RBC AUTO: 101 FL (ref 82–98)
MONOCYTES # BLD AUTO: 1.1 K/UL (ref 0.3–1)
MONOCYTES NFR BLD: 8 % (ref 4–15)
NEUTROPHILS # BLD AUTO: 8.2 K/UL (ref 1.8–7.7)
NEUTROPHILS NFR BLD: 58.4 % (ref 38–73)
PLATELET # BLD AUTO: 189 K/UL (ref 150–350)
PMV BLD AUTO: 11.6 FL (ref 9.2–12.9)
POIKILOCYTOSIS BLD QL SMEAR: SLIGHT
POTASSIUM SERPL-SCNC: 3.9 MMOL/L (ref 3.5–5.1)
PROT SERPL-MCNC: 7 G/DL (ref 6–8.4)
RBC # BLD AUTO: 4.19 M/UL (ref 4–5.4)
SODIUM SERPL-SCNC: 143 MMOL/L (ref 136–145)
SPHEROCYTES BLD QL SMEAR: ABNORMAL
STOMATOCYTES BLD QL SMEAR: PRESENT
TROPONIN I SERPL DL<=0.01 NG/ML-MCNC: 0.01 NG/ML (ref 0–0.03)
WBC # BLD AUTO: 14.05 K/UL (ref 3.9–12.7)

## 2019-08-15 PROCEDURE — 80053 COMPREHEN METABOLIC PANEL: CPT

## 2019-08-15 PROCEDURE — 96374 THER/PROPH/DIAG INJ IV PUSH: CPT

## 2019-08-15 PROCEDURE — 63600175 PHARM REV CODE 636 W HCPCS: Performed by: EMERGENCY MEDICINE

## 2019-08-15 PROCEDURE — 25000003 PHARM REV CODE 250: Performed by: EMERGENCY MEDICINE

## 2019-08-15 PROCEDURE — 99284 EMERGENCY DEPT VISIT MOD MDM: CPT | Mod: 25

## 2019-08-15 PROCEDURE — 36415 COLL VENOUS BLD VENIPUNCTURE: CPT

## 2019-08-15 PROCEDURE — 93010 EKG 12-LEAD: ICD-10-PCS | Mod: ,,, | Performed by: INTERNAL MEDICINE

## 2019-08-15 PROCEDURE — 93010 ELECTROCARDIOGRAM REPORT: CPT | Mod: ,,, | Performed by: INTERNAL MEDICINE

## 2019-08-15 PROCEDURE — 84484 ASSAY OF TROPONIN QUANT: CPT

## 2019-08-15 PROCEDURE — 93005 ELECTROCARDIOGRAM TRACING: CPT

## 2019-08-15 PROCEDURE — 96361 HYDRATE IV INFUSION ADD-ON: CPT

## 2019-08-15 PROCEDURE — 82550 ASSAY OF CK (CPK): CPT

## 2019-08-15 PROCEDURE — 96375 TX/PRO/DX INJ NEW DRUG ADDON: CPT

## 2019-08-15 PROCEDURE — 85025 COMPLETE CBC W/AUTO DIFF WBC: CPT

## 2019-08-15 RX ORDER — CYCLOBENZAPRINE HCL 10 MG
10 TABLET ORAL
Status: COMPLETED | OUTPATIENT
Start: 2019-08-15 | End: 2019-08-15

## 2019-08-15 RX ORDER — BUTORPHANOL TARTRATE 2 MG/ML
1 INJECTION INTRAMUSCULAR; INTRAVENOUS ONCE
Status: COMPLETED | OUTPATIENT
Start: 2019-08-16 | End: 2019-08-15

## 2019-08-15 RX ADMIN — SODIUM CHLORIDE 1000 ML: 0.9 INJECTION, SOLUTION INTRAVENOUS at 10:08

## 2019-08-15 RX ADMIN — BUTORPHANOL TARTRATE 1 MG: 2 INJECTION, SOLUTION INTRAMUSCULAR; INTRAVENOUS at 11:08

## 2019-08-15 RX ADMIN — CYCLOBENZAPRINE HYDROCHLORIDE 10 MG: 10 TABLET, FILM COATED ORAL at 10:08

## 2019-08-16 VITALS
WEIGHT: 192 LBS | DIASTOLIC BLOOD PRESSURE: 72 MMHG | TEMPERATURE: 98 F | HEART RATE: 87 BPM | HEIGHT: 62 IN | RESPIRATION RATE: 16 BRPM | SYSTOLIC BLOOD PRESSURE: 115 MMHG | BODY MASS INDEX: 35.33 KG/M2 | OXYGEN SATURATION: 95 %

## 2019-08-16 PROCEDURE — 63600175 PHARM REV CODE 636 W HCPCS: Performed by: EMERGENCY MEDICINE

## 2019-08-16 RX ORDER — HYDROCODONE BITARTRATE AND ACETAMINOPHEN 10; 325 MG/1; MG/1
1 TABLET ORAL EVERY 6 HOURS PRN
Qty: 9 TABLET | Refills: 0 | Status: SHIPPED | OUTPATIENT
Start: 2019-08-16 | End: 2019-10-17

## 2019-08-16 RX ORDER — CYCLOBENZAPRINE HCL 10 MG
5 TABLET ORAL 3 TIMES DAILY PRN
Qty: 15 TABLET | Refills: 0 | Status: SHIPPED | OUTPATIENT
Start: 2019-08-16 | End: 2019-09-03

## 2019-08-16 RX ADMIN — LORAZEPAM 1 MG: 2 INJECTION INTRAMUSCULAR; INTRAVENOUS at 12:08

## 2019-08-16 NOTE — ED PROVIDER NOTES
"SCRIBE #1 NOTE: I, Bk Muñoz, am scribing for, and in the presence of, BLAIR Bucio. I have scribed the entire note.     8/15/2019, 9:23 PM   History obtained from the patient      History of Present Illness: Diana Escobar is a 54 y.o. 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    9:29 PM: Pt was placed back in [facility]. I explained to pt that due to lack of available rooms pt was seen by myself to begin the workup. Pt understands they will be seen and dispositioned by the next available physician. Pt voices understanding and agrees with plan. Pt also understands the longer than normal wait time. I am removing myself from the care of pt and pt will now be assigned to the next available physician.       Physician Attestation Statement for Scribe #1: I, BLAIR Bucio, personally performed the services described in this documentation, as scribed by Bk Muñoz, in my presence, and it is both accurate and complete.     SCRIBE #1 NOTE: I, Bronwyn Tay, am scribing for, and in the presence of, Johnie Barrett Jr., MD. I have scribed the entire note.       History     Chief Complaint   Patient presents with    Headache     +neck pain, dizzy, and nauseated. Pt states "my loop recorder said I was having palpitations."     Review of patient's allergies indicates:   Allergen Reactions    Mobic [meloxicam]     Topamax [topiramate]     Adhesive Rash         History of Present Illness     HPI    8/15/2019, 10:39 PM  History obtained from the patient      History of Present Illness: Diana Escobar is a 54 y.o. female patient with a PMHx of asthma, HTN, DM who presents to the Emergency Department for evaluation of HA which onset gradually earlier today at work. Pt reports she works at [employer] and does heavy lifting outside. Symptoms " "are constant and moderate in severity.  No mitigating or exacerbating factors reported. Associated sxs include neck pain, lightheadedness, dizziness, nausea, palpitations, photophobia and BLE "feeling heavy". Patient denies any CP, SOB, visual disturbance, back pain, extremity numbness/weakness, fever, chills, diaphoresis, vomiting, and all other sxs at this time. No further complaints or concerns at this time.         Arrival mode: Personal vehicle    PCP: Jory Alcocer DO        Past Medical History:  Past Medical History:   Diagnosis Date    Asthma     Chronic low back pain     Degenerative disc disease, lumbar     Depression     Diabetes mellitus 2014    DM (diabetes mellitus) 2014     am 02/13/2019    Fibromyalgia     Hyperlipidemia     Hypertension     Hypothyroidism     MRSA (methicillin resistant Staphylococcus aureus) carrier     Palpitations     Dr. Jesus Lao--cardiology    Stroke     TIA    Vitamin D deficiency disease        Past Surgical History:  Past Surgical History:   Procedure Laterality Date    HYSTERECTOMY      left ankle surgery      loop recorder  05/2017    cardiac device    NECK SURGERY  06/2016    ROTATOR CUFF REPAIR      TONSILLECTOMY           Family History:  Family History   Problem Relation Age of Onset    Cancer Mother     Stroke Mother     Heart disease Mother     Diabetes Mother     Cataracts Mother     Hypertension Mother     Breast cancer Mother     Heart disease Father     COPD Father     Hypertension Father     Diabetes Maternal Aunt     Breast cancer Sister        Social History:  Social History     Tobacco Use    Smoking status: Current Some Day Smoker     Packs/day: 0.50     Types: Cigarettes    Smokeless tobacco: Never Used   Substance and Sexual Activity    Alcohol use: No     Alcohol/week: 0.0 oz    Drug use: No    Sexual activity: Yes        Review of Systems     Review of Systems   Constitutional: Negative for chills, " "diaphoresis and fever.   HENT: Negative for sore throat.    Eyes: Positive for photophobia. Negative for visual disturbance.   Respiratory: Negative for shortness of breath.    Cardiovascular: Positive for palpitations. Negative for chest pain.   Gastrointestinal: Positive for nausea. Negative for vomiting.   Genitourinary: Negative for dysuria.   Musculoskeletal: Positive for neck pain. Negative for back pain.        + BLE "feeling heavy"   Skin: Negative for rash.   Neurological: Positive for dizziness, light-headedness and headaches. Negative for weakness and numbness.   Hematological: Does not bruise/bleed easily.   All other systems reviewed and are negative.       Physical Exam     Initial Vitals [08/15/19 2125]   BP Pulse Resp Temp SpO2   (!) 157/99 84 18 97.6 °F (36.4 °C) 98 %      MAP       --          Physical Exam  Nursing Notes and Vital Signs Reviewed.  Constitutional: Patient is in moderate distress. Well-developed and well-nourished.  Head: Atraumatic. Normocephalic.  Eyes: PERRL. EOM intact. Conjunctivae are not pale. No scleral icterus.  ENT: Mucous membranes are moist. Oropharynx is clear and symmetric.  external auditory canals are normal.  TMs are normal. Nares are clear.  No mastoid tenderness on exam.  Neck: Supple. Full ROM. No lymphadenopathy.  Cardiovascular: Regular rate. Regular rhythm. No murmurs, rubs, or gallops. Distal pulses are 2+ and symmetric in both upper extremities.  Pulmonary/Chest: No respiratory distress. Clear to auscultation bilaterally. No wheezing or rales.  Abdominal: Soft and non-distended.  There is no tenderness.  No rebound, guarding, or rigidity. Good bowel sounds.  Genitourinary: No CVA tenderness. No suprapubic tenderness  Musculoskeletal: Moves all extremities. No obvious deformities. No edema. No calf tenderness. 5 x 5 strength in all extremities.  There is no point C/T/L/S tenderness.  Mild spasm a bilateral trapezius muscles.  There is no mastoid tenderness.  " "Full active range of motion of the neck.  There is no nuchal rigidity or meningismus.  She is neurovascular intact in both arms.  Skin: Warm and dry. No rash  Neurological: Patient is alert and oriented to person, place and time. Pupils ERRL and EOM normal. Cranial nerves II-XII are intact. Strength is full bilaterally; it is equal and 5/5 in bilateral upper and lower extremities. There is no pronator drift of outstretched arms. Light touch sense is intact. Speech is clear and normal. No acute focal neurological deficits noted. There is no nuchal rigidity or meningismus. Bilateral median radial ulnar musculocutaneous and axillary nerves are intact.  Psychiatric: Normal affect. Good eye contact. Appropriate in content.     ED Course   Procedures  ED Vital Signs:  Vitals:    08/15/19 2125 08/15/19 2129 08/15/19 2238 08/15/19 2239   BP: (!) 157/99  (!) 141/89    Pulse: 84 73 79    Resp: 18  18    Temp: 97.6 °F (36.4 °C)      TempSrc: Oral      SpO2: 98%   97%   Weight: 87.1 kg (192 lb 0.3 oz)      Height: 5' 2" (1.575 m)       08/16/19 0001 08/16/19 0101   BP: 127/85 124/80   Pulse: 79 85   Resp: 19 20   Temp:     TempSrc:     SpO2: (!) 93% 97%   Weight:     Height:         Abnormal Lab Results:  Labs Reviewed   CBC W/ AUTO DIFFERENTIAL - Abnormal; Notable for the following components:       Result Value    WBC 14.05 (*)     Mean Corpuscular Volume 101 (*)     Mean Corpuscular Hemoglobin 32.2 (*)     Mean Corpuscular Hemoglobin Conc 31.8 (*)     RDW 16.0 (*)     Gran # (ANC) 8.2 (*)     Mono # 1.1 (*)     All other components within normal limits   COMPREHENSIVE METABOLIC PANEL - Abnormal; Notable for the following components:    Glucose 114 (*)     All other components within normal limits   TROPONIN I   CK   URINALYSIS, REFLEX TO URINE CULTURE        All Lab Results:  Results for orders placed or performed during the hospital encounter of 08/15/19   CBC auto differential   Result Value Ref Range    WBC 14.05 (H) 3.90 " - 12.70 K/uL    RBC 4.19 4.00 - 5.40 M/uL    Hemoglobin 13.5 12.0 - 16.0 g/dL    Hematocrit 42.5 37.0 - 48.5 %    Mean Corpuscular Volume 101 (H) 82 - 98 fL    Mean Corpuscular Hemoglobin 32.2 (H) 27.0 - 31.0 pg    Mean Corpuscular Hemoglobin Conc 31.8 (L) 32.0 - 36.0 g/dL    RDW 16.0 (H) 11.5 - 14.5 %    Platelets 189 150 - 350 K/uL    MPV 11.6 9.2 - 12.9 fL    Gran # (ANC) 8.2 (H) 1.8 - 7.7 K/uL    Lymph # 4.4 1.0 - 4.8 K/uL    Mono # 1.1 (H) 0.3 - 1.0 K/uL    Eos # 0.3 0.0 - 0.5 K/uL    Baso # 0.10 0.00 - 0.20 K/uL    Gran% 58.4 38.0 - 73.0 %    Lymph% 31.1 18.0 - 48.0 %    Mono% 8.0 4.0 - 15.0 %    Eosinophil% 1.8 0.0 - 8.0 %    Basophil% 0.7 0.0 - 1.9 %    Aniso Slight     Poik Slight     Tear Drop Cells Occasional     Stomatocytes Present     Spherocytes Occasional     Differential Method Automated    Comprehensive metabolic panel   Result Value Ref Range    Sodium 143 136 - 145 mmol/L    Potassium 3.9 3.5 - 5.1 mmol/L    Chloride 104 95 - 110 mmol/L    CO2 24 23 - 29 mmol/L    Glucose 114 (H) 70 - 110 mg/dL    BUN, Bld 16 6 - 20 mg/dL    Creatinine 0.7 0.5 - 1.4 mg/dL    Calcium 9.0 8.7 - 10.5 mg/dL    Total Protein 7.0 6.0 - 8.4 g/dL    Albumin 4.0 3.5 - 5.2 g/dL    Total Bilirubin 0.3 0.1 - 1.0 mg/dL    Alkaline Phosphatase 93 55 - 135 U/L    AST 18 10 - 40 U/L    ALT 26 10 - 44 U/L    Anion Gap 15 8 - 16 mmol/L    eGFR if African American >60 >60 mL/min/1.73 m^2    eGFR if non African American >60 >60 mL/min/1.73 m^2   Troponin I #1   Result Value Ref Range    Troponin I 0.006 0.000 - 0.026 ng/mL   CPK   Result Value Ref Range    CPK 82 20 - 180 U/L         Imaging Results:  Imaging Results          CT Head Without Contrast (In process)                X-Ray Chest PA And Lateral (Final result)  Result time 08/15/19 21:54:51    Final result by Aakash Combs MD (08/15/19 21:54:51)                 Impression:      No acute findings.      Electronically signed by: Aakash Combs  MD  Date:    08/15/2019  Time:    21:54             Narrative:    EXAMINATION:  XR CHEST PA AND LATERAL    CLINICAL HISTORY:  cough;    TECHNIQUE:  PA and lateral views of the chest were performed.    COMPARISON:  06/19/2019    FINDINGS:  The cardiac and mediastinal silhouettes appear within normal limits.   The lungs are clear bilaterally.  No acute osseous findings demonstrated.  ACDF plate at the lower cervical spine.                               Per STAT radiology, pt's CT results no acute intracranial process.       The EKG was ordered, reviewed, and independently interpreted by the ED provider.  Interpretation time: 2129  Rate: 73 BPM  Rhythm: normal sinus rhythm  Interpretation: No acute ST changes. No STEMI.         The Emergency Provider reviewed the vital signs and test results, which are outlined above.     ED Discussion     1:17 AM: Reassessed pt at this time.  Pt states her condition has improved at this time. Discussed with pt all pertinent ED information and results. Discussed pt dx and plan of tx. Gave pt all f/u and return to the ED instructions. All questions and concerns were addressed at this time. Pt expresses understanding of information and instructions, and is comfortable with plan to discharge. Pt is stable for discharge.    Patient's headache is either consistent with previous headache and/or lacks features concerning for emergent or life threatening condition.  I do not suspect SAH, meningitis, increased IC pressure, infectious, toxic, vascular, CNS, or other EMC.  I have discussed this at length with patient and/or family/caretaker.    I discussed with patient and/or family/caretaker that evaluation in the ED does not suggest any emergent or life threatening medical conditions requiring immediate intervention beyond what was provided in the ED, and I believe patient is safe for discharge.  Regardless, an unremarkable evaluation in the ED does not preclude the development or presence of a  serious of life threatening condition. As such, patient was instructed to return immediately for any worsening or change in current symptoms.    Driving or other activities under influence of medications - Patient and/or family/caretaker was given a prescription for, or instructed to use a medicine that may impair ability to drive, operate machinery, or participate in other potentially dangerous activities.  Patient was instructed not to participate in these activities while under the influence of these medications.    1:20 AM  Patient is stable nontoxic.  Patient has been doing heavy lifting today at work subsequently developed pain in her bilateral trapezius muscles and subsequently headache.  She has remote history of migraines as well.  Patient feels much better after treatment here has a negative workup.  Clinically the patient has musculoskeletal trapezius pain/toward a Colles secondary to the lifting which caused her have a headache which is now resolved.  Patient is stable safe for discharge in my opinion.  I have discussed with her all findings well as plan of care.  She verbalized understanding room with all seems reliable.  She will return for symptoms worsen in any way.    ED Medication(s):  Medications   sodium chloride 0.9% bolus 1,000 mL (0 mLs Intravenous Stopped 8/16/19 0045)   butorphanol injection 1 mg (1 mg Intravenous Given 8/15/19 2321)   cyclobenzaprine tablet 10 mg (10 mg Oral Given 8/15/19 2256)   lorazepam (ATIVAN) injection 1 mg (1 mg Intravenous Given 8/16/19 0056)       New Prescriptions    CYCLOBENZAPRINE (FLEXERIL) 10 MG TABLET    Take 0.5 tablets (5 mg total) by mouth 3 (three) times daily as needed for Muscle spasms.    HYDROCODONE-ACETAMINOPHEN (NORCO)  MG PER TABLET    Take 1 tablet by mouth every 6 (six) hours as needed for Pain.       Follow-up Information     Jory Alcocer DO In 2 days.    Specialty:  Internal Medicine  Contact information:  [address] [place] [address] [address] Fer[address]  [place] [id number]  [phone]                         Medical Decision Making:   Clinical Tests:   Lab Tests: Ordered and Reviewed  Radiological Study: Ordered and Reviewed  Medical Tests: Ordered and Reviewed             Scribe Attestation:   Scribe #1: I performed the above scribed service and the documentation accurately describes the services I performed. I attest to the accuracy of the note.     Attending:   Physician Attestation Statement for Scribe #1: I, Johnie Barrett Jr., MD, personally performed the services described in this documentation, as scribed by Bronwyn Tay, in my presence, and it is both accurate and complete.           Clinical Impression       ICD-10-CM ICD-9-CM   1. Torticollis M43.6 723.5   2. Palpitations R00.2 785.1   3. Tension-type headache, not intractable, unspecified chronicity pattern G44.209 339.10       Disposition:   Disposition: Discharged  Condition: Stable         Johnie Barrett Jr., MD  08/16/19 0121

## 2019-08-16 NOTE — DISCHARGE INSTRUCTIONS
Norco for pain, Flexeril as a muscle relaxer.  No heavy lifting for 1 week.  Avoid driving or operating machinery within 24 hr of taking either of these medications as it will make you fatigued and puts you at risk.  Follow up with her doctor on Monday for re-evaluation.  Return as needed for any worsening symptoms, problems, questions or concerns.

## 2019-08-19 DIAGNOSIS — F41.9 ANXIETY: ICD-10-CM

## 2019-08-19 DIAGNOSIS — F33.9 MAJOR DEPRESSION, RECURRENT, CHRONIC: ICD-10-CM

## 2019-08-19 RX ORDER — VENLAFAXINE HYDROCHLORIDE 150 MG/1
CAPSULE, EXTENDED RELEASE ORAL
Qty: 30 CAPSULE | Refills: 0 | Status: SHIPPED | OUTPATIENT
Start: 2019-08-19 | End: 2019-10-04 | Stop reason: SDUPTHER

## 2019-08-20 ENCOUNTER — HOSPITAL ENCOUNTER (OUTPATIENT)
Dept: RADIOLOGY | Facility: HOSPITAL | Age: 54
Discharge: HOME | End: 2019-08-20
Attending: INTERNAL MEDICINE
Payer: COMMERCIAL

## 2019-08-20 ENCOUNTER — CLINICAL SUPPORT (OUTPATIENT)
Dept: SMOKING CESSATION | Facility: CLINIC | Age: 54
End: 2019-08-20
Payer: COMMERCIAL

## 2019-08-20 DIAGNOSIS — F17.210 HEAVY CIGARETTE SMOKER (20-39 PER DAY): Primary | ICD-10-CM

## 2019-08-20 DIAGNOSIS — R91.1 NODULE OF LEFT LUNG: ICD-10-CM

## 2019-08-20 DIAGNOSIS — R91.1 NODULE OF LEFT LUNG: Chronic | ICD-10-CM

## 2019-08-20 PROCEDURE — 99999 PR PBB SHADOW E&M-EST. PATIENT-LVL I: CPT | Mod: PBBFAC,,,

## 2019-08-20 PROCEDURE — 90853 PR GROUP PSYCHOTHERAPY: ICD-10-PCS | Mod: S$GLB,,,

## 2019-08-20 PROCEDURE — 90853 GROUP PSYCHOTHERAPY: CPT | Mod: S$GLB,,,

## 2019-08-20 PROCEDURE — 99999 PR PBB SHADOW E&M-EST. PATIENT-LVL I: ICD-10-PCS | Mod: PBBFAC,,,

## 2019-08-20 PROCEDURE — 71250 CT THORAX DX C-: CPT | Mod: TC

## 2019-08-20 NOTE — Clinical Note
The patient is smoking 20 cigarettes/day  from 22 cigarettes/day. She had hurt her back and was not able to go to work. She smoked more out of boredom and frustration due to the pain.She did have on day where she smoked 16 cigarettes. Discussed she is beginning to learn how not to smoke and needs to take small steps such as not smoking with coffee and not keeping her cigarettes where she  Can reach them easy. She agreed and will begin some changes. She stated she felt serenity rabout things after the session.The patient will continue individual sessions and medication monitoring by CTTS. Prescribed medication management will be by practitoner.

## 2019-08-20 NOTE — PROGRESS NOTES
Individual Follow-Up Form    8/20/2019    Quit Date: 10/5/19    Clinical Status of Patient: Outpatient    Length of Service: 45 minutes    Continuing Medication: yes  Wellbutrin    Other Medications: 21 mg nicotine patch     Target Symptoms: Withdrawal and medication side effects. The following were  rated moderate (3) to severe (4) on TCRS:  · Moderate (3): desire/crave anxious frustrated. - Nicotine replacement therapy, withdrawal symptoms, habit  · Severe (4): none    Comments: The patient is smoking 20 cigarettes/day  from 22 cigarettes/day. She had hurt her back and was not able to go to work. She smoked more out of boredom and frustration due to the pain.She did have on day where she smoked 16 cigarettes. Discussed she is beginning to learn how not to smoke and needs to take small steps such as not smoking with coffee and not keeping her cigarettes where she  Can reach them easy. She agreed and will begin some changes. She stated she felt serenity rabout things after the session.The patient will continue individual sessions and medication monitoring by CTTS. Prescribed medication management will be by practitoner.The patient denies any abnormal behavioral or mental changes at this time. completion of TCRS (Tobacco Cessation Rating Scale) reviewed strategies, controlling environment, cues, triggers, new goals set. Introduced high risk situations with preparation interventions, caffeine similarities with withdrawal issues of habit and nicotine, Alcohol, Understanding urges, cravings, stress and relaxation. Open discussion with intervention discussion.    Diagnosis: F17.210    Next Visit: 2 weeks

## 2019-08-23 ENCOUNTER — TELEPHONE (OUTPATIENT)
Dept: PULMONOLOGY | Facility: CLINIC | Age: 54
End: 2019-08-23

## 2019-08-23 NOTE — TELEPHONE ENCOUNTER
----- Message from Keya Jung sent at 8/23/2019 10:21 AM CDT -----  Contact: Pt  Pt is calling staff regarding some paper work    Pt call back [phone]    Thanks

## 2019-08-27 ENCOUNTER — OFFICE VISIT (OUTPATIENT)
Dept: INTERNAL MEDICINE | Facility: CLINIC | Age: 54
End: 2019-08-27
Payer: COMMERCIAL

## 2019-08-27 VITALS
HEIGHT: 62 IN | WEIGHT: 190.94 LBS | BODY MASS INDEX: 35.14 KG/M2 | DIASTOLIC BLOOD PRESSURE: 74 MMHG | SYSTOLIC BLOOD PRESSURE: 116 MMHG | OXYGEN SATURATION: 95 % | TEMPERATURE: 97 F | HEART RATE: 88 BPM

## 2019-08-27 DIAGNOSIS — F33.9 MAJOR DEPRESSION, RECURRENT, CHRONIC: ICD-10-CM

## 2019-08-27 DIAGNOSIS — R05.9 COUGH: ICD-10-CM

## 2019-08-27 DIAGNOSIS — I15.2 HYPERTENSION ASSOCIATED WITH DIABETES: Primary | ICD-10-CM

## 2019-08-27 DIAGNOSIS — E03.9 HYPOTHYROIDISM (ACQUIRED): ICD-10-CM

## 2019-08-27 DIAGNOSIS — F17.200 TOBACCO USE DISORDER: ICD-10-CM

## 2019-08-27 DIAGNOSIS — E78.5 HYPERLIPIDEMIA LDL GOAL <70: ICD-10-CM

## 2019-08-27 DIAGNOSIS — E11.59 HYPERTENSION ASSOCIATED WITH DIABETES: Primary | ICD-10-CM

## 2019-08-27 DIAGNOSIS — F41.9 ANXIETY: ICD-10-CM

## 2019-08-27 PROCEDURE — 99214 PR OFFICE/OUTPT VISIT, EST, LEVL IV, 30-39 MIN: ICD-10-PCS | Mod: S$GLB,,, | Performed by: INTERNAL MEDICINE

## 2019-08-27 PROCEDURE — 3044F HG A1C LEVEL LT 7.0%: CPT | Mod: CPTII,S$GLB,, | Performed by: INTERNAL MEDICINE

## 2019-08-27 PROCEDURE — 3074F PR MOST RECENT SYSTOLIC BLOOD PRESSURE < 130 MM HG: ICD-10-PCS | Mod: CPTII,S$GLB,, | Performed by: INTERNAL MEDICINE

## 2019-08-27 PROCEDURE — 99999 PR PBB SHADOW E&M-EST. PATIENT-LVL III: ICD-10-PCS | Mod: PBBFAC,,, | Performed by: INTERNAL MEDICINE

## 2019-08-27 PROCEDURE — 3008F BODY MASS INDEX DOCD: CPT | Mod: CPTII,S$GLB,, | Performed by: INTERNAL MEDICINE

## 2019-08-27 PROCEDURE — 3074F SYST BP LT 130 MM HG: CPT | Mod: CPTII,S$GLB,, | Performed by: INTERNAL MEDICINE

## 2019-08-27 PROCEDURE — 3078F DIAST BP <80 MM HG: CPT | Mod: CPTII,S$GLB,, | Performed by: INTERNAL MEDICINE

## 2019-08-27 PROCEDURE — 99214 OFFICE O/P EST MOD 30 MIN: CPT | Mod: S$GLB,,, | Performed by: INTERNAL MEDICINE

## 2019-08-27 PROCEDURE — 99999 PR PBB SHADOW E&M-EST. PATIENT-LVL III: CPT | Mod: PBBFAC,,, | Performed by: INTERNAL MEDICINE

## 2019-08-27 PROCEDURE — 3008F PR BODY MASS INDEX (BMI) DOCUMENTED: ICD-10-PCS | Mod: CPTII,S$GLB,, | Performed by: INTERNAL MEDICINE

## 2019-08-27 PROCEDURE — 3078F PR MOST RECENT DIASTOLIC BLOOD PRESSURE < 80 MM HG: ICD-10-PCS | Mod: CPTII,S$GLB,, | Performed by: INTERNAL MEDICINE

## 2019-08-27 PROCEDURE — 3044F PR MOST RECENT HEMOGLOBIN A1C LEVEL <7.0%: ICD-10-PCS | Mod: CPTII,S$GLB,, | Performed by: INTERNAL MEDICINE

## 2019-08-27 NOTE — PROGRESS NOTES
Subjective:       Patient ID: Diana Escobar is a 54 y.o. female.    Chief Complaint: Follow-up (6 month)    Diana Escobar  54 y.o. White female    Patient presents with:  Follow-up: 6 month    HPI: Here today to follow up on chronic conditions.  HTN--stable on amlodipine.   Diabetes--controlled, but trending up. She is compliant with metformin. She has been on a lot of steroids lately.                      HGBA1C                   6.1 (H)             08/20/2019            HLD--compliant with atorvastatin.                    CHOL                     163                 08/20/2019                  HDL                      57                  08/20/2019                 LDLCALC                  89.2                08/20/2019                 TRIG                     84                  08/20/2019            Hypothyroidism--stable on levothyroxine.                      TSH                      0.571               08/20/2019            Depression/anxiety--stable on current management.   She has been having a cough for a while now. She is being worked up by pulmonology. She continues to smoke.         Past Medical History:  Asthma  Chronic low back pain  Degenerative disc disease, lumbar  Depression  2014: Diabetes mellitus  Fibromyalgia  Hyperlipidemia  Hypertension  Hypothyroidism  MRSA (methicillin resistant Staphylococcus aureus) carrier  Palpitations      Comment:  Dr. Jesus Lao--cardiology  Stroke      Comment:  TIA  Vitamin D deficiency disease    Current Outpatient Medications on File Prior to Visit:  albuterol (PROVENTIL/VENTOLIN HFA) 90 mcg/actuation inhaler, Inhale 2 puffs into the lungs every 6 (six) hours as needed for Wheezing., Disp: 18 g, Rfl: 6  amLODIPine (NORVASC) 5 MG tablet, TAKE 1 TABLET BY MOUTH ONCE DAILY, Disp: 90 tablet, Rfl: 1  atorvastatin (LIPITOR) 40 MG tablet, Take 40 mg by mouth once daily., Disp: , Rfl:   azelastine (ASTELIN) 137 mcg (0.1 %) nasal spray, 2 sprays (274 mcg  "total) by Nasal route 2 (two) times daily., Disp: 30 mL, Rfl: 10  blood sugar diagnostic Strp, Glucose testing daily., Disp: 50 strip, Rfl: 11   blood-glucose meter Misc, Use as directed., Disp: 1 each, Rfl: 0  budesonide-formoterol 160-4.5 mcg (SYMBICORT) 160-4.5 mcg/actuation HFAA, Inhale 2 puffs into the lungs every 12 (twelve) hours. Controller, Disp: 10.2 g, Rfl: 10  buPROPion (WELLBUTRIN SR) 150 MG TBSR 12 hr tablet, **Take 1 pill for 7 days then change to  1 tablet twice daily**, Disp: 60 tablet, Rfl: 0  busPIRone (BUSPAR) 5 MG Tab, Take 1 tablet (5 mg total) by mouth 2 (two) times daily., Disp: 60 tablet, Rfl: 6  clopidogrel (PLAVIX) 75 mg tablet, Take 75 mg by mouth once daily., Disp: , Rfl:   diclofenac sodium 1 % Gel, Apply topically 4 (four) times daily., Disp: , Rfl:   fluticasone propionate (FLONASE) 50 mcg/actuation nasal spray, USE 2 SPRAY(S) IN EACH NOSTRIL TWICE DAILY, Disp: , Rfl: 11  folic acid (FOLVITE) 1 MG tablet, TAKE 1 TABLET BY MOUTH ONCE DAILY, Disp: 90 tablet, Rfl: 0  gabapentin (NEURONTIN) 300 MG capsule, TAKE 1 CAPSULE BY MOUTH TWICE DAILY, Disp: 60 capsule, Rfl: 2  insulin needles, disposable, (PEN NEEDLE) 31 X 1/4 " Ndle, Twice daily injections., Disp: 100 each, Rfl: 6  lancets (LANCETS,ULTRA THIN) Misc, Glucose testing daily., Disp: 50 each, Rfl: 11  levothyroxine (SYNTHROID) 50 MCG tablet, TAKE 1 TABLET BY MOUTH ONCE DAILY, Disp: 30 tablet, Rfl: 5  metFORMIN (GLUCOPHAGE-XR) 500 MG 24 hr tablet, TAKE 1 TABLET BY MOUTH TWICE DAILY WITH MEALS, Disp: 180 tablet, Rfl: 1  methotrexate 2.5 MG Tab,  TAKE 6 TABLETS BY MOUTH EVERY 7 DAYS, Disp: 24 tablet, Rfl: 2  MYRBETRIQ 25 mg Tb24 ER tablet, Take 25 mg by mouth once daily., Disp: , Rfl: 0  nicotine (NICODERM CQ) 21 mg/24 hr, Place 1 patch onto the skin once daily., Disp: 28 patch, Rfl: 0  pramipexole (MIRAPEX) 0.125 MG tablet, Take 1 tablet (0.125 mg total) by mouth every evening., Disp: 90 tablet, Rfl: 2  venlafaxine (EFFEXOR-XR) 150 MG " Cp24, TAKE 1 CAPSULE BY MOUTH ONCE DAILY, Disp: 30 capsule, Rfl: 0  cyclobenzaprine (FLEXERIL) 10 MG tablet, Take 0.5 tablets (5 mg total) by mouth 3 (three) times daily as needed for Muscle spasms., Disp: 15 tablet, Rfl: 0  HYDROcodone-acetaminophen (NORCO)  mg per tablet, Take 1 tablet by mouth every 6 (six) hours as needed for Pain., Disp: 9 tablet, Rfl: 0  peg 400-propylene glycol, PF, (SYSTANE ULTRA, PF,) 0.4-0.3 % Dpet, Place 1 drop into both eyes 4 (four) times daily., Disp: 1 each, Rfl:   predniSONE (DELTASONE) 10 MG tablet, Take 3 daily for 3 days, then 2 daily for three days, then 1 daily for three days., Disp: 18 tablet, Rfl: 0    Allergies:  Review of patient's allergies indicates:   -- Mobic (meloxicam)    -- Topamax (topiramate)    -- Adhesive -- Rash          Review of Systems   Constitutional: Negative for fever and unexpected weight change.   Respiratory: Positive for cough and wheezing. Negative for shortness of breath.    Cardiovascular: Negative for chest pain and leg swelling.   Gastrointestinal: Negative for abdominal pain.   Musculoskeletal: Positive for arthralgias and myalgias. Negative for gait problem.   Neurological: Negative for dizziness and headaches.   Psychiatric/Behavioral: Positive for dysphoric mood. The patient is nervous/anxious.        Objective:      Physical Exam   Constitutional: She is oriented to person, place, and time. She appears well-developed and well-nourished. No distress.   Eyes: No scleral icterus.   Neck: No tracheal deviation present.   Cardiovascular: Normal rate, regular rhythm and normal heart sounds.   Pulses:       Dorsalis pedis pulses are 2+ on the right side, and 2+ on the left side.   Pulmonary/Chest: Effort normal and breath sounds normal. No respiratory distress.   Abdominal: Soft. Bowel sounds are normal.   Musculoskeletal: She exhibits no edema.   Feet:   Right Foot:   Protective Sensation: 4 sites tested. 4 sites sensed.   Skin Integrity:  Positive for callus. Negative for ulcer.   Left Foot:   Protective Sensation: 4 sites tested. 4 sites sensed.   Skin Integrity: Positive for callus. Negative for ulcer.   Neurological: She is alert and oriented to person, place, and time.   Skin: Skin is warm and dry.   Psychiatric: She has a normal mood and affect.   Vitals reviewed.      Assessment:       1. Hypertension associated with diabetes    2. Hyperlipidemia LDL goal <70    3. Hypothyroidism (acquired)    4. Major depression, recurrent, chronic    5. Anxiety    6. Cough    7. Tobacco use disorder        Plan:       Diana was seen today for follow-up.    Diagnoses and all orders for this visit:    Hypertension associated with diabetes  -     Continue current management  -     Lifestyle modifications discussed    Hyperlipidemia LDL goal <70  -     Continue atorvastatin    Hypothyroidism (acquired)  -     Continue levothyroxine    Major depression, recurrent, chronic  -     Continue current management    Anxiety  -     Continue current management    Cough  -     Work up per pulmonology    Tobacco use disorder  -     Recommend smoking cessation    Labs and F/U with BLAIR Hickey in 6 months.

## 2019-09-03 ENCOUNTER — CLINICAL SUPPORT (OUTPATIENT)
Dept: SMOKING CESSATION | Facility: CLINIC | Age: 54
End: 2019-09-03
Payer: COMMERCIAL

## 2019-09-03 ENCOUNTER — CLINICAL SUPPORT (OUTPATIENT)
Dept: PULMONOLOGY | Facility: CLINIC | Age: 54
End: 2019-09-03
Payer: COMMERCIAL

## 2019-09-03 ENCOUNTER — OFFICE VISIT (OUTPATIENT)
Dept: PULMONOLOGY | Facility: CLINIC | Age: 54
End: 2019-09-03
Payer: COMMERCIAL

## 2019-09-03 VITALS
BODY MASS INDEX: 34.02 KG/M2 | HEART RATE: 104 BPM | HEIGHT: 63 IN | HEIGHT: 63 IN | WEIGHT: 192 LBS | DIASTOLIC BLOOD PRESSURE: 73 MMHG | OXYGEN SATURATION: 96 % | WEIGHT: 192.25 LBS | SYSTOLIC BLOOD PRESSURE: 113 MMHG | RESPIRATION RATE: 18 BRPM | BODY MASS INDEX: 34.06 KG/M2

## 2019-09-03 DIAGNOSIS — R06.00 DYSPNEA AND RESPIRATORY ABNORMALITIES: ICD-10-CM

## 2019-09-03 DIAGNOSIS — R06.89 DYSPNEA AND RESPIRATORY ABNORMALITIES: ICD-10-CM

## 2019-09-03 DIAGNOSIS — R06.89 DYSPNEA AND RESPIRATORY ABNORMALITIES: Primary | Chronic | ICD-10-CM

## 2019-09-03 DIAGNOSIS — G47.33 OSA (OBSTRUCTIVE SLEEP APNEA): Chronic | ICD-10-CM

## 2019-09-03 DIAGNOSIS — F17.210 NICOTINE DEPENDENCE, CIGARETTES, UNCOMPLICATED: Chronic | ICD-10-CM

## 2019-09-03 DIAGNOSIS — R91.8 MULTIPLE LUNG NODULES ON CT: Chronic | ICD-10-CM

## 2019-09-03 DIAGNOSIS — F17.210 HEAVY CIGARETTE SMOKER (20-39 PER DAY): Primary | ICD-10-CM

## 2019-09-03 DIAGNOSIS — R06.00 DYSPNEA AND RESPIRATORY ABNORMALITIES: Primary | Chronic | ICD-10-CM

## 2019-09-03 DIAGNOSIS — E66.09 CLASS 1 OBESITY DUE TO EXCESS CALORIES WITH SERIOUS COMORBIDITY AND BODY MASS INDEX (BMI) OF 34.0 TO 34.9 IN ADULT: Chronic | ICD-10-CM

## 2019-09-03 PROBLEM — R91.1 NODULE OF LEFT LUNG: Chronic | Status: RESOLVED | Noted: 2017-02-06 | Resolved: 2019-09-03

## 2019-09-03 LAB
ALLENS TEST: NORMAL
DELSYS: NORMAL
HCO3 UR-SCNC: 25.5 MMOL/L (ref 24–28)
PCO2 BLDA: 43.3 MMHG (ref 35–45)
PH SMN: 7.38 [PH] (ref 7.35–7.45)
PO2 BLDA: 81 MMHG (ref 80–100)
POC BE: 0 MMOL/L
POC SATURATED O2: 96 % (ref 95–100)
SAMPLE: NORMAL
SITE: NORMAL

## 2019-09-03 PROCEDURE — 94729 DIFFUSING CAPACITY: CPT | Mod: S$GLB,,, | Performed by: INTERNAL MEDICINE

## 2019-09-03 PROCEDURE — 82803 BLOOD GASES ANY COMBINATION: CPT | Mod: S$GLB,,, | Performed by: INTERNAL MEDICINE

## 2019-09-03 PROCEDURE — 3078F PR MOST RECENT DIASTOLIC BLOOD PRESSURE < 80 MM HG: ICD-10-PCS | Mod: CPTII,S$GLB,, | Performed by: INTERNAL MEDICINE

## 2019-09-03 PROCEDURE — 3008F PR BODY MASS INDEX (BMI) DOCUMENTED: ICD-10-PCS | Mod: CPTII,S$GLB,, | Performed by: INTERNAL MEDICINE

## 2019-09-03 PROCEDURE — 3078F DIAST BP <80 MM HG: CPT | Mod: CPTII,S$GLB,, | Performed by: INTERNAL MEDICINE

## 2019-09-03 PROCEDURE — 99215 OFFICE O/P EST HI 40 MIN: CPT | Mod: 25,S$GLB,, | Performed by: INTERNAL MEDICINE

## 2019-09-03 PROCEDURE — 99999 PR PBB SHADOW E&M-EST. PATIENT-LVL I: CPT | Mod: PBBFAC,,,

## 2019-09-03 PROCEDURE — 94729 PR C02/MEMBANE DIFFUSE CAPACITY: ICD-10-PCS | Mod: S$GLB,,, | Performed by: INTERNAL MEDICINE

## 2019-09-03 PROCEDURE — 99999 PR PBB SHADOW E&M-EST. PATIENT-LVL I: ICD-10-PCS | Mod: PBBFAC,,,

## 2019-09-03 PROCEDURE — 94726 PULM FUNCT TST PLETHYSMOGRAP: ICD-10-PCS | Mod: S$GLB,,, | Performed by: INTERNAL MEDICINE

## 2019-09-03 PROCEDURE — 99999 PR PBB SHADOW E&M-EST. PATIENT-LVL III: CPT | Mod: PBBFAC,,, | Performed by: INTERNAL MEDICINE

## 2019-09-03 PROCEDURE — 3074F PR MOST RECENT SYSTOLIC BLOOD PRESSURE < 130 MM HG: ICD-10-PCS | Mod: CPTII,S$GLB,, | Performed by: INTERNAL MEDICINE

## 2019-09-03 PROCEDURE — 94726 PLETHYSMOGRAPHY LUNG VOLUMES: CPT | Mod: S$GLB,,, | Performed by: INTERNAL MEDICINE

## 2019-09-03 PROCEDURE — 94618 PULMONARY STRESS TESTING: ICD-10-PCS | Mod: S$GLB,,, | Performed by: INTERNAL MEDICINE

## 2019-09-03 PROCEDURE — 36600 WITHDRAWAL OF ARTERIAL BLOOD: CPT | Mod: S$GLB,,, | Performed by: INTERNAL MEDICINE

## 2019-09-03 PROCEDURE — 3008F BODY MASS INDEX DOCD: CPT | Mod: CPTII,S$GLB,, | Performed by: INTERNAL MEDICINE

## 2019-09-03 PROCEDURE — 99215 PR OFFICE/OUTPT VISIT, EST, LEVL V, 40-54 MIN: ICD-10-PCS | Mod: 25,S$GLB,, | Performed by: INTERNAL MEDICINE

## 2019-09-03 PROCEDURE — 3074F SYST BP LT 130 MM HG: CPT | Mod: CPTII,S$GLB,, | Performed by: INTERNAL MEDICINE

## 2019-09-03 PROCEDURE — 99403 PREV MED CNSL INDIV APPRX 45: CPT | Mod: S$GLB,,,

## 2019-09-03 PROCEDURE — 82803 PR  BLOOD GASES: PH, PO2 & PCO2: ICD-10-PCS | Mod: S$GLB,,, | Performed by: INTERNAL MEDICINE

## 2019-09-03 PROCEDURE — 36600 PR WITHDRAWAL OF ARTERIAL BLOOD: ICD-10-PCS | Mod: S$GLB,,, | Performed by: INTERNAL MEDICINE

## 2019-09-03 PROCEDURE — 94010 BREATHING CAPACITY TEST: ICD-10-PCS | Mod: S$GLB,,, | Performed by: INTERNAL MEDICINE

## 2019-09-03 PROCEDURE — 94618 PULMONARY STRESS TESTING: CPT | Mod: S$GLB,,, | Performed by: INTERNAL MEDICINE

## 2019-09-03 PROCEDURE — 94010 BREATHING CAPACITY TEST: CPT | Mod: S$GLB,,, | Performed by: INTERNAL MEDICINE

## 2019-09-03 PROCEDURE — 99403 PR PREVENT COUNSEL,INDIV,45 MIN: ICD-10-PCS | Mod: S$GLB,,,

## 2019-09-03 PROCEDURE — 99999 PR PBB SHADOW E&M-EST. PATIENT-LVL III: ICD-10-PCS | Mod: PBBFAC,,, | Performed by: INTERNAL MEDICINE

## 2019-09-03 RX ORDER — METHOCARBAMOL 750 MG/1
TABLET, FILM COATED ORAL
Refills: 0 | COMMUNITY
Start: 2019-08-19 | End: 2019-10-17

## 2019-09-03 NOTE — ASSESSMENT & PLAN NOTE
Repeat CT chest in 6 months.    Fleischner Society Guidelines:    High risk patient:   Smoking history, history of malignancy or risk factors for malignancy.( non-solid ground glass opacities and partially solid nodules may require longer follow up to exclude indolent adenocarcinoma)      Nodule size Low risk patient High risk patient   4 mm  No follow up Follow up CT in 12 months. If unchanged, no further follow up.   4 - 6 mm Follow up CT in 12 months; if unchanged, no further follow up.  Initial follow up CT in 6 - 12 months, then at 18 - 24 months if no change.      6 - 8 mm  Initial follow up CT at 6 - 12 months and at 18 months if no change.  Initial follow up CT at 3 - 6 months. Then at 9-12 months and 24 months if no change.      > 8 mm Initial follow up CT at 3, 6, 9 and 24 months, dynamic contrast enhanced CT, PET-CT and/or biopsy. Initial follow up CT at 3, 6, 9 and 24 months, dynamic contrast enhanced CT, PET-CT and/or biopsy.

## 2019-09-03 NOTE — PROCEDURES
"[facility]  Six Minute Walk     SUMMARY     Ordering Provider: Dr. Boyle   Interpreting Provider: Dr. Boyle  Performing nurse/tech/RT: MARLY Simpson CRT  Diagnosis: (Respiratory Abnormalities)  Height: 5' 2.5" (158.8 cm)  Weight: 87.2 kg (192 lb 3.9 oz)  BMI (Calculated): 34.7   Patient Race:             Phase Oxygen Assessment Supplemental O2 Heart   Rate Blood Pressure Lise Dyspnea Scale Rating   Resting 98 % Room Air 97 bpm 116/70 1   Exercise        Minute        1 94 % Room Air 124 bpm     2 95 % Room Air 133 bpm     3 95 % Room Air 136 bpm     4 95 % Room Air 140 bpm     5 96 % Room Air 139 bpm     6  96 % Room Air 139 bpm 140/76 3   Recovery        Minute        1 97 % Room Air 119 bpm     2 97 % Room Air 113 bpm     3 97 % Room Air 108 bpm     4 96 % Room Air 104 bpm 113/73 2     Six Minute Walk Summary  6MWT Status: completed without stopping  Patient Reported: Other (Comment)(Headache)     Interpretation:  Did the patient stop or pause?: No                                         Total Time Walked (Calculated): 360 seconds  Final Partial Lap Distance (feet): 0 feet  Total Distance Meters (Calculated): 426.72 meters  Predicted Distance Meters (Calculated): 496.04 meters  Percentage of Predicted (Calculated): 86.03  Peak VO2 (Calculated): 16.78  Mets: 4.79  Has The Patient Had a Previous Six Minute Walk Test?: No       Previous 6MWT Results  Has The Patient Had a Previous Six Minute Walk Test?: No        PHYSICIAN INTERPRETATION:    Six minute walk distance is 426.72 / 496.04 meters  (86.03 % predicted) with light dyspnea.   During exercise, there was no significant desaturation while breathing room air.   Lowest oxygen saturation during walking was 94 %.  Both blood pressure and heart rate increased significantly with walking.  Normotension was present prior to exercise.  This may represent an abnormal cardiovascular response to exercise.  The patient reported non-pulmonary symptoms " during exercise - headache.  The patient did complete the study, walking 360 seconds of the 360 second test.  No previous study performed.  Based upon age and body mass index, exercise capacity is normal.    Peak VO2 during walking was 16.78 ml/min/kg[4.79 METS]

## 2019-09-03 NOTE — PROCEDURES
Recent Labs     09/03/19  1323   PH 7.378   PCO2 43.3   PO2 81   HCO3 25.5   POCSATURATED 96   BE 0     Normal ABG

## 2019-09-03 NOTE — ASSESSMENT & PLAN NOTE
Etiology unclear.  Multifactorial etiology suspected.  Likely contributors to etiology (checked)    [x] Pulmonary airway disease    []  Pulmonary parenchymal disease  [] Pulmonary vascular disease   [] Pleural disease  [] Pulmonary vasculitis  [] Hypoventilation ( chest wall deformity, neuromuscular disease, obesity etc)  [] Anemia  [] Thyroid disease.  [] Cardiac illess  [x]         Sleep disorder    EVALUATION:  []        Complete PFT with bronchodilator.  [x]        Bronchial challenge with methacholine.   []        Stress test, pulmonary.  []        PULM - Arterial Blood Gases  []        Chest X Ray  []        CT scan of chest.   []        STEVE  []        Sedimentation rate  []        C-reactive protein  []        Anti-neutrophilic cytoplasmic antibody          PLAN:  Discussed diagnosis, its evaluation, treatment and usual course.  All questions answered.        Call if shortness of breath worsens, blood in sputum, change in character of cough, development of fever or chills, inability to maintain nutrition and hydration.     Re evaluate in six weeks with results.

## 2019-09-03 NOTE — PATIENT INSTRUCTIONS
Diagnosing Chest and Lung Problems: Imaging Tests  Youve been told that you need imaging tests to diagnose a problem in your chest or lung. These images (scans) help the healthcare provider find the problem and figure out if it affects other structures. You will likely need more than one imaging test. If a mass has been found, imaging tests can also help find out if it has spread. Common imaging tests are described below.  CT scan  CT scans allow the healthcare provider to see a more detailed picture of the chest and lungs than a regular chest X-ray. During a CT scan, many images are taken of the lungs and chest. A computer combines the images to create one detailed image. In some cases, special dye (contrast) is given through an IV (intravenous) line. The contrast highlights any suspicious area on the scan.  PET scan    Positron emission tomography (PET) is used to diagnose chest and lung problems. For a PET scan, a safe radioactive liquid (tracer) is injected into the bloodstream. It takes about 45 minutes for the tracer to be in your system. Once the tracer is there, the healthcare provider takes a scan of your body. A PET scan can be helpful for finding cancer. It can also help find out if a cancer has spread. This is called staging. In some cases, you may need more testing before cancer is diagnosed or ruled out.  MRI   Like the CT scan, MRI takes many detailed images of the chest and lungs without the use of X-ray radiation. Contrast dye may be given through an IV line. The healthcare provider then takes a scan. MRI helps your provider find out if a mass is affecting other structures in the chest, such as blood vessels.  Getting ready for the test  Before your imaging test, do the following:  · Follow your healthcare providers instructions about eating and drinking  · Tell your provider about the medicines you take. You may need to stop taking certain medicines before the test.  · Discuss any allergies and  health problems with your healthcare provider. Be sure to tell him or her if you are allergic to iodine or contrast or if you have kidney problems.  · Tell your healthcare provider and the healthcare person doing the scan if you wear a medicated adhesive patch.  · Mention if you have any metal in your body. This includes loose pieces of metal or metal devices such as an aneurysm clip, a pacemaker, a prosthesis, braces on your teeth, or an intraocular lens.  · Tell your healthcare provider if you are pregnant.  During the test  For the test, you lie on your back inside a tube-like machine (scanner). You hear loud clicking sounds as images are taken. To make sure the images taken are clear, you must lie still. Straps may be used to help with this. Imaging tests (especially MRI) are done in a confined space. Talk with your healthcare provider before the day of your test if you are afraid of confined spaces. You may be given medicine (sedation) to help you relax during the test.  Risks and complications  · Swelling, infection, or other problems at the IV site  · Contrast or tracer-related problems, such as allergic reaction or kidney damage  · Damage to metal devices or prostheses from large magnetic MRI scanner   Date Last Reviewed: 11/1/2016 © 2000-2017 [email or web address]. [address] [place] [address], [place] [address]. All rights reserved. This information is not intended as a substitute for professional medical care. Always follow your healthcare professional's instructions.

## 2019-09-03 NOTE — PROGRESS NOTES
Subjective:      Established patient    Patient ID: Diana Escobar is a 54 y.o. female.  Patient Active Problem List   Diagnosis    Diplopia    Hypertension associated with diabetes    Combined hyperlipidemia associated with type 2 diabetes mellitus    Major depression, recurrent, chronic    Nicotine dependence, cigarettes, uncomplicated    Fibromyalgia    Multiple lung nodules on CT    Positive anti-CCP test    History of transient cerebral ischemia    High risk medication use    Jaw pain    Paresthesia    Right sided weakness    HA (headache)    Seronegative rheumatoid arthritis    Osteopenia    Rheumatoid arthritis of multiple sites with negative rheumatoid factor    Myofacial muscle pain    Hypothyroidism (acquired)    Cervical spondylosis with radiculopathy    Bilateral foot pain    Restless leg syndrome    Class 1 obesity due to excess calories with serious comorbidity and body mass index (BMI) of 34.0 to 34.9 in adult    Left hand pain    Chronic maxillary sinusitis    Chronic ethmoidal sinusitis    Chronic sphenoidal sinusitis    Chronic frontal sinusitis    Deviated nasal septum    Nasal obstruction    Nasal turbinate hypertrophy    Dyspnea and respiratory abnormalities    SRAVAN (obstructive sleep apnea)       Problem list has been reviewed.    Chief Complaint: Shortness of Breath      HPI    PFT, 6 MWT and ABG reviewed with patient who expressed and voiced understanding.   All questions were answered to the patients satisfaction.      Patients reports NYHA II dyspnea    The patient does not have currently have symptoms / an exacerbation.      Her current respiratory therapy regimen is SYMBICORT & VENTOLIN which provides relief. She is  adherent with her regimen.     No recent change in breathing.         Snoring / Sleep Apnea:     She presents for a sleep evaluation.  Patient has observed snoring .   Patient reports non restful' sleep.  she denies morning headache.    shereports impairment of activity during wakefulness.  she denies day time napping   Aurora sleepiness score was 11  Neck circumference is 13.  she denies recent weight gain.  Mallampati score 3  Cardiovascular risk factors: diabetes, hypertension, hyperlipidemia, history of TIA's and obesity  Bed time is 1900  Wake time is 0400  Sleep onset is within  1 Hour.  Sleep maintenance difficulties related to early morning awakening, frequent night time awakening and non-restful sleep  Wake after sleep onset occurs two times a night.  Nocturia occurs two times a night,   Sleep aids : No  Dry mouth : Yes,   Sleep walking: No,   Sleep talking : No,   Sleep eating:No  Vivid Dreams : No,   Cataplexy : No,   Hypnogogic hallucinations:  No    COMORBIDITIES:  BP Readings from Last 3 Encounters:   09/03/19 113/73   08/27/19 116/74   08/16/19 115/72           [place] Questionnaire (validated SRAVAN screening questionnaire)  positive -- Snoring/apnea  positive -- Fatigue    Body mass index is 34.56 kg/m².  (>25 is overweight, >30 is obese)  Blood Pressure = Hypertension    (PreHTN 120-139/80-89, Stg1 140-159/90-99, Stg2 >160/>100)  [place] = Three of three SRAVAN categories are positive (high risk is 2-3 positive categories)     Aurora Sleepiness Scale   EPWORTH SLEEPINESS SCALE 9/3/2019 8/13/2019   Sitting and reading 3 2   Watching TV 2 1   Sitting, inactive in a public place (e.g. a theatre or a meeting) 3 0   As a passenger in a car for an hour without a break 2 2   Lying down to rest in the afternoon when circumstances permit 0 0   Sitting and talking to someone 0 0   Sitting quietly after a lunch without alcohol 0 0   In a car, while stopped for a few minutes in traffic 1 1   Total score 11 6       Reference: Miranda FRYE. A new method for measuring daytime sleepiness: The Aurora  Sleepiness Scale. Sleep 1991; 14(6):540-5.    STOP-Bang Questionnaire (validated SRAVAN screening questionnaire)  Negative unless checked  off.  [x] Snoring    [x]  Tired/Fatigued/Sleepy  [x] Obstruction (apneas/choking)  [x] Pressure (HTN)  [] BMI >35  [x] Age >50  [] Neck >40 cm  [] Gender male   STOP-Bang = 5 (low risk 0-2,high risk 3-8)    References:   STOP Questionnaire   A Tool to Screen Patients for Obstructive Sleep Apnea: ROSEANN Macias.P.C., LAM KwonB.B.S., Tommy Ruiz M.D.,Glenna Minor, Ph.D., LAM CollinsB.B.S.,_ Lyubov Sawant.,_ Ziggy Patiño M.D., DENICE Griffith.R.C.P.C.; Anesthesiology 2008; 108:812-21 Copyright © 2008, the American Society of Anesthesiologists, Inc. [employer] Inc[employer]      Neck circumference 33 cm [?SRAVAN risk if >43cm (17in) male or >41cm (15.5 in) female]    Sleep position Supine = occasional    Non-supine = occasional  Mouth breathing during sleep - possibly    A full  review of systems, past , family  and social histories was performed except as mentioned in the note above, these are non contributory to the main issues discussed today.       Previous Report Reviewed: lab reports, office notes and radiology reports     The following portions of the patient's history were reviewed and updated as appropriate: She  has a past medical history of Asthma, Chronic low back pain, Degenerative disc disease, lumbar, Depression, Diabetes mellitus (2014), Fibromyalgia, Hyperlipidemia, Hypertension, Hypothyroidism, MRSA (methicillin resistant Staphylococcus aureus) carrier, Nodule of left lung (2/6/2017), Palpitations, Stroke, and Vitamin D deficiency disease.  She  has a past surgical history that includes Hysterectomy; Rotator cuff repair; left ankle surgery; Tonsillectomy; Neck surgery (06/2016); and loop recorder (05/2017).  Her family history includes Breast cancer in her mother and sister; COPD in her father; Cancer in her mother; Cataracts in her mother; Diabetes in her maternal aunt and mother; Heart disease in her father and mother; Hypertension in her  "father and mother; Stroke in her mother.  She  reports that she has been smoking cigarettes.  She has been smoking about 0.50 packs per day. She has never used smokeless tobacco. She reports that she does not drink alcohol or use drugs.  She has a current medication list which includes the following prescription(s): albuterol, amlodipine, atorvastatin, azelastine, blood sugar diagnostic, blood-glucose meter, budesonide-formoterol 160-4.5 mcg, bupropion, buspirone, clopidogrel, diclofenac sodium, fluticasone propionate, folic acid, gabapentin, hydrocodone-acetaminophen, pen needle, diabetic, lancets, levothyroxine, metformin, methocarbamol, methotrexate, myrbetriq, nicotine, peg 400-propylene glycol (pf), pramipexole, prednisone, and venlafaxine.  She is allergic to mobic [meloxicam]; topamax [topiramate]; and adhesive..    Review of Systems   Constitutional: Positive for appetite change and fatigue.   HENT: Positive for postnasal drip, trouble swallowing and congestion. Negative for rhinorrhea.    Eyes: Negative for redness and itching.   Respiratory: Positive for cough, sputum production, wheezing and dyspnea on extertion. Negative for chest tightness.    Cardiovascular: Positive for leg swelling.   Genitourinary: Negative for difficulty urinating.   Endocrine: Negative for polydipsia, cold intolerance and heat intolerance.    Musculoskeletal: Positive for arthralgias, back pain and myalgias.   Skin: Negative for rash.   Gastrointestinal: Positive for acid reflux.   Neurological: Negative for dizziness, light-headedness and headaches.   Psychiatric/Behavioral: The patient is nervous/anxious.         Objective:     /73   Pulse 104   Resp 18   Ht 5' 2.5" (1.588 m)   Wt 87.1 kg (192 lb)   SpO2 96%   BMI 34.56 kg/m²   Body mass index is 34.56 kg/m².     Physical Exam   Constitutional: She is oriented to person, place, and time. She appears well-developed and well-nourished. No distress.   HENT:   Head: " "Normocephalic and atraumatic.   Right Ear: Tympanic membrane and ear canal normal.   Left Ear: Tympanic membrane and ear canal normal.   Nose: Mucosal edema and rhinorrhea present.   Mouth/Throat: Uvula is midline. Posterior oropharyngeal edema and posterior oropharyngeal erythema present. No tonsillar exudate.   Mallampati 3    Eyes: Pupils are equal, round, and reactive to light. EOM are normal.   Neck: Normal range of motion. Neck supple.   13"   Cardiovascular: Normal rate and regular rhythm. Exam reveals no gallop and no friction rub.   No murmur heard.  Pulmonary/Chest: Effort normal and breath sounds normal. No stridor. No respiratory distress. She has no wheezes. She has no rales.   Abdominal: Soft. Bowel sounds are normal.   Musculoskeletal: Normal range of motion. She exhibits no edema.   Neurological: She is alert and oriented to person, place, and time.   Skin: Skin is warm and dry. Capillary refill takes 2 to 3 seconds. She is not diaphoretic.   Psychiatric: She has a normal mood and affect.   Nursing note and vitals reviewed.      Personal Diagnostic Review      ABG     19  1323   PH 7.378   PCO2 43.3   PO2 81   HCO3 25.5   POCSATURATED 96   BE 0     Normal ABG      Pulmonary function tests: FEV1: 2.06  (80.2 % predicted), FVC:  2.97 (92.8 % predicted), FEV1/FVC:  69, T.43 (115.4 % predicted), RV/TLVC: 44 (119.8 % predicted), DLCO: 16.57 (72.3 % predicted)  Normal spirometry. Normal lung volumes. Diffusion capacity is mildly reduced.      Six Minute Walk Test: Six minute walk distance is 426.72 / 496.04 meters  (86.03 % predicted) with light dyspnea.   During exercise, there was no significant desaturation while breathing room air.   Lowest oxygen saturation during walking was 94 %.  Both blood pressure and heart rate increased significantly with walking.  Normotension was present prior to exercise.  This may represent an abnormal cardiovascular response to exercise.  The patient reported " non-pulmonary symptoms during exercise - headache.  The patient did complete the study, walking 360 seconds of the 360 second test.  No previous study performed.  Based upon age and body mass index, exercise capacity is normal.    Peak VO2 during walking was 16.78 ml/min/kg[4.79 METS]    CT of chest performed on 08/20/19 without contrast:      Base of Neck: No significant abnormality.    Airways: Patent.    Lungs: Clear lungs.  Multiple nodules are seen within the lungs bilaterally all measuring less than 4 mm scattered throughout the lungs bilaterally.  Largest measure 4 mm within the left lower lobe and right upper lobe.  For multiple solid nodules all <6 mm, Fleischner Society 2017 guidelines recommend no routine follow up for a low risk patient, or follow up with non-contrast chest CT at 12 months after discovery in a high risk patient.    Pleura: No pleural fluid.No pleural calcification.    Maria Fernanda/Mediastinum: No pathologic adolfo enlargement.    Esophagus: Normal.    Heart/pericardium: Normal size.  No pericardial effusion or calcification.    Pulmonary vasculature: Pulmonary arteries distribute normally.  There are four pulmonary veins.    Aorta: Left-sided aortic arch with 3 arterial branches.  The aorta maintains normal caliber, contour and course. There is no calcification of the thoracic aorta.  There is  no coronary artery calcification.    Thoracic soft tissues: Normal. Both breasts are present.    Bones: No acute fracture. No suspicious lytic or sclerotic lesion.    Upper Abdomen: No abnormality of the partially imaged upper abdomen.  Subcentimeter hypodensities within the right hepatic lobe are too small to characterize but likely reflect small cysts.        Assessment / Plan:       Discussed diagnosis, its evaluation, treatment and usual course. All questions answered.    Problem List Items Addressed This Visit        Pulmonary    Multiple lung nodules on CT (Chronic)    Overview     CT chest :  04/06/16: Small 3 mm pleural-based nodule laterally in the left lower lobe.         Current Assessment & Plan     Repeat CT chest in 6 months.    Fleischner Society Guidelines:    High risk patient:   Smoking history, history of malignancy or risk factors for malignancy.( non-solid ground glass opacities and partially solid nodules may require longer follow up to exclude indolent adenocarcinoma)      Nodule size Low risk patient High risk patient   4 mm  No follow up Follow up CT in 12 months. If unchanged, no further follow up.   4 - 6 mm Follow up CT in 12 months; if unchanged, no further follow up.  Initial follow up CT in 6 - 12 months, then at 18 - 24 months if no change.      6 - 8 mm  Initial follow up CT at 6 - 12 months and at 18 months if no change.  Initial follow up CT at 3 - 6 months. Then at 9-12 months and 24 months if no change.      > 8 mm Initial follow up CT at 3, 6, 9 and 24 months, dynamic contrast enhanced CT, PET-CT and/or biopsy. Initial follow up CT at 3, 6, 9 and 24 months, dynamic contrast enhanced CT, PET-CT and/or biopsy.            Relevant Orders    CT Chest Without Contrast       Endocrine    Class 1 obesity due to excess calories with serious comorbidity and body mass index (BMI) of 34.0 to 34.9 in adult    Current Assessment & Plan     General weight loss/lifestyle modification strategies discussed (elicit support from others; identify saboteurs; non-food rewards, etc).  Behavioral treatment: Slim Fast.  Diet interventions: low calorie (1000 kCal/d) deficit diet.  Informal exercise measures discussed, e.g. taking stairs instead of elevator.  Regular aerobic exercise program discussed.            Other    Nicotine dependence, cigarettes, uncomplicated (Chronic)    Current Assessment & Plan     Assistance with smoking cessation was offered, including:  [x]  Medications  [x]  Counseling  []  Printed Information on Smoking Cessation  []  Referral to a Smoking Cessation  Program    Patient was counseled regarding smoking for 3-10 minutes.           Dyspnea and respiratory abnormalities - Primary (Chronic)    Current Assessment & Plan     Etiology unclear.  Multifactorial etiology suspected.  Likely contributors to etiology (checked)    [x] Pulmonary airway disease    []  Pulmonary parenchymal disease  [] Pulmonary vascular disease   [] Pleural disease  [] Pulmonary vasculitis  [] Hypoventilation ( chest wall deformity, neuromuscular disease, obesity etc)  [] Anemia  [] Thyroid disease.  [] Cardiac illess  [x]         Sleep disorder    EVALUATION:  []        Complete PFT with bronchodilator.  [x]        Bronchial challenge with methacholine.   []        Stress test, pulmonary.  []        PULM - Arterial Blood Gases  []        Chest X Ray  []        CT scan of chest.   []        STEVE  []        Sedimentation rate  []        C-reactive protein  []        Anti-neutrophilic cytoplasmic antibody          PLAN:  Discussed diagnosis, its evaluation, treatment and usual course.  All questions answered.        Call if shortness of breath worsens, blood in sputum, change in character of cough, development of fever or chills, inability to maintain nutrition and hydration.     Re evaluate in six weeks with results.         Relevant Orders    Bronchial challenge with methacholine    SRAVAN (obstructive sleep apnea)    Current Assessment & Plan     My recommendation at this point would be to set up a [facility].  We have discussed weight loss and how this may improve her situation.  We have discussed behavioral modifications, as well.  After her study, she  could certainly try a CPAP.          Relevant Orders    Home Sleep Studies            TIME SPENT WITH PATIENT: Time spent: 45 minutes in face to face  discussion concerning diagnosis, prognosis, review of lab and test results, benefits of treatment as well as management of disease, counseling of patient and  coordination of care between various health  care providers . Greater than half the time spent was used for coordination of care and counseling of patient.       Follow up in about 6 weeks (around 10/15/2019) for SRAVAN, Obesity, Tobacco use disorder, Multiple lung nodules.

## 2019-09-04 ENCOUNTER — TELEPHONE (OUTPATIENT)
Dept: PULMONOLOGY | Facility: CLINIC | Age: 54
End: 2019-09-04

## 2019-09-05 LAB
BRPFT: ABNORMAL
DLCO ADJ PRE: 16.05 ML/(MIN*MMHG) (ref 17.17–28.63)
DLCO SINGLE BREATH LLN: 17.17
DLCO SINGLE BREATH PRE REF: 72.3 %
DLCO SINGLE BREATH REF: 22.9
DLCOC SBVA LLN: 3.28
DLCOC SBVA PRE REF: 76.6 %
DLCOC SBVA REF: 4.87
DLCOC SINGLE BREATH LLN: 17.17
DLCOC SINGLE BREATH PRE REF: 70.1 %
DLCOC SINGLE BREATH REF: 22.9
DLCOVA LLN: 3.28
DLCOVA PRE REF: 79.1 %
DLCOVA PRE: 3.85 ML/(MIN*MMHG*L) (ref 3.28–6.46)
DLCOVA REF: 4.87
DLVAADJ PRE: 3.73 ML/(MIN*MMHG*L) (ref 3.28–6.46)
ERV LLN: 0.89
ERV PRE REF: 62.9 %
ERV REF: 0.89
FEF 25 75 LLN: 1.37
FEF 25 75 PRE REF: 49.9 %
FEF 25 75 REF: 2.51
FEV1 FVC LLN: 69
FEV1 FVC PRE REF: 86 %
FEV1 FVC REF: 80
FEV1 LLN: 1.98
FEV1 PRE REF: 80.2 %
FEV1 REF: 2.56
FRCPLETH LLN: 1.79
FRCPLETH PREREF: 112.1 %
FRCPLETH REF: 2.61
FVC LLN: 2.49
FVC PRE REF: 92.8 %
FVC REF: 3.21
IVC PRE: 2.64 L (ref 2.49–3.93)
IVC SINGLE BREATH LLN: 2.49
IVC SINGLE BREATH PRE REF: 82.2 %
IVC SINGLE BREATH REF: 3.21
MVV LLN: 80
MVV PRE REF: 69 %
MVV REF: 94
PEF LLN: 4.77
PEF PRE REF: 80.9 %
PEF REF: 6.41
PRE DLCO: 16.57 ML/(MIN*MMHG) (ref 17.17–28.63)
PRE ERV: 0.56 L (ref 0.89–0.89)
PRE FEF 25 75: 1.25 L/S (ref 1.37–3.64)
PRE FET 100: 9.02 SEC
PRE FEV1 FVC: 69.08 % (ref 68.96–91.72)
PRE FEV1: 2.06 L (ref 1.98–3.14)
PRE FRC PL: 2.93 L
PRE FVC: 2.98 L (ref 2.49–3.93)
PRE MVV: 65 L/MIN (ref 80.09–108.35)
PRE PEF: 5.19 L/S (ref 4.77–8.05)
PRE RV: 2.4 L (ref 1.15–2.3)
PRE TLC: 5.43 L (ref 3.72–5.69)
RAW LLN: 3.06
RAW PRE REF: 76.5 %
RAW PRE: 2.34 CMH2O*S/L (ref 3.06–3.06)
RAW REF: 3.06
RV LLN: 1.15
RV PRE REF: 139.3 %
RV REF: 1.73
RVTLC LLN: 27
RVTLC PRE REF: 119.8 %
RVTLC PRE: 44.3 % (ref 27.39–46.57)
RVTLC REF: 37
TLC LLN: 3.72
TLC PRE REF: 115.4 %
TLC REF: 4.7
VA PRE: 4.31 L (ref 4.55–4.55)
VA SINGLE BREATH LLN: 4.55
VA SINGLE BREATH PRE REF: 94.6 %
VA SINGLE BREATH REF: 4.55
VC LLN: 2.49
VC PRE REF: 94.3 %
VC PRE: 3.02 L (ref 2.49–3.93)
VC REF: 3.21
VTGRAWPRE: 3.71 L

## 2019-09-06 ENCOUNTER — TELEPHONE (OUTPATIENT)
Dept: PULMONOLOGY | Facility: CLINIC | Age: 54
End: 2019-09-06

## 2019-09-06 NOTE — TELEPHONE ENCOUNTER
----- Message from Penny Jacobsen sent at 9/6/2019  8:29 AM CDT -----  Contact: Patient  Patient is checking on status of her [employer] paperwork for a DANDY, please call her back at [phone]. Thank you

## 2019-09-06 NOTE — TELEPHONE ENCOUNTER
Patient informed that paperwork was completed and faxed to number on form. Patient request to receive a copy mailed to her home

## 2019-09-09 ENCOUNTER — PATIENT MESSAGE (OUTPATIENT)
Dept: PULMONOLOGY | Facility: CLINIC | Age: 54
End: 2019-09-09

## 2019-09-10 NOTE — TELEPHONE ENCOUNTER
Spoke to patient explained home sleep study need approval and she will call back to schedule asthma test

## 2019-09-11 ENCOUNTER — TELEPHONE (OUTPATIENT)
Dept: PULMONOLOGY | Facility: CLINIC | Age: 54
End: 2019-09-11

## 2019-09-11 DIAGNOSIS — G47.33 OSA (OBSTRUCTIVE SLEEP APNEA): Primary | ICD-10-CM

## 2019-09-11 NOTE — TELEPHONE ENCOUNTER
HST has been denied by patients insurance for LACK OF COVERAGE.     In Lab PSG which is covered  will be ordered.     Please inform patient.

## 2019-09-12 ENCOUNTER — TELEPHONE (OUTPATIENT)
Dept: PULMONOLOGY | Facility: CLINIC | Age: 54
End: 2019-09-12

## 2019-09-12 NOTE — TELEPHONE ENCOUNTER
----- Message from Isak Lazcano sent at 9/12/2019 10:32 AM CDT -----  Contact: self [phone]  Pt would like return call from nurse regarding question about asthma test. Please call back at [phone].  Md [place]

## 2019-09-16 ENCOUNTER — TELEPHONE (OUTPATIENT)
Dept: SMOKING CESSATION | Facility: CLINIC | Age: 54
End: 2019-09-16

## 2019-09-16 NOTE — TELEPHONE ENCOUNTER
Patient called to cancel her appointment. She states that she was called in to work today. She will call back to reschedule.

## 2019-09-23 ENCOUNTER — PATIENT MESSAGE (OUTPATIENT)
Dept: ADMINISTRATIVE | Facility: OTHER | Age: 54
End: 2019-09-23

## 2019-09-23 ENCOUNTER — PATIENT MESSAGE (OUTPATIENT)
Dept: INTERNAL MEDICINE | Facility: CLINIC | Age: 54
End: 2019-09-23

## 2019-09-23 ENCOUNTER — TELEPHONE (OUTPATIENT)
Dept: INTERNAL MEDICINE | Facility: CLINIC | Age: 54
End: 2019-09-23

## 2019-09-23 NOTE — TELEPHONE ENCOUNTER
----- Message from Isak Lazcano sent at 9/23/2019 11:38 AM CDT -----  Contact: self [phone]  .Type:  Patient Returning Call    Who Called:Diana Escobar  Who Left Message for Patient:Jenny  Does the patient know what this is regarding?:no  Would the patient rather a call back or a response via [facility]? Call back   Best Call Back Number:[phone]  Additional Information:

## 2019-09-24 ENCOUNTER — PATIENT MESSAGE (OUTPATIENT)
Dept: INTERNAL MEDICINE | Facility: CLINIC | Age: 54
End: 2019-09-24

## 2019-09-24 DIAGNOSIS — Z13.79 GENETIC SCREENING: ICD-10-CM

## 2019-10-02 ENCOUNTER — OFFICE VISIT (OUTPATIENT)
Dept: INTERNAL MEDICINE | Facility: CLINIC | Age: 54
End: 2019-10-02
Payer: COMMERCIAL

## 2019-10-02 VITALS
WEIGHT: 177 LBS | DIASTOLIC BLOOD PRESSURE: 76 MMHG | HEART RATE: 99 BPM | OXYGEN SATURATION: 95 % | BODY MASS INDEX: 31.86 KG/M2 | TEMPERATURE: 97 F | SYSTOLIC BLOOD PRESSURE: 122 MMHG

## 2019-10-02 DIAGNOSIS — R05.9 COUGH: Primary | ICD-10-CM

## 2019-10-02 PROCEDURE — 3078F PR MOST RECENT DIASTOLIC BLOOD PRESSURE < 80 MM HG: ICD-10-PCS | Mod: CPTII,S$GLB,, | Performed by: FAMILY MEDICINE

## 2019-10-02 PROCEDURE — 3074F PR MOST RECENT SYSTOLIC BLOOD PRESSURE < 130 MM HG: ICD-10-PCS | Mod: CPTII,S$GLB,, | Performed by: FAMILY MEDICINE

## 2019-10-02 PROCEDURE — 90686 IIV4 VACC NO PRSV 0.5 ML IM: CPT | Mod: S$GLB,,, | Performed by: FAMILY MEDICINE

## 2019-10-02 PROCEDURE — 90686 FLU VACCINE (QUAD) GREATER THAN OR EQUAL TO 3YO PRESERVATIVE FREE IM: ICD-10-PCS | Mod: S$GLB,,, | Performed by: FAMILY MEDICINE

## 2019-10-02 PROCEDURE — 90471 FLU VACCINE (QUAD) GREATER THAN OR EQUAL TO 3YO PRESERVATIVE FREE IM: ICD-10-PCS | Mod: S$GLB,,, | Performed by: FAMILY MEDICINE

## 2019-10-02 PROCEDURE — 99999 PR PBB SHADOW E&M-EST. PATIENT-LVL V: CPT | Mod: PBBFAC,,, | Performed by: FAMILY MEDICINE

## 2019-10-02 PROCEDURE — 3078F DIAST BP <80 MM HG: CPT | Mod: CPTII,S$GLB,, | Performed by: FAMILY MEDICINE

## 2019-10-02 PROCEDURE — 90471 IMMUNIZATION ADMIN: CPT | Mod: S$GLB,,, | Performed by: FAMILY MEDICINE

## 2019-10-02 PROCEDURE — 3008F BODY MASS INDEX DOCD: CPT | Mod: CPTII,S$GLB,, | Performed by: FAMILY MEDICINE

## 2019-10-02 PROCEDURE — 99213 OFFICE O/P EST LOW 20 MIN: CPT | Mod: 25,S$GLB,, | Performed by: FAMILY MEDICINE

## 2019-10-02 PROCEDURE — 99999 PR PBB SHADOW E&M-EST. PATIENT-LVL V: ICD-10-PCS | Mod: PBBFAC,,, | Performed by: FAMILY MEDICINE

## 2019-10-02 PROCEDURE — 99213 PR OFFICE/OUTPT VISIT, EST, LEVL III, 20-29 MIN: ICD-10-PCS | Mod: 25,S$GLB,, | Performed by: FAMILY MEDICINE

## 2019-10-02 PROCEDURE — 3008F PR BODY MASS INDEX (BMI) DOCUMENTED: ICD-10-PCS | Mod: CPTII,S$GLB,, | Performed by: FAMILY MEDICINE

## 2019-10-02 PROCEDURE — 3074F SYST BP LT 130 MM HG: CPT | Mod: CPTII,S$GLB,, | Performed by: FAMILY MEDICINE

## 2019-10-02 RX ORDER — PANTOPRAZOLE SODIUM 40 MG/1
40 TABLET, DELAYED RELEASE ORAL DAILY
Qty: 30 TABLET | Refills: 0 | Status: SHIPPED | OUTPATIENT
Start: 2019-10-02 | End: 2019-10-02 | Stop reason: SDUPTHER

## 2019-10-02 RX ORDER — BENZONATATE 200 MG/1
200 CAPSULE ORAL 3 TIMES DAILY PRN
Qty: 30 CAPSULE | Refills: 0 | Status: SHIPPED | OUTPATIENT
Start: 2019-10-02 | End: 2019-10-02 | Stop reason: SDUPTHER

## 2019-10-02 RX ORDER — BENZONATATE 200 MG/1
200 CAPSULE ORAL 3 TIMES DAILY PRN
Qty: 30 CAPSULE | Refills: 0 | Status: SHIPPED | OUTPATIENT
Start: 2019-10-02 | End: 2019-10-12

## 2019-10-02 RX ORDER — PROMETHAZINE HYDROCHLORIDE AND CODEINE PHOSPHATE 6.25; 1 MG/5ML; MG/5ML
5 SOLUTION ORAL EVERY 4 HOURS PRN
Qty: 118 ML | Refills: 0 | Status: SHIPPED | OUTPATIENT
Start: 2019-10-02 | End: 2019-10-02 | Stop reason: SDUPTHER

## 2019-10-02 RX ORDER — PROMETHAZINE HYDROCHLORIDE AND CODEINE PHOSPHATE 6.25; 1 MG/5ML; MG/5ML
5 SOLUTION ORAL EVERY 4 HOURS PRN
Qty: 118 ML | Refills: 0 | Status: SHIPPED | OUTPATIENT
Start: 2019-10-02 | End: 2019-10-12

## 2019-10-02 RX ORDER — PANTOPRAZOLE SODIUM 40 MG/1
40 TABLET, DELAYED RELEASE ORAL DAILY
Qty: 30 TABLET | Refills: 0 | Status: SHIPPED | OUTPATIENT
Start: 2019-10-02 | End: 2019-10-09

## 2019-10-02 NOTE — PROGRESS NOTES
Subjective:       Patient ID: Diana Escobar is a 54 y.o. female.    Chief Complaint: Cough    HPI Ms. Escobar presents today for cough. This has persisted since August.     Started in August  Dx with Bronchitis -inhaler's have not changed symptoms    Saw Mr. Hsu 8/7/19,PCP 8/27/19, pulmonary in September    Has been using her Ventolin 3 times a day but has not used it in the past couple days b/c it didn't make a difference.   Waiting to schedule sleep study   Inhaler and short course of oral steroids     In the beginning she tried two nasal sprays with Symbicort.   Flonase was one of the nasal sprays.   Drinking a lot.     Review of Systems   Constitutional: Positive for activity change, appetite change and fatigue.   HENT: Negative.    Respiratory: Positive for cough. Negative for wheezing.    Cardiovascular: Chest pain: with cough.   Gastrointestinal: Negative.    Genitourinary: Negative.    Musculoskeletal: Positive for myalgias.   Neurological: Negative.            Past Medical History:   Diagnosis Date    Asthma     Chronic low back pain     Degenerative disc disease, lumbar     Depression     Diabetes mellitus 2014    Fibromyalgia     Hyperlipidemia     Hypertension     Hypothyroidism     MRSA (methicillin resistant Staphylococcus aureus) carrier     Nodule of left lung 2/6/2017    CT chest : 04/06/16: Small 3 mm pleural-based nodule laterally in the left lower lobe.    Palpitations     Dr. Jesus Lao--cardiology    Stroke     TIA    Vitamin D deficiency disease      Objective:        Physical Exam   Constitutional: She is oriented to person, place, and time. She appears well-developed and well-nourished.   HENT:   Head: Normocephalic and atraumatic.   Nose: Mucosal edema present.   Mouth/Throat: Posterior oropharyngeal erythema present.   Postnasal drip   Cardiovascular: Normal rate, regular rhythm and normal heart sounds.   Pulmonary/Chest: Effort normal and breath sounds normal.  No stridor. No respiratory distress. She has no wheezes. She has no rales.   Neurological: She is alert and oriented to person, place, and time.   Vitals reviewed.        Results for orders placed or performed in visit on 09/03/19   Complete PFT with bronchodilator   Result Value Ref Range    Interpretation       Baseline spirometry is normal. Static lung volumes reveal air trapping but no hyperinflation. Diffusion capacity is mildly reduced.     Pre FVC 2.98 2.49 - 3.93 L    Pre FEV1 2.06 1.98 - 3.14 L    Pre FEV1 FVC 69.08 68.96 - 91.72 %    Pre FEF 25 75 1.25 (L) 1.37 - 3.64 L/s    Pre PEF 5.19 4.77 - 8.05 L/s    Pre  9.02 sec    Pre MVV 65.00 (L) 80.09 - 108.35 L/min    Pre DLCO 16.57 (L) 17.17 - 28.63 ml/(min*mmHg)    DLCO ADJ PRE 16.05 (L) 17.17 - 28.63 ml/(min*mmHg)    DLCOVA PRE 3.85 3.28 - 6.46 ml/(min*mmHg*L)    DLVAAdj PRE 3.73 3.28 - 6.46 ml/(min*mmHg*L)    VA PRE 4.31 (L) 4.55 - 4.55 L    IVC PRE 2.64 2.49 - 3.93 L    Pre TLC 5.43 3.72 - 5.69 L    VC PRE 3.02 2.49 - 3.93 L    Pre FRC PL 2.93 L    Pre ERV 0.56 (L) 0.89 - 0.89 L    Pre RV 2.40 (H) 1.15 - 2.30 L    RVTLC PRE 44.30 27.39 - 46.57 %    Raw PRE 2.34 (L) 3.06 - 3.06 cmH2O*s/L    VTGRAWPRE 3.71 L    FVC Ref 3.21     FVC LLN 2.49     FVC Pre Ref 92.8 %    FEV1 Ref 2.56     FEV1 LLN 1.98     FEV1 Pre Ref 80.2 %    FEV1 FVC Ref 80     FEV1 FVC LLN 69     FEV1 FVC Pre Ref 86.0 %    FEF 25 75 Ref 2.51     FEF 25 75 LLN 1.37     FEF 25 75 Pre Ref 49.9 %    PEF Ref 6.41     PEF LLN 4.77     PEF Pre Ref 80.9 %    MVV Ref 94     MVV LLN 80     MVV Pre Ref 69.0 %    TLC Ref 4.70     TLC LLN 3.72     TLC Pre Ref 115.4 %    VC Ref 3.21     VC LLN 2.49     VC Pre Ref 94.3 %    FRCpleth Ref 2.61     FRCpleth LLN 1.79     FRCpleth PreRef 112.1 %    ERV Ref 0.89     ERV LLN 0.89     ERV Pre Ref 62.9 %    RV Ref 1.73     RV LLN 1.15     RV Pre Ref 139.3 %    RVTLC Ref 37     RVTLC LLN 27     RVTLC Pre Ref 119.8 %    Raw Ref 3.06     Raw LLN 3.06     Raw  Pre Ref 76.5 %    DLCO Single Breath Ref 22.90     DLCO Single Breath LLN 17.17     DLCO Single Breath Pre Ref 72.3 %    DLCOc Single Breath Ref 22.90     DLCOc Single Breath LLN 17.17     DLCOc Single Breath Pre Ref 70.1 %    DLCOVA Ref 4.87     DLCOVA LLN 3.28     DLCOVA Pre Ref 79.1 %    DLCOc SBVA Ref 4.87     DLCOc SBVA LLN 3.28     DLCOc SBVA Pre Ref 76.6 %    VA Single Breath Ref 4.55     VA Single Breath LLN 4.55     VA Single Breath Pre Ref 94.6 %    IVC Single Breath Ref 3.21     IVC Single Breath LLN 2.49     IVC Single Breath Pre Ref 82.2 %   ISTAT PROCEDURE   Result Value Ref Range    POC PH 7.378 7.35 - 7.45    POC PCO2 43.3 35 - 45 mmHg    POC PO2 81 80 - 100 mmHg    POC HCO3 25.5 24 - 28 mmol/L    POC BE 0 -2 to 2 mmol/L    POC SATURATED O2 96 95 - 100 %    Sample ARTERIAL     Site LR     Allens Test Pass     DelSys Room Air        Assessment/Plan:     Cough  -     Discontinue: benzonatate (TESSALON) 200 MG capsule; Take 1 capsule (200 mg total) by mouth 3 (three) times daily as needed.  Dispense: 30 capsule; Refill: 0  -     Discontinue: promethazine-codeine 6.25-10 mg/5 ml (PHENERGAN WITH CODEINE) 6.25-10 mg/5 mL syrup; Take 5 mLs by mouth every 4 (four) hours as needed.  Dispense: 118 mL; Refill: 0  -     Discontinue: pantoprazole (PROTONIX) 40 MG tablet; Take 1 tablet (40 mg total) by mouth once daily.  Dispense: 30 tablet; Refill: 0  -     promethazine-codeine 6.25-10 mg/5 ml (PHENERGAN WITH CODEINE) 6.25-10 mg/5 mL syrup; Take 5 mLs by mouth every 4 (four) hours as needed.  Dispense: 118 mL; Refill: 0  -     benzonatate (TESSALON) 200 MG capsule; Take 1 capsule (200 mg total) by mouth 3 (three) times daily as needed.  Dispense: 30 capsule; Refill: 0  -     pantoprazole (PROTONIX) 40 MG tablet; Take 1 tablet (40 mg total) by mouth once daily.  Dispense: 30 tablet; Refill: 0    Other orders  -     Influenza - Quadrivalent (PF)    Addressing reflux today to see if she may silent reflux causing the  persistent cough.   Gave other cough medication to help with symptoms in the meantime of taking protonix. Please take for at least 2 weeks    Will also consider allergy medication for a few days   Patient to call next week with status of symptoms    Follow up 2 weeks     Tressa Shetty MD  [facility]   Family Medicine

## 2019-10-04 DIAGNOSIS — R25.2 MUSCLE CRAMPS AT NIGHT: ICD-10-CM

## 2019-10-04 DIAGNOSIS — F41.9 ANXIETY: ICD-10-CM

## 2019-10-04 DIAGNOSIS — F33.9 MAJOR DEPRESSION, RECURRENT, CHRONIC: ICD-10-CM

## 2019-10-04 RX ORDER — VENLAFAXINE HYDROCHLORIDE 150 MG/1
CAPSULE, EXTENDED RELEASE ORAL
Qty: 30 CAPSULE | Refills: 3 | Status: SHIPPED | OUTPATIENT
Start: 2019-10-04 | End: 2020-02-25

## 2019-10-04 RX ORDER — GABAPENTIN 300 MG/1
CAPSULE ORAL
Qty: 60 CAPSULE | Refills: 2 | Status: SHIPPED | OUTPATIENT
Start: 2019-10-04 | End: 2019-12-12 | Stop reason: SDUPTHER

## 2019-10-07 ENCOUNTER — CLINICAL SUPPORT (OUTPATIENT)
Dept: SMOKING CESSATION | Facility: CLINIC | Age: 54
End: 2019-10-07
Payer: COMMERCIAL

## 2019-10-07 DIAGNOSIS — F17.210 MODERATE SMOKER (20 OR LESS PER DAY): Primary | ICD-10-CM

## 2019-10-07 PROCEDURE — 99403 PREV MED CNSL INDIV APPRX 45: CPT | Mod: S$GLB,,,

## 2019-10-07 PROCEDURE — 99999 PR PBB SHADOW E&M-EST. PATIENT-LVL I: ICD-10-PCS | Mod: PBBFAC,,,

## 2019-10-07 PROCEDURE — 99999 PR PBB SHADOW E&M-EST. PATIENT-LVL I: CPT | Mod: PBBFAC,,,

## 2019-10-07 PROCEDURE — 99403 PR PREVENT COUNSEL,INDIV,45 MIN: ICD-10-PCS | Mod: S$GLB,,,

## 2019-10-07 RX ORDER — DM/P-EPHED/ACETAMINOPH/DOXYLAM 30-7.5/3
2 LIQUID (ML) ORAL
Qty: 216 LOZENGE | Refills: 0 | Status: SHIPPED | OUTPATIENT
Start: 2019-10-07 | End: 2019-11-14 | Stop reason: ALTCHOICE
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
## 2019-10-08 ENCOUNTER — PATIENT MESSAGE (OUTPATIENT)
Dept: INTERNAL MEDICINE | Facility: CLINIC | Age: 54
End: 2019-10-08

## 2019-10-09 RX ORDER — CIMETIDINE 400 MG/1
400 TABLET, FILM COATED ORAL 2 TIMES DAILY
Qty: 60 TABLET | Refills: 1 | Status: SHIPPED | OUTPATIENT
Start: 2019-10-09 | End: 2019-10-18

## 2019-10-15 ENCOUNTER — CLINICAL SUPPORT (OUTPATIENT)
Dept: SMOKING CESSATION | Facility: CLINIC | Age: 54
End: 2019-10-15
Payer: COMMERCIAL

## 2019-10-15 DIAGNOSIS — F17.210 MODERATE SMOKER (20 OR LESS PER DAY): Primary | ICD-10-CM

## 2019-10-15 DIAGNOSIS — F17.210 CIGARETTE NICOTINE DEPENDENCE, UNCOMPLICATED: ICD-10-CM

## 2019-10-15 PROCEDURE — 99999 PR PBB SHADOW E&M-EST. PATIENT-LVL I: CPT | Mod: PBBFAC,,,

## 2019-10-15 PROCEDURE — 99403 PR PREVENT COUNSEL,INDIV,45 MIN: ICD-10-PCS | Mod: S$GLB,,,

## 2019-10-15 PROCEDURE — 99403 PREV MED CNSL INDIV APPRX 45: CPT | Mod: S$GLB,,,

## 2019-10-15 PROCEDURE — 99999 PR PBB SHADOW E&M-EST. PATIENT-LVL I: ICD-10-PCS | Mod: PBBFAC,,,

## 2019-10-15 NOTE — Clinical Note
The patient is smoking 17-18 cigarettes/day  from 22 cigarettes/day.The CO measurement = 18  ppm which indicates possibly a heavylsmoker . The patient is using 150 mg Wellbutrin BID and 2 mg nicotine lozenges as needed. She was congratulated on her progress and feels the Wellbutrin is helping. Discussed how quitting smoking can help you feel better and discussed ways to help cope. completion of TCRS (Tobacco Cessation Rating Scale) reviewed strategies, cues, and triggers. Introduced the negative impact of tobacco on health, the health advantages of discontinuing the use of tobacco, time line improved health changes after a quit, withdrawal issues to expect from nicotine and habit, and ways to achieve the goal of a quit.

## 2019-10-16 RX ORDER — BUPROPION HYDROCHLORIDE 150 MG/1
150 TABLET, EXTENDED RELEASE ORAL 2 TIMES DAILY
Qty: 60 TABLET | Refills: 0 | Status: SHIPPED | OUTPATIENT
Start: 2019-10-16 | End: 2019-11-18 | Stop reason: SDUPTHER

## 2019-10-16 NOTE — PROGRESS NOTES
Individual Follow-Up Form    10/16/2019    Quit Date: TBD    Clinical Status of Patient: Outpatient    Length of Service: 45 minutes    Continuing Medication: yes  Wellbutrin    Other Medications: 2 mg nicotine lozenges     Target Symptoms: Withdrawal and medication side effects. The following were  rated moderate (3) to severe (4) on TCRS:  · Moderate (3): desire/crave, increased appetite, - Nicotine replacement therapy, withdrawal symptoms, habit  ·   · Severe (4): none    Comments: The patient is smoking 17-18 cigarettes/day  from 22 cigarettes/day.The CO measurement = 18  ppm which indicates possibly a heavylsmoker . The patient is using 150 mg Wellbutrin BID and 2 mg nicotine lozenges as needed. She was congratulated on her progress and feels the Wellbutrin is helping. Discussed how quitting smoking can help you feel better and discussed ways to help cope. completion of TCRS (Tobacco Cessation Rating Scale) reviewed strategies, cues, and triggers. Introduced the negative impact of tobacco on health, the health advantages of discontinuing the use of tobacco, time line improved health changes after a quit, withdrawal issues to expect from nicotine and habit, and ways to achieve the goal of a quit.The patient denies any abnormal behavioral or mental changes at this time.The patient will continue individual sessions and medication monitoring by CTTS. Prescribed medication management will be by practitoner.        Diagnosis: F17.210    Next Visit: 2 weeks

## 2019-10-17 ENCOUNTER — TELEPHONE (OUTPATIENT)
Dept: PULMONOLOGY | Facility: CLINIC | Age: 54
End: 2019-10-17

## 2019-10-17 ENCOUNTER — CLINICAL SUPPORT (OUTPATIENT)
Dept: PULMONOLOGY | Facility: CLINIC | Age: 54
End: 2019-10-17
Payer: COMMERCIAL

## 2019-10-17 ENCOUNTER — OFFICE VISIT (OUTPATIENT)
Dept: PULMONOLOGY | Facility: CLINIC | Age: 54
End: 2019-10-17
Payer: COMMERCIAL

## 2019-10-17 VITALS
SYSTOLIC BLOOD PRESSURE: 110 MMHG | DIASTOLIC BLOOD PRESSURE: 60 MMHG | HEIGHT: 63 IN | RESPIRATION RATE: 18 BRPM | OXYGEN SATURATION: 93 % | HEART RATE: 85 BPM | WEIGHT: 192 LBS | BODY MASS INDEX: 34.02 KG/M2

## 2019-10-17 DIAGNOSIS — R91.8 MULTIPLE LUNG NODULES ON CT: Chronic | ICD-10-CM

## 2019-10-17 DIAGNOSIS — E66.09 CLASS 1 OBESITY DUE TO EXCESS CALORIES WITH SERIOUS COMORBIDITY AND BODY MASS INDEX (BMI) OF 34.0 TO 34.9 IN ADULT: Chronic | ICD-10-CM

## 2019-10-17 DIAGNOSIS — F17.210 NICOTINE DEPENDENCE, CIGARETTES, UNCOMPLICATED: Chronic | ICD-10-CM

## 2019-10-17 DIAGNOSIS — J45.20 MILD INTERMITTENT ASTHMA WITHOUT COMPLICATION: Primary | Chronic | ICD-10-CM

## 2019-10-17 DIAGNOSIS — R06.00 DYSPNEA AND RESPIRATORY ABNORMALITIES: Chronic | ICD-10-CM

## 2019-10-17 DIAGNOSIS — G47.33 OSA (OBSTRUCTIVE SLEEP APNEA): Chronic | ICD-10-CM

## 2019-10-17 DIAGNOSIS — R06.89 DYSPNEA AND RESPIRATORY ABNORMALITIES: Chronic | ICD-10-CM

## 2019-10-17 PROCEDURE — 99999 PR PBB SHADOW E&M-EST. PATIENT-LVL III: ICD-10-PCS | Mod: PBBFAC,,, | Performed by: INTERNAL MEDICINE

## 2019-10-17 PROCEDURE — 94070 EVALUATION OF WHEEZING: CPT | Mod: S$GLB,,, | Performed by: INTERNAL MEDICINE

## 2019-10-17 PROCEDURE — 99215 PR OFFICE/OUTPT VISIT, EST, LEVL V, 40-54 MIN: ICD-10-PCS | Mod: 25,S$GLB,, | Performed by: INTERNAL MEDICINE

## 2019-10-17 PROCEDURE — 99999 PR PBB SHADOW E&M-EST. PATIENT-LVL III: CPT | Mod: PBBFAC,,, | Performed by: INTERNAL MEDICINE

## 2019-10-17 PROCEDURE — 3008F BODY MASS INDEX DOCD: CPT | Mod: CPTII,S$GLB,, | Performed by: INTERNAL MEDICINE

## 2019-10-17 PROCEDURE — 3074F SYST BP LT 130 MM HG: CPT | Mod: CPTII,S$GLB,, | Performed by: INTERNAL MEDICINE

## 2019-10-17 PROCEDURE — 3078F DIAST BP <80 MM HG: CPT | Mod: CPTII,S$GLB,, | Performed by: INTERNAL MEDICINE

## 2019-10-17 PROCEDURE — 99215 OFFICE O/P EST HI 40 MIN: CPT | Mod: 25,S$GLB,, | Performed by: INTERNAL MEDICINE

## 2019-10-17 PROCEDURE — 3078F PR MOST RECENT DIASTOLIC BLOOD PRESSURE < 80 MM HG: ICD-10-PCS | Mod: CPTII,S$GLB,, | Performed by: INTERNAL MEDICINE

## 2019-10-17 PROCEDURE — 94070 PR EVAL OF BRONCHOSPASM,PROLONGED: ICD-10-PCS | Mod: S$GLB,,, | Performed by: INTERNAL MEDICINE

## 2019-10-17 PROCEDURE — 3008F PR BODY MASS INDEX (BMI) DOCUMENTED: ICD-10-PCS | Mod: CPTII,S$GLB,, | Performed by: INTERNAL MEDICINE

## 2019-10-17 PROCEDURE — 3074F PR MOST RECENT SYSTOLIC BLOOD PRESSURE < 130 MM HG: ICD-10-PCS | Mod: CPTII,S$GLB,, | Performed by: INTERNAL MEDICINE

## 2019-10-17 RX ORDER — FLUTICASONE FUROATE AND VILANTEROL 200; 25 UG/1; UG/1
1 POWDER RESPIRATORY (INHALATION) DAILY
Qty: 60 EACH | Refills: 5 | Status: SHIPPED | OUTPATIENT
Start: 2019-10-17 | End: 2020-04-28

## 2019-10-17 NOTE — PROGRESS NOTES
Subjective:      Established patient    Patient ID: Diana Escobar is a 54 y.o. female.  Patient Active Problem List   Diagnosis    Diplopia    Hypertension associated with diabetes    Combined hyperlipidemia associated with type 2 diabetes mellitus    Major depression, recurrent, chronic    Nicotine dependence, cigarettes, uncomplicated    Fibromyalgia    Multiple lung nodules on CT    Positive anti-CCP test    History of transient cerebral ischemia    High risk medication use    Jaw pain    Paresthesia    Right sided weakness    HA (headache)    Seronegative rheumatoid arthritis    Osteopenia    Rheumatoid arthritis of multiple sites with negative rheumatoid factor    Myofacial muscle pain    Hypothyroidism (acquired)    Cervical spondylosis with radiculopathy    Bilateral foot pain    Restless leg syndrome    Class 1 obesity due to excess calories with serious comorbidity and body mass index (BMI) of 34.0 to 34.9 in adult    Left hand pain    Chronic maxillary sinusitis    Chronic ethmoidal sinusitis    Chronic sphenoidal sinusitis    Chronic frontal sinusitis    Deviated nasal septum    Nasal obstruction    Nasal turbinate hypertrophy    SRAVAN (obstructive sleep apnea)    Mild intermittent asthma without complication       Problem list has been reviewed.    Chief Complaint: Shortness of Breath      MCT reviewed with patient who expressed and voiced understanding.   All questions were answered to the patients satisfaction.      Patients reports NYHA II dyspnea    The patient does not have currently have symptoms / an exacerbation.      Her current respiratory therapy regimen is SYMBICORT & VENTOLIN which provides relief. She is  adherent with her regimen.     No recent change in breathing.         PSG is scheduled for 11/14/19.      Previous Report Reviewed: lab reports, office notes and radiology reports     The following portions of the patient's history were reviewed and  updated as appropriate: She  has a past medical history of Asthma, Chronic low back pain, Degenerative disc disease, lumbar, Depression, Diabetes mellitus (2014), Fibromyalgia, Hyperlipidemia, Hypertension, Hypothyroidism, MRSA (methicillin resistant Staphylococcus aureus) carrier, Nodule of left lung (2/6/2017), Palpitations, Stroke, and Vitamin D deficiency disease.  She  has a past surgical history that includes Hysterectomy; Rotator cuff repair; left ankle surgery; Tonsillectomy; Neck surgery (06/2016); and loop recorder (05/2017).  Her family history includes Breast cancer in her mother and sister; COPD in her father; Cancer in her mother; Cataracts in her mother; Diabetes in her maternal aunt and mother; Heart disease in her father and mother; Hypertension in her father and mother; Stroke in her mother.  She  reports that she has been smoking cigarettes. She has been smoking about 0.50 packs per day. She has never used smokeless tobacco. She reports that she does not drink alcohol or use drugs.  She has a current medication list which includes the following prescription(s): albuterol, amlodipine, atorvastatin, blood sugar diagnostic, blood-glucose meter, bupropion, cimetidine, clopidogrel, diclofenac sodium, folic acid, gabapentin, lancets, levothyroxine, metformin, methotrexate, nicotine polacrilex, peg 400-propylene glycol (pf), pramipexole, venlafaxine, and fluticasone furoate-vilanterol.  She is allergic to mobic [meloxicam]; topamax [topiramate]; and adhesive..    Review of Systems   Constitutional: Positive for appetite change and fatigue.   HENT: Positive for postnasal drip, trouble swallowing and congestion. Negative for rhinorrhea.    Eyes: Negative for redness and itching.   Respiratory: Positive for cough, sputum production, shortness of breath, wheezing and dyspnea on extertion. Negative for chest tightness.    Cardiovascular: Positive for leg swelling.   Genitourinary: Negative for difficulty  "urinating.   Endocrine: Negative for polydipsia, cold intolerance and heat intolerance.    Musculoskeletal: Positive for arthralgias, back pain and myalgias.   Skin: Negative for rash.   Gastrointestinal: Positive for acid reflux.   Neurological: Negative for dizziness, light-headedness and headaches.   Psychiatric/Behavioral: The patient is nervous/anxious.         Objective:     /60   Pulse 85   Resp 18   Ht 5' 2.5" (1.588 m)   Wt 87.1 kg (192 lb)   SpO2 (!) 93%   BMI 34.56 kg/m²   Body mass index is 34.56 kg/m².     Physical Exam   Constitutional: She is oriented to person, place, and time. She appears well-developed and well-nourished. No distress.   HENT:   Head: Normocephalic and atraumatic.   Right Ear: Tympanic membrane and ear canal normal.   Left Ear: Tympanic membrane and ear canal normal.   Nose: Mucosal edema and rhinorrhea present.   Mouth/Throat: Uvula is midline. Posterior oropharyngeal edema and posterior oropharyngeal erythema present. No tonsillar exudate.   Mallampati 3    Eyes: Pupils are equal, round, and reactive to light. EOM are normal.   Neck: Normal range of motion. Neck supple.   13"   Cardiovascular: Normal rate and regular rhythm. Exam reveals no gallop and no friction rub.   No murmur heard.  Pulmonary/Chest: Effort normal and breath sounds normal. No stridor. No respiratory distress. She has no wheezes. She has no rales.   Abdominal: Soft. Bowel sounds are normal.   Musculoskeletal: Normal range of motion. She exhibits no edema.   Neurological: She is alert and oriented to person, place, and time.   Skin: Skin is warm and dry. Capillary refill takes 2 to 3 seconds. She is not diaphoretic.   Psychiatric: She has a normal mood and affect.   Nursing note and vitals reviewed.      Personal Diagnostic Review    MCT: Positive MCT    Assessment / Plan:       Discussed diagnosis, its evaluation, treatment and usual course. All questions answered.    Problem List Items Addressed " This Visit        Pulmonary    Multiple lung nodules on CT (Chronic)    Overview     CT chest : 08/20/19: Multiple nodules are seen within the lungs bilaterally all measuring less than 4 mm scattered throughout the lungs bilaterally. Largest measure 4 mm within the left lower lobe and right upper lobe.         Current Assessment & Plan     Repeat CT chest in 6 months ( 03/2020).    Fleischner Society Guidelines:    High risk patient:   Smoking history, history of malignancy or risk factors for malignancy.( non-solid ground glass opacities and partially solid nodules may require longer follow up to exclude indolent adenocarcinoma)      Nodule size Low risk patient High risk patient   4 mm  No follow up Follow up CT in 12 months. If unchanged, no further follow up.   4 - 6 mm Follow up CT in 12 months; if unchanged, no further follow up.  Initial follow up CT in 6 - 12 months, then at 18 - 24 months if no change.      6 - 8 mm  Initial follow up CT at 6 - 12 months and at 18 months if no change.  Initial follow up CT at 3 - 6 months. Then at 9-12 months and 24 months if no change.      > 8 mm Initial follow up CT at 3, 6, 9 and 24 months, dynamic contrast enhanced CT, PET-CT and/or biopsy. Initial follow up CT at 3, 6, 9 and 24 months, dynamic contrast enhanced CT, PET-CT and/or biopsy.            Mild intermittent asthma without complication - Primary (Chronic)    Current Assessment & Plan     Asthma ROS: using bronchodilator MDI less than twice a week.   New concerns: Stable NYHA NAVA.   Exam: appears well, vitals normal, no respiratory distress, acyanotic, normal RR.   Assessment:  Asthma control uncertain.   Plan: START BREO. Orders per EMR. Continue VENTOLIN. Re evaluate in 3 months.          Relevant Medications    fluticasone furoate-vilanterol (BREO ELLIPTA) 200-25 mcg/dose DsDv diskus inhaler       Endocrine    Class 1 obesity due to excess calories with serious comorbidity and body mass index (BMI) of 34.0 to  34.9 in adult    Current Assessment & Plan     General weight loss/lifestyle modification strategies discussed (elicit support from others; identify saboteurs; non-food rewards, etc).  Behavioral treatment: Slim Fast.  Diet interventions: low calorie (1000 kCal/d) deficit diet.  Informal exercise measures discussed, e.g. taking stairs instead of elevator.  Regular aerobic exercise program discussed.            Other    Nicotine dependence, cigarettes, uncomplicated (Chronic)    Current Assessment & Plan     Assistance with smoking cessation was offered, including:  []  Medications  [x]  Counseling  []  Printed Information on Smoking Cessation  []  Referral to a Smoking Cessation Program    Patient was counseled regarding smoking for 3-10 minutes.         SRAVAN (obstructive sleep apnea)    Current Assessment & Plan     PSG awaited.                  TIME SPENT WITH PATIENT: Time spent: 45 minutes in face to face  discussion concerning diagnosis, prognosis, review of lab and test results, benefits of treatment as well as management of disease, counseling of patient and coordination of care between various health  care providers . Greater than half the time spent was used for coordination of care and counseling of patient.       Follow up in about 3 months (around 1/17/2020) for SRAVAN, Tobacco use disorder, Obesity, Asthma, Multiple lung nodules.

## 2019-10-17 NOTE — ASSESSMENT & PLAN NOTE
Repeat CT chest in 6 months ( 03/2020).    Fleischner Society Guidelines:    High risk patient:   Smoking history, history of malignancy or risk factors for malignancy.( non-solid ground glass opacities and partially solid nodules may require longer follow up to exclude indolent adenocarcinoma)      Nodule size Low risk patient High risk patient   4 mm  No follow up Follow up CT in 12 months. If unchanged, no further follow up.   4 - 6 mm Follow up CT in 12 months; if unchanged, no further follow up.  Initial follow up CT in 6 - 12 months, then at 18 - 24 months if no change.      6 - 8 mm  Initial follow up CT at 6 - 12 months and at 18 months if no change.  Initial follow up CT at 3 - 6 months. Then at 9-12 months and 24 months if no change.      > 8 mm Initial follow up CT at 3, 6, 9 and 24 months, dynamic contrast enhanced CT, PET-CT and/or biopsy. Initial follow up CT at 3, 6, 9 and 24 months, dynamic contrast enhanced CT, PET-CT and/or biopsy.

## 2019-10-17 NOTE — TELEPHONE ENCOUNTER
----- Message from Eneida Calle sent at 10/17/2019  4:22 PM CDT -----  Contact: self  states that she's waiting at pharmacy for script..[phone] (home)

## 2019-10-17 NOTE — PATIENT INSTRUCTIONS
Lung Anatomy  Your lungs take air in to give your body oxygen, which the body needs to work. Your lungs, like all the tissues in your body, are made up of billions of tiny specialized cells. Old lung cells die and are replaced by new, identical lung cells. This natural process helps ensure healthy lungs.    Date Last Reviewed: 11/1/2016  © 3736-8628 [employer]. [address] [place] [address], [place] [address]. All rights reserved. This information is not intended as a substitute for professional medical care. Always follow your healthcare professional's instructions.

## 2019-10-17 NOTE — TELEPHONE ENCOUNTER
Patient informed that her prescription was sent to [facility]. She states that she will  10/18/19

## 2019-10-18 ENCOUNTER — PATIENT MESSAGE (OUTPATIENT)
Dept: INTERNAL MEDICINE | Facility: CLINIC | Age: 54
End: 2019-10-18

## 2019-10-18 LAB — BRMETHACHOLINE: NORMAL

## 2019-10-18 RX ORDER — FAMOTIDINE 40 MG/1
40 TABLET, FILM COATED ORAL DAILY
Qty: 90 TABLET | Refills: 0 | Status: SHIPPED | OUTPATIENT
Start: 2019-10-18 | End: 2020-01-17

## 2019-10-18 RX ORDER — FAMOTIDINE 40 MG/1
40 TABLET, FILM COATED ORAL DAILY
Qty: 90 TABLET | Refills: 0 | Status: SHIPPED | OUTPATIENT
Start: 2019-10-18 | End: 2019-10-18 | Stop reason: SDUPTHER

## 2019-10-21 DIAGNOSIS — E03.9 HYPOTHYROIDISM (ACQUIRED): ICD-10-CM

## 2019-10-22 ENCOUNTER — CLINICAL SUPPORT (OUTPATIENT)
Dept: SMOKING CESSATION | Facility: CLINIC | Age: 54
End: 2019-10-22
Payer: COMMERCIAL

## 2019-10-22 DIAGNOSIS — F17.210 MODERATE SMOKER (20 OR LESS PER DAY): Primary | ICD-10-CM

## 2019-10-22 PROCEDURE — 99403 PR PREVENT COUNSEL,INDIV,45 MIN: ICD-10-PCS | Mod: S$GLB,,,

## 2019-10-22 PROCEDURE — 99403 PREV MED CNSL INDIV APPRX 45: CPT | Mod: S$GLB,,,

## 2019-10-22 PROCEDURE — 99999 PR PBB SHADOW E&M-EST. PATIENT-LVL I: CPT | Mod: PBBFAC,,,

## 2019-10-22 PROCEDURE — 99999 PR PBB SHADOW E&M-EST. PATIENT-LVL I: ICD-10-PCS | Mod: PBBFAC,,,

## 2019-10-22 RX ORDER — LEVOTHYROXINE SODIUM 50 UG/1
TABLET ORAL
Qty: 30 TABLET | Refills: 5 | Status: SHIPPED | OUTPATIENT
Start: 2019-10-22 | End: 2020-06-01

## 2019-10-22 NOTE — PROGRESS NOTES
Individual Follow-Up Form    10/22/2019    Quit Date: TBD    Clinical Status of Patient: Outpatient    Length of Service: 45 minutes    Continuing Medication: yes  Patches    Other Medications: 2 mg nicotine lozenges       Target Symptoms: Withdrawal and medication side effects. The following were  rated moderate (3) to severe (4) on TCRS:  · Moderate (3): increased weight gain,  - Nicotine replacement therapy, withdrawal symptoms, habit  ·   · Severe (4): none    Comments: The patient is smoking 18 cigarettes/day  from 18 cigarettes/day.The CO measurement =  18 ppm which indicates possibly a heavy smoker . The patient is using 150 mg Wellbutrin SR BID and 21 mg nicotine patches. She has not made nay progress. She is to have surgery and is worried about that and is having a hard time focusing on smoking. Discussed cutting back as much as possible for the surgery and she wants to get down to 10. She will wear the nicotine patch while in the hospital if allowed, and plans on trying to not start back when released. Suggested she at least not smoke over 10 when she gets out. She feels she can to it. The patient will continue individual sessions and medication monitoring by CTTS. Prescribed medication management will be by practitoner.The patient denies any abnormal behavioral or mental changes at this time.        Diagnosis: F17.210    Next Visit: 2 weeks

## 2019-10-22 NOTE — Clinical Note
The patient is smoking 18 cigarettes/day  from 18 cigarettes/day.The CO measurement =  18 ppm which indicates possibly a heavy smoker . The patient is using 150 mg Wellbutrin SR BID and 21 mg nicotine patches. She has not made nay progress. She is to have surgery and is worried about that and is having a hard time focusing on smoking. Discussed cutting back as much as possible for the surgery and she wants to get down to 10. She will wear the nicotine patch while in the hospital if allowed, and plans on trying to not start back when released. Suggested she at least not smoke over 10 when she gets out. She feels she can to it.

## 2019-11-04 ENCOUNTER — CLINICAL SUPPORT (OUTPATIENT)
Dept: SMOKING CESSATION | Facility: CLINIC | Age: 54
End: 2019-11-04
Payer: COMMERCIAL

## 2019-11-04 DIAGNOSIS — F17.210 LIGHT CIGARETTE SMOKER (1-9 CIGS/DAY): Primary | ICD-10-CM

## 2019-11-04 PROCEDURE — 99999 PR PBB SHADOW E&M-EST. PATIENT-LVL I: ICD-10-PCS | Mod: PBBFAC,,,

## 2019-11-04 PROCEDURE — 99999 PR PBB SHADOW E&M-EST. PATIENT-LVL I: CPT | Mod: PBBFAC,,,

## 2019-11-04 PROCEDURE — 99402 PREV MED CNSL INDIV APPRX 30: CPT | Mod: S$GLB,,,

## 2019-11-04 PROCEDURE — 99402 PR PREVENT COUNSEL,INDIV,30 MIN: ICD-10-PCS | Mod: S$GLB,,,

## 2019-11-04 NOTE — PROGRESS NOTES
Individual Follow-Up Form    11/4/2019    Quit Date: 11/28/19    Clinical Status of Patient: Outpatient    Length of Service: 30 minutes     Continuing Medication: 150 mg Wellbutrin Sr BID     Other Medications: 2 mg lozenges      Target Symptoms: Withdrawal and medication side effects. The following were  rated moderate (3) to severe (4) on TCRS:  · Moderate (3): desire /craving - Nicotine replacement therapy, withdrawal symptoms, habit  ·   · Severe (4): none    Comments: The patient had surgery and will not be able to come to session for about 6 weeks. She agreed to phone sessions for this period of time.  She stated she is smoking about 10 cigarettes/day and is using the 150 mg Wellbutrin SR BID and 2 mg nicotine lozenges PRN.  She did not smoke in the hospital, but has started getting bored and tired of not being able to go. Discussed things to do for boredom, anxiety and pain with nicotine use completion of TCRS (Tobacco Cessation Rating Scale) reviewed strategies, controlling environment, cues, triggers, new goals set. Introduced high risk situations with preparation interventions, caffeine similarities with withdrawal issues of habit and nicotine, Alcohol, Understanding urges, cravings, stress and relaxation. Open discussion with intervention discussion. The patient will continue individual sessions and medication monitoring by CTTS. Prescribed medication management will be by practitoner.The patient denies any abnormal behavioral or mental changes at this time.Will continue to contact by phone till can return to group.   Diagnosis: F17.210    Next Visit: 2 weeks

## 2019-11-04 NOTE — Clinical Note
The patient had surgery and will not be able to come to session for about 6 weeks. She agreed to phone sessions for this period of time.  She stated she is smoking about 10 cigarettes/day and is using the 150 mg Wellbutrin SR BID and 2 mg nicotine lozenges PRN.  She did not smoke in the hospital, but has started getting bored and tired of not being able to go. Discussed things to do for boredom, anxiety and pain with nicotine use completion of TCRS (Tobacco Cessation Rating Scale) reviewed strategies, controlling environment, cues, triggers, new goals set. Introduced high risk situations with preparation interventions, caffeine similarities with withdrawal issues of habit and nicotine, Alcohol, Understanding urges, cravings, stress and relaxation. Open discussion with intervention discussion. The patient will continue individual sessions and medication monitoring by CTTS. The patient denies any abnormal behavioral or mental changes at this time.Will continue to contact by phone till can return to group.

## 2019-11-13 ENCOUNTER — TELEPHONE (OUTPATIENT)
Dept: SMOKING CESSATION | Facility: CLINIC | Age: 54
End: 2019-11-13

## 2019-11-13 NOTE — TELEPHONE ENCOUNTER
1st  attempt to follow up for progress and support.  Left [phone] number to call for update and needs. Left my name Penny Rausch

## 2019-11-14 ENCOUNTER — OFFICE VISIT (OUTPATIENT)
Dept: RHEUMATOLOGY | Facility: CLINIC | Age: 54
End: 2019-11-14
Payer: COMMERCIAL

## 2019-11-14 ENCOUNTER — HOSPITAL ENCOUNTER (OUTPATIENT)
Dept: SLEEP MEDICINE | Facility: HOSPITAL | Age: 54
Discharge: HOME | End: 2019-11-14
Attending: INTERNAL MEDICINE
Payer: COMMERCIAL

## 2019-11-14 VITALS
DIASTOLIC BLOOD PRESSURE: 79 MMHG | HEIGHT: 62 IN | WEIGHT: 193.31 LBS | BODY MASS INDEX: 35.57 KG/M2 | HEART RATE: 100 BPM | SYSTOLIC BLOOD PRESSURE: 113 MMHG

## 2019-11-14 DIAGNOSIS — G25.81 RESTLESS LEG SYNDROME: ICD-10-CM

## 2019-11-14 DIAGNOSIS — G47.33 OBSTRUCTIVE SLEEP APNEA: Primary | ICD-10-CM

## 2019-11-14 DIAGNOSIS — G47.61 PERIODIC LIMB MOVEMENT DISORDER (PLMD): ICD-10-CM

## 2019-11-14 DIAGNOSIS — E66.9 OBESITY (BMI 30.0-34.9): ICD-10-CM

## 2019-11-14 DIAGNOSIS — G25.81 RESTLESS LEGS SYNDROME (RLS): ICD-10-CM

## 2019-11-14 DIAGNOSIS — M06.09 RHEUMATOID ARTHRITIS OF MULTIPLE SITES WITH NEGATIVE RHEUMATOID FACTOR: Primary | ICD-10-CM

## 2019-11-14 DIAGNOSIS — Z72.821 INADEQUATE SLEEP HYGIENE: ICD-10-CM

## 2019-11-14 DIAGNOSIS — D84.9 IMMUNOSUPPRESSED STATUS: ICD-10-CM

## 2019-11-14 DIAGNOSIS — M79.7 FIBROMYALGIA: ICD-10-CM

## 2019-11-14 DIAGNOSIS — Z79.899 HIGH RISK MEDICATION USE: ICD-10-CM

## 2019-11-14 DIAGNOSIS — F51.04 PSYCHOPHYSIOLOGICAL INSOMNIA: ICD-10-CM

## 2019-11-14 PROCEDURE — 99999 PR PBB SHADOW E&M-EST. PATIENT-LVL III: ICD-10-PCS | Mod: PBBFAC,,, | Performed by: INTERNAL MEDICINE

## 2019-11-14 PROCEDURE — 95810 PR POLYSOMNOGRAPHY, 4 OR MORE: ICD-10-PCS | Mod: 26,,, | Performed by: PSYCHOLOGIST

## 2019-11-14 PROCEDURE — 3078F DIAST BP <80 MM HG: CPT | Mod: CPTII,S$GLB,, | Performed by: INTERNAL MEDICINE

## 2019-11-14 PROCEDURE — 3074F PR MOST RECENT SYSTOLIC BLOOD PRESSURE < 130 MM HG: ICD-10-PCS | Mod: CPTII,S$GLB,, | Performed by: INTERNAL MEDICINE

## 2019-11-14 PROCEDURE — 99214 OFFICE O/P EST MOD 30 MIN: CPT | Mod: S$GLB,,, | Performed by: INTERNAL MEDICINE

## 2019-11-14 PROCEDURE — 3008F PR BODY MASS INDEX (BMI) DOCUMENTED: ICD-10-PCS | Mod: CPTII,S$GLB,, | Performed by: INTERNAL MEDICINE

## 2019-11-14 PROCEDURE — 3078F PR MOST RECENT DIASTOLIC BLOOD PRESSURE < 80 MM HG: ICD-10-PCS | Mod: CPTII,S$GLB,, | Performed by: INTERNAL MEDICINE

## 2019-11-14 PROCEDURE — 95810 POLYSOM 6/> YRS 4/> PARAM: CPT | Mod: 26,,, | Performed by: PSYCHOLOGIST

## 2019-11-14 PROCEDURE — 3008F BODY MASS INDEX DOCD: CPT | Mod: CPTII,S$GLB,, | Performed by: INTERNAL MEDICINE

## 2019-11-14 PROCEDURE — 99214 PR OFFICE/OUTPT VISIT, EST, LEVL IV, 30-39 MIN: ICD-10-PCS | Mod: S$GLB,,, | Performed by: INTERNAL MEDICINE

## 2019-11-14 PROCEDURE — 95810 POLYSOM 6/> YRS 4/> PARAM: CPT

## 2019-11-14 PROCEDURE — 3074F SYST BP LT 130 MM HG: CPT | Mod: CPTII,S$GLB,, | Performed by: INTERNAL MEDICINE

## 2019-11-14 PROCEDURE — 99999 PR PBB SHADOW E&M-EST. PATIENT-LVL III: CPT | Mod: PBBFAC,,, | Performed by: INTERNAL MEDICINE

## 2019-11-14 ASSESSMENT — ROUTINE ASSESSMENT OF PATIENT INDEX DATA (RAPID3): MDHAQ FUNCTION SCORE: .4

## 2019-11-14 NOTE — PROGRESS NOTES
RHEUMATOLOGY CLINIC FOLLOW UP VISIT  Chief complaints:-  To follow up for rheumatoid arthritis.    HPI:-   Diana Rubio a 54 y.o. pleasant female comes in for a follow up visit. She follows up in the Rheumatology Clinic for seronegative rheumatoid arthritis, fibromyalgia and osteoarthritis.  Her rheumatoid arthritis was in remission for more than 2 years off all disease modifying agents including methotrexate, hydroxychloroquine and Humira.  She had a recent flare and was restarted on methotrexate.  After restarting methotrexate her inflammatory arthritis subsided with few weeks when she has been doing well.  For her recent bladder surgery she stop methotrexate for the past 3 weeks and wants to know whether she has to restart it the of since she does not have any arthritis.  No joint pain today.  No prolonged morning stiffness.   Mirapex has been helping her restless leg syndrome.      Review of Systems   Constitutional: Negative for chills and fever.   HENT: Negative for congestion and sore throat.    Eyes: Negative for blurred vision and redness.   Respiratory: Negative for cough and shortness of breath.    Cardiovascular: Negative for chest pain and leg swelling.   Gastrointestinal: Negative for abdominal pain.   Genitourinary: Negative for dysuria.   Musculoskeletal: Negative for back pain, falls, joint pain, myalgias and neck pain.   Skin: Negative for rash.   Neurological: Negative for headaches.   Endo/Heme/Allergies: Does not bruise/bleed easily.   Psychiatric/Behavioral: Negative for memory loss. The patient does not have insomnia.        Past Medical History:   Diagnosis Date    Asthma     Chronic low back pain     Degenerative disc disease, lumbar     Depression     Diabetes mellitus 2014    Fibromyalgia     Hyperlipidemia     Hypertension     Hypothyroidism     MRSA (methicillin resistant Staphylococcus aureus) carrier     Nodule of  "left lung 2/6/2017    CT chest : 04/06/16: Small 3 mm pleural-based nodule laterally in the left lower lobe.    Palpitations     Dr. Jesus Lao--cardiology    Stress incontinence     Dr. Zurdo Lopez--urology    Stroke     TIA    Vitamin D deficiency disease        Past Surgical History:   Procedure Laterality Date    HYSTERECTOMY      left ankle surgery      loop recorder  05/2017    cardiac device    NECK SURGERY  06/2016    ROTATOR CUFF REPAIR      TONSILLECTOMY          Social History     Tobacco Use    Smoking status: Current Some Day Smoker     Packs/day: 0.50     Types: Cigarettes    Smokeless tobacco: Never Used   Substance Use Topics    Alcohol use: No     Alcohol/week: 0.0 standard drinks    Drug use: No       Family History   Problem Relation Age of Onset    Cancer Mother     Stroke Mother     Heart disease Mother     Diabetes Mother     Cataracts Mother     Hypertension Mother     Breast cancer Mother     Heart disease Father     COPD Father     Hypertension Father     Diabetes Maternal Aunt     Breast cancer Sister        Review of patient's allergies indicates:   Allergen Reactions    Mobic [meloxicam]     Topamax [topiramate]     Adhesive Rash       Vitals:    11/14/19 1510   BP: 113/79   Pulse: 100   Weight: 87.7 kg (193 lb 5.5 oz)   Height: 5' 2" (1.575 m)   PainSc: 0-No pain       Physical Exam   Constitutional: She is oriented to person, place, and time and well-developed, well-nourished, and in no distress. No distress.   HENT:   Head: Normocephalic.   Mouth/Throat: Oropharynx is clear and moist.   Eyes: Pupils are equal, round, and reactive to light. Conjunctivae and EOM are normal.   Neck: Normal range of motion. Neck supple.   Cardiovascular: Normal rate and intact distal pulses.   Pulmonary/Chest: Effort normal. No respiratory distress.   Abdominal: Soft. There is no tenderness.   Musculoskeletal:   No synovitis over small joints of hands or feet.  No effusion " over large joints.   Neurological: She is alert and oriented to person, place, and time. No cranial nerve deficit.   Skin: Skin is warm. No rash noted. No erythema.   Psychiatric: Mood and affect normal.   Nursing note and vitals reviewed.      Medication List with Changes/Refills   Current Medications    ALBUTEROL (PROVENTIL/VENTOLIN HFA) 90 MCG/ACTUATION INHALER    Inhale 2 puffs into the lungs every 6 (six) hours as needed for Wheezing.    AMLODIPINE (NORVASC) 5 MG TABLET    TAKE 1 TABLET BY MOUTH ONCE DAILY    ATORVASTATIN (LIPITOR) 40 MG TABLET    Take 40 mg by mouth once daily.    BLOOD SUGAR DIAGNOSTIC STRP    Glucose testing daily.    BLOOD-GLUCOSE METER MISC    Use as directed.    BUPROPION (WELLBUTRIN SR) 150 MG TBSR 12 HR TABLET    **Take 1 pill for 7 days then change to  1 tablet twice daily**    CLOPIDOGREL (PLAVIX) 75 MG TABLET    Take 75 mg by mouth once daily.    DICLOFENAC SODIUM 1 % GEL    Apply topically 4 (four) times daily.    FAMOTIDINE (PEPCID) 40 MG TABLET    Take 1 tablet (40 mg total) by mouth once daily.    FLUTICASONE FUROATE-VILANTEROL (BREO ELLIPTA) 200-25 MCG/DOSE DSDV DISKUS INHALER    Inhale 1 puff into the lungs once daily. Controller    FOLIC ACID (FOLVITE) 1 MG TABLET    TAKE 1 TABLET BY MOUTH ONCE DAILY    GABAPENTIN (NEURONTIN) 300 MG CAPSULE    TAKE 1 CAPSULE BY MOUTH TWICE DAILY    LANCETS (LANCETS,ULTRA THIN) MISC    Glucose testing daily.    LEVOTHYROXINE (SYNTHROID) 50 MCG TABLET    TAKE 1 TABLET BY MOUTH ONCE DAILY    METFORMIN (GLUCOPHAGE-XR) 500 MG 24 HR TABLET    TAKE 1 TABLET BY MOUTH TWICE DAILY WITH MEALS    METHOTREXATE 2.5 MG TAB     TAKE 6 TABLETS BY MOUTH EVERY 7 DAYS    -PROPYLENE GLYCOL, PF, (SYSTANE ULTRA, PF,) 0.4-0.3 % DPET    Place 1 drop into both eyes 4 (four) times daily.    PRAMIPEXOLE (MIRAPEX) 0.125 MG TABLET    Take 1 tablet (0.125 mg total) by mouth every evening.    VENLAFAXINE (EFFEXOR-XR) 150 MG CP24    TAKE 1 CAPSULE BY MOUTH ONCE DAILY    Discontinued Medications    NICOTINE POLACRILEX 2 MG LOZG    Take 1 lozenge (2 mg total) by mouth every 2 (two) hours as needed (can use 12 times per day).       Assessment/Plans:-  1. Rheumatoid arthritis of multiple sites with negative rheumatoid factor    2. Restless leg syndrome    3. Fibromyalgia    4. Obesity (BMI 30.0-34.9)    5. High risk medication use    6. Immunosuppressed status      Problem List Items Addressed This Visit        Neuro    Restless leg syndrome    Current Assessment & Plan     Well controlled on Mirapex.  Continue the same.            Immunology/Multi System    Immunosuppressed status    Current Assessment & Plan     Compromised immune system secondary to autoimmune disease and use of immunosuppressive drugs. Monitor carefully for infections. Advised to get immediate medical care if any infection. Also advised strict adherence to age appropriate vaccinations and cancer screenings with PCP.             Endocrine    Obesity (BMI 30.0-34.9)    Current Assessment & Plan     Advised low-carbohydrate diet            Orthopedic    Fibromyalgia    Current Assessment & Plan     Stable on opioid therapy along with p.r.n. gabapentin.  Continue.         Rheumatoid arthritis of multiple sites with negative rheumatoid factor - Primary    Current Assessment & Plan     Seronegative rheumatoid arthritis in remission.  History of methotrexate, Plaquenil, prednisone hurt the Humira use.  Of methotrexate for the past 3 weeks as explained in the HPI.  Okay to not restart methotrexate until arthritis recurs.            Other    High risk medication use    Current Assessment & Plan     Safety labs for methotrexate today.  Restart only if arthritis returns.             Disclaimer: This note was prepared using voice recognition system and is likely to have sound alike errors and is not proof read.  Please call me with any questions.

## 2019-11-14 NOTE — ASSESSMENT & PLAN NOTE
Seronegative rheumatoid arthritis in remission.  History of methotrexate, Plaquenil, prednisone hurt the Humira use.  Of methotrexate for the past 3 weeks as explained in the HPI.  Okay to not restart methotrexate until arthritis recurs.

## 2019-11-18 ENCOUNTER — CLINICAL SUPPORT (OUTPATIENT)
Dept: SMOKING CESSATION | Facility: CLINIC | Age: 54
End: 2019-11-18
Payer: COMMERCIAL

## 2019-11-18 DIAGNOSIS — F17.210 CIGARETTE NICOTINE DEPENDENCE, UNCOMPLICATED: ICD-10-CM

## 2019-11-18 DIAGNOSIS — F17.210 MODERATE SMOKER (20 OR LESS PER DAY): Primary | ICD-10-CM

## 2019-11-18 PROCEDURE — 99402 PR PREVENT COUNSEL,INDIV,30 MIN: ICD-10-PCS | Mod: S$GLB,,,

## 2019-11-18 PROCEDURE — 99999 PR PBB SHADOW E&M-EST. PATIENT-LVL I: CPT | Mod: PBBFAC,,,

## 2019-11-18 PROCEDURE — 99999 PR PBB SHADOW E&M-EST. PATIENT-LVL I: ICD-10-PCS | Mod: PBBFAC,,,

## 2019-11-18 PROCEDURE — 99402 PREV MED CNSL INDIV APPRX 30: CPT | Mod: S$GLB,,,

## 2019-11-18 RX ORDER — BUPROPION HYDROCHLORIDE 150 MG/1
150 TABLET, EXTENDED RELEASE ORAL 2 TIMES DAILY
Qty: 60 TABLET | Refills: 0 | Status: SHIPPED | OUTPATIENT
Start: 2019-11-18 | End: 2019-12-17 | Stop reason: SDUPTHER

## 2019-11-18 NOTE — PROGRESS NOTES
Individual Follow-Up Form    11/18/2019    Quit Date: TBD    Clinical Status of Patient: Outpatient    Length of Service: 30 minutes    Continuing Medication: yes  Wellbutrin    Other Medications: none     Target Symptoms: Withdrawal and medication side effects. The following were  rated moderate (3) to severe (4) on TCRS:  · Moderate (3): desire/crave, - Nicotine replacement therapy, withdrawal symptoms, habit  ·   · Severe (4): none    Comments: The patient is smoking 18-19 cigarettes/day  from 20 cigarettes/day. She states she is smoking more since she is not working because she is bored. She is using 150 mg Wellbutrin Sr QD, but is ready to try BID. Reordered medication for patient today. She waited to take the second pill till after surgery so she would know if she was having complications from other medication. She feels she needs the increased dose to do better. Explained the Wellbutrin will help with habit, but nicotine cravings and may have to find something that may help since she did not like the lozenges and the patches are breaking her out. She will be able to come to the next session and will explore the need for nicotine replacement at that time. The patient will continue individual  and/or group sessions and medication monitoring by CTTS. Prescribed medication management will be by practitoner.The patient denies any abnormal behavioral or mental changes at this time.      Diagnosis: F17.210    Next Visit: 2 weeks

## 2019-11-20 DIAGNOSIS — E11.59 HYPERTENSION ASSOCIATED WITH DIABETES: ICD-10-CM

## 2019-11-20 DIAGNOSIS — I15.2 HYPERTENSION ASSOCIATED WITH DIABETES: ICD-10-CM

## 2019-11-21 RX ORDER — METFORMIN HYDROCHLORIDE 500 MG/1
TABLET, EXTENDED RELEASE ORAL
Qty: 180 TABLET | Refills: 1 | OUTPATIENT
Start: 2019-11-21

## 2019-11-22 NOTE — PROCEDURES
Patient Name: Diana Escobar  Date of Report: 19     Date of PS2019   [facility] No.: [id number]   : 1965                            Time of PS:24:41 PM - 5:00:20 AM  Sex:  Female   Age:  54   Weight:  192.0 lbs Height:  5  2.5             Type of PSG:  Diagnostic     REASONS FOR REFERRAL: Ms Escobar is a 54 year old female, referred for diagnostic polysomnography by Dr. Eric Boyle, based on the patients reported snoring, observed respiratory pauses in sleep (STOP-BANG), frequent nocturnal awakenings, dry mouth in morning, unrefreshing sleep and daytime hypersomnolence. Her Gibson Sleepiness Scale score was 11, clinically significant, and her STOP-BANG score was 5, high risk of SRAVAN.  Dr. Jory Alcocer is the patients primary care physician.    STUDY PARAMETERS: This diagnostic study involved analysis of the patient's sleep pattern while breathing unassisted. The study was performed with a sleep technologist in attendance for the entire test period, with video monitoring throughout the study, and routine laboratory clinical parameters recorded:  NOTE: The polysomnography electrophysiological record for the patient has been reviewed in its entirety by Dr. Hankins.    SUMMARY STATEMENTS  DIAGNOSTIC IMPRESSIONS  G47.33  /  327.23  Borderline Obstructive Sleep Apnea, Adult (OSAHS)  G47.61  /  327.51  Moderate Periodic Limb Movement (PLM) Disorder   F51.04   / 307.42  Psychophysiological Insomnia (stress - related and / or conditioned; with depression)    G25.81  /  333.99  Restless Legs Syndrome (RLS)  Z72.821 /  V69.4  Inadequate Sleep Hygiene  G47.01  / 327.01  r/o Insomnia due to Medical Condition (pain, discomfort)      PRIMARY TREATMENT RECOMMENDATIONS  Treat, or refer to Sleep Disorders Center.  1. The diagnostic polysomnography revealed a borderline obstructive sleep apnea / hypopnea syndrome (A + H Index = 5.1 events / hr asleep with only 1.6 respiratory event - related  arousals / hr asleep for the study, and no RERAs (respiratory effort -  related arousals).  The mean SpO2 value was 89.1 %, significant, minimum oxygen saturation during sleep was 82.0 %, and waking baseline SpO2 was 97 %.  Sporadic, moderately loud snoring was noted.  A  CPAP titration polysomnography is recommended, but  CPAP alone may not improve this patients sleep greatly because event - related arousals were infrequent and sleep quality was only mildly impaired,  though  oxygen desaturation was significant.    2. If treatment of snoring (sporadic, and moderately loud  during the PSG) is desired, consider a reversible treatment such as a dental oral device.  If a permanent procedure such as UPPP is preferred, periodic polysomnography may be needed because signs of worsening apnea could be missed (silent apneas) if SRAVAN develops or becomes more severe.  3. Weight loss to the normal range is recommended as it can decrease respiratory events and snoring in overweight patients.  4. The following changes in sleep hygiene / sleep - related behavior are recommended after medical treatments are successful   Regular bedtimes and wake times, including weekends: Total sleep time / night should not be more than one hour more             than usual, and bedtime or wake time should not be more than one hour earlier or later than usual.     Do not attempt to make up lost sleep by extending sleep periods.     Avoid meals or large snacks within 3 hours of bedtime.    SECONDARY TREATMENT RECOMMENDATIONS  Treat, or refer to SDC if problems are not satisfactorily resolved by the above.  1. A moderate PLM disorder was observed (PLMS Index = 34.5 / hr asleep,  but  treatment might not be optimal because the PLMS were not disruptive of sleep (only 2.8 arousals / hr asleep);  however,  as there was SDI evidence of restless legs syndrome (which can be treated with the same medications as PLMS), consider treatment of restless legs  syndrome if RLS is confirmed and / or  PLM disorder if PLMS symptoms are sufficiently bothersome to the patient.  (Note, Ms Escobar  currently takes pramipexole / Mirapex, presumably for RLS).  Note too that benzodiazepine medications sometimes used to treat PLM disorder (e.g., clonazepam) may exacerbate some sleep - related respiratory disorders, and that dopaminergic medications such as Mirapex and Requip can be used in such instances.    2. Ms Escobar is taking venlafaxine / Effexor and buproprion / Wellbutrin.  Most tricyclic, and many SSRI antidepressants (e.g., fluoxetine - Prozac, sertraline - Zoloft, venlafaxine - Effexor), are associated with increased PLMS in some studies and increased RLS in others.  Effexor is one such medication; consider replacing it with a medication known not to exacerbate PLMS or RLS.  3. Recommend review of efficacy of Ms Escobar current treatment for RLS and PLMD  (pramipexole / Mirapex) due to her positive SDI responses to RLS symptoms and positive PLMS findings in this PSG study, both of which suggest ineffective tx  4. If  insomnia persists after treatments for medical sleep disorders (SRAVAN) have proven effective, and  RLS has been ruled out or effectively treated, recommend  follow - up inquiry regarding frequency, duration and nature of reported sleep loss, delayed sleep onset, and involuntary early awakening (stress - related and / or conditioned psychophysiological insomnia) and referral for behavioral and cognitive / behavioral treatment of insomnia, as indicated.  Please see SDI.  5. Consider behavioral and cognitive / behavioral treatments for depression to complement the medication and to increase the probability of long - term adaptive change.  Sleep might be expected to further improve.  6. Follow - up inquiry regarding sleep disruption secondary to pain or other physical discomfort (please see SDI).    See below for a complete interpretation of data from the  polysomnography and Sleep Disorders Inventory.     Thank you for referring this patient to the [facility].      Shen Hankins, Ph.D., ABPP; Diplomate, American Board of Sleep Medicine

## 2019-12-02 ENCOUNTER — TELEPHONE (OUTPATIENT)
Dept: SMOKING CESSATION | Facility: CLINIC | Age: 54
End: 2019-12-02

## 2019-12-03 ENCOUNTER — CLINICAL SUPPORT (OUTPATIENT)
Dept: SMOKING CESSATION | Facility: CLINIC | Age: 54
End: 2019-12-03
Payer: COMMERCIAL

## 2019-12-03 ENCOUNTER — TELEPHONE (OUTPATIENT)
Dept: PULMONOLOGY | Facility: CLINIC | Age: 54
End: 2019-12-03

## 2019-12-03 DIAGNOSIS — F17.210 MODERATE SMOKER (20 OR LESS PER DAY): Primary | ICD-10-CM

## 2019-12-03 DIAGNOSIS — E11.59 HYPERTENSION ASSOCIATED WITH DIABETES: ICD-10-CM

## 2019-12-03 DIAGNOSIS — G47.33 OSA (OBSTRUCTIVE SLEEP APNEA): Primary | ICD-10-CM

## 2019-12-03 DIAGNOSIS — I15.2 HYPERTENSION ASSOCIATED WITH DIABETES: ICD-10-CM

## 2019-12-03 PROCEDURE — 99407 BEHAV CHNG SMOKING > 10 MIN: CPT | Mod: S$GLB,,,

## 2019-12-03 PROCEDURE — 99407 PR TOBACCO USE CESSATION INTENSIVE >10 MINUTES: ICD-10-PCS | Mod: S$GLB,,,

## 2019-12-03 NOTE — TELEPHONE ENCOUNTER
Sleep study Results reviewed:          The diagnostic polysomnography revealed a borderline obstructive sleep apnea / hypopnea syndrome (A + H Index = 5.1 events / hr asleep with only 1.6 respiratory event - related arousals / hr asleep for the study, and no RERAs (respiratory effort - related arousals). The mean SpO2 value was 89.1 %, significant, minimum oxygen saturation during sleep was 82.0 %, and waking baseline SpO2 was 97 %. Sporadic, moderately loud snoring was noted. A CPAP titration polysomnography is recommended, but CPAP alone may not improve this patients sleep greatly because event - related arousals were infrequent and sleep quality was only mildly impaired, though oxygen desaturation was significant.         Assessment:        Borderline RSAVAN    Plan:  Start  AUTOPAP of 4 - 20 CMWP.  Schedule  6 weeks follow up for complaince evaluation.     Ordered Please inform pateint

## 2019-12-03 NOTE — PROGRESS NOTES
Patient has scheduled appt.today and called and stated she could not come due to illness and grand kids had a virus. She did state she is smoking about 15 cigarettes/day and is taking the 150 mg Wellbutrin BID and 2 mg nicotine lozenges BID. Ill of work and will return before next visit. Discussed trying to cut down  And use delay on smoking. She feels when she gets back to work and gets back in a routine, she will have more success. She does feel the 150 mg Wellbutrin BID is helping,, but concerned she may not be using enough lozenges to help. SHe does not need any refills at this time and appt.was made. The patient will continue individual  and/or group sessions and medication monitoring by CTTS. Prescribed medication management will be by practitoner.The patient denies any abnormal behavioral or mental changes at this time.

## 2019-12-04 RX ORDER — METFORMIN HYDROCHLORIDE 500 MG/1
500 TABLET, EXTENDED RELEASE ORAL 2 TIMES DAILY WITH MEALS
Qty: 180 TABLET | Refills: 1 | Status: SHIPPED | OUTPATIENT
Start: 2019-12-04 | End: 2020-06-01

## 2019-12-05 ENCOUNTER — PATIENT MESSAGE (OUTPATIENT)
Dept: PULMONOLOGY | Facility: CLINIC | Age: 54
End: 2019-12-05

## 2019-12-09 ENCOUNTER — PATIENT MESSAGE (OUTPATIENT)
Dept: PULMONOLOGY | Facility: CLINIC | Age: 54
End: 2019-12-09

## 2019-12-10 ENCOUNTER — PATIENT OUTREACH (OUTPATIENT)
Dept: ADMINISTRATIVE | Facility: HOSPITAL | Age: 54
End: 2019-12-10

## 2019-12-10 NOTE — PROGRESS NOTES
Spoke with Pt. Re Mammogram. Pt. States she had her mammo with Dr. Jesus Castro @ [facility]. Records requested.

## 2019-12-11 ENCOUNTER — PATIENT MESSAGE (OUTPATIENT)
Dept: RHEUMATOLOGY | Facility: CLINIC | Age: 54
End: 2019-12-11

## 2019-12-11 DIAGNOSIS — M79.7 FIBROMYALGIA: Primary | ICD-10-CM

## 2019-12-11 DIAGNOSIS — M06.09 RHEUMATOID ARTHRITIS OF MULTIPLE SITES WITH NEGATIVE RHEUMATOID FACTOR: ICD-10-CM

## 2019-12-11 DIAGNOSIS — R25.2 MUSCLE CRAMPS AT NIGHT: ICD-10-CM

## 2019-12-12 RX ORDER — GABAPENTIN 300 MG/1
300 CAPSULE ORAL 2 TIMES DAILY
Qty: 60 CAPSULE | Refills: 2 | Status: SHIPPED | OUTPATIENT
Start: 2019-12-12 | End: 2020-04-26 | Stop reason: SDUPTHER

## 2019-12-12 NOTE — TELEPHONE ENCOUNTER
Refill sent on gabapentin.  If no improvement or significant worsening of symptoms, please schedule appointment with .  Thanks.

## 2019-12-13 ENCOUNTER — PATIENT MESSAGE (OUTPATIENT)
Dept: RHEUMATOLOGY | Facility: CLINIC | Age: 54
End: 2019-12-13

## 2019-12-16 ENCOUNTER — PATIENT MESSAGE (OUTPATIENT)
Dept: RHEUMATOLOGY | Facility: CLINIC | Age: 54
End: 2019-12-16

## 2019-12-16 DIAGNOSIS — M06.09 RHEUMATOID ARTHRITIS OF MULTIPLE SITES WITH NEGATIVE RHEUMATOID FACTOR: Primary | ICD-10-CM

## 2019-12-16 RX ORDER — PREDNISONE 5 MG/1
5 TABLET ORAL DAILY
Qty: 90 TABLET | Refills: 2 | Status: SHIPPED | OUTPATIENT
Start: 2019-12-16 | End: 2020-01-17

## 2019-12-17 ENCOUNTER — CLINICAL SUPPORT (OUTPATIENT)
Dept: SMOKING CESSATION | Facility: CLINIC | Age: 54
End: 2019-12-17
Payer: COMMERCIAL

## 2019-12-17 DIAGNOSIS — F17.210 MODERATE SMOKER (20 OR LESS PER DAY): Primary | ICD-10-CM

## 2019-12-17 DIAGNOSIS — F17.210 CIGARETTE NICOTINE DEPENDENCE, UNCOMPLICATED: ICD-10-CM

## 2019-12-17 PROCEDURE — 99403 PREV MED CNSL INDIV APPRX 45: CPT | Mod: S$GLB,,,

## 2019-12-17 PROCEDURE — 99403 PR PREVENT COUNSEL,INDIV,45 MIN: ICD-10-PCS | Mod: S$GLB,,,

## 2019-12-17 RX ORDER — BUPROPION HYDROCHLORIDE 150 MG/1
150 TABLET, EXTENDED RELEASE ORAL 2 TIMES DAILY
Qty: 60 TABLET | Refills: 0 | Status: SHIPPED | OUTPATIENT
Start: 2019-12-17 | End: 2020-07-27

## 2019-12-17 RX ORDER — IBUPROFEN 200 MG
1 TABLET ORAL DAILY
Qty: 28 PATCH | Refills: 0 | Status: SHIPPED | OUTPATIENT
Start: 2019-12-17 | End: 2020-01-17

## 2019-12-17 NOTE — Clinical Note
The CO measurement =  44   ppm which indicates possibly a very heavy smoker .The patient is smoking 20 cigarettes/day  from 15 cigarettes/day. She is not using any of the medication at this time. She has started back to work this week and is stressed and has been smoking more. Feel the 150 mg Wellbutrin SR BID will help and the 21 mg nicotine patch. Suggest to try nicotine gum to deeif will help then will order. completion of TCRS (Tobacco Cessation Rating Scale) reviewed strategies, habitual behavior, stress, and high risk situations. Introduced stress with addition interventions, SOLVE, relaxation with interventions, nutrition, exercise, weight gain, and the importance of rewarding oneself for accomplishments toward becoming tobacco free.

## 2019-12-17 NOTE — PROGRESS NOTES
Individual Follow-Up Form    12/17/2019    Quit Date: TBD    Clinical Status of Patient: Outpatient    Length of Service: 45 minutes    Continuing Medication: yes  Wellbutrin    Other Medications: 21 mg nicotine patches 2 mg nicotine gum      Target Symptoms: Withdrawal and medication side effects. The following were  rated moderate (3) to severe (4) on TCRS:  · Moderate (3): increased appetite, irritated nose, dry mouth, - Nicotine replacement therapy, withdrawal symptoms, habit  ·   · Severe (4): none    Comments: The CO measurement =  44   ppm which indicates possibly a very heavy smoker .The patient is smoking 20 cigarettes/day  from 15 cigarettes/day. She is not using any of the medication at this time. She has started back to work this week and is stressed and has been smoking more. Feel the 150 mg Wellbutrin SR BID will help and the 21 mg nicotine patch. Suggest to try nicotine gum to deeif will help then will order. completion of TCRS (Tobacco Cessation Rating Scale) reviewed strategies, habitual behavior, stress, and high risk situations. Introduced stress with addition interventions, SOLVE, relaxation with interventions, nutrition, exercise, weight gain, and the importance of rewarding oneself for accomplishments toward becoming tobacco free. Open discussion of all items with interventions. The patient will continue individual  and/or group sessions and medication monitoring by CTTS. Prescribed medication management will be by Smoking [facility] Medical Practitioner.The patient denies any abnormal behavioral or mental changes at this time        Diagnosis: F17.210    Next Visit: 2 weeks

## 2019-12-26 ENCOUNTER — PATIENT OUTREACH (OUTPATIENT)
Dept: ADMINISTRATIVE | Facility: HOSPITAL | Age: 54
End: 2019-12-26

## 2019-12-26 NOTE — PROGRESS NOTES
Per chart audit I already spoke with pt. Pt. States she had her mammo with Dr. Jesus Castro @ [facility]. Records already requested.

## 2020-01-06 ENCOUNTER — PATIENT MESSAGE (OUTPATIENT)
Dept: ADMINISTRATIVE | Facility: OTHER | Age: 55
End: 2020-01-06

## 2020-01-14 ENCOUNTER — TELEPHONE (OUTPATIENT)
Dept: OPHTHALMOLOGY | Facility: CLINIC | Age: 55
End: 2020-01-14

## 2020-01-14 NOTE — TELEPHONE ENCOUNTER
----- Message from Tere Duron sent at 1/14/2020 10:21 AM CST -----  Contact: Pt   Pt calling in to schedule her annual . She can be reached at [phone] thanks

## 2020-01-17 ENCOUNTER — OFFICE VISIT (OUTPATIENT)
Dept: PULMONOLOGY | Facility: CLINIC | Age: 55
End: 2020-01-17
Payer: COMMERCIAL

## 2020-01-17 VITALS
BODY MASS INDEX: 35.51 KG/M2 | HEIGHT: 62 IN | SYSTOLIC BLOOD PRESSURE: 128 MMHG | DIASTOLIC BLOOD PRESSURE: 82 MMHG | WEIGHT: 193 LBS | HEART RATE: 93 BPM | RESPIRATION RATE: 20 BRPM | OXYGEN SATURATION: 96 %

## 2020-01-17 DIAGNOSIS — J45.20 MILD INTERMITTENT ASTHMA WITHOUT COMPLICATION: Chronic | ICD-10-CM

## 2020-01-17 DIAGNOSIS — E66.09 CLASS 1 OBESITY DUE TO EXCESS CALORIES WITH SERIOUS COMORBIDITY AND BODY MASS INDEX (BMI) OF 34.0 TO 34.9 IN ADULT: Chronic | ICD-10-CM

## 2020-01-17 DIAGNOSIS — F17.210 NICOTINE DEPENDENCE, CIGARETTES, UNCOMPLICATED: Chronic | ICD-10-CM

## 2020-01-17 DIAGNOSIS — G47.61 PERIODIC LIMB MOVEMENT DISORDER (PLMD): Chronic | ICD-10-CM

## 2020-01-17 DIAGNOSIS — G47.33 OBSTRUCTIVE SLEEP APNEA: Primary | Chronic | ICD-10-CM

## 2020-01-17 DIAGNOSIS — R91.8 MULTIPLE LUNG NODULES ON CT: Chronic | ICD-10-CM

## 2020-01-17 PROCEDURE — 3079F PR MOST RECENT DIASTOLIC BLOOD PRESSURE 80-89 MM HG: ICD-10-PCS | Mod: CPTII,S$GLB,, | Performed by: INTERNAL MEDICINE

## 2020-01-17 PROCEDURE — 3074F SYST BP LT 130 MM HG: CPT | Mod: CPTII,S$GLB,, | Performed by: INTERNAL MEDICINE

## 2020-01-17 PROCEDURE — 3074F PR MOST RECENT SYSTOLIC BLOOD PRESSURE < 130 MM HG: ICD-10-PCS | Mod: CPTII,S$GLB,, | Performed by: INTERNAL MEDICINE

## 2020-01-17 PROCEDURE — 99214 PR OFFICE/OUTPT VISIT, EST, LEVL IV, 30-39 MIN: ICD-10-PCS | Mod: S$GLB,,, | Performed by: INTERNAL MEDICINE

## 2020-01-17 PROCEDURE — 3079F DIAST BP 80-89 MM HG: CPT | Mod: CPTII,S$GLB,, | Performed by: INTERNAL MEDICINE

## 2020-01-17 PROCEDURE — 99999 PR PBB SHADOW E&M-EST. PATIENT-LVL III: ICD-10-PCS | Mod: PBBFAC,,, | Performed by: INTERNAL MEDICINE

## 2020-01-17 PROCEDURE — 99214 OFFICE O/P EST MOD 30 MIN: CPT | Mod: S$GLB,,, | Performed by: INTERNAL MEDICINE

## 2020-01-17 PROCEDURE — 3008F BODY MASS INDEX DOCD: CPT | Mod: CPTII,S$GLB,, | Performed by: INTERNAL MEDICINE

## 2020-01-17 PROCEDURE — 3008F PR BODY MASS INDEX (BMI) DOCUMENTED: ICD-10-PCS | Mod: CPTII,S$GLB,, | Performed by: INTERNAL MEDICINE

## 2020-01-17 PROCEDURE — 99999 PR PBB SHADOW E&M-EST. PATIENT-LVL III: CPT | Mod: PBBFAC,,, | Performed by: INTERNAL MEDICINE

## 2020-01-17 RX ORDER — MONTELUKAST SODIUM 10 MG/1
10 TABLET ORAL NIGHTLY
Qty: 30 TABLET | Refills: 0 | Status: SHIPPED | OUTPATIENT
Start: 2020-01-17 | End: 2020-02-16

## 2020-01-17 RX ORDER — DOXYCYCLINE 100 MG/1
CAPSULE ORAL
COMMUNITY
Start: 2020-01-07 | End: 2020-02-05

## 2020-01-17 RX ORDER — BENZONATATE 200 MG/1
CAPSULE ORAL
COMMUNITY
Start: 2020-01-07 | End: 2020-07-27

## 2020-01-17 NOTE — ASSESSMENT & PLAN NOTE
Assistance with smoking cessation was offered, including:  []  Medications  [x]  Counseling  []  Printed Information on Smoking Cessation  [x]  Referred to a Smoking Cessation Program    Patient was counseled regarding smoking for 3-10 minutes.

## 2020-01-17 NOTE — PROGRESS NOTES
Subjective:      Established patient    Patient ID: Diana Escobar is a 54 y.o. female.  Patient Active Problem List   Diagnosis    Diplopia    Hypertension associated with diabetes    Combined hyperlipidemia associated with type 2 diabetes mellitus    Major depression, recurrent, chronic    Nicotine dependence, cigarettes, uncomplicated    Fibromyalgia    Multiple lung nodules on CT    Positive anti-CCP test    History of transient cerebral ischemia    High risk medication use    Jaw pain    Paresthesia    Right sided weakness    HA (headache)    Seronegative rheumatoid arthritis    Osteopenia    Rheumatoid arthritis of multiple sites with negative rheumatoid factor    Myofacial muscle pain    Hypothyroidism (acquired)    Cervical spondylosis with radiculopathy    Bilateral foot pain    Restless legs syndrome (RLS)    Class 1 obesity due to excess calories with serious comorbidity and body mass index (BMI) of 34.0 to 34.9 in adult    Left hand pain    Chronic maxillary sinusitis    Chronic ethmoidal sinusitis    Chronic sphenoidal sinusitis    Chronic frontal sinusitis    Deviated nasal septum    Nasal obstruction    Nasal turbinate hypertrophy    Obstructive sleep apnea    Mild intermittent asthma without complication    Immunosuppressed status    Obesity (BMI 30.0-34.9)    Periodic limb movement disorder (PLMD)       Problem list has been reviewed.    Chief Complaint: Asthma; Sleep Apnea; Pulmonary Nodules; and Shortness of Breath            Patients reports stable NYHA II dyspnea    The patient does not have currently have symptoms / an exacerbation.      Her current respiratory therapy regimen is BREO & VENTOLIN which provides relief. She is  adherent with her regimen.     No recent change in breathing.     She is on APAP 4 - 20 CMWP.     She is compliant with her PAP.    She definitely thinks PAP is beneficial to her health and she wants to continue with PAP  therapy.      Compliance Summary:  12/26/2019 - 1/17/2020 (23 days)  Days with Device Usage 17 days  Days without Device Usage 6 days  Percent Days with Device Usage 73.9%  Cumulative Usage 2 days 11 hrs. 51 mins. 13 secs.  Maximum Usage (1 Day) 8 hrs. 32 mins. 37 secs.  Average Usage (All Days) 2 hrs. 36 mins. 8 secs.  Average Usage (Days Used) 3 hrs. 31 mins. 14 secs.  Minimum Usage (1 Day) 8 mins. 54 secs.  Percent of Days with Usage >= 4 Hours 30.4%  Percent of Days with Usage < 4 Hours 69.6%  Total Blower Time 2 days 12 hrs. 47 mins. 58 secs.  Average AHI 3.6  Auto-CPAP Summary  Auto-CPAP Mean Pressure 9.5 cmH2O  Auto-CPAP Peak Average Pressure 13.1 cmH2O  Average Device Pressure <= 90% of Time 14.4 cmH2O  Average Time in Large Leak Per Day 5 mins. 35 secs.    [place] Sleepiness Scale   EPWORTH SLEEPINESS SCALE 1/17/2020 9/3/2019 8/13/2019   Sitting and reading 3 3 2   Watching TV 3 2 1   Sitting, inactive in a public place (e.g. a theatre or a meeting) 0 3 0   As a passenger in a car for an hour without a break 3 2 2   Lying down to rest in the afternoon when circumstances permit 0 0 0   Sitting and talking to someone 0 0 0   Sitting quietly after a lunch without alcohol 1 0 0   In a car, while stopped for a few minutes in traffic 1 1 1   Total score 11 11 6       Asthma is somewhat controlled.     Answers for HPI/ROS submitted by the patient on 1/17/2020   Asthma  In the past 4 weeks, how much of the time did your asthma keep you from getting as much done at work, school, or at home?: some of the time  During the past 4 weeks, how often have you had shortness of breath?: more than once a day  During the past 4 weeks, how often did your asthma symptoms (Wheezing, coughing, shortness of breath, chest tightness or pain) wake you up at night or earlier that usual in the morning?: once or twice  During the past 4 weeks, how often have you used your rescue inhaler or nebulizer medication (such as albuterol)?: 2 or  3 times a week  How would you rate your asthma control during the past 4 weeks?: somewhat controlled   : 14      Current Disease Severity  frequent daytime asthma symptoms.   no nighttime asthma symptoms.   Frequency B-agonist use:more than 2 days per week but not more than once a day.   Number of urgent/emergent visit in last year: 0  Current limitations in activity from asthma: Sometimes.   Number of days of school or work missed in the last month: not applicable.     Asthma Classification (General Symptom Frequency):  Mild Intermittent (< 2 x wk)  Mild Persistent (> 2 x wk, < daily)  Moderate Persistent (daily; almost daily inhaler)  Severe Persistent (continual; limited activities)    Previous Report Reviewed: lab reports, office notes and radiology reports     The following portions of the patient's history were reviewed and updated as appropriate: She  has a past medical history of Asthma, Chronic low back pain, Degenerative disc disease, lumbar, Depression, Diabetes mellitus (2014), Fibromyalgia, Hyperlipidemia, Hypertension, Hypothyroidism, MRSA (methicillin resistant Staphylococcus aureus) carrier, Nodule of left lung (2/6/2017), Palpitations, Stress incontinence, Stroke, and Vitamin D deficiency disease.  She  has a past surgical history that includes Hysterectomy; Rotator cuff repair; left ankle surgery; Tonsillectomy; Neck surgery (06/2016); and loop recorder (05/2017).  Her family history includes Breast cancer in her mother and sister; COPD in her father; Cancer in her mother; Cataracts in her mother; Diabetes in her maternal aunt and mother; Heart disease in her father and mother; Hypertension in her father and mother; Stroke in her mother.  She  reports that she has been smoking cigarettes. She has been smoking about 0.50 packs per day. She has never used smokeless tobacco. She reports that she does not drink alcohol or use drugs.  She has a current medication list which includes the following  "prescription(s): albuterol, amlodipine, atorvastatin, benzonatate, blood sugar diagnostic, blood-glucose meter, bupropion, clopidogrel, diclofenac sodium, doxycycline, fluticasone furoate-vilanterol, gabapentin, lancets, levothyroxine, metformin, peg 400-propylene glycol (pf), pramipexole, venlafaxine, and montelukast.  She is allergic to mobic [meloxicam]; topamax [topiramate]; and adhesive..    Review of Systems   Constitutional: Positive for appetite change and fatigue.   HENT: Positive for postnasal drip, trouble swallowing and congestion. Negative for rhinorrhea.    Eyes: Negative for redness and itching.   Respiratory: Positive for cough, sputum production, shortness of breath, wheezing and dyspnea on extertion. Negative for chest tightness.    Cardiovascular: Positive for leg swelling.   Genitourinary: Negative for difficulty urinating.   Endocrine: Negative for polydipsia, cold intolerance and heat intolerance.    Musculoskeletal: Positive for arthralgias, back pain and myalgias.   Skin: Negative for rash.   Gastrointestinal: Positive for acid reflux.   Neurological: Negative for dizziness, light-headedness and headaches.   Psychiatric/Behavioral: The patient is nervous/anxious.         Objective:     /82   Pulse 93   Resp 20   Ht 5' 2" (1.575 m)   Wt 87.5 kg (193 lb 0.2 oz)   SpO2 96%   BMI 35.30 kg/m²   Body mass index is 35.3 kg/m².     Physical Exam   Constitutional: She is oriented to person, place, and time. She appears well-developed and well-nourished. No distress.   HENT:   Head: Normocephalic and atraumatic.   Right Ear: Tympanic membrane and ear canal normal.   Left Ear: Tympanic membrane and ear canal normal.   Nose: Mucosal edema and rhinorrhea present.   Mouth/Throat: Uvula is midline. Posterior oropharyngeal edema and posterior oropharyngeal erythema present. No tonsillar exudate.   Mallampati 3    Eyes: Pupils are equal, round, and reactive to light. EOM are normal.   Neck: Normal " "range of motion. Neck supple.   13"   Cardiovascular: Normal rate and regular rhythm. Exam reveals no gallop and no friction rub.   No murmur heard.  Pulmonary/Chest: Effort normal and breath sounds normal. No stridor. No respiratory distress. She has no wheezes. She has no rales.   Abdominal: Soft. Bowel sounds are normal.   Musculoskeletal: Normal range of motion. She exhibits no edema.   Neurological: She is alert and oriented to person, place, and time.   Skin: Skin is warm and dry. Capillary refill takes 2 to 3 seconds. She is not diaphoretic.   Psychiatric: She has a normal mood and affect.   Nursing note and vitals reviewed.      Personal Diagnostic Review        Assessment / Plan:       Discussed diagnosis, its evaluation, treatment and usual course. All questions answered.    Problem List Items Addressed This Visit        Pulmonary    Multiple lung nodules on CT (Chronic)    Overview     CT chest : 08/20/19: Multiple nodules are seen within the lungs bilaterally all measuring less than 4 mm scattered throughout the lungs bilaterally. Largest measure 4 mm within the left lower lobe and right upper lobe.         Current Assessment & Plan     Repeat CT chest in 6 months ( 03/2020).    Fleischner Society Guidelines:    High risk patient:   Smoking history, history of malignancy or risk factors for malignancy.( non-solid ground glass opacities and partially solid nodules may require longer follow up to exclude indolent adenocarcinoma)      Nodule size Low risk patient High risk patient   4 mm  No follow up Follow up CT in 12 months. If unchanged, no further follow up.   4 - 6 mm Follow up CT in 12 months; if unchanged, no further follow up.  Initial follow up CT in 6 - 12 months, then at 18 - 24 months if no change.      6 - 8 mm  Initial follow up CT at 6 - 12 months and at 18 months if no change.  Initial follow up CT at 3 - 6 months. Then at 9-12 months and 24 months if no change.      > 8 mm Initial follow " up CT at 3, 6, 9 and 24 months, dynamic contrast enhanced CT, PET-CT and/or biopsy. Initial follow up CT at 3, 6, 9 and 24 months, dynamic contrast enhanced CT, PET-CT and/or biopsy.            Mild intermittent asthma without complication (Chronic)    Current Assessment & Plan     Asthma ROS: using bronchodilator MDI less than twice a week.   New concerns: Stable NYHA NAVA.   Exam: appears well, vitals normal, no respiratory distress, acyanotic, normal RR.   Assessment:  Asthma needs improvement.   Plan: Continue BREO and VENTOLIN. Start SINGULAIR per orders. Re evaluate in 2 months.          Relevant Medications    montelukast (SINGULAIR) 10 mg tablet       Endocrine    Class 1 obesity due to excess calories with serious comorbidity and body mass index (BMI) of 34.0 to 34.9 in adult    Current Assessment & Plan     General weight loss/lifestyle modification strategies discussed (elicit support from others; identify saboteurs; non-food rewards, etc).  Behavioral treatment: Slim Fast.  Diet interventions: low calorie (1000 kCal/d) deficit diet.  Informal exercise measures discussed, e.g. taking stairs instead of elevator.  Regular aerobic exercise program discussed.            Other    Nicotine dependence, cigarettes, uncomplicated (Chronic)    Current Assessment & Plan     Assistance with smoking cessation was offered, including:  []  Medications  [x]  Counseling  []  Printed Information on Smoking Cessation  [x]  Referred to a Smoking Cessation Program    Patient was counseled regarding smoking for 3-10 minutes.         Obstructive sleep apnea - Primary    Current Assessment & Plan     Continue  4PAP of 4 - 20 CMWP.  (DME - [facility])     Discussed therapeutic goals for positive airway pressure therapy(CPAP or BiPAP): Ideal is usage 100% of nights for 6 - 8 hours per night. Minimum usage is 70% of night for at least 4 hours per night used. Pateint expressed understanding. All Questions answered.    Compliance evaluation  in 6 - 8 weeks         Periodic limb movement disorder (PLMD)    Current Assessment & Plan     Continue MIRAPEX.                 TIME SPENT WITH PATIENT: Time spent: 30 minutes in face to face  discussion concerning diagnosis, prognosis, review of lab and test results, benefits of treatment as well as management of disease, counseling of patient and coordination of care between various health  care providers . Greater than half the time spent was used for coordination of care and counseling of patient.       Follow up in about 6 weeks (around 3/2/2020) for Multiple lung nodules, Asthma, Obesity, SRAVAN.

## 2020-01-17 NOTE — ASSESSMENT & PLAN NOTE
Continue  4PAP of 4 - 20 CMWP.  (DME - [facility])     Discussed therapeutic goals for positive airway pressure therapy(CPAP or BiPAP): Ideal is usage 100% of nights for 6 - 8 hours per night. Minimum usage is 70% of night for at least 4 hours per night used. Pateint expressed understanding. All Questions answered.    Compliance evaluation in 6 - 8 weeks

## 2020-01-17 NOTE — ASSESSMENT & PLAN NOTE
Asthma ROS: using bronchodilator MDI less than twice a week.   New concerns: Stable NYHA NAVA.   Exam: appears well, vitals normal, no respiratory distress, acyanotic, normal RR.   Assessment:  Asthma needs improvement.   Plan: Continue BREO and VENTOLIN. Start SINGULAIR per orders. Re evaluate in 2 months.

## 2020-01-24 DIAGNOSIS — Z12.39 BREAST CANCER SCREENING: ICD-10-CM

## 2020-01-31 ENCOUNTER — PATIENT MESSAGE (OUTPATIENT)
Dept: INTERNAL MEDICINE | Facility: CLINIC | Age: 55
End: 2020-01-31

## 2020-01-31 DIAGNOSIS — E11.69 COMBINED HYPERLIPIDEMIA ASSOCIATED WITH TYPE 2 DIABETES MELLITUS: ICD-10-CM

## 2020-01-31 DIAGNOSIS — E78.2 COMBINED HYPERLIPIDEMIA ASSOCIATED WITH TYPE 2 DIABETES MELLITUS: ICD-10-CM

## 2020-01-31 DIAGNOSIS — E11.59 HYPERTENSION ASSOCIATED WITH DIABETES: ICD-10-CM

## 2020-01-31 DIAGNOSIS — I15.2 HYPERTENSION ASSOCIATED WITH DIABETES: ICD-10-CM

## 2020-01-31 RX ORDER — AMLODIPINE BESYLATE 5 MG/1
5 TABLET ORAL DAILY
Qty: 90 TABLET | Refills: 1 | Status: SHIPPED | OUTPATIENT
Start: 2020-01-31 | End: 2020-08-07

## 2020-02-04 ENCOUNTER — CLINICAL SUPPORT (OUTPATIENT)
Dept: SMOKING CESSATION | Facility: CLINIC | Age: 55
End: 2020-02-04
Payer: COMMERCIAL

## 2020-02-04 ENCOUNTER — PATIENT MESSAGE (OUTPATIENT)
Dept: RHEUMATOLOGY | Facility: CLINIC | Age: 55
End: 2020-02-04

## 2020-02-04 DIAGNOSIS — F17.210 MODERATE SMOKER (20 OR LESS PER DAY): Primary | ICD-10-CM

## 2020-02-04 PROCEDURE — 99402 PR PREVENT COUNSEL,INDIV,30 MIN: ICD-10-PCS | Mod: S$GLB,,,

## 2020-02-04 PROCEDURE — 99999 PR PBB SHADOW E&M-EST. PATIENT-LVL I: CPT | Mod: PBBFAC,,,

## 2020-02-04 PROCEDURE — 99999 PR PBB SHADOW E&M-EST. PATIENT-LVL I: ICD-10-PCS | Mod: PBBFAC,,,

## 2020-02-04 PROCEDURE — 99402 PREV MED CNSL INDIV APPRX 30: CPT | Mod: S$GLB,,,

## 2020-02-04 RX ORDER — MICONAZOLE NITRATE 2 %
2 CREAM (GRAM) TOPICAL
Qty: 300 EACH | Refills: 0 | Status: SHIPPED | OUTPATIENT
Start: 2020-02-04 | End: 2020-07-27

## 2020-02-04 NOTE — PROGRESS NOTES
Individual Follow-Up Form    2/4/2020    Quit Date: TBD    Clinical Status of Patient: Outpatient    Length of Service: 30 minutes    Continuing Medication: yes  Wellbutrin, Patches or Nicotine Lozenges    Other Medications: none     Target Symptoms: Withdrawal and medication side effects. The following were  rated moderate (3) to severe (4) on TCRS:  · Moderate (3): desire/crave, frustration, agitated mood. - Nicotine replacement therapy, withdrawal symptoms, habit  ·   · Severe (4): increased appetite- - Nicotine replacement therapy, withdrawal symptoms, habit  ·     Comments: The patient is smoking 18 cigarettes/day  from 20 cigarettes/day. The patient agreed to a phone follow-up due to work hours. She visited with MD who advised patient to loss weight and the patient felt she could not quit smoking and diet at the same time. Explained to patient each cigarette has 1/2 teaspoon of sugar and when you reduce smoking you have sugar rushes which may cause the increased desire for chocolate and snacks. Suggested higher protein snacks and she is exercising riding her bike. To help with cravings, will continue to use the 150 mg Wellbutrin SR BID, will add 21 mg nicotine patch and will use 2 mg nicotine gum for cravings. The patient will continue individual  and/or group sessions and medication monitoring by CTTS. Prescribed medication management will be by [facility] Medical Practitioner.The patient denies any abnormal behavioral or mental changes at this time.      Diagnosis: F17.210    Next Visit: 2 weeks

## 2020-02-05 ENCOUNTER — OFFICE VISIT (OUTPATIENT)
Dept: RHEUMATOLOGY | Facility: CLINIC | Age: 55
End: 2020-02-05
Payer: COMMERCIAL

## 2020-02-05 ENCOUNTER — OFFICE VISIT (OUTPATIENT)
Dept: FAMILY MEDICINE | Facility: CLINIC | Age: 55
End: 2020-02-05
Payer: COMMERCIAL

## 2020-02-05 VITALS
SYSTOLIC BLOOD PRESSURE: 119 MMHG | DIASTOLIC BLOOD PRESSURE: 78 MMHG | HEART RATE: 100 BPM | WEIGHT: 196.63 LBS | BODY MASS INDEX: 36.18 KG/M2 | HEIGHT: 62 IN

## 2020-02-05 VITALS
TEMPERATURE: 98 F | WEIGHT: 196.63 LBS | BODY MASS INDEX: 36.18 KG/M2 | OXYGEN SATURATION: 98 % | DIASTOLIC BLOOD PRESSURE: 81 MMHG | SYSTOLIC BLOOD PRESSURE: 116 MMHG | HEART RATE: 110 BPM | HEIGHT: 62 IN

## 2020-02-05 DIAGNOSIS — M06.09 RHEUMATOID ARTHRITIS OF MULTIPLE SITES WITH NEGATIVE RHEUMATOID FACTOR: Primary | ICD-10-CM

## 2020-02-05 DIAGNOSIS — Z72.0 TOBACCO ABUSE: ICD-10-CM

## 2020-02-05 DIAGNOSIS — E66.01 SEVERE OBESITY (BMI 35.0-35.9 WITH COMORBIDITY): ICD-10-CM

## 2020-02-05 DIAGNOSIS — R30.0 DYSURIA: ICD-10-CM

## 2020-02-05 DIAGNOSIS — R68.89 FLU-LIKE SYMPTOMS: Primary | ICD-10-CM

## 2020-02-05 LAB
BILIRUB SERPL-MCNC: NEGATIVE MG/DL
BLOOD URINE, POC: NEGATIVE
COLOR, POC UA: NORMAL
CTP QC/QA: YES
FLUAV AG NPH QL: NEGATIVE
FLUBV AG NPH QL: NEGATIVE
GLUCOSE UR QL STRIP: NORMAL
KETONES UR QL STRIP: NEGATIVE
LEUKOCYTE ESTERASE URINE, POC: NEGATIVE
NITRITE, POC UA: NEGATIVE
PH, POC UA: 5
PROTEIN, POC: NEGATIVE
SPECIFIC GRAVITY, POC UA: 1
UROBILINOGEN, POC UA: NORMAL

## 2020-02-05 PROCEDURE — 3008F PR BODY MASS INDEX (BMI) DOCUMENTED: ICD-10-PCS | Mod: CPTII,S$GLB,, | Performed by: INTERNAL MEDICINE

## 2020-02-05 PROCEDURE — 3074F PR MOST RECENT SYSTOLIC BLOOD PRESSURE < 130 MM HG: ICD-10-PCS | Mod: CPTII,S$GLB,, | Performed by: NURSE PRACTITIONER

## 2020-02-05 PROCEDURE — 87804 INFLUENZA ASSAY W/OPTIC: CPT | Mod: 59,QW,S$GLB, | Performed by: NURSE PRACTITIONER

## 2020-02-05 PROCEDURE — 81002 URINALYSIS NONAUTO W/O SCOPE: CPT | Mod: S$GLB,,, | Performed by: NURSE PRACTITIONER

## 2020-02-05 PROCEDURE — 3079F PR MOST RECENT DIASTOLIC BLOOD PRESSURE 80-89 MM HG: ICD-10-PCS | Mod: CPTII,S$GLB,, | Performed by: NURSE PRACTITIONER

## 2020-02-05 PROCEDURE — 99999 PR PBB SHADOW E&M-EST. PATIENT-LVL III: ICD-10-PCS | Mod: PBBFAC,,, | Performed by: INTERNAL MEDICINE

## 2020-02-05 PROCEDURE — 3079F DIAST BP 80-89 MM HG: CPT | Mod: CPTII,S$GLB,, | Performed by: NURSE PRACTITIONER

## 2020-02-05 PROCEDURE — 3074F SYST BP LT 130 MM HG: CPT | Mod: CPTII,S$GLB,, | Performed by: INTERNAL MEDICINE

## 2020-02-05 PROCEDURE — 99215 PR OFFICE/OUTPT VISIT, EST, LEVL V, 40-54 MIN: ICD-10-PCS | Mod: S$GLB,,, | Performed by: INTERNAL MEDICINE

## 2020-02-05 PROCEDURE — 99214 OFFICE O/P EST MOD 30 MIN: CPT | Mod: 25,S$GLB,, | Performed by: NURSE PRACTITIONER

## 2020-02-05 PROCEDURE — 3078F PR MOST RECENT DIASTOLIC BLOOD PRESSURE < 80 MM HG: ICD-10-PCS | Mod: CPTII,S$GLB,, | Performed by: INTERNAL MEDICINE

## 2020-02-05 PROCEDURE — 99999 PR PBB SHADOW E&M-EST. PATIENT-LVL V: ICD-10-PCS | Mod: PBBFAC,,, | Performed by: NURSE PRACTITIONER

## 2020-02-05 PROCEDURE — 99999 PR PBB SHADOW E&M-EST. PATIENT-LVL V: CPT | Mod: PBBFAC,,, | Performed by: NURSE PRACTITIONER

## 2020-02-05 PROCEDURE — 81002 POCT URINE DIPSTICK WITHOUT MICROSCOPE: ICD-10-PCS | Mod: S$GLB,,, | Performed by: NURSE PRACTITIONER

## 2020-02-05 PROCEDURE — 3078F DIAST BP <80 MM HG: CPT | Mod: CPTII,S$GLB,, | Performed by: INTERNAL MEDICINE

## 2020-02-05 PROCEDURE — 3074F SYST BP LT 130 MM HG: CPT | Mod: CPTII,S$GLB,, | Performed by: NURSE PRACTITIONER

## 2020-02-05 PROCEDURE — 99214 PR OFFICE/OUTPT VISIT, EST, LEVL IV, 30-39 MIN: ICD-10-PCS | Mod: 25,S$GLB,, | Performed by: NURSE PRACTITIONER

## 2020-02-05 PROCEDURE — 3074F PR MOST RECENT SYSTOLIC BLOOD PRESSURE < 130 MM HG: ICD-10-PCS | Mod: CPTII,S$GLB,, | Performed by: INTERNAL MEDICINE

## 2020-02-05 PROCEDURE — 3008F BODY MASS INDEX DOCD: CPT | Mod: CPTII,S$GLB,, | Performed by: NURSE PRACTITIONER

## 2020-02-05 PROCEDURE — 87804 POCT INFLUENZA A/B: ICD-10-PCS | Mod: QW,S$GLB,, | Performed by: NURSE PRACTITIONER

## 2020-02-05 PROCEDURE — 99999 PR PBB SHADOW E&M-EST. PATIENT-LVL III: CPT | Mod: PBBFAC,,, | Performed by: INTERNAL MEDICINE

## 2020-02-05 PROCEDURE — 3008F PR BODY MASS INDEX (BMI) DOCUMENTED: ICD-10-PCS | Mod: CPTII,S$GLB,, | Performed by: NURSE PRACTITIONER

## 2020-02-05 PROCEDURE — 3008F BODY MASS INDEX DOCD: CPT | Mod: CPTII,S$GLB,, | Performed by: INTERNAL MEDICINE

## 2020-02-05 PROCEDURE — 99215 OFFICE O/P EST HI 40 MIN: CPT | Mod: S$GLB,,, | Performed by: INTERNAL MEDICINE

## 2020-02-05 RX ORDER — PREDNISONE 5 MG/1
5 TABLET ORAL DAILY
Qty: 30 TABLET | Refills: 1 | Status: SHIPPED | OUTPATIENT
Start: 2020-02-05 | End: 2020-08-06

## 2020-02-05 RX ORDER — METHOTREXATE 2.5 MG/1
15 TABLET ORAL
Qty: 84 TABLET | Refills: 1 | Status: SHIPPED | OUTPATIENT
Start: 2020-02-05 | End: 2020-07-27

## 2020-02-05 RX ORDER — LEVOCETIRIZINE DIHYDROCHLORIDE 5 MG/1
5 TABLET, FILM COATED ORAL NIGHTLY
Qty: 30 TABLET | Refills: 0 | Status: SHIPPED | OUTPATIENT
Start: 2020-02-05 | End: 2020-07-27

## 2020-02-05 RX ORDER — FOLIC ACID 1 MG/1
1 TABLET ORAL DAILY
Qty: 90 TABLET | Refills: 3 | Status: SHIPPED | OUTPATIENT
Start: 2020-02-05 | End: 2020-11-24 | Stop reason: SDUPTHER

## 2020-02-05 RX ORDER — FLUTICASONE PROPIONATE 50 MCG
2 SPRAY, SUSPENSION (ML) NASAL DAILY
Qty: 16 G | Refills: 0 | Status: SHIPPED | OUTPATIENT
Start: 2020-02-05 | End: 2020-07-27

## 2020-02-05 RX ORDER — DICLOFENAC SODIUM 10 MG/G
GEL TOPICAL 2 TIMES DAILY PRN
Qty: 1 TUBE | Refills: 1 | Status: SHIPPED | OUTPATIENT
Start: 2020-02-05 | End: 2020-10-07 | Stop reason: SDUPTHER

## 2020-02-05 NOTE — PROGRESS NOTES
CC:  Chief Complaint   Patient presents with    Pain    likely RA flare    History of Present Illness:  Diana Rubio a 54 y.o.yo female here for acute visit   She has a prior history of seronegative rheumatoid arthritis  She was doing fairly well on methotrexate 15 mg a week with daily folic acid until few months back when she stopped methotrexate for a bladder surgery  Post surgery off methotrexate she was doing well and soon never started methotrexate again  She has used prednisone 5 mg daily until a week back  Then 1 week back she ran out of prednisone and stopped  She has been flaring for the past 1 week    She complains of pain and a.m. Stiffness  Pain and swelling involving both hands and wrists  Her feet hurt as well  She states her symptoms started while she was on prednisone itself but got worse when she stopped prednisone 1 week back      Review of Systems:  Constitutional: Denies fever, chills. No recent weight changes.   Fatigue: no  Muscle weakness: no  Headaches: no new headaches  Eyes: No redness .  No recent visual changes.  ENT:  No oral or nasal ulcers.  Card: No chest pain.  Resp: No cough or sob.   Gastro: No nausea or vomiting.  No heartburn.  Constipation: no  Diarrhea: no  Genito:  No dysuria.  No genital ulcers.  Skin: No rash.  Raynauds:no  Neuro: No numbness / tingling.   Psych: No depression, anxiety  Endo:  no excess thirst.  Heme: no abnormal bleeding or bruising  Clots:none       Past Medical History:   Diagnosis Date    Asthma     Chronic low back pain     Degenerative disc disease, lumbar     Depression     Diabetes mellitus 2014    Fibromyalgia     Hyperlipidemia     Hypertension     Hypothyroidism     MRSA (methicillin resistant Staphylococcus aureus) carrier     Nodule of left lung 2/6/2017    CT chest : 04/06/16: Small 3 mm pleural-based nodule laterally in the left lower lobe.    Palpitations     Dr. Jesus Lao--cardiology    Stress incontinence       Zurdo Lopez--urology    Stroke     TIA    Vitamin D deficiency disease        Past Surgical History:   Procedure Laterality Date    HYSTERECTOMY      left ankle surgery      loop recorder  05/2017    cardiac device    NECK SURGERY  06/2016    ROTATOR CUFF REPAIR      TONSILLECTOMY           Social History     Tobacco Use    Smoking status: Current Some Day Smoker     Packs/day: 0.50     Types: Cigarettes    Smokeless tobacco: Never Used   Substance Use Topics    Alcohol use: No     Alcohol/week: 0.0 standard drinks    Drug use: No       Family History   Problem Relation Age of Onset    Cancer Mother     Stroke Mother     Heart disease Mother     Diabetes Mother     Cataracts Mother     Hypertension Mother     Breast cancer Mother     Heart disease Father     COPD Father     Hypertension Father     Diabetes Maternal Aunt     Breast cancer Sister        Review of patient's allergies indicates:   Allergen Reactions    Mobic [meloxicam]     Topamax [topiramate]     Adhesive Rash         OBJECTIVE:     Vital Signs   Vitals:    02/05/20 1310   BP: 119/78   Pulse: 100     Physical Exam:  General Appearance:  NAD.   Gait: not favoring.  HEENT: PERRL.  Eyes not dry or injected.  No nasal ulcers.  Mouth not dry, no oral lesions.  Lymph: cervical, supraclavicular or axillary nodes: none abnormal   Cardio: no irregularity of S1 or S2.  No gallops or rubs.   Resp: Normal respiratory motion. Clear to auscultation bilaterally.   No abnormal chest conformation.  Abd: Soft, non-tender, nondistended.  No masses.   Skin: Head and neck,  and extremities examined. No rashes.   Neuro: Ox3.   Cranial nerves II-XII grossly intact.   Sensation intact  in both distal LE and upper extremities to light touch.  Musculoskeletal Exam:    Bilateral hands positive synovitis  Bilateral hands all PIP and DIP tenderness present  Bilateral wrists  Tender+ swelling +pain on range of motion    Hand  good  she is able to  make a good fist    Laboratory: I have reviewed all of the patient's relevant lab work available in the medical record and have utilized this in my evaluation and management recommendations today    Imaging : I have reviewed all of the patient's diagnostic/imaging results available in the medical record and have utilized this in my evaluation and management recommendations today.    Notes reviewed  Other procedures:    ASSESSMENT/PLAN:     Rheumatoid arthritis of multiple sites with negative rheumatoid factor  -     methotrexate 2.5 MG Tab; Take 6 tablets (15 mg total) by mouth every 7 days.  Dispense: 84 tablet; Refill: 1  -     folic acid (FOLVITE) 1 MG tablet; Take 1 tablet (1 mg total) by mouth once daily.  Dispense: 90 tablet; Refill: 3  -     predniSONE (DELTASONE) 5 MG tablet; Take 1 tablet (5 mg total) by mouth once daily.  Dispense: 30 tablet; Refill: 1  -     diclofenac sodium (VOLTAREN) 1 % Gel; Apply topically 2 (two) times daily as needed. Apply to affected area  Dispense: 1 Tube; Refill: 1  -     CBC auto differential; Standing  -     Comprehensive metabolic panel; Standing  -     C-reactive protein; Standing  -     Sedimentation rate; Standing  -     Hepatitis panel, acute; Future  -     Quantiferon Gold TB; Future; Expected date: 02/05/2020    Rheumatoid arthritis flare   Prior hep panel and TB gold negative  More recent CBC CMP stable  Restart methotrexate 4 tablets a week for a week and then increase to 6 tablets a week with folic acid 1 mg daily  Start prednisone 5 mg a day until symptoms resolve    3M return with Dr. MORGAN    Risks vs Benefits and potential side effects of medication prescribed today were discussed with patient. Medication literature given to patient up discharge  Went over uptodate information /literature on the meds prescribed today    Patient advised to hold DMARD and/or biologic therapy for signs of infection or for surgery. If you are unsure what to do please call our office for  price. [facility] [phone]  MTX - infection, malignancy, lymphoma risk potential discussed      Disclaimer: This note was prepared using voice recognition system and is likely to have sound alike errors and is not proof read.  Please call me with any questions.

## 2020-02-05 NOTE — PROGRESS NOTES
Subjective:       Patient ID: Diana Escobar is a 54 y.o. female.    Chief Complaint: Generalized Body Aches  pt reports to clinic with chief complaint of generalized body aches, fever and ST.  Reports multiple family members positive flu.  Additionally, pt reports low back pain and dysuria.  Onset today.  Occurs with every urination.  No visible hematuria.  PMH of RA, HTN, DM2, HLD, chronic pain, RLS, SRAVAN, obesity   Dysuria    This is a new problem. The current episode started today. The problem occurs every urination. The problem has been unchanged. The quality of the pain is described as aching and burning. The maximum temperature recorded prior to her arrival was 100 - 100.9 F. The fever has been present for less than 1 day. She is sexually active. Associated symptoms include flank pain. Pertinent negatives include no frequency, hematuria or weight loss. She has tried nothing for the symptoms.     Review of Systems   Constitutional: Positive for fever. Negative for weight loss.   HENT: Positive for congestion and rhinorrhea.    Respiratory: Negative for shortness of breath and wheezing.    Cardiovascular: Negative.    Gastrointestinal: Negative.    Genitourinary: Positive for dysuria and flank pain. Negative for frequency and hematuria.   Musculoskeletal: Positive for back pain.       Objective:      Physical Exam   Constitutional: She is oriented to person, place, and time. She appears well-developed and well-nourished.   HENT:   Head: Normocephalic.   Nose: Mucosal edema and rhinorrhea present. Right sinus exhibits no maxillary sinus tenderness and no frontal sinus tenderness. Left sinus exhibits no maxillary sinus tenderness and no frontal sinus tenderness.   Eyes: EOM are normal.   Neck: Neck supple.   Cardiovascular: Normal rate and normal heart sounds.   Pulmonary/Chest: Effort normal and breath sounds normal.   Abdominal: Soft. Bowel sounds are normal. There is tenderness in the right lower quadrant.    Neurological: She is alert and oriented to person, place, and time.   Skin: Skin is warm and dry.   Vitals reviewed.      Assessment:       1. Flu-like symptoms    2. Tobacco abuse    3. Dysuria    4. Severe obesity (BMI 35.0-35.9 with comorbidity)        Plan:   Flu-like symptoms  -     POCT Influenza A/B  -     fluticasone propionate (FLONASE) 50 mcg/actuation nasal spray; 2 sprays (100 mcg total) by Each Nostril route once daily.  Dispense: 16 g; Refill: 0  -     levocetirizine (XYZAL) 5 MG tablet; Take 1 tablet (5 mg total) by mouth every evening.  Dispense: 30 tablet; Refill: 0    Tobacco abuse  Smoking cessation   Dysuria  -     POCT URINE DIPSTICK WITHOUT MICROSCOPE    Severe obesity (BMI 35.0-35.9 with comorbidity)  -uncontrolled diet and exercise    No follow-ups on file.

## 2020-02-11 ENCOUNTER — CLINICAL SUPPORT (OUTPATIENT)
Dept: SMOKING CESSATION | Facility: CLINIC | Age: 55
End: 2020-02-11
Payer: COMMERCIAL

## 2020-02-11 DIAGNOSIS — F17.210 MODERATE SMOKER (20 OR LESS PER DAY): Primary | ICD-10-CM

## 2020-02-11 PROCEDURE — 99403 PREV MED CNSL INDIV APPRX 45: CPT | Mod: S$GLB,,,

## 2020-02-11 PROCEDURE — 99403 PR PREVENT COUNSEL,INDIV,45 MIN: ICD-10-PCS | Mod: S$GLB,,,

## 2020-02-11 NOTE — Clinical Note
The patient is smoking 17 cigarettes/day  from 20 cigarettes/day. The CO measurement = 27  ppm which indicates possibly a heavy smoker . Discussed smoking usage with 3 in Am, 3 on break, 3-5 lunch, and the rest PM. She feels mores stressed at home and smokes more. She cannot use the Wellbutrin which was D/C or patches and is using the 2 mg nicotine gum every 2 hours, except at work. Suggested to use couple of pieces to try to maintain an even level of nicotine and to chew a piece before she leaves work. Will work on decreasing the number smoked at break and lunch first. Works on delay and will add distractions.

## 2020-02-11 NOTE — PROGRESS NOTES
Individual Follow-Up Form    2/11/2020    Quit Date: TBD    Clinical Status of Patient: Outpatient    Length of Service: 45 minutes    Continuing Medication: yes  Nicotine gum    Other Medications: none     Target Symptoms: Withdrawal and medication side effects. The following were  rated moderate (3) to severe (4) on TCRS:  · Moderate (3): desire/crave, - Nicotine replacement therapy, withdrawal symptoms, habit  ·   · Severe (4): NONE    Comments: The patient is smoking 17 cigarettes/day  from 20 cigarettes/day. The CO measurement = 27  ppm which indicates possibly a heavy smoker . Discussed smoking usage with 3 in Am, 3 on break, 3-5 lunch, and the rest PM. She feels mores stressed at home and smokes more. She cannot use the Wellbutrin which was D/C or patches and is using the 2 mg nicotine gum every 2 hours, except at work. Suggested to use couple of pieces to try to maintain an even level of nicotine and to chew a piece before she leaves work. Will work on decreasing the number smoked at break and lunch first. Works on delay and will add distractions. The patient will continue individual  and/or group sessions and medication monitoring by CTTS. Prescribed medication management will be by [facility]. The patient denies any abnormal behavioral or mental changes at this time.          Diagnosis: F17.210    Next Visit: 2 weeks

## 2020-02-17 ENCOUNTER — OFFICE VISIT (OUTPATIENT)
Dept: OPHTHALMOLOGY | Facility: CLINIC | Age: 55
End: 2020-02-17
Payer: COMMERCIAL

## 2020-02-17 DIAGNOSIS — E11.9 DIABETES MELLITUS TYPE 2 WITHOUT RETINOPATHY: Primary | ICD-10-CM

## 2020-02-17 DIAGNOSIS — H52.4 BILATERAL PRESBYOPIA: ICD-10-CM

## 2020-02-17 PROCEDURE — 92014 COMPRE OPH EXAM EST PT 1/>: CPT | Mod: S$GLB,,, | Performed by: OPTOMETRIST

## 2020-02-17 PROCEDURE — 92015 DETERMINE REFRACTIVE STATE: CPT | Mod: S$GLB,,, | Performed by: OPTOMETRIST

## 2020-02-17 PROCEDURE — 99999 PR PBB SHADOW E&M-EST. PATIENT-LVL I: ICD-10-PCS | Mod: PBBFAC,,, | Performed by: OPTOMETRIST

## 2020-02-17 PROCEDURE — 99999 PR PBB SHADOW E&M-EST. PATIENT-LVL I: CPT | Mod: PBBFAC,,, | Performed by: OPTOMETRIST

## 2020-02-17 PROCEDURE — 92015 PR REFRACTION: ICD-10-PCS | Mod: S$GLB,,, | Performed by: OPTOMETRIST

## 2020-02-17 PROCEDURE — 92014 PR EYE EXAM, EST PATIENT,COMPREHESV: ICD-10-PCS | Mod: S$GLB,,, | Performed by: OPTOMETRIST

## 2020-02-17 NOTE — PROGRESS NOTES
HPI     NIDDM exam.   Blurred vision at near with glasses.   Last eye exam 02/13/2019 [facility].   Update glasses RX.  Lab Results       Component                Value               Date                       HGBA1C                   6.1 (H)             08/20/2019                Last edited by Sharri Retana on 2/17/2020 10:34 AM. (History)            Assessment /Plan     For exam results, see Encounter Report.    Diabetes mellitus type 2 without retinopathy    Bilateral presbyopia      No Background Diabetic Retinopathy    Dispense Final Rx for glasses.  RTC 1 year  Discussed above and answered questions.

## 2020-02-19 ENCOUNTER — TELEPHONE (OUTPATIENT)
Dept: SMOKING CESSATION | Facility: CLINIC | Age: 55
End: 2020-02-19

## 2020-02-25 ENCOUNTER — CLINICAL SUPPORT (OUTPATIENT)
Dept: SMOKING CESSATION | Facility: CLINIC | Age: 55
End: 2020-02-25
Payer: COMMERCIAL

## 2020-02-25 DIAGNOSIS — G25.81 RESTLESS LEG SYNDROME: ICD-10-CM

## 2020-02-25 DIAGNOSIS — F17.210 MODERATE SMOKER (20 OR LESS PER DAY): Primary | ICD-10-CM

## 2020-02-25 DIAGNOSIS — F33.9 MAJOR DEPRESSION, RECURRENT, CHRONIC: ICD-10-CM

## 2020-02-25 DIAGNOSIS — F41.9 ANXIETY: ICD-10-CM

## 2020-02-25 PROCEDURE — 99999 PR PBB SHADOW E&M-EST. PATIENT-LVL I: ICD-10-PCS | Mod: PBBFAC,,,

## 2020-02-25 PROCEDURE — 99999 PR PBB SHADOW E&M-EST. PATIENT-LVL I: CPT | Mod: PBBFAC,,,

## 2020-02-25 PROCEDURE — 99403 PR PREVENT COUNSEL,INDIV,45 MIN: ICD-10-PCS | Mod: S$GLB,,,

## 2020-02-25 PROCEDURE — 99403 PREV MED CNSL INDIV APPRX 45: CPT | Mod: S$GLB,,,

## 2020-02-25 RX ORDER — VENLAFAXINE HYDROCHLORIDE 150 MG/1
CAPSULE, EXTENDED RELEASE ORAL
Qty: 30 CAPSULE | Refills: 6 | Status: SHIPPED | OUTPATIENT
Start: 2020-02-25 | End: 2020-10-07

## 2020-02-25 RX ORDER — PRAMIPEXOLE DIHYDROCHLORIDE 0.12 MG/1
TABLET ORAL
Qty: 90 TABLET | Refills: 0 | Status: SHIPPED | OUTPATIENT
Start: 2020-02-25 | End: 2020-02-28

## 2020-02-25 RX ORDER — VARENICLINE TARTRATE 0.5 (11)-1
KIT ORAL
Qty: 53 TABLET | Refills: 0 | Status: SHIPPED | OUTPATIENT
Start: 2020-02-25 | End: 2020-04-01

## 2020-02-25 NOTE — PROGRESS NOTES
Individual Follow-Up Form    2/25/2020    Quit Date: TBD    Clinical Status of Patient: Outpatient    Length of Service: 45 minutes    Continuing Medication: yes  Nicotine gum    Other Medications: none     Target Symptoms: Withdrawal and medication side effects. The following were  rated moderate (3) to severe (4) on TCRS:  · Moderate (3): angry,depressed mood, difficulty concentrating, skin burning,- Nicotine replacement therapy, withdrawal symptoms, habit  ·   · Severe (4): increased appetite -- Nicotine replacement therapy, withdrawal symptoms, habit  ·     Comments: The patient is smoking 17 cigarettes/day  from 20 cigarettes/day. The CO measurement = 19 ppm which indicates possibly a heavy smoker . The patient has cut out 2 more cigarettes at  Work on break. She is using the 2 mg nicotine gum 4-5 pieces a day. She does have days where she will get frustrated at work or smoke more at home. She will try to cut down to 15/day, but she states it is hard. Discussed Chantix with the patient, purpose, use, side effects, etc since she cannot use Wellbutrin and she would like to try to see if this will help. Ordered starter pack today. The patient will continue individual  and/or group sessions and medication monitoring by CTTS. Prescribed medication management will be by [facility] Practitioner.The patient denies any abnormal behavioral or mental changes at this time.        Diagnosis: F17.210    Next Visit: 2 weeks

## 2020-02-27 ENCOUNTER — OFFICE VISIT (OUTPATIENT)
Dept: INTERNAL MEDICINE | Facility: CLINIC | Age: 55
End: 2020-02-27
Payer: COMMERCIAL

## 2020-02-27 ENCOUNTER — LAB VISIT (OUTPATIENT)
Dept: LAB | Facility: HOSPITAL | Age: 55
End: 2020-02-27
Attending: INTERNAL MEDICINE
Payer: COMMERCIAL

## 2020-02-27 VITALS
DIASTOLIC BLOOD PRESSURE: 78 MMHG | TEMPERATURE: 99 F | SYSTOLIC BLOOD PRESSURE: 130 MMHG | OXYGEN SATURATION: 95 % | BODY MASS INDEX: 35.41 KG/M2 | HEART RATE: 102 BPM | HEIGHT: 62 IN | WEIGHT: 192.44 LBS

## 2020-02-27 DIAGNOSIS — J45.20 MILD INTERMITTENT ASTHMA WITHOUT COMPLICATION: Chronic | ICD-10-CM

## 2020-02-27 DIAGNOSIS — E11.69 COMBINED HYPERLIPIDEMIA ASSOCIATED WITH TYPE 2 DIABETES MELLITUS: ICD-10-CM

## 2020-02-27 DIAGNOSIS — M06.09 RHEUMATOID ARTHRITIS OF MULTIPLE SITES WITH NEGATIVE RHEUMATOID FACTOR: Primary | ICD-10-CM

## 2020-02-27 DIAGNOSIS — E78.2 COMBINED HYPERLIPIDEMIA ASSOCIATED WITH TYPE 2 DIABETES MELLITUS: ICD-10-CM

## 2020-02-27 DIAGNOSIS — R68.89 INFLUENZA-LIKE SYMPTOMS: ICD-10-CM

## 2020-02-27 DIAGNOSIS — I15.2 HYPERTENSION ASSOCIATED WITH DIABETES: ICD-10-CM

## 2020-02-27 DIAGNOSIS — Z20.828 EXPOSURE TO THE FLU: ICD-10-CM

## 2020-02-27 DIAGNOSIS — E11.59 HYPERTENSION ASSOCIATED WITH DIABETES: ICD-10-CM

## 2020-02-27 DIAGNOSIS — D84.9 IMMUNOSUPPRESSED STATUS: ICD-10-CM

## 2020-02-27 LAB
ALBUMIN SERPL BCP-MCNC: 4.1 G/DL (ref 3.5–5.2)
ALP SERPL-CCNC: 105 U/L (ref 55–135)
ALT SERPL W/O P-5'-P-CCNC: 24 U/L (ref 10–44)
ANION GAP SERPL CALC-SCNC: 9 MMOL/L (ref 8–16)
AST SERPL-CCNC: 21 U/L (ref 10–40)
BILIRUB SERPL-MCNC: 0.4 MG/DL (ref 0.1–1)
BUN SERPL-MCNC: 9 MG/DL (ref 6–20)
CALCIUM SERPL-MCNC: 9.1 MG/DL (ref 8.7–10.5)
CHLORIDE SERPL-SCNC: 104 MMOL/L (ref 95–110)
CHOLEST SERPL-MCNC: 140 MG/DL (ref 120–199)
CHOLEST/HDLC SERPL: 2.9 {RATIO} (ref 2–5)
CO2 SERPL-SCNC: 29 MMOL/L (ref 23–29)
CREAT SERPL-MCNC: 0.8 MG/DL (ref 0.5–1.4)
EST. GFR  (AFRICAN AMERICAN): >60 ML/MIN/1.73 M^2
EST. GFR  (NON AFRICAN AMERICAN): >60 ML/MIN/1.73 M^2
ESTIMATED AVG GLUCOSE: 140 MG/DL (ref 68–131)
GLUCOSE SERPL-MCNC: 104 MG/DL (ref 70–110)
HBA1C MFR BLD HPLC: 6.5 % (ref 4–5.6)
HDLC SERPL-MCNC: 49 MG/DL (ref 40–75)
HDLC SERPL: 35 % (ref 20–50)
LDLC SERPL CALC-MCNC: 58 MG/DL (ref 63–159)
NONHDLC SERPL-MCNC: 91 MG/DL
POTASSIUM SERPL-SCNC: 4.2 MMOL/L (ref 3.5–5.1)
PROT SERPL-MCNC: 7.1 G/DL (ref 6–8.4)
SODIUM SERPL-SCNC: 142 MMOL/L (ref 136–145)
TRIGL SERPL-MCNC: 165 MG/DL (ref 30–150)

## 2020-02-27 PROCEDURE — 99214 PR OFFICE/OUTPT VISIT, EST, LEVL IV, 30-39 MIN: ICD-10-PCS | Mod: S$GLB,,, | Performed by: FAMILY MEDICINE

## 2020-02-27 PROCEDURE — 3044F PR MOST RECENT HEMOGLOBIN A1C LEVEL <7.0%: ICD-10-PCS | Mod: CPTII,S$GLB,, | Performed by: FAMILY MEDICINE

## 2020-02-27 PROCEDURE — 80053 COMPREHEN METABOLIC PANEL: CPT

## 2020-02-27 PROCEDURE — 99999 PR PBB SHADOW E&M-EST. PATIENT-LVL III: ICD-10-PCS | Mod: PBBFAC,,, | Performed by: FAMILY MEDICINE

## 2020-02-27 PROCEDURE — 3075F PR MOST RECENT SYSTOLIC BLOOD PRESS GE 130-139MM HG: ICD-10-PCS | Mod: CPTII,S$GLB,, | Performed by: FAMILY MEDICINE

## 2020-02-27 PROCEDURE — 3075F SYST BP GE 130 - 139MM HG: CPT | Mod: CPTII,S$GLB,, | Performed by: FAMILY MEDICINE

## 2020-02-27 PROCEDURE — 3008F PR BODY MASS INDEX (BMI) DOCUMENTED: ICD-10-PCS | Mod: CPTII,S$GLB,, | Performed by: FAMILY MEDICINE

## 2020-02-27 PROCEDURE — 99999 PR PBB SHADOW E&M-EST. PATIENT-LVL III: CPT | Mod: PBBFAC,,, | Performed by: FAMILY MEDICINE

## 2020-02-27 PROCEDURE — 80061 LIPID PANEL: CPT

## 2020-02-27 PROCEDURE — 83036 HEMOGLOBIN GLYCOSYLATED A1C: CPT

## 2020-02-27 PROCEDURE — 36415 COLL VENOUS BLD VENIPUNCTURE: CPT

## 2020-02-27 PROCEDURE — 3008F BODY MASS INDEX DOCD: CPT | Mod: CPTII,S$GLB,, | Performed by: FAMILY MEDICINE

## 2020-02-27 PROCEDURE — 3044F HG A1C LEVEL LT 7.0%: CPT | Mod: CPTII,S$GLB,, | Performed by: FAMILY MEDICINE

## 2020-02-27 PROCEDURE — 99214 OFFICE O/P EST MOD 30 MIN: CPT | Mod: S$GLB,,, | Performed by: FAMILY MEDICINE

## 2020-02-27 PROCEDURE — 3078F PR MOST RECENT DIASTOLIC BLOOD PRESSURE < 80 MM HG: ICD-10-PCS | Mod: CPTII,S$GLB,, | Performed by: FAMILY MEDICINE

## 2020-02-27 PROCEDURE — 3078F DIAST BP <80 MM HG: CPT | Mod: CPTII,S$GLB,, | Performed by: FAMILY MEDICINE

## 2020-02-27 NOTE — ASSESSMENT & PLAN NOTE
Monitored and evaluated medical condition. Stable.  Reports compliance to meds.  No adverse effects to meds.  Continue meds and monitor.    On amlodipine

## 2020-02-27 NOTE — PATIENT INSTRUCTIONS

## 2020-02-27 NOTE — PROGRESS NOTES
"Subjective:       Patient ID: Diana Escobar is a 54 y.o. female.    Chief Complaint: URI; Cough; Headache; and Generalized Body Aches    HPI  Sx as above  Onset couple of days  subj Fever  Did not check temp  +Fever, cough   +myalgias   +chills, sweats   OTC meds tried, nothing    Sick contacts, flu+  Did receive influenza vaccination  Good PO intake    Did recently start prednisone and mtx for ra flare  Review of Systems   Constitutional: Positive for chills, diaphoresis and fever. Negative for appetite change and unexpected weight change.   HENT: Positive for congestion.    Respiratory: Positive for cough.    Gastrointestinal: Positive for nausea. Negative for vomiting.   Musculoskeletal: Positive for myalgias.   Neurological: Positive for headaches.        Objective:   /78 (BP Location: Right arm, Patient Position: Sitting, BP Method: Large (Manual))   Pulse 102   Temp 99.2 °F (37.3 °C) (Tympanic)   Ht 5' 2" (1.575 m)   Wt 87.3 kg (192 lb 7.4 oz)   SpO2 95%   BMI 35.20 kg/m²     Physical Exam   Constitutional: She is oriented to person, place, and time. She appears well-developed and well-nourished.  Non-toxic appearance. She does not have a sickly appearance. She does not appear ill. No distress.   HENT:   Head: Normocephalic and atraumatic.   Right Ear: Tympanic membrane normal.   Left Ear: Tympanic membrane normal.   Nose: Mucosal edema and rhinorrhea present.   Mouth/Throat: Posterior oropharyngeal erythema present. No oropharyngeal exudate or posterior oropharyngeal edema.   Eyes: Conjunctivae and EOM are normal.   Neck: Normal range of motion. Neck supple.   Cardiovascular: Normal rate and regular rhythm.   No murmur heard.  Pulmonary/Chest: Effort normal and breath sounds normal. No respiratory distress. She has no wheezes. She has no rales.   Abdominal: Soft. Bowel sounds are normal.   Neurological: She is alert and oriented to person, place, and time.   Skin: Capillary refill takes less " than 2 seconds.   Vitals reviewed.    Assessment:     1. Rheumatoid arthritis of multiple sites with negative rheumatoid factor    2. Immunosuppressed status    3. Hypertension associated with diabetes    4. Influenza-like symptoms    5. Exposure to the flu    6. Mild intermittent asthma without complication      Plan:     Problem List Items Addressed This Visit        Pulmonary    Mild intermittent asthma without complication (Chronic)       Cardiac/Vascular    Hypertension associated with diabetes    Current Assessment & Plan     Monitored and evaluated medical condition. Stable.  Reports compliance to meds.  No adverse effects to meds.  Continue meds and monitor.    On amlodipine            Immunology/Multi System    Immunosuppressed status    Current Assessment & Plan     Recently started on steroids and mtx            Orthopedic    Rheumatoid arthritis of multiple sites with negative rheumatoid factor - Primary    Current Assessment & Plan     Reviewed recent rheum note. Has been started on steroids and mtx for RA flare           Other Visit Diagnoses     Influenza-like symptoms        Relevant Medications    baloxavir marboxiL (XOFLUZA) 40 mg tablet    Other Relevant Orders    Ambulatory referral/consult to Pharmacy Assistance    Exposure to the flu        Relevant Medications    baloxavir marboxiL (XOFLUZA) 40 mg tablet    Other Relevant Orders    Ambulatory referral/consult to Pharmacy Assistance      will rx antiviral based on symptomology  DDx Viral vs. Seasonal URI symptoms. Unlikely bacterial based on H&P, no fever, duration. No antibiotics indicated at this time. Symptomatic treatment advised with fluids, throat lozenges, nasal spray, tylenol/NSAIDs for analgesia if not contraindicated. Advised of proper technique for nasal spray. RTC/ED/Urgent care if symptoms worsen or fever develops.   Follow up if symptoms worsen or fail to improve.

## 2020-02-27 NOTE — LETTER
February 27, 2020      [facility]  [id number] [place] [id number]  Phone: [phone]  Fax: [phone]       Patient: Diana Escobar   YOB: 1965  Date of Visit: 02/27/2020    To Whom It May Concern:    Yaneli Escobar  was at [facility] on 02/27/2020. She may return to work on 03/02/2020 with no restrictions. If you have any questions or concerns, or if I can be of further assistance, please do not hesitate to contact me.    Sincerely,        Patti Sandoval MA

## 2020-02-28 ENCOUNTER — OFFICE VISIT (OUTPATIENT)
Dept: PULMONOLOGY | Facility: CLINIC | Age: 55
End: 2020-02-28
Payer: COMMERCIAL

## 2020-02-28 ENCOUNTER — HOSPITAL ENCOUNTER (OUTPATIENT)
Dept: RADIOLOGY | Facility: HOSPITAL | Age: 55
Discharge: HOME | End: 2020-02-28
Attending: INTERNAL MEDICINE
Payer: COMMERCIAL

## 2020-02-28 VITALS
HEART RATE: 102 BPM | BODY MASS INDEX: 35.7 KG/M2 | DIASTOLIC BLOOD PRESSURE: 78 MMHG | SYSTOLIC BLOOD PRESSURE: 116 MMHG | OXYGEN SATURATION: 97 % | RESPIRATION RATE: 20 BRPM | HEIGHT: 62 IN | WEIGHT: 194 LBS

## 2020-02-28 DIAGNOSIS — E66.09 CLASS 1 OBESITY DUE TO EXCESS CALORIES WITH SERIOUS COMORBIDITY AND BODY MASS INDEX (BMI) OF 34.0 TO 34.9 IN ADULT: Chronic | ICD-10-CM

## 2020-02-28 DIAGNOSIS — J45.20 MILD INTERMITTENT ASTHMA WITHOUT COMPLICATION: Chronic | ICD-10-CM

## 2020-02-28 DIAGNOSIS — R91.8 MULTIPLE LUNG NODULES ON CT: Primary | Chronic | ICD-10-CM

## 2020-02-28 DIAGNOSIS — G47.33 OBSTRUCTIVE SLEEP APNEA: ICD-10-CM

## 2020-02-28 DIAGNOSIS — G25.81 RESTLESS LEGS SYNDROME (RLS): Chronic | ICD-10-CM

## 2020-02-28 DIAGNOSIS — R91.8 MULTIPLE LUNG NODULES ON CT: ICD-10-CM

## 2020-02-28 DIAGNOSIS — F17.210 NICOTINE DEPENDENCE, CIGARETTES, UNCOMPLICATED: Chronic | ICD-10-CM

## 2020-02-28 PROCEDURE — 3008F PR BODY MASS INDEX (BMI) DOCUMENTED: ICD-10-PCS | Mod: CPTII,S$GLB,, | Performed by: INTERNAL MEDICINE

## 2020-02-28 PROCEDURE — 3074F SYST BP LT 130 MM HG: CPT | Mod: CPTII,S$GLB,, | Performed by: INTERNAL MEDICINE

## 2020-02-28 PROCEDURE — 99999 PR PBB SHADOW E&M-EST. PATIENT-LVL III: ICD-10-PCS | Mod: PBBFAC,,, | Performed by: INTERNAL MEDICINE

## 2020-02-28 PROCEDURE — 3074F PR MOST RECENT SYSTOLIC BLOOD PRESSURE < 130 MM HG: ICD-10-PCS | Mod: CPTII,S$GLB,, | Performed by: INTERNAL MEDICINE

## 2020-02-28 PROCEDURE — 71250 CT THORAX DX C-: CPT | Mod: TC

## 2020-02-28 PROCEDURE — 99215 OFFICE O/P EST HI 40 MIN: CPT | Mod: S$GLB,,, | Performed by: INTERNAL MEDICINE

## 2020-02-28 PROCEDURE — 3008F BODY MASS INDEX DOCD: CPT | Mod: CPTII,S$GLB,, | Performed by: INTERNAL MEDICINE

## 2020-02-28 PROCEDURE — 99999 PR PBB SHADOW E&M-EST. PATIENT-LVL III: CPT | Mod: PBBFAC,,, | Performed by: INTERNAL MEDICINE

## 2020-02-28 PROCEDURE — 3078F DIAST BP <80 MM HG: CPT | Mod: CPTII,S$GLB,, | Performed by: INTERNAL MEDICINE

## 2020-02-28 PROCEDURE — 99215 PR OFFICE/OUTPT VISIT, EST, LEVL V, 40-54 MIN: ICD-10-PCS | Mod: S$GLB,,, | Performed by: INTERNAL MEDICINE

## 2020-02-28 PROCEDURE — 3078F PR MOST RECENT DIASTOLIC BLOOD PRESSURE < 80 MM HG: ICD-10-PCS | Mod: CPTII,S$GLB,, | Performed by: INTERNAL MEDICINE

## 2020-02-28 RX ORDER — ROPINIROLE 0.25 MG/1
0.25 TABLET, FILM COATED ORAL 3 TIMES DAILY
Qty: 21 TABLET | Refills: 0 | Status: SHIPPED | OUTPATIENT
Start: 2020-02-28 | End: 2020-03-06

## 2020-02-28 RX ORDER — ROPINIROLE 1 MG/1
1 TABLET, FILM COATED ORAL 3 TIMES DAILY
Qty: 21 TABLET | Refills: 0 | Status: SHIPPED | OUTPATIENT
Start: 2020-02-28 | End: 2020-04-06 | Stop reason: SDUPTHER

## 2020-02-28 RX ORDER — ROPINIROLE 0.25 MG/1
0.75 TABLET, FILM COATED ORAL 3 TIMES DAILY
Qty: 63 TABLET | Refills: 0 | Status: SHIPPED | OUTPATIENT
Start: 2020-02-28 | End: 2020-03-06

## 2020-02-28 RX ORDER — ROPINIROLE 0.5 MG/1
0.5 TABLET, FILM COATED ORAL 3 TIMES DAILY
Qty: 21 TABLET | Refills: 0 | Status: SHIPPED | OUTPATIENT
Start: 2020-02-28 | End: 2020-07-27 | Stop reason: SDUPTHER

## 2020-02-28 NOTE — ASSESSMENT & PLAN NOTE
Continue  4PAP of 4 - 20 CMWP.  (DME - [facility])     Compliant and benefiting.    Discussed therapeutic goals for positive airway pressure therapy(CPAP or BiPAP): Ideal is usage 100% of nights for 6 - 8 hours per night. Minimum usage is 70% of night for at least 4 hours per night used. Pateint expressed understanding. All Questions answered.    Compliance evaluation in 1 year

## 2020-02-28 NOTE — ASSESSMENT & PLAN NOTE
Repeat CT chest in 12 months    Fleischner Society Guidelines:    High risk patient:   Smoking history, history of malignancy or risk factors for malignancy.( non-solid ground glass opacities and partially solid nodules may require longer follow up to exclude indolent adenocarcinoma)      Nodule size Low risk patient High risk patient   4 mm  No follow up Follow up CT in 12 months. If unchanged, no further follow up.   4 - 6 mm Follow up CT in 12 months; if unchanged, no further follow up.  Initial follow up CT in 6 - 12 months, then at 18 - 24 months if no change.      6 - 8 mm  Initial follow up CT at 6 - 12 months and at 18 months if no change.  Initial follow up CT at 3 - 6 months. Then at 9-12 months and 24 months if no change.      > 8 mm Initial follow up CT at 3, 6, 9 and 24 months, dynamic contrast enhanced CT, PET-CT and/or biopsy. Initial follow up CT at 3, 6, 9 and 24 months, dynamic contrast enhanced CT, PET-CT and/or biopsy.

## 2020-02-28 NOTE — ASSESSMENT & PLAN NOTE
Assistance with smoking cessation was offered, including:  []  Medications  [x]  Counseling  []  Printed Information on Smoking Cessation  [x]  Referred to a Smoking Cessation Program  Currently on CHANTIX.    Patient was counseled regarding smoking for 3-10 minutes.

## 2020-02-28 NOTE — ASSESSMENT & PLAN NOTE
Stop MIRAPEX.    Start REQUIP.    Start Requip per orders;     Week 1: 0.25 mg 3 times daily; total daily dose: 0.75 mg  Week 2: 0.5 mg 3 times daily; total daily dose: 1.5 mg  Week 3: 0.75 mg 3 times daily; total daily dose: 2.25 mg  Week 4: 1 mg 3 times daily; total daily dose: 3 mg

## 2020-02-28 NOTE — PROGRESS NOTES
Subjective:      Established patient    Patient ID: Diana Escobar is a 54 y.o. female.  Patient Active Problem List   Diagnosis    Diplopia    Hypertension associated with diabetes    Combined hyperlipidemia associated with type 2 diabetes mellitus    Major depression, recurrent, chronic    Nicotine dependence, cigarettes, uncomplicated    Fibromyalgia    Multiple lung nodules on CT    Positive anti-CCP test    History of transient cerebral ischemia    High risk medication use    Jaw pain    Paresthesia    Right sided weakness    HA (headache)    Seronegative rheumatoid arthritis    Osteopenia    Rheumatoid arthritis of multiple sites with negative rheumatoid factor    Myofacial muscle pain    Hypothyroidism (acquired)    Cervical spondylosis with radiculopathy    Bilateral foot pain    Restless legs syndrome (RLS)    Class 1 obesity due to excess calories with serious comorbidity and body mass index (BMI) of 34.0 to 34.9 in adult    Left hand pain    Chronic maxillary sinusitis    Chronic ethmoidal sinusitis    Chronic sphenoidal sinusitis    Chronic frontal sinusitis    Deviated nasal septum    Nasal obstruction    Nasal turbinate hypertrophy    Obstructive sleep apnea    Mild intermittent asthma without complication    Immunosuppressed status    Obesity (BMI 30.0-34.9)    Periodic limb movement disorder (PLMD)       Problem list has been reviewed.    Chief Complaint: Sleep Apnea (DL IN MEDIA. NEEDS NEW MASK) and Pulmonary Nodules    CT Chest reviewed with patient who voiced understanding. Lung nodule are stable.     Currently has SUSPECTED INFLUENZA  ( No swab). Family members had the INFLUENZA A. Evaluated by PCP and prescribed XOFLUZA. Completed yesterday.       Patients reports stable NYHA I dyspnea    The patient does not have currently have symptoms / an exacerbation.      Her current respiratory therapy regimen is BREO & VENTOLIN which provides relief. She is   adherent with her regimen.     No recent change in breathing.     She is on APAP 4 - 20 CMWP.     She is compliant with her PAP.    She definitely thinks PAP is beneficial to her health and she wants to continue with PAP therapy.    Compliance Summary  1/26/2020 - 2/24/2020 (30 days)  Days with Device Usage 30 days  Days without Device Usage 0 days  Percent Days with Device Usage 100.0%  Cumulative Usage 6 days 17 hrs. 36 mins. 16 secs.  Maximum Usage (1 Day) 10 hrs. 5 mins. 11 secs.  Average Usage (All Days) 5 hrs. 23 mins. 12 secs.  Average Usage (Days Used) 5 hrs. 23 mins. 12 secs.  Minimum Usage (1 Day) 1 hrs. 42 mins. 1 secs.  Percent of Days with Usage >= 4 Hours 73.3%  Percent of Days with Usage < 4 Hours 26.7%  Total Blower Time 6 days 17 hrs. 52 mins. 50 secs.  Average AHI 2.8  Auto-CPAP Summary  Auto-CPAP Mean Pressure 9.4 cmH2O  Auto-CPAP Peak Average Pressure 16.7 cmH2O  Average Device Pressure <= 90% of Time 14.0 cmH2O  Average Time in Large Leak Per Day 17 mins.    [place] Sleepiness Scale   EPWORTH SLEEPINESS SCALE 2/28/2020 1/17/2020 9/3/2019 8/13/2019   Sitting and reading 0 3 3 2   Watching TV 1 3 2 1   Sitting, inactive in a public place (e.g. a theatre or a meeting) 0 0 3 0   As a passenger in a car for an hour without a break 0 3 2 2   Lying down to rest in the afternoon when circumstances permit 0 0 0 0   Sitting and talking to someone 0 0 0 0   Sitting quietly after a lunch without alcohol 0 1 0 0   In a car, while stopped for a few minutes in traffic 0 1 1 1   Total score 1 11 11 6       Asthma is somewhat controlled.     Asthma Control Test  In the past 4  weeks, how much of the time did your asthma keep you from getting as much done at work, school or at home?: A little of the time  During the past 4 weeks, how often have you had shortness of breath?: 3 to 6 times a week  During the past 4 weeks, how often did your asthma symptoms (wheezing, couging, shortness of breath, chest tightness or  pain) wake you up at night or earlier than usual in the morning?: 2 or 3 nights a week  During the past 4 weeks, how often have you used your rescue inhaler or nebulizer medication (such as albuterol)?: Once a week or less  How would you rate your asthma control during the past 4 weeks?: Somewhat controlled  If your score is 19 or less, your asthma may not be under control: 16     Current Disease Severity  frequent daytime asthma symptoms.   no nighttime asthma symptoms.   Frequency B-agonist use:more than 2 days per week but not more than once a day.   Number of urgent/emergent visit in last year: 0  Current limitations in activity from asthma: Sometimes.   Number of days of school or work missed in the last month: not applicable.     Asthma Classification (General Symptom Frequency):  Mild Intermittent (< 2 x wk)  Mild Persistent (> 2 x wk, < daily)  Moderate Persistent (daily; almost daily inhaler)  Severe Persistent (continual; limited activities)      She is down to 6 cigarettes per day. She is curently in Chantix.    PLMD: MIRAPEX not effective. Will try REQUIP.    Multiple lung nodules. Stable on CT chest . < 4 mm.    Previous Report Reviewed: lab reports, office notes and radiology reports     The following portions of the patient's history were reviewed and updated as appropriate: She  has a past medical history of Asthma, Chronic low back pain, Degenerative disc disease, lumbar, Depression, Diabetes mellitus (2014), DM (diabetes mellitus) (2014), Fibromyalgia, Hyperlipidemia, Hypertension, Hypothyroidism, MRSA (methicillin resistant Staphylococcus aureus) carrier, Nodule of left lung (2/6/2017), Palpitations, Stress incontinence, Stroke, and Vitamin D deficiency disease.  She  has a past surgical history that includes Hysterectomy; Rotator cuff repair; left ankle surgery; Tonsillectomy; Neck surgery (06/2016); and loop recorder (05/2017).  Her family history includes Breast cancer in her mother and sister;  "COPD in her father; Cancer in her mother; Cataracts in her mother; Diabetes in her maternal aunt and mother; Heart disease in her father and mother; Hypertension in her father and mother; Stroke in her mother.  She  reports that she has been smoking cigarettes. She has been smoking about 0.50 packs per day. She has never used smokeless tobacco. She reports that she does not drink alcohol or use drugs.  She has a current medication list which includes the following prescription(s): albuterol, amlodipine, atorvastatin, benzonatate, blood sugar diagnostic, blood-glucose meter, bupropion, clopidogrel, diclofenac sodium, fluticasone furoate-vilanterol, fluticasone propionate, folic acid, gabapentin, lancets, levocetirizine, levothyroxine, metformin, methotrexate, nicotine (polacrilex), peg 400-propylene glycol (pf), prednisone, varenicline, venlafaxine, ropinirole, ropinirole, ropinirole, and ropinirole.  She is allergic to mobic [meloxicam]; topamax [topiramate]; and adhesive..    Review of Systems   Constitutional: Positive for appetite change and fatigue.   HENT: Positive for postnasal drip, trouble swallowing and congestion. Negative for rhinorrhea.    Eyes: Negative for redness and itching.   Respiratory: Positive for cough, shortness of breath, wheezing and dyspnea on extertion. Negative for sputum production and chest tightness.    Cardiovascular: Positive for leg swelling.   Genitourinary: Negative for difficulty urinating.   Endocrine: Negative for polydipsia, cold intolerance and heat intolerance.    Musculoskeletal: Positive for arthralgias, back pain and myalgias.   Skin: Negative for rash.   Gastrointestinal: Positive for acid reflux.   Neurological: Negative for dizziness, light-headedness and headaches.   Psychiatric/Behavioral: The patient is nervous/anxious.         Objective:     /78   Pulse 102   Resp 20   Ht 5' 2" (1.575 m)   Wt 88 kg (194 lb 0.1 oz)   SpO2 97%   BMI 35.48 kg/m²   Body " "mass index is 35.48 kg/m².     Physical Exam   Constitutional: She is oriented to person, place, and time. She appears well-developed and well-nourished. No distress.   HENT:   Head: Normocephalic and atraumatic.   Right Ear: Tympanic membrane and ear canal normal.   Left Ear: Tympanic membrane and ear canal normal.   Nose: Mucosal edema and rhinorrhea present.   Mouth/Throat: Uvula is midline. Posterior oropharyngeal edema and posterior oropharyngeal erythema present. No tonsillar exudate.   Mallampati 3    Eyes: Pupils are equal, round, and reactive to light. EOM are normal.   Neck: Normal range of motion. Neck supple.   13"   Cardiovascular: Normal rate and regular rhythm. Exam reveals no gallop and no friction rub.   No murmur heard.  Pulmonary/Chest: Effort normal and breath sounds normal. No stridor. No respiratory distress. She has no wheezes. She has no rales.   Abdominal: Soft. Bowel sounds are normal.   Musculoskeletal: Normal range of motion. She exhibits no edema.   Neurological: She is alert and oriented to person, place, and time.   Skin: Skin is warm and dry. Capillary refill takes 2 to 3 seconds. She is not diaphoretic.   Psychiatric: She has a normal mood and affect.   Nursing note and vitals reviewed.      Personal Diagnostic Review        Assessment / Plan:       Discussed diagnosis, its evaluation, treatment and usual course. All questions answered.    Problem List Items Addressed This Visit        Neuro    Restless legs syndrome (RLS)    Current Assessment & Plan     Stop MIRAPEX.    Start REQUIP.    Start Requip per orders;     Week 1: 0.25 mg 3 times daily; total daily dose: 0.75 mg  Week 2: 0.5 mg 3 times daily; total daily dose: 1.5 mg  Week 3: 0.75 mg 3 times daily; total daily dose: 2.25 mg  Week 4: 1 mg 3 times daily; total daily dose: 3 mg         Relevant Medications    rOPINIRole (REQUIP) 0.25 MG tablet    rOPINIRole (REQUIP) 0.5 MG tablet    rOPINIRole (REQUIP) 0.25 MG tablet    " rOPINIRole (REQUIP) 1 MG tablet       Pulmonary    Multiple lung nodules on CT - Primary (Chronic)    Overview     CT chest: 02/28/20: Stable CT scan of the chest when compared with 08/20/2019.  Multiple small less than 4 mm in diameter nodules.  No new nodules identified.  No evidence of lymphadenopathy.         Current Assessment & Plan     Repeat CT chest in 12 months    Fleischner Society Guidelines:    High risk patient:   Smoking history, history of malignancy or risk factors for malignancy.( non-solid ground glass opacities and partially solid nodules may require longer follow up to exclude indolent adenocarcinoma)      Nodule size Low risk patient High risk patient   4 mm  No follow up Follow up CT in 12 months. If unchanged, no further follow up.   4 - 6 mm Follow up CT in 12 months; if unchanged, no further follow up.  Initial follow up CT in 6 - 12 months, then at 18 - 24 months if no change.      6 - 8 mm  Initial follow up CT at 6 - 12 months and at 18 months if no change.  Initial follow up CT at 3 - 6 months. Then at 9-12 months and 24 months if no change.      > 8 mm Initial follow up CT at 3, 6, 9 and 24 months, dynamic contrast enhanced CT, PET-CT and/or biopsy. Initial follow up CT at 3, 6, 9 and 24 months, dynamic contrast enhanced CT, PET-CT and/or biopsy.            Mild intermittent asthma without complication (Chronic)    Current Assessment & Plan     Asthma ROS: using bronchodilator MDI less than twice a week.   New concerns: Stable NYHA NAVA.   Exam: appears well, vitals normal, no respiratory distress, acyanotic, normal RR.   Assessment:  Asthma needs improvement.   Plan: Continue BREO, VENTOLIN,  SINGULAIR. PFT in 6 months.         Relevant Orders    Complete PFT without bronchodilator       Endocrine    Class 1 obesity due to excess calories with serious comorbidity and body mass index (BMI) of 34.0 to 34.9 in adult    Current Assessment & Plan     General weight loss/lifestyle  modification strategies discussed (elicit support from others; identify saboteurs; non-food rewards, etc).  Behavioral treatment: Slim Fast.  Diet interventions: low calorie (1000 kCal/d) deficit diet.  Informal exercise measures discussed, e.g. taking stairs instead of elevator.  Regular aerobic exercise program discussed.            Other    Nicotine dependence, cigarettes, uncomplicated (Chronic)    Current Assessment & Plan     Assistance with smoking cessation was offered, including:  []  Medications  [x]  Counseling  []  Printed Information on Smoking Cessation  [x]  Referred to a Smoking Cessation Program  Currently on CHANTIX.    Patient was counseled regarding smoking for 3-10 minutes.         Obstructive sleep apnea    Current Assessment & Plan     Continue  4PAP of 4 - 20 CMWP.  (DME - [facility])     Compliant and benefiting.    Discussed therapeutic goals for positive airway pressure therapy(CPAP or BiPAP): Ideal is usage 100% of nights for 6 - 8 hours per night. Minimum usage is 70% of night for at least 4 hours per night used. Pateint expressed understanding. All Questions answered.    Compliance evaluation in 1 year                 TIME SPENT WITH PATIENT: Time spent: 40 minutes in face to face  discussion concerning diagnosis, prognosis, review of lab and test results, benefits of treatment as well as management of disease, counseling of patient and coordination of care between various health  care providers . Greater than half the time spent was used for coordination of care and counseling of patient.       Follow up in about 6 months (around 8/28/2020) for SRAVAN, Obesity, Asthma, Tobacco use disorder, Multiple lung nodules.

## 2020-02-28 NOTE — PATIENT INSTRUCTIONS
Lung Anatomy  Your lungs take air in to give your body oxygen, which the body needs to work. Your lungs, like all the tissues in your body, are made up of billions of tiny specialized cells. Old lung cells die and are replaced by new, identical lung cells. This natural process helps ensure healthy lungs.    Date Last Reviewed: 11/1/2016  © 9915-7546 [employer]. [address] [place] [address], [place] [address]. All rights reserved. This information is not intended as a substitute for professional medical care. Always follow your healthcare professional's instructions.

## 2020-02-28 NOTE — ASSESSMENT & PLAN NOTE
Asthma ROS: using bronchodilator MDI less than twice a week.   New concerns: Stable NYHA NAVA.   Exam: appears well, vitals normal, no respiratory distress, acyanotic, normal RR.   Assessment:  Asthma needs improvement.   Plan: Continue BREO, VENTOLIN,  SINGULAIR. PFT in 6 months.

## 2020-03-02 ENCOUNTER — TELEPHONE (OUTPATIENT)
Dept: INTERNAL MEDICINE | Facility: CLINIC | Age: 55
End: 2020-03-02

## 2020-03-02 NOTE — TELEPHONE ENCOUNTER
----- Message from Olga Nunn sent at 3/2/2020 11:55 AM CST -----  Contact: Patient   Patient would like a call back at [phone], Regards to going over her results.    thanks  Td

## 2020-03-02 NOTE — TELEPHONE ENCOUNTER
Spoke to pt she would like the results of her labs and she stated you and her talk about her BS and wanted to know if you were sending in the prescription.

## 2020-03-03 ENCOUNTER — PATIENT MESSAGE (OUTPATIENT)
Dept: INTERNAL MEDICINE | Facility: CLINIC | Age: 55
End: 2020-03-03

## 2020-03-16 ENCOUNTER — PATIENT MESSAGE (OUTPATIENT)
Dept: INTERNAL MEDICINE | Facility: CLINIC | Age: 55
End: 2020-03-16

## 2020-03-16 DIAGNOSIS — R82.90 ABNORMAL URINE ODOR: Primary | ICD-10-CM

## 2020-03-18 ENCOUNTER — LAB VISIT (OUTPATIENT)
Dept: LAB | Facility: HOSPITAL | Age: 55
End: 2020-03-18
Attending: INTERNAL MEDICINE
Payer: COMMERCIAL

## 2020-03-18 ENCOUNTER — CLINICAL SUPPORT (OUTPATIENT)
Dept: SMOKING CESSATION | Facility: CLINIC | Age: 55
End: 2020-03-18
Payer: COMMERCIAL

## 2020-03-18 DIAGNOSIS — R82.90 ABNORMAL URINE ODOR: ICD-10-CM

## 2020-03-18 DIAGNOSIS — F17.200 NICOTINE DEPENDENCE: Primary | ICD-10-CM

## 2020-03-18 LAB
BACTERIA #/AREA URNS AUTO: ABNORMAL /HPF
BILIRUB UR QL STRIP: NEGATIVE
CLARITY UR REFRACT.AUTO: ABNORMAL
COLOR UR AUTO: YELLOW
GLUCOSE UR QL STRIP: NEGATIVE
HGB UR QL STRIP: ABNORMAL
KETONES UR QL STRIP: NEGATIVE
LEUKOCYTE ESTERASE UR QL STRIP: ABNORMAL
MICROSCOPIC COMMENT: ABNORMAL
NITRITE UR QL STRIP: NEGATIVE
PH UR STRIP: 6 [PH] (ref 5–8)
PROT UR QL STRIP: NEGATIVE
RBC #/AREA URNS AUTO: 28 /HPF (ref 0–4)
SP GR UR STRIP: 1.01 (ref 1–1.03)
SQUAMOUS #/AREA URNS AUTO: 2 /HPF
URN SPEC COLLECT METH UR: ABNORMAL
WBC #/AREA URNS AUTO: 81 /HPF (ref 0–5)

## 2020-03-18 PROCEDURE — 99407 PR TOBACCO USE CESSATION INTENSIVE >10 MINUTES: ICD-10-PCS | Mod: S$GLB,,, | Performed by: INTERNAL MEDICINE

## 2020-03-18 PROCEDURE — 99407 BEHAV CHNG SMOKING > 10 MIN: CPT | Mod: S$GLB,,, | Performed by: INTERNAL MEDICINE

## 2020-03-18 PROCEDURE — 81001 URINALYSIS AUTO W/SCOPE: CPT

## 2020-03-18 NOTE — PROGRESS NOTES
Spoke with patient today in regard to smoking cessation progress for 6 month telephone follow up, she states tobacco free since 3/12/2020. Commended patient on the accomplishment thus far. She states using the prescribed tobacco cessation medication of Chantix to help aid in her quit. Patient is currently enrolled in the program with a scheduled follow up appointment. Informed patient of benefit period, future follow up, and contact information if any further help or support is needed. Will complete smart form for 3 and 6 month follow up on Quit attempt #2.

## 2020-03-20 ENCOUNTER — PATIENT MESSAGE (OUTPATIENT)
Dept: PULMONOLOGY | Facility: CLINIC | Age: 55
End: 2020-03-20

## 2020-03-20 ENCOUNTER — TELEPHONE (OUTPATIENT)
Dept: INTERNAL MEDICINE | Facility: CLINIC | Age: 55
End: 2020-03-20

## 2020-03-20 ENCOUNTER — PATIENT MESSAGE (OUTPATIENT)
Dept: RHEUMATOLOGY | Facility: CLINIC | Age: 55
End: 2020-03-20

## 2020-03-20 RX ORDER — CIPROFLOXACIN 500 MG/1
500 TABLET ORAL 2 TIMES DAILY
Qty: 14 TABLET | Refills: 0 | Status: SHIPPED | OUTPATIENT
Start: 2020-03-20 | End: 2020-03-27

## 2020-03-20 NOTE — LETTER
March 23, 2020    Diana Escobar  [address] [place] [address]             [facility]  [address]  [place] [address]  Phone: [phone]  Fax: [phone] To whomsoever it may concern.    Dear /,   Ms.Phelisa Escobar is under my care in rheumatology clinic. She is severely immunocompromised. In the wake of the recent pandemic , I would request you to provide opportunity for her to self quarantine herself at home for next 2 weeks to prevent her from acquiring COVID-19.   Please feel free to call me with any questions or concerns.   Regards

## 2020-03-20 NOTE — LETTER
March 20, 2020    Diana Escobar  [address] [place] [address]             [facility]  [address]  [place] [address]  Phone: [phone]  Fax: [phone] To whomsoever it may concern.    Dear /,   Ms.Phelisa Escobar is under my care in rheumatology clinic. She is severely immunocompromised. In the wake of the recent pandemic , I would request you to provide opportunity for her to self quarantine herself at home for next 2 weeks to prevent her from acquiring COVID-19.   Please feel free to call me with any questions or concerns.   Regards

## 2020-03-20 NOTE — TELEPHONE ENCOUNTER
Pt called asked for a letter to state she can stay home from work due to the meds she is currently taking that lowers her immune system. Advised pt Dr. MORGAN is out today and will be back Monday.

## 2020-03-31 ENCOUNTER — CLINICAL SUPPORT (OUTPATIENT)
Dept: SMOKING CESSATION | Facility: CLINIC | Age: 55
End: 2020-03-31
Payer: COMMERCIAL

## 2020-03-31 DIAGNOSIS — F17.210 CIGARETTE NICOTINE DEPENDENCE, UNCOMPLICATED: Primary | ICD-10-CM

## 2020-03-31 PROCEDURE — 99402 PREV MED CNSL INDIV APPRX 30: CPT | Mod: S$GLB,,,

## 2020-03-31 PROCEDURE — 99402 PR PREVENT COUNSEL,INDIV,30 MIN: ICD-10-PCS | Mod: S$GLB,,,

## 2020-03-31 PROCEDURE — 99999 PR PBB SHADOW E&M-EST. PATIENT-LVL I: ICD-10-PCS | Mod: PBBFAC,,,

## 2020-03-31 PROCEDURE — 99999 PR PBB SHADOW E&M-EST. PATIENT-LVL I: CPT | Mod: PBBFAC,,,

## 2020-03-31 NOTE — PROGRESS NOTES
Individual Follow-Up Form    3/31/2020    Quit Date: 3/12/20    Clinical Status of Patient: Outpatient    Length of Service: 30 minutes    Continuing Medication: yes  Chantix    Other Medications: 2 mg nicotine gum     Target Symptoms: Withdrawal and medication side effects. The following were  rated moderate (3) to severe (4) on TCRS:  · Moderate (3): none  · Severe (4): none    Comments: The patient is smoking 0 cigarettes/day  from 20 cigarettes/day. The patient I using 1 mg Chantix BID and 2 mg nicotine gum PRN as needed. She did get upset with grandchild and reached to get a cigarette, lit it, took 1 puff and realized what she was doing and put it out. She said it tasted bad. The patient has no desire to start  using tobacco again. was praised for the accomplishment of not using tobacco. She has been out of Chantix for 3 days but will start taking the next pack tomorrow. She has phone number to call if needed. The patient will continue individual  and/or group sessions and medication monitoring by CTTS. Prescribed medication management will be by [facility] Medical Practitioner.The patient denies any abnormal behavioral or mental changes at this time.      Diagnosis: F17.210    Next Visit: 2 weeks

## 2020-04-01 ENCOUNTER — TELEPHONE (OUTPATIENT)
Dept: SMOKING CESSATION | Facility: CLINIC | Age: 55
End: 2020-04-01

## 2020-04-01 DIAGNOSIS — F17.210 MODERATE SMOKER (20 OR LESS PER DAY): ICD-10-CM

## 2020-04-01 RX ORDER — VARENICLINE TARTRATE 1 MG/1
TABLET, FILM COATED ORAL
Qty: 56 TABLET | Refills: 0 | Status: SHIPPED | OUTPATIENT
Start: 2020-04-01 | End: 2020-07-27

## 2020-04-01 RX ORDER — VARENICLINE TARTRATE 1 MG/1
1 TABLET, FILM COATED ORAL 2 TIMES DAILY
Qty: 56 TABLET | Refills: 0 | Status: SHIPPED | OUTPATIENT
Start: 2020-04-01 | End: 2020-07-27

## 2020-04-03 ENCOUNTER — PATIENT MESSAGE (OUTPATIENT)
Dept: INTERNAL MEDICINE | Facility: CLINIC | Age: 55
End: 2020-04-03

## 2020-04-03 ENCOUNTER — OFFICE VISIT (OUTPATIENT)
Dept: INTERNAL MEDICINE | Facility: CLINIC | Age: 55
End: 2020-04-03
Payer: COMMERCIAL

## 2020-04-03 DIAGNOSIS — F41.9 ANXIETY: ICD-10-CM

## 2020-04-03 DIAGNOSIS — E11.9 CONTROLLED TYPE 2 DIABETES MELLITUS WITHOUT COMPLICATION, WITHOUT LONG-TERM CURRENT USE OF INSULIN: Primary | ICD-10-CM

## 2020-04-03 DIAGNOSIS — G25.81 RESTLESS LEGS SYNDROME (RLS): Chronic | ICD-10-CM

## 2020-04-03 DIAGNOSIS — E78.5 HYPERLIPIDEMIA LDL GOAL <70: ICD-10-CM

## 2020-04-03 DIAGNOSIS — I10 HYPERTENSION GOAL BP (BLOOD PRESSURE) < 130/80: ICD-10-CM

## 2020-04-03 PROCEDURE — 99214 OFFICE O/P EST MOD 30 MIN: CPT | Mod: 95,,, | Performed by: INTERNAL MEDICINE

## 2020-04-03 PROCEDURE — 3044F HG A1C LEVEL LT 7.0%: CPT | Mod: CPTII,,, | Performed by: INTERNAL MEDICINE

## 2020-04-03 PROCEDURE — 3044F PR MOST RECENT HEMOGLOBIN A1C LEVEL <7.0%: ICD-10-PCS | Mod: CPTII,,, | Performed by: INTERNAL MEDICINE

## 2020-04-03 PROCEDURE — 99214 PR OFFICE/OUTPT VISIT, EST, LEVL IV, 30-39 MIN: ICD-10-PCS | Mod: 95,,, | Performed by: INTERNAL MEDICINE

## 2020-04-03 RX ORDER — ROPINIROLE 0.25 MG/1
0.75 TABLET, FILM COATED ORAL 3 TIMES DAILY
Qty: 63 TABLET | Refills: 0 | Status: CANCELLED | OUTPATIENT
Start: 2020-04-03 | End: 2020-04-10

## 2020-04-03 RX ORDER — BUSPIRONE HYDROCHLORIDE 5 MG/1
5 TABLET ORAL 2 TIMES DAILY
Qty: 60 TABLET | Refills: 6 | Status: SHIPPED | OUTPATIENT
Start: 2020-04-03 | End: 2020-11-24 | Stop reason: SDUPTHER

## 2020-04-03 NOTE — PROGRESS NOTES
Subjective:       Patient ID: Diana Escobar is a 54 y.o. female.    Chief Complaint: Follow-up    Diana Escobar  54 y.o. White female    Patient presents with:  Follow-up    HPI: Presents for a video visit follow up.   The patient location is: home  The chief complaint leading to consultation is: follow up/lab review  Visit type: Virtual visit with synchronous audio and video  Total time spent with patient: [id number]  Each patient to whom he or she provides medical services by telemedicine is:  (1) informed of the relationship between the physician and patient and the respective role of any other health care provider with respect to management of the patient; and (2) notified that he or she may decline to receive medical services by telemedicine and may withdraw from such care at any time.    Diabetes--worsened. She admits to diet non compliance. She has been active and has been compliant with metformin.                  HGBA1C                   6.5 (H)             02/27/2020            HLD--compliant with atorvastatin but not her diet.                     CHOL                     140                 02/27/2020                 HDL                      49                  02/27/2020                 LDLCALC                  58.0 (L)            02/27/2020                 TRIG                     165 (H)             02/27/2020            HTN--compliant with amlodipine. She does not check her b/p at home. She denies symptoms.   Anxiety--she feels it has worsened lately due to current Covid crisis. She is taking venlafaxine as prescribed. She had taken buspirone in the past and it helped, but she is out         Past Medical History:  Asthma  Chronic low back pain  Degenerative disc disease, lumbar  Depression  2014: Diabetes mellitus  Fibromyalgia  Hyperlipidemia  Hypertension  Hypothyroidism  MRSA (methicillin resistant Staphylococcus aureus) carrier  2/6/2017: Nodule of left lung      Comment:  CT chest :  04/06/16: Small 3 mm pleural-based nodule                laterally in the left lower lobe.  Palpitations      Comment:  Dr. Jesus Lao--cardiology  Stress incontinence      Comment:  Dr. Zurdo Lopez--urology  Stroke      Comment:  TIA  Vitamin D deficiency disease    Current Outpatient Medications on File Prior to Visit:  albuterol (PROVENTIL/VENTOLIN HFA) 90 mcg/actuation inhaler, Inhale 2 puffs into the lungs every 6 (six) hours as needed for Wheezing., Disp: 18 g, Rfl: 6  amLODIPine (NORVASC) 5 MG tablet, Take 1 tablet (5 mg total) by mouth once daily., Disp: 90 tablet, Rfl: 1  atorvastatin (LIPITOR) 40 MG tablet, Take 40 mg by mouth once daily., Disp: , Rfl:   benzonatate (TESSALON) 200 MG capsule, , Disp: , Rfl:   blood sugar diagnostic (ONETOUCH VERIO) Strp, Glucose testing once daily., Disp: 30 each, Rfl: 6  blood-glucose meter Misc, Use as directed., Disp: 1 each, Rfl: 0  buPROPion (WELLBUTRIN SR) 150 MG TBSR 12 hr tablet, Take 1 tablet (150 mg total) by mouth 2 (two) times daily., Disp: 60 tablet, Rfl: 0  clopidogrel (PLAVIX) 75 mg tablet, Take 75 mg by mouth once daily., Disp: , Rfl:   diclofenac sodium (VOLTAREN) 1 % Gel, Apply topically 2 (two) times daily as needed. Apply to affected area, Disp: 1 Tube, Rfl: 1  fluticasone furoate-vilanterol (BREO ELLIPTA) 200-25 mcg/dose DsDv diskus inhaler, Inhale 1 puff into the lungs once daily. Controller, Disp: 60 each, Rfl: 5  fluticasone propionate (FLONASE) 50 mcg/actuation nasal spray, 2 sprays (100 mcg total) by Each Nostril route once daily., Disp: 16 g, Rfl: 0  folic acid (FOLVITE) 1 MG tablet, Take 1 tablet (1 mg total) by mouth once daily., Disp: 90 tablet, Rfl: 3  gabapentin (NEURONTIN) 300 MG capsule, Take 1 capsule (300 mg total) by mouth 2 (two) times daily., Disp: 60 capsule, Rfl: 2  lancets (LANCETS,ULTRA THIN) Misc, Glucose testing daily., Disp: 50 each, Rfl: 11  levocetirizine (XYZAL) 5 MG tablet, Take 1 tablet (5 mg total) by mouth every  evening., Disp: 30 tablet, Rfl: 0  levothyroxine (SYNTHROID) 50 MCG tablet, TAKE 1 TABLET BY MOUTH ONCE DAILY, Disp: 30 tablet, Rfl: 5  metFORMIN (GLUCOPHAGE-XR) 500 MG 24 hr tablet, Take 1 tablet (500 mg total) by mouth 2 (two) times daily with meals., Disp: 180 tablet, Rfl: 1  methotrexate 2.5 MG Tab, Take 6 tablets (15 mg total) by mouth every 7 days., Disp: 84 tablet, Rfl: 1  nicotine, polacrilex, (NICORETTE) 2 mg Gum, Take 1 each (2 mg total) by mouth every 2 (two) hours as needed. cinnamon flavored, Disp: 300 each, Rfl: 0  peg 400-propylene glycol, PF, (SYSTANE ULTRA, PF,) 0.4-0.3 % Dpet, Place 1 drop into both eyes 4 (four) times daily., Disp: 1 each, Rfl:   predniSONE (DELTASONE) 5 MG tablet, Take 1 tablet (5 mg total) by mouth once daily., Disp: 30 tablet, Rfl: 1  rOPINIRole (REQUIP) 0.25 MG tablet, Take 3 tablets (0.75 mg total) by mouth 3 (three) times daily. for 7 days, Disp: 63 tablet, Rfl: 0  varenicline (CHANTIX) 1 mg Tab, as directed, Disp: 56 tablet, Rfl: 0  varenicline (CHANTIX) 1 mg Tab, Take 1 tablet (1 mg total) by mouth 2 (two) times daily., Disp: 56 tablet, Rfl: 0  venlafaxine (EFFEXOR-XR) 150 MG Cp24, TAKE 1 CAPSULE BY MOUTH ONCE DAILY, Disp: 30 capsule, Rfl: 6    Allergies:  Review of patient's allergies indicates:   -- Mobic (meloxicam)    -- Topamax (topiramate)    -- Adhesive -- Rash      Review of Systems   Constitutional: Negative for activity change and unexpected weight change.   HENT: Negative for hearing loss, rhinorrhea and trouble swallowing.    Eyes: Negative for discharge and visual disturbance.   Respiratory: Negative for chest tightness and wheezing.    Cardiovascular: Negative for chest pain and palpitations.   Gastrointestinal: Negative for blood in stool, constipation, diarrhea and vomiting.   Endocrine: Negative for polydipsia and polyuria.   Genitourinary: Negative for difficulty urinating, dysuria, hematuria and menstrual problem.   Musculoskeletal: Positive for  arthralgias and joint swelling. Negative for neck pain.   Neurological: Negative for weakness and headaches.   Psychiatric/Behavioral: Negative for confusion and dysphoric mood.       Objective:      Physical Exam   Constitutional: She is oriented to person, place, and time. She appears well-developed and well-nourished. No distress.   Pulmonary/Chest: Effort normal. No respiratory distress.   Neurological: She is alert and oriented to person, place, and time.   Psychiatric: She has a normal mood and affect. Her behavior is normal. Judgment and thought content normal.       Assessment:       1. Controlled type 2 diabetes mellitus without complication, without long-term current use of insulin    2. Hyperlipidemia LDL goal <70    3. Hypertension goal BP (blood pressure) < 130/80    4. Anxiety        Plan:       Diana was seen today for follow-up.    Diagnoses and all orders for this visit:    Controlled type 2 diabetes mellitus without complication, without long-term current use of insulin  -     Start dulaglutide (TRULICITY) 0.75 mg/0.5 mL PnIj; Inject 0.5 mLs (0.75 mg total) into the skin every 7 days. Discussed medication risks and benefits.   -     Continue metformin  -     Lifestyle modifications discussed  -     Comprehensive metabolic panel; Standing  -     Hemoglobin A1c; Standing  -     Lipid panel; Standing    Hyperlipidemia LDL goal <70  -     Continue atorvastatin  -     Lifestyle modifications discussed  -     Comprehensive metabolic panel; Standing  -     Lipid panel; Standing    Hypertension goal BP (blood pressure) < 130/80  -     Comprehensive metabolic panel; Standing  -     Lipid panel; Standing    Anxiety  -     busPIRone (BUSPAR) 5 MG Tab; Take 1 tablet (5 mg total) by mouth 2 (two) times daily.    Labs and f/u in 3 months.

## 2020-04-06 DIAGNOSIS — G25.81 RESTLESS LEGS SYNDROME (RLS): Chronic | ICD-10-CM

## 2020-04-06 RX ORDER — ROPINIROLE 0.25 MG/1
0.75 TABLET, FILM COATED ORAL 3 TIMES DAILY
Qty: 63 TABLET | Refills: 0 | OUTPATIENT
Start: 2020-04-06 | End: 2020-04-13

## 2020-04-07 RX ORDER — ROPINIROLE 1 MG/1
1 TABLET, FILM COATED ORAL 3 TIMES DAILY
Qty: 90 TABLET | Refills: 5 | Status: SHIPPED | OUTPATIENT
Start: 2020-04-07 | End: 2020-07-27

## 2020-04-07 NOTE — TELEPHONE ENCOUNTER
Telephoned to update on dose of requip. States taking 1 mg requip 3 times a day, no benefit, her legs still feel achy behind her knees, above and below posterior, mainly when not active that day. Advised to try heat and massage and possibly a weighted blanket for additional relief.   Refills provided, keep follow up with Dr. Boyle

## 2020-04-24 ENCOUNTER — PATIENT OUTREACH (OUTPATIENT)
Dept: ADMINISTRATIVE | Facility: OTHER | Age: 55
End: 2020-04-24

## 2020-04-26 DIAGNOSIS — R25.2 MUSCLE CRAMPS AT NIGHT: ICD-10-CM

## 2020-04-26 DIAGNOSIS — J45.20 MILD INTERMITTENT ASTHMA WITHOUT COMPLICATION: Chronic | ICD-10-CM

## 2020-04-26 RX ORDER — GABAPENTIN 300 MG/1
CAPSULE ORAL
Qty: 60 CAPSULE | Refills: 0 | Status: SHIPPED | OUTPATIENT
Start: 2020-04-26 | End: 2020-06-01 | Stop reason: SDUPTHER

## 2020-04-27 ENCOUNTER — OFFICE VISIT (OUTPATIENT)
Dept: RHEUMATOLOGY | Facility: CLINIC | Age: 55
End: 2020-04-27
Payer: COMMERCIAL

## 2020-04-27 DIAGNOSIS — D84.9 IMMUNOSUPPRESSED STATUS: ICD-10-CM

## 2020-04-27 DIAGNOSIS — M06.09 RHEUMATOID ARTHRITIS OF MULTIPLE SITES WITH NEGATIVE RHEUMATOID FACTOR: Primary | ICD-10-CM

## 2020-04-27 DIAGNOSIS — M79.7 FIBROMYALGIA: ICD-10-CM

## 2020-04-27 DIAGNOSIS — M15.9 GENERALIZED OSTEOARTHRITIS OF HAND: ICD-10-CM

## 2020-04-27 PROCEDURE — 99214 PR OFFICE/OUTPT VISIT, EST, LEVL IV, 30-39 MIN: ICD-10-PCS | Mod: 95,,, | Performed by: INTERNAL MEDICINE

## 2020-04-27 PROCEDURE — 99214 OFFICE O/P EST MOD 30 MIN: CPT | Mod: 95,,, | Performed by: INTERNAL MEDICINE

## 2020-04-27 RX ORDER — HYDROXYCHLOROQUINE SULFATE 200 MG/1
200 TABLET, FILM COATED ORAL DAILY
Qty: 30 TABLET | Refills: 6 | Status: SHIPPED | OUTPATIENT
Start: 2020-04-27 | End: 2020-08-06

## 2020-04-27 NOTE — TELEPHONE ENCOUNTER
----- Message from Bryan Parmar MD sent at 4/27/2020  2:02 PM CDT -----  CBC, CMP at the earliest. Follow up in 3 months.

## 2020-04-27 NOTE — PROGRESS NOTES
RHEUMATOLOGY FOLLOW UP - TELE VISIT     The patient location is: [place]   The chief complaint leading to consultation is: Pain over base of thumb joints, few DIP joints and MCP joints  Visit type: Virtual visit with synchronous audio and video  Total time spent with patient: 15 minutes  Each patient to whom he or she provides medical services by telemedicine is:  (1) informed of the relationship between the physician and patient and the respective role of any other health care provider with respect to management of the patient; and (2) notified that he or she may decline to receive medical services by telemedicine and may withdraw from such care at any time.    HPI:-  Soledadioana TRESA Rubio a 54 y.o. pleasant female seen today through My chart video visit for follow up.  She has longstanding seronegative rheumatoid arthritis..  She is on methotrexate and intermittent low-dose prednisone.  It swollen once daily.  She complains of intermittent pain and swelling over base of bilateral thumbs joints and over few PIP joints.  She does report pain over few MCP joints after prolonged activities.  Morning stiffness sometimes lasts up to 30 moods.  She denies any adverse effects from methotrexate.  No infections.  No fever or hospitalization for any infection since last visit.    ROS    Past Medical History:   Diagnosis Date    Asthma     Chronic low back pain     Degenerative disc disease, lumbar     Depression     Diabetes mellitus 2014    DM (diabetes mellitus) 2014     am 02/17/2020    Fibromyalgia     Hyperlipidemia     Hypertension     Hypothyroidism     MRSA (methicillin resistant Staphylococcus aureus) carrier     Nodule of left lung 2/6/2017    CT chest : 04/06/16: Small 3 mm pleural-based nodule laterally in the left lower lobe.    Palpitations     Dr. Jesus Lao--cardiology    Stress incontinence     Dr. Zurdo Lopez--urology    Stroke     TIA    Vitamin D deficiency disease        Past  Surgical History:   Procedure Laterality Date    HYSTERECTOMY      left ankle surgery      loop recorder  2017    cardiac device    NECK SURGERY  2016    ROTATOR CUFF REPAIR      TONSILLECTOMY          Social History     Tobacco Use    Smoking status: Former Smoker     Packs/day: 0.50     Types: Cigarettes     Last attempt to quit: 3/12/2020     Years since quittin.1    Smokeless tobacco: Never Used    Tobacco comment: in smoking cessation program used Chantix    Substance Use Topics    Alcohol use: No     Alcohol/week: 0.0 standard drinks     Frequency: Monthly or less     Drinks per session: 1 or 2     Binge frequency: Never     Comment: OCC    Drug use: No       Family History   Problem Relation Age of Onset    Cancer Mother     Stroke Mother     Heart disease Mother     Diabetes Mother     Cataracts Mother     Hypertension Mother     Breast cancer Mother     Heart disease Father     COPD Father     Hypertension Father     Diabetes Maternal Aunt     Breast cancer Sister        Review of patient's allergies indicates:   Allergen Reactions    Mobic [meloxicam]     Topamax [topiramate]     Adhesive Rash       Physical exam:-    GEN: awake, alert, non-diaphoretic, no psychomotor agitation, no acute distress    HEENT :Head: atraumatic, normocephalic, no rashes noted, no lesions noted;    Eyes: NO redness, discharge, swelling, or lesions    Nose: NO redness, swelling, discharge, deformity, or impetigo/crusting    Skin: no lesions, wounds, erythema, or cyanosis noted on face or hands    Cardiopulmonary: no increased respiratory effort, speaking in clear sentences    MSK: normal ROM in the neck, Upper extremities, Lower extremities  Good ROM of hands, fist formation 90% with mild synovitis around MCP joints.    Good ambulation in front of the camera    Neuro: cranial nerves grossly normal, speech normal rate and rhythm, orientation arrived to appointment on time with no prompting,  moved both upper extremities equally    Pysch:  appearance, behavior, and attitude- well groomed, pleasant, cooperative    Medication List with Changes/Refills   New Medications    HYDROXYCHLOROQUINE (PLAQUENIL) 200 MG TABLET    Take 1 tablet (200 mg total) by mouth once daily.   Current Medications    ALBUTEROL (PROVENTIL/VENTOLIN HFA) 90 MCG/ACTUATION INHALER    Inhale 2 puffs into the lungs every 6 (six) hours as needed for Wheezing.    AMLODIPINE (NORVASC) 5 MG TABLET    Take 1 tablet (5 mg total) by mouth once daily.    ATORVASTATIN (LIPITOR) 40 MG TABLET    Take 40 mg by mouth once daily.    BENZONATATE (TESSALON) 200 MG CAPSULE        BLOOD SUGAR DIAGNOSTIC (ONETOUCH VERIO) STRP    Glucose testing once daily.    BLOOD-GLUCOSE METER MISC    Use as directed.    BUPROPION (WELLBUTRIN SR) 150 MG TBSR 12 HR TABLET    Take 1 tablet (150 mg total) by mouth 2 (two) times daily.    BUSPIRONE (BUSPAR) 5 MG TAB    Take 1 tablet (5 mg total) by mouth 2 (two) times daily.    CLOPIDOGREL (PLAVIX) 75 MG TABLET    Take 75 mg by mouth once daily.    DICLOFENAC SODIUM (VOLTAREN) 1 % GEL    Apply topically 2 (two) times daily as needed. Apply to affected area    DULAGLUTIDE (TRULICITY) 0.75 MG/0.5 ML PNIJ    Inject 0.5 mLs (0.75 mg total) into the skin every 7 days.    FLUTICASONE FUROATE-VILANTEROL (BREO ELLIPTA) 200-25 MCG/DOSE DSDV DISKUS INHALER    Inhale 1 puff into the lungs once daily. Controller    FLUTICASONE PROPIONATE (FLONASE) 50 MCG/ACTUATION NASAL SPRAY    2 sprays (100 mcg total) by Each Nostril route once daily.    FOLIC ACID (FOLVITE) 1 MG TABLET    Take 1 tablet (1 mg total) by mouth once daily.    GABAPENTIN (NEURONTIN) 300 MG CAPSULE    Take 1 capsule by mouth twice daily    LANCETS (LANCETS,ULTRA THIN) MISC    Glucose testing daily.    LEVOCETIRIZINE (XYZAL) 5 MG TABLET    Take 1 tablet (5 mg total) by mouth every evening.    LEVOTHYROXINE (SYNTHROID) 50 MCG TABLET    TAKE 1 TABLET BY MOUTH ONCE DAILY     METFORMIN (GLUCOPHAGE-XR) 500 MG 24 HR TABLET    Take 1 tablet (500 mg total) by mouth 2 (two) times daily with meals.    METHOTREXATE 2.5 MG TAB    Take 6 tablets (15 mg total) by mouth every 7 days.    NICOTINE, POLACRILEX, (NICORETTE) 2 MG GUM    Take 1 each (2 mg total) by mouth every 2 (two) hours as needed. cinnamon flavored    -PROPYLENE GLYCOL, PF, (SYSTANE ULTRA, PF,) 0.4-0.3 % DPET    Place 1 drop into both eyes 4 (four) times daily.    PREDNISONE (DELTASONE) 5 MG TABLET    Take 1 tablet (5 mg total) by mouth once daily.    ROPINIROLE (REQUIP) 1 MG TABLET    Take 1 tablet (1 mg total) by mouth 3 (three) times daily.    VARENICLINE (CHANTIX) 1 MG TAB    as directed    VARENICLINE (CHANTIX) 1 MG TAB    Take 1 tablet (1 mg total) by mouth 2 (two) times daily.    VENLAFAXINE (EFFEXOR-XR) 150 MG CP24    TAKE 1 CAPSULE BY MOUTH ONCE DAILY       Assessment/Plans:-  1. Rheumatoid arthritis of multiple sites with negative rheumatoid factor    2. Fibromyalgia    3. Immunosuppressed status    4. Generalized osteoarthritis of hand      Problem List Items Addressed This Visit        Immunology/Multi System    Immunosuppressed status    Current Assessment & Plan     Compromised immune system secondary to autoimmune disease and use of immunosuppressive drugs. Monitor carefully for infections. Advised to get immediate medical care if any infection. Also advised strict adherence to age appropriate vaccinations and cancer screenings with PCP.             Orthopedic    Fibromyalgia    Rheumatoid arthritis of multiple sites with negative rheumatoid factor - Primary    Current Assessment & Plan     Stable on methotrexate with mild intermittent flare-ups.  Try adding hydroxychloroquine to methotrexate.  Continue folic acid.         Relevant Medications    hydroxychloroquine (PLAQUENIL) 200 mg tablet    Generalized osteoarthritis of hand    Current Assessment & Plan     Mild osteoarthritis related pain over the base of  bilateral thumbs joints.  Advised conservative therapy.               Follow up in about 3 months (around 7/27/2020).    Disclaimer: This note was prepared using voice recognition system and is likely to have sound alike errors and is not proof read.  Please call me with any questions.

## 2020-04-27 NOTE — TELEPHONE ENCOUNTER
Scheduled appt for Dr. MORGAN on 7/27/20 and she is scheduled for labs prior on 5/11/2020 pt verbally understands.

## 2020-04-28 RX ORDER — FLUTICASONE FUROATE AND VILANTEROL TRIFENATATE 200; 25 UG/1; UG/1
POWDER RESPIRATORY (INHALATION)
Qty: 180 EACH | Refills: 3 | Status: SHIPPED | OUTPATIENT
Start: 2020-04-28 | End: 2020-11-24 | Stop reason: SDUPTHER

## 2020-04-28 NOTE — ASSESSMENT & PLAN NOTE
Mild osteoarthritis related pain over the base of bilateral thumbs joints.  Advised conservative therapy.

## 2020-04-28 NOTE — ASSESSMENT & PLAN NOTE
Stable on methotrexate with mild intermittent flare-ups.  Try adding hydroxychloroquine to methotrexate.  Continue folic acid.

## 2020-05-07 ENCOUNTER — TELEPHONE (OUTPATIENT)
Dept: INTERNAL MEDICINE | Facility: CLINIC | Age: 55
End: 2020-05-07

## 2020-05-07 ENCOUNTER — HOSPITAL ENCOUNTER (EMERGENCY)
Facility: HOSPITAL | Age: 55
Discharge: HOME | End: 2020-05-07
Attending: EMERGENCY MEDICINE
Payer: COMMERCIAL

## 2020-05-07 VITALS
BODY MASS INDEX: 36.87 KG/M2 | TEMPERATURE: 98 F | WEIGHT: 200.38 LBS | SYSTOLIC BLOOD PRESSURE: 123 MMHG | OXYGEN SATURATION: 93 % | HEART RATE: 83 BPM | DIASTOLIC BLOOD PRESSURE: 66 MMHG | HEIGHT: 62 IN | RESPIRATION RATE: 13 BRPM

## 2020-05-07 DIAGNOSIS — M54.50 ACUTE LEFT-SIDED LOW BACK PAIN WITHOUT SCIATICA: Primary | ICD-10-CM

## 2020-05-07 LAB
ALBUMIN SERPL BCP-MCNC: 4.2 G/DL (ref 3.5–5.2)
ALP SERPL-CCNC: 86 U/L (ref 55–135)
ALT SERPL W/O P-5'-P-CCNC: 14 U/L (ref 10–44)
ANION GAP SERPL CALC-SCNC: 11 MMOL/L (ref 8–16)
AST SERPL-CCNC: 11 U/L (ref 10–40)
BASOPHILS # BLD AUTO: 0.08 K/UL (ref 0–0.2)
BASOPHILS NFR BLD: 0.9 % (ref 0–1.9)
BILIRUB SERPL-MCNC: 0.2 MG/DL (ref 0.1–1)
BILIRUB UR QL STRIP: NEGATIVE
BUN SERPL-MCNC: 10 MG/DL (ref 6–20)
CALCIUM SERPL-MCNC: 9 MG/DL (ref 8.7–10.5)
CHLORIDE SERPL-SCNC: 104 MMOL/L (ref 95–110)
CLARITY UR: CLEAR
CO2 SERPL-SCNC: 25 MMOL/L (ref 23–29)
COLOR UR: YELLOW
CREAT SERPL-MCNC: 0.7 MG/DL (ref 0.5–1.4)
DIFFERENTIAL METHOD: ABNORMAL
EOSINOPHIL # BLD AUTO: 0.1 K/UL (ref 0–0.5)
EOSINOPHIL NFR BLD: 1.2 % (ref 0–8)
ERYTHROCYTE [DISTWIDTH] IN BLOOD BY AUTOMATED COUNT: 15.2 % (ref 11.5–14.5)
EST. GFR  (AFRICAN AMERICAN): >60 ML/MIN/1.73 M^2
EST. GFR  (NON AFRICAN AMERICAN): >60 ML/MIN/1.73 M^2
GLUCOSE SERPL-MCNC: 113 MG/DL (ref 70–110)
GLUCOSE UR QL STRIP: NEGATIVE
HCT VFR BLD AUTO: 43.2 % (ref 37–48.5)
HCV AB SERPL QL IA: NEGATIVE
HGB BLD-MCNC: 13.6 G/DL (ref 12–16)
HGB UR QL STRIP: NEGATIVE
HIV 1+2 AB+HIV1 P24 AG SERPL QL IA: NEGATIVE
IMM GRANULOCYTES # BLD AUTO: 0.18 K/UL (ref 0–0.04)
IMM GRANULOCYTES NFR BLD AUTO: 2.1 % (ref 0–0.5)
KETONES UR QL STRIP: NEGATIVE
LEUKOCYTE ESTERASE UR QL STRIP: NEGATIVE
LIPASE SERPL-CCNC: 87 U/L (ref 4–60)
LYMPHOCYTES # BLD AUTO: 1.4 K/UL (ref 1–4.8)
LYMPHOCYTES NFR BLD: 16.4 % (ref 18–48)
MCH RBC QN AUTO: 32 PG (ref 27–31)
MCHC RBC AUTO-ENTMCNC: 31.5 G/DL (ref 32–36)
MCV RBC AUTO: 102 FL (ref 82–98)
MONOCYTES # BLD AUTO: 0.6 K/UL (ref 0.3–1)
MONOCYTES NFR BLD: 6.6 % (ref 4–15)
NEUTROPHILS # BLD AUTO: 6.3 K/UL (ref 1.8–7.7)
NEUTROPHILS NFR BLD: 72.8 % (ref 38–73)
NITRITE UR QL STRIP: NEGATIVE
NRBC BLD-RTO: 0 /100 WBC
PH UR STRIP: 6 [PH] (ref 5–8)
PLATELET # BLD AUTO: 209 K/UL (ref 150–350)
PMV BLD AUTO: 11.4 FL (ref 9.2–12.9)
POTASSIUM SERPL-SCNC: 4.2 MMOL/L (ref 3.5–5.1)
PROT SERPL-MCNC: 7 G/DL (ref 6–8.4)
PROT UR QL STRIP: NEGATIVE
RBC # BLD AUTO: 4.25 M/UL (ref 4–5.4)
SODIUM SERPL-SCNC: 140 MMOL/L (ref 136–145)
SP GR UR STRIP: 1.02 (ref 1–1.03)
URN SPEC COLLECT METH UR: NORMAL
UROBILINOGEN UR STRIP-ACNC: NEGATIVE EU/DL
WBC # BLD AUTO: 8.59 K/UL (ref 3.9–12.7)

## 2020-05-07 PROCEDURE — 63600175 PHARM REV CODE 636 W HCPCS: Performed by: EMERGENCY MEDICINE

## 2020-05-07 PROCEDURE — 80053 COMPREHEN METABOLIC PANEL: CPT

## 2020-05-07 PROCEDURE — 81003 URINALYSIS AUTO W/O SCOPE: CPT

## 2020-05-07 PROCEDURE — 96375 TX/PRO/DX INJ NEW DRUG ADDON: CPT

## 2020-05-07 PROCEDURE — 86703 HIV-1/HIV-2 1 RESULT ANTBDY: CPT

## 2020-05-07 PROCEDURE — 86803 HEPATITIS C AB TEST: CPT

## 2020-05-07 PROCEDURE — 83690 ASSAY OF LIPASE: CPT

## 2020-05-07 PROCEDURE — 85025 COMPLETE CBC W/AUTO DIFF WBC: CPT

## 2020-05-07 PROCEDURE — 96374 THER/PROPH/DIAG INJ IV PUSH: CPT

## 2020-05-07 PROCEDURE — 99285 EMERGENCY DEPT VISIT HI MDM: CPT | Mod: 25

## 2020-05-07 RX ORDER — CYCLOBENZAPRINE HCL 10 MG
5 TABLET ORAL 3 TIMES DAILY PRN
Qty: 15 TABLET | Refills: 0 | Status: SHIPPED | OUTPATIENT
Start: 2020-05-07 | End: 2020-07-27

## 2020-05-07 RX ORDER — TRAMADOL HYDROCHLORIDE 50 MG/1
50 TABLET ORAL EVERY 6 HOURS PRN
Qty: 12 TABLET | Refills: 0 | Status: SHIPPED | OUTPATIENT
Start: 2020-05-07 | End: 2020-05-17

## 2020-05-07 RX ORDER — KETOROLAC TROMETHAMINE 30 MG/ML
30 INJECTION, SOLUTION INTRAMUSCULAR; INTRAVENOUS
Status: COMPLETED | OUTPATIENT
Start: 2020-05-07 | End: 2020-05-07

## 2020-05-07 RX ORDER — ONDANSETRON 2 MG/ML
8 INJECTION INTRAMUSCULAR; INTRAVENOUS
Status: COMPLETED | OUTPATIENT
Start: 2020-05-07 | End: 2020-05-07

## 2020-05-07 RX ADMIN — KETOROLAC TROMETHAMINE 30 MG: 30 INJECTION, SOLUTION INTRAMUSCULAR at 08:05

## 2020-05-07 RX ADMIN — ONDANSETRON 8 MG: 2 INJECTION INTRAMUSCULAR; INTRAVENOUS at 08:05

## 2020-05-07 NOTE — ED PROVIDER NOTES
SCRIBE #1 NOTE: I, Evelyne Garcia, am scribing for, and in the presence of, Johnie Barrett Jr., MD. I have scribed the entire note.       History     Chief Complaint   Patient presents with    Flank Pain     left sided flank pain onset today at 5 am, pain radiates around and into her LLQ     Review of patient's allergies indicates:   Allergen Reactions    Mobic [meloxicam]     Topamax [topiramate]     Adhesive Rash         History of Present Illness     HPI    5/7/2020, 8:07 AM  History obtained from the patient      History of Present Illness: Diana Escobar is a 54 y.o. female patient  who presents to the Emergency Department for evaluation of L flank pain radiating to her abd which onset suddenly at 5am while she was at work. Symptoms are constant and moderate in severity. The pain is worse with movement.  No bowel or bladder dysfunction.  No saddle anesthesia.  Patient does do lifting at work at [employer] and was lifting when symptoms began. Associated sxs include mild nausea. Pt states her urine is darker than normal. Patient denies any vomiting diarrhea, hematuria, dysuria, frequency, blood in stool, numbness, weakness, CP, SOB, and all other sxs at this time. Denies any history of kidney stones. No further complaints or concerns at this time.       Arrival mode: Personal vehicle     PCP: Jory Alcocer DO        Past Medical History:  Past Medical History:   Diagnosis Date    Asthma     Chronic low back pain     Degenerative disc disease, lumbar     Depression     Diabetes mellitus 2014    DM (diabetes mellitus) 2014     am 02/17/2020    Fibromyalgia     Hyperlipidemia     Hypertension     Hypothyroidism     MRSA (methicillin resistant Staphylococcus aureus) carrier     Nodule of left lung 2/6/2017    CT chest : 04/06/16: Small 3 mm pleural-based nodule laterally in the left lower lobe.    Palpitations     Dr. Jesus Lao--cardiology    Stress incontinence     Dr. Renner  Jessica--urology    Stroke     TIA    Vitamin D deficiency disease        Past Surgical History:  Past Surgical History:   Procedure Laterality Date    HYSTERECTOMY      left ankle surgery      loop recorder  2017    cardiac device    NECK SURGERY  2016    ROTATOR CUFF REPAIR      TONSILLECTOMY           Family History:  Family History   Problem Relation Age of Onset    Cancer Mother     Stroke Mother     Heart disease Mother     Diabetes Mother     Cataracts Mother     Hypertension Mother     Breast cancer Mother     Heart disease Father     COPD Father     Hypertension Father     Diabetes Maternal Aunt     Breast cancer Sister        Social History:  Social History     Tobacco Use    Smoking status: Former Smoker     Packs/day: 0.50     Types: Cigarettes     Last attempt to quit: 3/12/2020     Years since quittin.1    Smokeless tobacco: Never Used    Tobacco comment: in smoking cessation program used Chantix    Substance and Sexual Activity    Alcohol use: No     Alcohol/week: 0.0 standard drinks     Frequency: Monthly or less     Drinks per session: 1 or 2     Binge frequency: Never     Comment: OCC    Drug use: No    Sexual activity: Yes        Review of Systems     Review of Systems   Constitutional: Negative for chills and fever.   HENT: Negative for sore throat.    Respiratory: Negative for shortness of breath.    Cardiovascular: Negative for chest pain.   Gastrointestinal: Positive for nausea (mild). Negative for blood in stool, diarrhea and vomiting.   Genitourinary: Positive for flank pain (L radiating to abd). Negative for dysuria and hematuria.   Musculoskeletal: Negative for back pain.   Skin: Negative for rash.   Neurological: Negative for weakness and numbness.   Hematological: Does not bruise/bleed easily.   All other systems reviewed and are negative.     Physical Exam     Initial Vitals [20 0724]   BP Pulse Resp Temp SpO2   (!) 141/87 87 18 98.2 °F (36.8  "°C) 98 %      MAP       --          Physical Exam  Nursing Notes and Vital Signs Reviewed.  Constitutional: Patient is in no acute distress. Well-developed and well-nourished.  Head: Atraumatic. Normocephalic.  Eyes: PERRL. EOM intact. Conjunctivae are not pale. No scleral icterus.  ENT: Mucous membranes are moist. Oropharynx is clear and symmetric.    Neck: Supple. Full ROM. No lymphadenopathy.  Cardiovascular: Regular rate. Regular rhythm. No murmurs, rubs, or gallops. Distal pulses are 2+ and symmetric.  Pulmonary/Chest: No respiratory distress. Clear to auscultation bilaterally. No wheezing or rales.  Abdominal: Soft and non-distended.  There is no tenderness.  No rebound, guarding, or rigidity. Good bowel sounds.  No palpable pulsatile abdominal mass  Genitourinary: L CVA tenderness..  No suprapubic tenderness.  Musculoskeletal: Moves all extremities. No obvious deformities. No edema. No calf tenderness.  Skin: Warm and dry.  Neurological:  Alert, awake, and appropriate.  Normal speech.  No acute focal neurological deficits are appreciated.  Psychiatric: Normal affect. Good eye contact. Appropriate in content.     ED Course   Procedures  ED Vital Signs:  Vitals:    05/07/20 0724 05/07/20 0738 05/07/20 0740 05/07/20 0745   BP: (!) 141/87  134/78 130/77   Pulse: 87 84 86 81   Resp: 18  18 20   Temp: 98.2 °F (36.8 °C)      TempSrc: Tympanic      SpO2: 98%  97% 97%   Weight: 90.9 kg (200 lb 6.4 oz)      Height: 5' 2" (1.575 m)       05/07/20 0805 05/07/20 0815 05/07/20 0830 05/07/20 0845   BP: 135/79 135/80 130/74 123/67   Pulse: 86 80 82 88   Resp: 17 20 14 14   Temp:       TempSrc:       SpO2: 99% 98% 96% (!) 93%   Weight:       Height:        05/07/20 0900 05/07/20 0915 05/07/20 0930   BP: 120/63 124/64 123/66   Pulse: 86 82 83   Resp: 13 13 13   Temp:      TempSrc:      SpO2: (!) 93% (!) 93% (!) 93%   Weight:      Height:          Abnormal Lab Results:  Labs Reviewed   CBC W/ AUTO DIFFERENTIAL - Abnormal; " Notable for the following components:       Result Value    Mean Corpuscular Volume 102 (*)     Mean Corpuscular Hemoglobin 32.0 (*)     Mean Corpuscular Hemoglobin Conc 31.5 (*)     RDW 15.2 (*)     Immature Granulocytes 2.1 (*)     Immature Grans (Abs) 0.18 (*)     Lymph% 16.4 (*)     All other components within normal limits   COMPREHENSIVE METABOLIC PANEL - Abnormal; Notable for the following components:    Glucose 113 (*)     All other components within normal limits   LIPASE - Abnormal; Notable for the following components:    Lipase 87 (*)     All other components within normal limits   URINALYSIS, REFLEX TO URINE CULTURE    Narrative:     Preferred Collection Type->Urine, Clean Catch   HIV 1 / 2 ANTIBODY   HEPATITIS C ANTIBODY        All Lab Results:  Results for orders placed or performed during the hospital encounter of 05/07/20   Urinalysis, Reflex to Urine Culture Urine, Clean Catch   Result Value Ref Range    Specimen UA Urine, Clean Catch     Color, UA Yellow Yellow, Straw, Carrie    Appearance, UA Clear Clear    pH, UA 6.0 5.0 - 8.0    Specific Gravity, UA 1.025 1.005 - 1.030    Protein, UA Negative Negative    Glucose, UA Negative Negative    Ketones, UA Negative Negative    Bilirubin (UA) Negative Negative    Occult Blood UA Negative Negative    Nitrite, UA Negative Negative    Urobilinogen, UA Negative <2.0 EU/dL    Leukocytes, UA Negative Negative   CBC auto differential   Result Value Ref Range    WBC 8.59 3.90 - 12.70 K/uL    RBC 4.25 4.00 - 5.40 M/uL    Hemoglobin 13.6 12.0 - 16.0 g/dL    Hematocrit 43.2 37.0 - 48.5 %    Mean Corpuscular Volume 102 (H) 82 - 98 fL    Mean Corpuscular Hemoglobin 32.0 (H) 27.0 - 31.0 pg    Mean Corpuscular Hemoglobin Conc 31.5 (L) 32.0 - 36.0 g/dL    RDW 15.2 (H) 11.5 - 14.5 %    Platelets 209 150 - 350 K/uL    MPV 11.4 9.2 - 12.9 fL    Immature Granulocytes 2.1 (H) 0.0 - 0.5 %    Gran # (ANC) 6.3 1.8 - 7.7 K/uL    Immature Grans (Abs) 0.18 (H) 0.00 - 0.04 K/uL     Lymph # 1.4 1.0 - 4.8 K/uL    Mono # 0.6 0.3 - 1.0 K/uL    Eos # 0.1 0.0 - 0.5 K/uL    Baso # 0.08 0.00 - 0.20 K/uL    nRBC 0 0 /100 WBC    Gran% 72.8 38.0 - 73.0 %    Lymph% 16.4 (L) 18.0 - 48.0 %    Mono% 6.6 4.0 - 15.0 %    Eosinophil% 1.2 0.0 - 8.0 %    Basophil% 0.9 0.0 - 1.9 %    Differential Method Automated    Comprehensive metabolic panel   Result Value Ref Range    Sodium 140 136 - 145 mmol/L    Potassium 4.2 3.5 - 5.1 mmol/L    Chloride 104 95 - 110 mmol/L    CO2 25 23 - 29 mmol/L    Glucose 113 (H) 70 - 110 mg/dL    BUN, Bld 10 6 - 20 mg/dL    Creatinine 0.7 0.5 - 1.4 mg/dL    Calcium 9.0 8.7 - 10.5 mg/dL    Total Protein 7.0 6.0 - 8.4 g/dL    Albumin 4.2 3.5 - 5.2 g/dL    Total Bilirubin 0.2 0.1 - 1.0 mg/dL    Alkaline Phosphatase 86 55 - 135 U/L    AST 11 10 - 40 U/L    ALT 14 10 - 44 U/L    Anion Gap 11 8 - 16 mmol/L    eGFR if African American >60 >60 mL/min/1.73 m^2    eGFR if non African American >60 >60 mL/min/1.73 m^2   Lipase   Result Value Ref Range    Lipase 87 (H) 4 - 60 U/L     Imaging Results:  Imaging Results          CT Renal Stone Study ABD Pelvis WO (Final result)  Result time 05/07/20 08:22:33    Final result by Gamaliel Plasencia MD (05/07/20 08:22:33)                 Impression:      No acute intra-abdominal abnormality.  No nephrolithiasis or hydronephrosis.    1.7 cm right adnexal hypodensity, likely an ovarian cyst.    All CT scans at this facility use dose modulation, iterative reconstruction, and/or weight based dosing when appropriate to reduce radiation dose to as low as reasonable achievable.      Electronically signed by: Gamaliel Plasencia  Date:    05/07/2020  Time:    08:22             Narrative:    EXAMINATION:  CT RENAL STONE STUDY ABD PELVIS WO    CLINICAL HISTORY:  Flank pain, stone disease suspected;    TECHNIQUE:  Low dose axial images, sagittal and coronal reformations were obtained from the lung bases to the pubic symphysis.  Contrast was not  administered.    COMPARISON:  Abdominal radiograph 07/19/2018    FINDINGS:  Heart: Normal in size. No pericardial effusion.    Lung Bases: Well aerated, without consolidation or pleural fluid.    Liver: Punctate hypodensities in the are too characterize.    Gallbladder: No calcified gallstones or wall thickening.    Bile Ducts: No evidence of dilated ducts.    Pancreas: No mass or peripancreatic fat stranding.    Spleen: Unremarkable.    Adrenals: Unremarkable.    Kidneys/ Ureters: Kidneys are normal in size and position bilaterally.  No or hydronephrosis.  Ureters are nondilated.    Bladder: Not significant distended.  No significant wall thickening.    Reproductive organs: Uterus is surgically absent.  There is a 1.7 cm hypodensity in the right adnexa, likely a small ovarian cyst.  No adnexal mass.    GI Tract/Mesentery: Stomach, small bowel and colon demonstrate no obstructive or inflammatory changes.  Few colonic diverticula.  The appendix is visualized and appears within normal limits.    Peritoneal Space: No ascites. No free air.    Retroperitoneum: No significant adenopathy.    Abdominal wall: Unremarkable.    Vasculature: Abdominal aorta is nonaneurysmal.  Mild aortic atherosclerotic calcifications, particularly at the bifurcation.    Bones: Degenerative changes acute or concerning osseous abnormality.                                        The Emergency Provider reviewed the vital signs and test results, which are outlined above.     ED Discussion       9:38 AM  Patient's pain is well controlled.  Clinically she has musculoskeletal back pain from lifting.  She does not have a kidney stone or any intra-abdominal pathology her urine is clear.  Does have a small right-sided ovarian cyst with this is not contributory towards going on today her pain is well controlled at this point will treat symptomatically with close follow-up.  She is return symptoms worsen in way.  Patient verbalized understanding agreement  with all seems reliable. Pt is stable for discharge.     I discussed with patient and/or family/caretaker that evaluation in the ED does not suggest any emergent or life threatening medical conditions requiring immediate intervention beyond what was provided in the ED, and I believe patient is safe for discharge.  Regardless, an unremarkable evaluation in the ED does not preclude the development or presence of a serious of life threatening condition. As such, patient was instructed to return immediately for any worsening or change in current symptoms.    9:52 AM  Patient is stable nontoxic.  Patient does not have an allergy to Advil.  Allergy to Mobic with secondary to stroke which may have been attributed Cox2     Medical Decision Making:   Clinical Tests:   Lab Tests: Reviewed and Ordered  Radiological Study: Reviewed and Ordered           ED Medication(s):  Medications   ketorolac injection 30 mg (30 mg Intravenous Given 5/7/20 0821)   ondansetron injection 8 mg (8 mg Intravenous Given 5/7/20 0821)       New Prescriptions    CYCLOBENZAPRINE (FLEXERIL) 10 MG TABLET    Take 0.5 tablets (5 mg total) by mouth 3 (three) times daily as needed for Muscle spasms.    TRAMADOL (ULTRAM) 50 MG TABLET    Take 1 tablet (50 mg total) by mouth every 6 (six) hours as needed.       Follow-up Information     Jory Alcocer DO In 2 days.    Specialty:  Internal Medicine  Contact information:  [address] [place] [address] [address] Carissa ANGULO [id number] [phone]                       Scribe Attestation:   Scribe #1: I performed the above scribed service and the documentation accurately describes the services I performed. I attest to the accuracy of the note.     Attending:   Physician Attestation Statement for Scribe #1: I, Johnie Barrett Jr., MD, personally performed the services described in this documentation, as scribed by Evelyne Garcia, in my presence, and it is both accurate and complete.           Clinical Impression       ICD-10-CM  ICD-9-CM   1. Acute left-sided low back pain without sciatica M54.5 724.2       Disposition:   Disposition: Discharged  Condition: Stable       Johnie Barrett Jr., MD  05/07/20 0953

## 2020-05-07 NOTE — TELEPHONE ENCOUNTER
Mara with [facility] pharmacy called to say that the medication you sent over venlafaxine (Effexor- Xr) 150 mg and Buspirone (Buspar) 5 mg can caused an interaction with the Tramadol (Ultram) 50 mg.     Mara wants to know if you would still like for her to fill the prescriptions?    Please advise

## 2020-05-07 NOTE — TELEPHONE ENCOUNTER
Pharmacist notified and states the prescribing doctor changed the pain medication to Toradol. Advised to call the office as needed.

## 2020-05-07 NOTE — TELEPHONE ENCOUNTER
Have them fill prescription and have patient monitor for side effects. Looks like the tramadol is temporary.

## 2020-05-11 ENCOUNTER — LAB VISIT (OUTPATIENT)
Dept: LAB | Facility: HOSPITAL | Age: 55
End: 2020-05-11
Attending: INTERNAL MEDICINE
Payer: COMMERCIAL

## 2020-05-11 ENCOUNTER — PATIENT MESSAGE (OUTPATIENT)
Dept: RHEUMATOLOGY | Facility: CLINIC | Age: 55
End: 2020-05-11

## 2020-05-11 DIAGNOSIS — M06.09 RHEUMATOID ARTHRITIS OF MULTIPLE SITES WITH NEGATIVE RHEUMATOID FACTOR: ICD-10-CM

## 2020-05-11 LAB
ALBUMIN SERPL BCP-MCNC: 4.2 G/DL (ref 3.5–5.2)
ALP SERPL-CCNC: 82 U/L (ref 55–135)
ALT SERPL W/O P-5'-P-CCNC: 22 U/L (ref 10–44)
ANION GAP SERPL CALC-SCNC: 11 MMOL/L (ref 8–16)
AST SERPL-CCNC: 13 U/L (ref 10–40)
BASOPHILS # BLD AUTO: 0.1 K/UL (ref 0–0.2)
BASOPHILS NFR BLD: 1.1 % (ref 0–1.9)
BILIRUB SERPL-MCNC: 0.3 MG/DL (ref 0.1–1)
BUN SERPL-MCNC: 11 MG/DL (ref 6–20)
CALCIUM SERPL-MCNC: 9 MG/DL (ref 8.7–10.5)
CHLORIDE SERPL-SCNC: 101 MMOL/L (ref 95–110)
CO2 SERPL-SCNC: 28 MMOL/L (ref 23–29)
CREAT SERPL-MCNC: 0.8 MG/DL (ref 0.5–1.4)
CRP SERPL-MCNC: 1.3 MG/L (ref 0–8.2)
DIFFERENTIAL METHOD: ABNORMAL
EOSINOPHIL # BLD AUTO: 0.2 K/UL (ref 0–0.5)
EOSINOPHIL NFR BLD: 2.5 % (ref 0–8)
ERYTHROCYTE [DISTWIDTH] IN BLOOD BY AUTOMATED COUNT: 14.9 % (ref 11.5–14.5)
ERYTHROCYTE [SEDIMENTATION RATE] IN BLOOD BY WESTERGREN METHOD: 7 MM/HR (ref 0–36)
EST. GFR  (AFRICAN AMERICAN): >60 ML/MIN/1.73 M^2
EST. GFR  (NON AFRICAN AMERICAN): >60 ML/MIN/1.73 M^2
GLUCOSE SERPL-MCNC: 133 MG/DL (ref 70–110)
HCT VFR BLD AUTO: 43.3 % (ref 37–48.5)
HGB BLD-MCNC: 13.8 G/DL (ref 12–16)
IMM GRANULOCYTES # BLD AUTO: 0.18 K/UL (ref 0–0.04)
IMM GRANULOCYTES NFR BLD AUTO: 1.9 % (ref 0–0.5)
LYMPHOCYTES # BLD AUTO: 2.8 K/UL (ref 1–4.8)
LYMPHOCYTES NFR BLD: 29.2 % (ref 18–48)
MCH RBC QN AUTO: 32 PG (ref 27–31)
MCHC RBC AUTO-ENTMCNC: 31.9 G/DL (ref 32–36)
MCV RBC AUTO: 101 FL (ref 82–98)
MONOCYTES # BLD AUTO: 0.6 K/UL (ref 0.3–1)
MONOCYTES NFR BLD: 6.4 % (ref 4–15)
NEUTROPHILS # BLD AUTO: 5.8 K/UL (ref 1.8–7.7)
NEUTROPHILS NFR BLD: 60.8 % (ref 38–73)
NRBC BLD-RTO: 0 /100 WBC
PLATELET # BLD AUTO: 206 K/UL (ref 150–350)
PMV BLD AUTO: 11.4 FL (ref 9.2–12.9)
POTASSIUM SERPL-SCNC: 4.2 MMOL/L (ref 3.5–5.1)
PROT SERPL-MCNC: 7.2 G/DL (ref 6–8.4)
RBC # BLD AUTO: 4.31 M/UL (ref 4–5.4)
SODIUM SERPL-SCNC: 140 MMOL/L (ref 136–145)
WBC # BLD AUTO: 9.46 K/UL (ref 3.9–12.7)

## 2020-05-11 PROCEDURE — 80053 COMPREHEN METABOLIC PANEL: CPT

## 2020-05-11 PROCEDURE — 80074 ACUTE HEPATITIS PANEL: CPT

## 2020-05-11 PROCEDURE — 85025 COMPLETE CBC W/AUTO DIFF WBC: CPT

## 2020-05-11 PROCEDURE — 85652 RBC SED RATE AUTOMATED: CPT

## 2020-05-11 PROCEDURE — 86480 TB TEST CELL IMMUN MEASURE: CPT

## 2020-05-11 PROCEDURE — 86140 C-REACTIVE PROTEIN: CPT

## 2020-05-11 PROCEDURE — 36415 COLL VENOUS BLD VENIPUNCTURE: CPT

## 2020-05-12 LAB
GAMMA INTERFERON BACKGROUND BLD IA-ACNC: 0.01 IU/ML
HAV IGM SERPL QL IA: NEGATIVE
HBV CORE IGM SERPL QL IA: NEGATIVE
HBV SURFACE AG SERPL QL IA: NEGATIVE
HCV AB SERPL QL IA: NEGATIVE
M TB IFN-G CD4+ BCKGRND COR BLD-ACNC: 0.01 IU/ML
MITOGEN IGNF BCKGRD COR BLD-ACNC: 8.45 IU/ML
TB GOLD PLUS: NEGATIVE
TB2 - NIL: 0.01 IU/ML

## 2020-06-01 ENCOUNTER — DOCUMENTATION ONLY (OUTPATIENT)
Dept: RHEUMATOLOGY | Facility: CLINIC | Age: 55
End: 2020-06-01

## 2020-06-01 DIAGNOSIS — I15.2 HYPERTENSION ASSOCIATED WITH DIABETES: ICD-10-CM

## 2020-06-01 DIAGNOSIS — E11.59 HYPERTENSION ASSOCIATED WITH DIABETES: ICD-10-CM

## 2020-06-01 RX ORDER — METFORMIN HYDROCHLORIDE 500 MG/1
TABLET, EXTENDED RELEASE ORAL
Qty: 60 TABLET | Refills: 0 | Status: SHIPPED | OUTPATIENT
Start: 2020-06-01 | End: 2020-07-17 | Stop reason: ALTCHOICE

## 2020-07-01 ENCOUNTER — LAB VISIT (OUTPATIENT)
Dept: LAB | Facility: HOSPITAL | Age: 55
End: 2020-07-01
Attending: INTERNAL MEDICINE
Payer: COMMERCIAL

## 2020-07-01 DIAGNOSIS — E11.9 CONTROLLED TYPE 2 DIABETES MELLITUS WITHOUT COMPLICATION, WITHOUT LONG-TERM CURRENT USE OF INSULIN: ICD-10-CM

## 2020-07-01 DIAGNOSIS — I10 HYPERTENSION GOAL BP (BLOOD PRESSURE) < 130/80: ICD-10-CM

## 2020-07-01 DIAGNOSIS — E78.5 HYPERLIPIDEMIA LDL GOAL <70: ICD-10-CM

## 2020-07-01 DIAGNOSIS — E03.9 HYPOTHYROIDISM (ACQUIRED): ICD-10-CM

## 2020-07-01 LAB
ALBUMIN SERPL BCP-MCNC: 4.2 G/DL (ref 3.5–5.2)
ALP SERPL-CCNC: 89 U/L (ref 55–135)
ALT SERPL W/O P-5'-P-CCNC: 22 U/L (ref 10–44)
ANION GAP SERPL CALC-SCNC: 8 MMOL/L (ref 8–16)
AST SERPL-CCNC: 16 U/L (ref 10–40)
BILIRUB SERPL-MCNC: 0.4 MG/DL (ref 0.1–1)
BUN SERPL-MCNC: 10 MG/DL (ref 6–20)
CALCIUM SERPL-MCNC: 9.5 MG/DL (ref 8.7–10.5)
CHLORIDE SERPL-SCNC: 106 MMOL/L (ref 95–110)
CHOLEST SERPL-MCNC: 140 MG/DL (ref 120–199)
CHOLEST/HDLC SERPL: 3.1 {RATIO} (ref 2–5)
CO2 SERPL-SCNC: 28 MMOL/L (ref 23–29)
CREAT SERPL-MCNC: 0.7 MG/DL (ref 0.5–1.4)
EST. GFR  (AFRICAN AMERICAN): >60 ML/MIN/1.73 M^2
EST. GFR  (NON AFRICAN AMERICAN): >60 ML/MIN/1.73 M^2
ESTIMATED AVG GLUCOSE: 120 MG/DL (ref 68–131)
GLUCOSE SERPL-MCNC: 98 MG/DL (ref 70–110)
HBA1C MFR BLD HPLC: 5.8 % (ref 4–5.6)
HDLC SERPL-MCNC: 45 MG/DL (ref 40–75)
HDLC SERPL: 32.1 % (ref 20–50)
LDLC SERPL CALC-MCNC: 63 MG/DL (ref 63–159)
NONHDLC SERPL-MCNC: 95 MG/DL
POTASSIUM SERPL-SCNC: 4 MMOL/L (ref 3.5–5.1)
PROT SERPL-MCNC: 7.1 G/DL (ref 6–8.4)
SODIUM SERPL-SCNC: 142 MMOL/L (ref 136–145)
TRIGL SERPL-MCNC: 160 MG/DL (ref 30–150)
TSH SERPL DL<=0.005 MIU/L-ACNC: 0.47 UIU/ML (ref 0.4–4)

## 2020-07-01 PROCEDURE — 36415 COLL VENOUS BLD VENIPUNCTURE: CPT

## 2020-07-01 PROCEDURE — 80061 LIPID PANEL: CPT

## 2020-07-01 PROCEDURE — 84443 ASSAY THYROID STIM HORMONE: CPT

## 2020-07-01 PROCEDURE — 83036 HEMOGLOBIN GLYCOSYLATED A1C: CPT

## 2020-07-01 PROCEDURE — 80053 COMPREHEN METABOLIC PANEL: CPT

## 2020-07-15 ENCOUNTER — TELEPHONE (OUTPATIENT)
Dept: INTERNAL MEDICINE | Facility: CLINIC | Age: 55
End: 2020-07-15

## 2020-07-15 DIAGNOSIS — E11.9 CONTROLLED TYPE 2 DIABETES MELLITUS WITHOUT COMPLICATION, WITHOUT LONG-TERM CURRENT USE OF INSULIN: Primary | ICD-10-CM

## 2020-07-15 NOTE — TELEPHONE ENCOUNTER
Pt states he pharmacy is waiting on the okay to give the pt her metformin due to the recall. Pt wants to know if her medication will be changed or what can be done.       Please advise.

## 2020-07-16 NOTE — TELEPHONE ENCOUNTER
Patient can continue the medication as long as the pharmacy ensures it is not in the batch that was recalled. If it needs to be changed pharmacy can substitute.

## 2020-07-17 DIAGNOSIS — E11.9 TYPE 2 DIABETES MELLITUS WITHOUT COMPLICATION: ICD-10-CM

## 2020-07-17 RX ORDER — METFORMIN HYDROCHLORIDE 500 MG/1
500 TABLET ORAL 2 TIMES DAILY WITH MEALS
Qty: 60 TABLET | Refills: 3 | Status: SHIPPED | OUTPATIENT
Start: 2020-07-17 | End: 2020-11-24 | Stop reason: SDUPTHER

## 2020-07-17 NOTE — TELEPHONE ENCOUNTER
Pt states that the pharmacy sent her a letter stating that her medication was apart of the recall.     Please advise

## 2020-07-24 ENCOUNTER — TELEPHONE (OUTPATIENT)
Dept: RHEUMATOLOGY | Facility: CLINIC | Age: 55
End: 2020-07-24

## 2020-07-24 NOTE — TELEPHONE ENCOUNTER
Spoke with pt and confirmed appointment with Dr. Parmar for 7.27.20 at 1.45 pm. Changed to a virtual visit.

## 2020-07-26 ENCOUNTER — PATIENT OUTREACH (OUTPATIENT)
Dept: ADMINISTRATIVE | Facility: OTHER | Age: 55
End: 2020-07-26

## 2020-07-26 DIAGNOSIS — Z12.11 ENCOUNTER FOR FIT (FECAL IMMUNOCHEMICAL TEST) SCREENING: Primary | ICD-10-CM

## 2020-07-26 NOTE — PROGRESS NOTES
Chart Reviewed  Care Everywhere updated  Immunizations reconciled  Health Maintenance updated  Fit kit ordered

## 2020-07-27 ENCOUNTER — TELEPHONE (OUTPATIENT)
Dept: RHEUMATOLOGY | Facility: CLINIC | Age: 55
End: 2020-07-27

## 2020-07-27 ENCOUNTER — OFFICE VISIT (OUTPATIENT)
Dept: RHEUMATOLOGY | Facility: CLINIC | Age: 55
End: 2020-07-27
Payer: COMMERCIAL

## 2020-07-27 ENCOUNTER — TELEPHONE (OUTPATIENT)
Dept: PHARMACY | Facility: CLINIC | Age: 55
End: 2020-07-27

## 2020-07-27 DIAGNOSIS — M79.7 FIBROMYALGIA: ICD-10-CM

## 2020-07-27 DIAGNOSIS — M06.09 RHEUMATOID ARTHRITIS OF MULTIPLE SITES WITH NEGATIVE RHEUMATOID FACTOR: Primary | ICD-10-CM

## 2020-07-27 DIAGNOSIS — Z79.899 HIGH RISK MEDICATION USE: ICD-10-CM

## 2020-07-27 DIAGNOSIS — G25.81 RESTLESS LEGS SYNDROME (RLS): Chronic | ICD-10-CM

## 2020-07-27 DIAGNOSIS — D84.9 IMMUNOSUPPRESSED STATUS: ICD-10-CM

## 2020-07-27 PROCEDURE — 99215 OFFICE O/P EST HI 40 MIN: CPT | Mod: 95,,, | Performed by: INTERNAL MEDICINE

## 2020-07-27 PROCEDURE — 99215 PR OFFICE/OUTPT VISIT, EST, LEVL V, 40-54 MIN: ICD-10-PCS | Mod: 95,,, | Performed by: INTERNAL MEDICINE

## 2020-07-27 RX ORDER — METHOTREXATE 2.5 MG/1
10 TABLET ORAL
Qty: 48 TABLET | Refills: 1 | Status: SHIPPED | OUTPATIENT
Start: 2020-07-27 | End: 2020-11-24 | Stop reason: SDUPTHER

## 2020-07-27 RX ORDER — ROPINIROLE 1 MG/1
1 TABLET, FILM COATED ORAL NIGHTLY
Qty: 30 TABLET | Refills: 2 | Status: SHIPPED | OUTPATIENT
Start: 2020-07-27 | End: 2020-10-29

## 2020-07-27 NOTE — PROGRESS NOTES
RHEUMATOLOGY FOLLOW UP - TELE VISIT     The patient location is: [place]   The chief complaint leading to consultation is: Pain over base of thumb joints, few DIP joints and MCP joints  Visit type: Virtual visit with synchronous audio and video  Total time spent with patient: 15 minutes  Each patient to whom he or she provides medical services by telemedicine is:  (1) informed of the relationship between the physician and patient and the respective role of any other health care provider with respect to management of the patient; and (2) notified that he or she may decline to receive medical services by telemedicine and may withdraw from such care at any time.    HPI:-  Diana TRESA Rubio a 54 y.o. pleasant female seen today through My chart video visit for follow up.  She has longstanding seronegative rheumatoid arthritis..  She is on methotrexate and intermittent low-dose prednisone.    She complains of intermittent pain and swelling over base of bilateral thumbs joints and over few PIP joints.  She does report pain over few MCP joints after prolonged activities.  Morning stiffness sometimes lasts up to 30 min.  She denies any adverse effects from methotrexate.  No infections.  No fever or hospitalization for any infection since last visit.    Review of Systems   Constitutional: Positive for malaise/fatigue. Negative for chills and fever.   HENT: Negative for congestion and sore throat.    Eyes: Negative for blurred vision and redness.   Respiratory: Negative for cough and shortness of breath.    Cardiovascular: Negative for chest pain and leg swelling.   Gastrointestinal: Negative for abdominal pain.   Genitourinary: Negative for dysuria.   Musculoskeletal: Positive for back pain, joint pain, myalgias and neck pain. Negative for falls.   Skin: Negative for rash.   Neurological: Negative for headaches.   Endo/Heme/Allergies: Does not bruise/bleed easily.   Psychiatric/Behavioral: Negative for memory loss. The  patient does not have insomnia.        Past Medical History:   Diagnosis Date    Asthma     Chronic low back pain     Degenerative disc disease, lumbar     Depression     Diabetes mellitus     DM (diabetes mellitus)      am 2020    Fibromyalgia     Hyperlipidemia     Hypertension     Hypothyroidism     MRSA (methicillin resistant Staphylococcus aureus) carrier     Nodule of left lung 2017    CT chest : 16: Small 3 mm pleural-based nodule laterally in the left lower lobe.    Palpitations     Dr. Jesus Lao--cardiology    Stress incontinence     Dr. Zurdo Lopez--urology    Stroke     TIA    Vitamin D deficiency disease        Past Surgical History:   Procedure Laterality Date    HYSTERECTOMY      left ankle surgery      loop recorder  2017    cardiac device    NECK SURGERY  2016    ROTATOR CUFF REPAIR      TONSILLECTOMY          Social History     Tobacco Use    Smoking status: Former Smoker     Packs/day: 0.50     Types: Cigarettes     Quit date: 3/12/2020     Years since quittin.3    Smokeless tobacco: Never Used    Tobacco comment: in smoking cessation program used Chantix    Substance Use Topics    Alcohol use: No     Alcohol/week: 0.0 standard drinks     Frequency: Monthly or less     Drinks per session: 1 or 2     Binge frequency: Never     Comment: OCC    Drug use: No       Family History   Problem Relation Age of Onset    Cancer Mother     Stroke Mother     Heart disease Mother     Diabetes Mother     Cataracts Mother     Hypertension Mother     Breast cancer Mother     Heart disease Father     COPD Father     Hypertension Father     Diabetes Maternal Aunt     Breast cancer Sister        Review of patient's allergies indicates:   Allergen Reactions    Mobic [meloxicam]     Topamax [topiramate]     Adhesive Rash       Physical exam:-    GEN: awake, alert, non-diaphoretic, no psychomotor agitation, no acute distress    HEENT  :Head: atraumatic, normocephalic, no rashes noted, no lesions noted;    Eyes: NO redness, discharge, swelling, or lesions    Nose: NO redness, swelling, discharge, deformity, or impetigo/crusting    Skin: no lesions, wounds, erythema, or cyanosis noted on face or hands    Cardiopulmonary: no increased respiratory effort, speaking in clear sentences    MSK: normal ROM in the neck, Upper extremities, Lower extremities  Good ROM of hands, fist formation 90% with mild synovitis around MCP joints.    Good ambulation in front of the camera    Neuro: cranial nerves grossly normal, speech normal rate and rhythm, orientation arrived to appointment on time with no prompting, moved both upper extremities equally    Pysch:  appearance, behavior, and attitude- well groomed, pleasant, cooperative    Medication List with Changes/Refills   Current Medications    ALBUTEROL (PROVENTIL/VENTOLIN HFA) 90 MCG/ACTUATION INHALER    Inhale 2 puffs into the lungs every 6 (six) hours as needed for Wheezing.    AMLODIPINE (NORVASC) 5 MG TABLET    Take 1 tablet (5 mg total) by mouth once daily.    ATORVASTATIN (LIPITOR) 40 MG TABLET    Take 40 mg by mouth once daily.    BENZONATATE (TESSALON) 200 MG CAPSULE        BLOOD SUGAR DIAGNOSTIC (ONETOUCH VERIO) STRP    Glucose testing once daily.    BLOOD-GLUCOSE METER MISC    Use as directed.    BREO ELLIPTA 200-25 MCG/DOSE DSDV DISKUS INHALER    Inhale 1 puff by mouth once daily    BUPROPION (WELLBUTRIN SR) 150 MG TBSR 12 HR TABLET    Take 1 tablet (150 mg total) by mouth 2 (two) times daily.    BUSPIRONE (BUSPAR) 5 MG TAB    Take 1 tablet (5 mg total) by mouth 2 (two) times daily.    CLOPIDOGREL (PLAVIX) 75 MG TABLET    Take 75 mg by mouth once daily.    CYCLOBENZAPRINE (FLEXERIL) 10 MG TABLET    Take 0.5 tablets (5 mg total) by mouth 3 (three) times daily as needed for Muscle spasms.    DICLOFENAC SODIUM (VOLTAREN) 1 % GEL    Apply topically 2 (two) times daily as needed. Apply to affected area     DULAGLUTIDE (TRULICITY) 0.75 MG/0.5 ML PNIJ    Inject 0.5 mLs (0.75 mg total) into the skin every 7 days.    EUTHYROX 50 MCG TABLET    Take 1 tablet by mouth once daily    FLUTICASONE PROPIONATE (FLONASE) 50 MCG/ACTUATION NASAL SPRAY    2 sprays (100 mcg total) by Each Nostril route once daily.    FOLIC ACID (FOLVITE) 1 MG TABLET    Take 1 tablet (1 mg total) by mouth once daily.    GABAPENTIN (NEURONTIN) 300 MG CAPSULE    Take 1 capsule by mouth twice daily    HYDROXYCHLOROQUINE (PLAQUENIL) 200 MG TABLET    Take 1 tablet (200 mg total) by mouth once daily.    LANCETS (LANCETS,ULTRA THIN) MISC    Glucose testing daily.    LEVOCETIRIZINE (XYZAL) 5 MG TABLET    Take 1 tablet (5 mg total) by mouth every evening.    METFORMIN (GLUCOPHAGE) 500 MG TABLET    Take 1 tablet (500 mg total) by mouth 2 (two) times daily with meals.    METHOTREXATE 2.5 MG TAB    Take 6 tablets (15 mg total) by mouth every 7 days.    NICOTINE, POLACRILEX, (NICORETTE) 2 MG GUM    Take 1 each (2 mg total) by mouth every 2 (two) hours as needed. cinnamon flavored    -PROPYLENE GLYCOL, PF, (SYSTANE ULTRA, PF,) 0.4-0.3 % DPET    Place 1 drop into both eyes 4 (four) times daily.    PREDNISONE (DELTASONE) 5 MG TABLET    Take 1 tablet (5 mg total) by mouth once daily.    ROPINIROLE (REQUIP) 1 MG TABLET    Take 1 tablet (1 mg total) by mouth 3 (three) times daily.    VARENICLINE (CHANTIX) 1 MG TAB    as directed    VARENICLINE (CHANTIX) 1 MG TAB    Take 1 tablet (1 mg total) by mouth 2 (two) times daily.    VENLAFAXINE (EFFEXOR-XR) 150 MG CP24    TAKE 1 CAPSULE BY MOUTH ONCE DAILY       Assessment/Plans:-  1. Rheumatoid arthritis of multiple sites with negative rheumatoid factor    2. Fibromyalgia    3. Immunosuppressed status    4. High risk medication use    5. Restless legs syndrome (RLS) Active   ·  Active rheumatoid arthritis not responding to methotrexate.  Start Humira.  Discussed in detail about all adverse effects of the medication  including life-threatening infections and malignancy.  Voiced understanding.  · Well controlled fibromyalgia with mild intermittent flares.  Continue FX or and muscle relaxants p.r.n..  · Compromised immune system secondary to autoimmune disease and use of immunosuppressive drugs. Monitor carefully for infections. Advised to get immediate medical care if any infection. Also advised strict adherence to age appropriate vaccinations and cancer screenings with PCP.    · Hold methotrexate and Humira if any infection.  Safety labs due in August.  · Increase dose of Requip to help restless leg syndrome.      Problem List Items Addressed This Visit     Fibromyalgia    High risk medication use    Rheumatoid arthritis of multiple sites with negative rheumatoid factor - Primary    Relevant Medications    adalimumab 40 mg/0.4 mL PnKt    methotrexate 2.5 MG Tab    Restless legs syndrome (RLS)    Relevant Medications    rOPINIRole (REQUIP) 1 MG tablet    Immunosuppressed status          Follow up in about 3 months (around 10/27/2020).    Disclaimer: This note was prepared using voice recognition system and is likely to have sound alike errors and is not proof read.  Please call me with any questions.

## 2020-07-27 NOTE — TELEPHONE ENCOUNTER
----- Message from Bryan Parmar MD sent at 7/27/2020  2:08 PM CDT -----  Please schedule [facility], CMP on 08/28. Follow up in 3 months.

## 2020-07-27 NOTE — TELEPHONE ENCOUNTER
Spoke with pt and scheduled appt with Dr. MORGAN for 10.28.20 at 2.15 pm and labs on 8.28.20 at [facility]. Pt verbalized understanding

## 2020-07-28 ENCOUNTER — OFFICE VISIT (OUTPATIENT)
Dept: URGENT CARE | Facility: CLINIC | Age: 55
End: 2020-07-28
Payer: COMMERCIAL

## 2020-07-28 VITALS — WEIGHT: 196.44 LBS | TEMPERATURE: 99 F | OXYGEN SATURATION: 94 % | HEART RATE: 104 BPM | BODY MASS INDEX: 35.93 KG/M2

## 2020-07-28 DIAGNOSIS — R50.9 FEVER, UNSPECIFIED FEVER CAUSE: ICD-10-CM

## 2020-07-28 DIAGNOSIS — R05.9 COUGH: Primary | ICD-10-CM

## 2020-07-28 PROCEDURE — 99999 PR PBB SHADOW E&M-EST. PATIENT-LVL V: ICD-10-PCS | Mod: PBBFAC,,, | Performed by: PHYSICIAN ASSISTANT

## 2020-07-28 PROCEDURE — 99214 PR OFFICE/OUTPT VISIT, EST, LEVL IV, 30-39 MIN: ICD-10-PCS | Mod: S$GLB,,, | Performed by: PHYSICIAN ASSISTANT

## 2020-07-28 PROCEDURE — 99214 OFFICE O/P EST MOD 30 MIN: CPT | Mod: S$GLB,,, | Performed by: PHYSICIAN ASSISTANT

## 2020-07-28 PROCEDURE — 3008F PR BODY MASS INDEX (BMI) DOCUMENTED: ICD-10-PCS | Mod: CPTII,S$GLB,, | Performed by: PHYSICIAN ASSISTANT

## 2020-07-28 PROCEDURE — 3008F BODY MASS INDEX DOCD: CPT | Mod: CPTII,S$GLB,, | Performed by: PHYSICIAN ASSISTANT

## 2020-07-28 PROCEDURE — U0003 INFECTIOUS AGENT DETECTION BY NUCLEIC ACID (DNA OR RNA); SEVERE ACUTE RESPIRATORY SYNDROME CORONAVIRUS 2 (SARS-COV-2) (CORONAVIRUS DISEASE [COVID-19]), AMPLIFIED PROBE TECHNIQUE, MAKING USE OF HIGH THROUGHPUT TECHNOLOGIES AS DESCRIBED BY CMS-2020-01-R: HCPCS

## 2020-07-28 PROCEDURE — 99999 PR PBB SHADOW E&M-EST. PATIENT-LVL V: CPT | Mod: PBBFAC,,, | Performed by: PHYSICIAN ASSISTANT

## 2020-07-28 RX ORDER — ESTRADIOL 10 UG/1
10 INSERT VAGINAL
COMMUNITY
End: 2021-01-27

## 2020-07-28 RX ORDER — PRAMIPEXOLE DIHYDROCHLORIDE 0.12 MG/1
TABLET ORAL
COMMUNITY
Start: 2020-06-29 | End: 2020-08-06

## 2020-07-28 RX ORDER — BENZONATATE 100 MG/1
100 CAPSULE ORAL 3 TIMES DAILY PRN
Qty: 30 CAPSULE | Refills: 0 | Status: SHIPPED | OUTPATIENT
Start: 2020-07-28 | End: 2020-08-07

## 2020-07-28 NOTE — PATIENT INSTRUCTIONS
COVID swab today.  Will call in 3-5 days with results.  Tessalon perles to pharmacy for cough.  Follow up with primary care physician in 2-3 days.  Increase fluid intake.  Tylenol and/or Ibuprofen for fever and body aches.  Report to ER with new or worsening symptoms.    Instructions for Patients with Confirmed or Suspected COVID-19    If you are awaiting your test result, you will either be called or it will be released to the patient portal.  If you have any questions about your test, please visit [email or web address] or call our COVID-19 information line at [phone].      Instructions for non-hospitalized or discharged patients with confirmed or suspected COVID-19:       Stay home except to get medical care.    Separate yourself from other people and animals in your home.    Call ahead before visiting your doctor.    Wear a face mask.    Cover your coughs and sneezes.    Clean your hands often.    Avoid sharing personal household items.    Clean all high-touch surfaces every day.    Monitor your symptoms. Seek prompt medical attention if your illness is worsening (e.g., difficulty breathing). Before seeking care, call your healthcare provider.    If you have a medical emergency and must call 911, notify the dispatcher that you have or are being evaluated for COVID-19. If possible, put on a face mask before emergency medical services arrive.    Use the following symptom-based strategy to return to normal activity following a suspected or confirmed case of COVID-19. Continue isolation until:   o At least 3 days (72 hours) have passed since recovery defined as resolution of fever without the use of fever-reducing medications and improvement in respiratory symptoms (e.g. cough, shortness of breath), and   o At least 10 days have passed since the first positive test.       As one of the next steps, you will receive a call or text from the [place] Department of Health ([facility]) COVID-19 Tracing  Team. See the contact information below so you know not to ignore the health departments call. It is important that you contact them back immediately so they can help.     Contact Tracer Number:  [phone]  Caller ID for most carriers: [facility]    What is contact tracing?   Contact tracing is a process that helps identify everyone who has been in close contact with an infected person. Contact tracers let those people know they may have been exposed and guide them on next steps. Confidentiality is important for everyone; no one will be told who may have exposed them to the virus.   Your involvement is important. The more we know about where and how this virus is spreading, the better chance we have at stopping it from spreading further.  What does exposure mean?   Exposure means you have been within 6 feet for more than 15 minutes with a person who has or had COVID-19.  What kind of questions do the contact tracers ask?   A contact tracer will confirm your basic contact information including name, address, phone number, and next of kin, as well as asking about any symptoms you may have had. Theyll also ask you how you think you may have gotten sick, such as places where you may have been exposed to the virus, and people you were with. Those names will never be shared with anyone outside of that call, and will only be used to help trace and stop the spread of the virus.   I have privacy concerns. How will the state use my information?   Your privacy about your health is important. All calls are completed using call centers that use the appropriate health privacy protection measures (HIPAA compliance), meaning that your patient information is safe. No one will ever ask you any questions related to immigration status. Your health comes first.   Do I have to participate?   You do not have to participate, but we strongly encourage you to. Contact tracing can help us catch and control new outbreaks as  theyre developing to keep your friends and family safe.   What if I dont hear from anyone?   If you dont receive a call within 24 hours, you can call the number above right away to inquire about your status. That line is open from 8:00 am - 8:00 p.m., 7 days a week.  Contact tracing saves lives! Together, we have the power to beat this virus and keep our loved ones and neighbors safe.       Instructions for household members, intimate partners and caregivers in a non-healthcare setting of a patient with confirmed or suspected COVID-19:         Close contacts should monitor their health and call their healthcare provider right away if they develop symptoms suggestive of COVID-19 (e.g., fever, cough, shortness of breath).    Stay home except to get medical care. Separate yourself from other people and animals in the home.   Monitor the patients symptoms. If the patient is getting sicker, call his or her healthcare provider. If the patient has a medical emergency and you need to call 911, notify the dispatch personnel that the patient has or is being evaluated for COVID-19.    Wear a facemask when around other people such as sharing a room or vehicle and before entering a healthcare provider's office.   Cover coughs and sneezes with a tissue. Throw used tissues in a lined trash can immediately and wash hands.   Clean hands often with soap and water for at least 20 seconds or with an alcohol-based hand , rubbing hands together until they feel dry. Avoid touching your eyes, nose, and mouth with unwashed hands.   Clean all high-touch; surfaces every day, including counters, tabletops, doorknobs, bathroom fixtures, toilets, phones, keyboards, tablets, bedside tables, etc. Use a household cleaning spray or wipe according to label instructions.   Avoid sharing personal household items such as dishes, drinking glasses, cups, towels, bedding, etc. After these items are used, they should be washed  thoroughly with soap and water.   Continue isolation until:   At least 3 days (72 hours) have passed since recovery defined as resolution of fever without the use of fever-reducing medications and improvement in respiratory symptoms (e.g. cough, shortness of breath), and    At least 10 days have passed since the patients first positive test.    https://www.cdc.gov/coronavirus/2019-ncov/your-health/index.htm

## 2020-07-28 NOTE — LETTER
July 28, 2020      [facility]  [address]  [facility] [address]  Phone: [phone]  Fax: [phone]       Patient: Diana Escobar   YOB: 1965  Date of Visit: 07/28/2020    To Whom It May Concern:    Yaneli Escobar  was at [facility] on 07/28/2020. Please excuse patient from 7/28/2020 through 7/31/2020.  If you have any questions or concerns, or if I can be of further assistance, please do not hesitate to contact me.    Sincerely,    Vinh Retana PA-C

## 2020-07-28 NOTE — PROGRESS NOTES
Diana Escobar is a 54 year old female who presents today with complaints of shortness of breath and congestion for 5-6 days.  She has associated symptoms of subjective fever, body aches, and dry cough.  She has had potential exposure to COVID 19 because she works at [employer].  She has a history of asthma and states she has had to use her inhaler more often over the past few days.      All areas of patients chart reviewed including past medical history, past surgical history, medications, allergies, family history, and social history.    Review of Systems   Constitutional: Positive for fever.   HENT: Positive for congestion. Negative for sore throat.    Respiratory: Positive for cough and shortness of breath.    Cardiovascular: Negative for chest pain.   Gastrointestinal: Negative for abdominal pain and nausea.   Genitourinary: Negative for dysuria and frequency.   Musculoskeletal: Negative for back pain.   Neurological: Negative for headaches.   All other systems reviewed and are negative.    Physical Exam:  Pulse 102   Temp 99 °F (37.2 °C) (Temporal)   Wt 89.1 kg (196 lb 6.9 oz)   SpO2 (!) 94%   BMI 35.93 kg/m²   Physical Exam   Constitutional: She is oriented to person, place, and time and well-developed, well-nourished, and in no distress.   HENT:   Head: Normocephalic.   Right Ear: External ear normal.   Left Ear: External ear normal.   Mouth/Throat: No oropharyngeal exudate.   Neck: Normal range of motion.   Cardiovascular: Normal rate, regular rhythm and normal heart sounds.   Pulmonary/Chest: Effort normal. No accessory muscle usage. No respiratory distress. She has no decreased breath sounds. She has wheezes (mild in all lung fields). She has no rhonchi. She has no rales.   Neurological: She is alert and oriented to person, place, and time.   Skin: Skin is warm.   Psychiatric: Affect normal.   Vitals reviewed.    Assessment:  1. Cough  - COVID-19 Routine Screening  - benzonatate (TESSALON) 100 MG  capsule; Take 1 capsule (100 mg total) by mouth 3 (three) times daily as needed for Cough (Do not take more than 6 capsules in a 24 hour period.).  Dispense: 30 capsule; Refill: 0    2. Fever, unspecified fever cause  - COVID-19 Routine Screening      Plan:  Patient was ambulated and no decrease in oxygen levels.  Appears stable.  COVID swab today.  Will call in 3-5 days with results.  Tessalon perles to pharmacy for cough.  Follow up with primary care physician in 2-3 days.  Increase fluid intake.  Tylenol and/or Ibuprofen for fever and body aches.  Report to ER with new or worsening symptoms.    Instructions for Patients with Confirmed or Suspected COVID-19    If you are awaiting your test result, you will either be called or it will be released to the patient portal.  If you have any questions about your test, please visit [email or web address] or call our COVID-19 information line at [phone].       Instructions for non-hospitalized or discharged patients with confirmed or suspected COVID-19:       Stay home except to get medical care.    Separate yourself from other people and animals in your home.    Call ahead before visiting your doctor.    Wear a face mask.    Cover your coughs and sneezes.    Clean your hands often.    Avoid sharing personal household items.    Clean all high-touch surfaces every day.    Monitor your symptoms. Seek prompt medical attention if your illness is worsening (e.g., difficulty breathing). Before seeking care, call your healthcare provider.    If you have a medical emergency and must call 911, notify the dispatcher that you have or are being evaluated for COVID-19. If possible, put on a face mask before emergency medical services arrive.    Use the following symptom-based strategy to return to normal activity following a suspected or confirmed case of COVID-19. Continue isolation until:   o At least 3 days (72 hours) have passed since recovery defined as  resolution of fever without the use of fever-reducing medications and improvement in respiratory symptoms (e.g. cough, shortness of breath), and   o At least 10 days have passed since the first positive test.       As one of the next steps, you will receive a call or text from the [place] Department of Health ([facility]) COVID-19 Tracing Team. See the contact information below so you know not to ignore the health departments call. It is important that you contact them back immediately so they can help.     Contact Tracer Number:  [phone]  Caller ID for most carriers: [facility]    What is contact tracing?   Contact tracing is a process that helps identify everyone who has been in close contact with an infected person. Contact tracers let those people know they may have been exposed and guide them on next steps. Confidentiality is important for everyone; no one will be told who may have exposed them to the virus.   Your involvement is important. The more we know about where and how this virus is spreading, the better chance we have at stopping it from spreading further.  What does exposure mean?   Exposure means you have been within 6 feet for more than 15 minutes with a person who has or had COVID-19.  What kind of questions do the contact tracers ask?   A contact tracer will confirm your basic contact information including name, address, phone number, and next of kin, as well as asking about any symptoms you may have had. Theyll also ask you how you think you may have gotten sick, such as places where you may have been exposed to the virus, and people you were with. Those names will never be shared with anyone outside of that call, and will only be used to help trace and stop the spread of the virus.   I have privacy concerns. How will the state use my information?   Your privacy about your health is important. All calls are completed using call centers that use the appropriate health privacy  protection measures (HIPAA compliance), meaning that your patient information is safe. No one will ever ask you any questions related to immigration status. Your health comes first.   Do I have to participate?   You do not have to participate, but we strongly encourage you to. Contact tracing can help us catch and control new outbreaks as theyre developing to keep your friends and family safe.   What if I dont hear from anyone?   If you dont receive a call within 24 hours, you can call the number above right away to inquire about your status. That line is open from 8:00 am - 8:00 p.m., 7 days a week.  Contact tracing saves lives! Together, we have the power to beat this virus and keep our loved ones and neighbors safe.       Instructions for household members, intimate partners and caregivers in a non-healthcare setting of a patient with confirmed or suspected COVID-19:         Close contacts should monitor their health and call their healthcare provider right away if they develop symptoms suggestive of COVID-19 (e.g., fever, cough, shortness of breath).    Stay home except to get medical care. Separate yourself from other people and animals in the home.   Monitor the patients symptoms. If the patient is getting sicker, call his or her healthcare provider. If the patient has a medical emergency and you need to call 911, notify the dispatch personnel that the patient has or is being evaluated for COVID-19.    Wear a facemask when around other people such as sharing a room or vehicle and before entering a healthcare provider's office.   Cover coughs and sneezes with a tissue. Throw used tissues in a lined trash can immediately and wash hands.   Clean hands often with soap and water for at least 20 seconds or with an alcohol-based hand , rubbing hands together until they feel dry. Avoid touching your eyes, nose, and mouth with unwashed hands.   Clean all high-touch; surfaces every day, including  counters, tabletops, doorknobs, bathroom fixtures, toilets, phones, keyboards, tablets, bedside tables, etc. Use a household cleaning spray or wipe according to label instructions.   Avoid sharing personal household items such as dishes, drinking glasses, cups, towels, bedding, etc. After these items are used, they should be washed thoroughly with soap and water.   Continue isolation until:   At least 3 days (72 hours) have passed since recovery defined as resolution of fever without the use of fever-reducing medications and improvement in respiratory symptoms (e.g. cough, shortness of breath), and    At least 10 days have passed since the patients first positive test.    https://www.cdc.gov/coronavirus/2019-ncov/your-health/index.htm

## 2020-07-30 DIAGNOSIS — U07.1 COVID-19 VIRUS DETECTED: ICD-10-CM

## 2020-07-30 LAB — SARS-COV-2 RNA RESP QL NAA+PROBE: DETECTED

## 2020-07-30 NOTE — TELEPHONE ENCOUNTER
DOCUMENTATION ONLY:  Prior authorization for Humira approved from 07/28/20 to 07/28/21    Case Id: [id number]    Co-pay: $100    Assistance is needed    Patient must fill with [facility]

## 2020-07-31 ENCOUNTER — TELEPHONE (OUTPATIENT)
Dept: URGENT CARE | Facility: CLINIC | Age: 55
End: 2020-07-31

## 2020-07-31 NOTE — TELEPHONE ENCOUNTER
Your test was POSITIVE for COVID-19 (coronavirus).    All questions answered.       Sincerely,     Vinh Retana PA-C

## 2020-08-03 RX ORDER — ADALIMUMAB 40MG/0.4ML
40 KIT SUBCUTANEOUS
Qty: 2 PEN | Refills: 11 | Status: SHIPPED | OUTPATIENT
Start: 2020-08-03 | End: 2020-08-05 | Stop reason: SDUPTHER

## 2020-08-03 NOTE — TELEPHONE ENCOUNTER
RX call to offer patient possible financial assistance via a copay card due to $100 copay before patients script gets transferred over to [facility] where she is locked in at. Patient reached-- gave me permission to go forward and obtain a copay card for her on her behalf. I informed the patient that she is also locked in with [facility] therefore once I get the copay card I will send it to her via My Chart along with [facility] phone number, patient agreed. @2:42PM -DENICE

## 2020-08-03 NOTE — TELEPHONE ENCOUNTER
"----- Message from Ariella Cedeno sent at 8/3/2020  2:42 PM CDT -----  Regarding: [facility] is Out of Network  Contact: [phone]   and staff,    BARRON:  The patients Humira prior authorization has been approved through 07/28/2021. Patient's insurance requires the patient to fill through [facility]. Please send prescription to [facility], which has been added to the patients EPIC profile. Patient has been notified and provided with the necessary info to call and schedule a delivery.    To complete this in EPIC, the original order MUST be discontinued and re-typed as a new prescription with the updated pharmacy listed. Clicking "reorder" will continue to route the rx to [facility] even if the pharmacy is changed. Please opt the patient out of [facility] when the BPA is fired.     Thank you,   Gopi Joiner CPhT.  [facility]- Patient Care Advocate  Direct Phone: [phone]  Fax: [phone]     "

## 2020-08-03 NOTE — TELEPHONE ENCOUNTER
Approved- Co-Pay Card    FOR DOCUMENTATION ONLY:    Financial Assistance for Humira approved with a Co-Pay Card.  Source: Humira Copay Card    ID: [id number]  BIN: [id number]  PCN: [id number]  GRP: [id number]    Max Amount: $15,000/year   -MYRONLAKESHIA

## 2020-08-05 DIAGNOSIS — M06.09 RHEUMATOID ARTHRITIS OF MULTIPLE SITES WITH NEGATIVE RHEUMATOID FACTOR: Primary | ICD-10-CM

## 2020-08-05 RX ORDER — ADALIMUMAB 40MG/0.4ML
40 KIT SUBCUTANEOUS
Qty: 2 PEN | Refills: 11 | Status: SHIPPED | OUTPATIENT
Start: 2020-08-05 | End: 2020-09-08 | Stop reason: SINTOL

## 2020-08-06 ENCOUNTER — OFFICE VISIT (OUTPATIENT)
Dept: INTERNAL MEDICINE | Facility: CLINIC | Age: 55
End: 2020-08-06
Payer: COMMERCIAL

## 2020-08-06 VITALS — OXYGEN SATURATION: 95 %

## 2020-08-06 DIAGNOSIS — J45.20 MILD INTERMITTENT ASTHMA WITHOUT COMPLICATION: ICD-10-CM

## 2020-08-06 DIAGNOSIS — U07.1 COVID-19: Primary | ICD-10-CM

## 2020-08-06 DIAGNOSIS — R05.9 COUGH: ICD-10-CM

## 2020-08-06 DIAGNOSIS — F17.200 TOBACCO USE DISORDER: ICD-10-CM

## 2020-08-06 PROCEDURE — 99213 OFFICE O/P EST LOW 20 MIN: CPT | Mod: 95,,, | Performed by: INTERNAL MEDICINE

## 2020-08-06 PROCEDURE — 99213 PR OFFICE/OUTPT VISIT, EST, LEVL III, 20-29 MIN: ICD-10-PCS | Mod: 95,,, | Performed by: INTERNAL MEDICINE

## 2020-08-06 RX ORDER — PROMETHAZINE HYDROCHLORIDE AND DEXTROMETHORPHAN HYDROBROMIDE 6.25; 15 MG/5ML; MG/5ML
5 SYRUP ORAL EVERY 8 HOURS PRN
Qty: 118 ML | Refills: 0 | Status: SHIPPED | OUTPATIENT
Start: 2020-08-06 | End: 2020-08-16

## 2020-08-06 NOTE — PATIENT INSTRUCTIONS
Instructions for Patients with Confirmed or Suspected COVID-19    If you are awaiting your test result, you will either be called or it will be released to the patient portal.  If you have any questions about your test, please visit [email or web address] or call our COVID-19 information line at [phone].      Instructions for non-hospitalized or discharged patients with confirmed or suspected COVID-19:       Stay home except to get medical care.    Separate yourself from other people and animals in your home.    Call ahead before visiting your doctor.    Wear a face mask.    Cover your coughs and sneezes.    Clean your hands often.    Avoid sharing personal household items.    Clean all high-touch surfaces every day.    Monitor your symptoms. Seek prompt medical attention if your illness is worsening (e.g., difficulty breathing). Before seeking care, call your healthcare provider.    If you have a medical emergency and must call 911, notify the dispatcher that you have or are being evaluated for COVID-19. If possible, put on a face mask before emergency medical services arrive.    Use the following symptom-based strategy to return to normal activity following a suspected or confirmed case of COVID-19. Continue isolation until:   o At least 3 days (72 hours) have passed since recovery defined as resolution of fever without the use of fever-reducing medications and improvement in respiratory symptoms (e.g. cough, shortness of breath), and   o At least 10 days have passed since the first positive test.       As one of the next steps, you will receive a call or text from the [place] Department of Health ([facility]) COVID-19 Tracing Team. See the contact information below so you know not to ignore the health departments call. It is important that you contact them back immediately so they can help.     Contact Tracer Number:  [phone]  Caller ID for most carriers: [place] [facility]    What is  contact tracing?   Contact tracing is a process that helps identify everyone who has been in close contact with an infected person. Contact tracers let those people know they may have been exposed and guide them on next steps. Confidentiality is important for everyone; no one will be told who may have exposed them to the virus.   Your involvement is important. The more we know about where and how this virus is spreading, the better chance we have at stopping it from spreading further.  What does exposure mean?   Exposure means you have been within 6 feet for more than 15 minutes with a person who has or had COVID-19.  What kind of questions do the contact tracers ask?   A contact tracer will confirm your basic contact information including name, address, phone number, and next of kin, as well as asking about any symptoms you may have had. Theyll also ask you how you think you may have gotten sick, such as places where you may have been exposed to the virus, and people you were with. Those names will never be shared with anyone outside of that call, and will only be used to help trace and stop the spread of the virus.   I have privacy concerns. How will the state use my information?   Your privacy about your health is important. All calls are completed using call centers that use the appropriate health privacy protection measures (HIPAA compliance), meaning that your patient information is safe. No one will ever ask you any questions related to immigration status. Your health comes first.   Do I have to participate?   You do not have to participate, but we strongly encourage you to. Contact tracing can help us catch and control new outbreaks as theyre developing to keep your friends and family safe.   What if I dont hear from anyone?   If you dont receive a call within 24 hours, you can call the number above right away to inquire about your status. That line is open from 8:00 am - 8:00 p.m., 7 days a  week.  Contact tracing saves lives! Together, we have the power to beat this virus and keep our loved ones and neighbors safe.       Instructions for household members, intimate partners and caregivers in a non-healthcare setting of a patient with confirmed or suspected COVID-19:         Close contacts should monitor their health and call their healthcare provider right away if they develop symptoms suggestive of COVID-19 (e.g., fever, cough, shortness of breath).    Stay home except to get medical care. Separate yourself from other people and animals in the home.   Monitor the patients symptoms. If the patient is getting sicker, call his or her healthcare provider. If the patient has a medical emergency and you need to call 911, notify the dispatch personnel that the patient has or is being evaluated for COVID-19.    Wear a facemask when around other people such as sharing a room or vehicle and before entering a healthcare provider's office.   Cover coughs and sneezes with a tissue. Throw used tissues in a lined trash can immediately and wash hands.   Clean hands often with soap and water for at least 20 seconds or with an alcohol-based hand , rubbing hands together until they feel dry. Avoid touching your eyes, nose, and mouth with unwashed hands.   Clean all high-touch; surfaces every day, including counters, tabletops, doorknobs, bathroom fixtures, toilets, phones, keyboards, tablets, bedside tables, etc. Use a household cleaning spray or wipe according to label instructions.   Avoid sharing personal household items such as dishes, drinking glasses, cups, towels, bedding, etc. After these items are used, they should be washed thoroughly with soap and water.   Continue isolation until:   At least 3 days (72 hours) have passed since recovery defined as resolution of fever without the use of fever-reducing medications and improvement in respiratory symptoms (e.g. cough, shortness of breath),  and    At least 10 days have passed since the patients first positive test.    https://www.cdc.gov/coronavirus/2019-ncov/your-health/index.htm

## 2020-08-06 NOTE — PROGRESS NOTES
Subjective:       Patient ID: Diana Escobar is a 55 y.o. female.    Chief Complaint: Follow-up (COVID 19 )      Diana Escobar  55 y.o. White female    Patient presents with:  Follow-up: COVID 19     HPI: Presents for a telemedicine visit.   The patient location is: [place]   The chief complaint leading to consultation is: follow up/COVID 19    Visit type: audiovisual    Face to Face time with patient: 9 minutes   9 minutes of total time spent on the encounter, which includes face to face time and non-face to face time preparing to see the patient (eg, review of tests), Obtaining and/or reviewing separately obtained history, Documenting clinical information in the electronic or other health record, Independently interpreting results (not separately reported) and communicating results to the patient/family/caregiver, or Care coordination (not separately reported).     Each patient to whom he or she provides medical services by telemedicine is:  (1) informed of the relationship between the physician and patient and the respective role of any other health care provider with respect to management of the patient; and (2) notified that he or she may decline to receive medical services by telemedicine and may withdraw from such care at any time.    Diagnosed with COVID 19 on 7/28. She has been in quarantine. She continues to cough but it has improved slightly. She is taking Tessalon. She does not feel it helps. She denies having a fever and her cough is not productive.   She has been checking her oxygen saturation regularly and gets readings of 90-95%.   She has asthma and has been having wheezing on occasion. She is using her inhaler as prescribed.   She continues to smoke.     Past Medical History:  Asthma  Chronic low back pain  Degenerative disc disease, lumbar  Depression  2014: Diabetes mellitus  Fibromyalgia  Hyperlipidemia  Hypertension  Hypothyroidism  MRSA (methicillin resistant Staphylococcus aureus)  carrier  2/6/2017: Nodule of left lung      Comment:  CT chest : 04/06/16: Small 3 mm pleural-based nodule                laterally in the left lower lobe.  Palpitations      Comment:  Dr. Jesus Lao--cardiology  Stress incontinence      Comment:  Dr. Zurdo Lopez--urology  Stroke      Comment:  TIA  Vitamin D deficiency disease    Current Outpatient Medications on File Prior to Visit:  adalimumab (HUMIRA,CF, PEN) 40 mg/0.4 mL PnKt, Inject 0.4 mLs (40 mg total) into the skin every 14 (fourteen) days., Disp: 2 pen, Rfl: 11  albuterol (PROVENTIL/VENTOLIN HFA) 90 mcg/actuation inhaler, Inhale 2 puffs into the lungs every 6 (six) hours as needed for Wheezing., Disp: 18 g, Rfl: 6  amLODIPine (NORVASC) 5 MG tablet, Take 1 tablet (5 mg total) by mouth once daily., Disp: 90 tablet, Rfl: 1  atorvastatin (LIPITOR) 40 MG tablet, Take 40 mg by mouth once daily., Disp: , Rfl:   benzonatate (TESSALON) 100 MG capsule, Take 1 capsule (100 mg total) by mouth 3 (three) times daily as needed for Cough (Do not take more than 6 capsules in a 24 hour period.)., Disp: 30 capsule, Rfl: 0  blood sugar diagnostic (ONETOUCH VERIO) Strp, Glucose testing once daily., Disp: 30 each, Rfl: 6  blood-glucose meter Misc, Use as directed., Disp: 1 each, Rfl: 0  BREO ELLIPTA 200-25 mcg/dose DsDv diskus inhaler, Inhale 1 puff by mouth once daily, Disp: 180 each, Rfl: 3  busPIRone (BUSPAR) 5 MG Tab, Take 1 tablet (5 mg total) by mouth 2 (two) times daily. (Patient taking differently: Take 5 mg by mouth 2 (two) times daily as needed. ), Disp: 60 tablet, Rfl: 6  clopidogrel (PLAVIX) 75 mg tablet, Take 75 mg by mouth once daily., Disp: , Rfl:   diclofenac sodium (VOLTAREN) 1 % Gel, Apply topically 2 (two) times daily as needed. Apply to affected area, Disp: 1 Tube, Rfl: 1  dulaglutide (TRULICITY) 0.75 mg/0.5 mL PnIj, Inject 0.5 mLs (0.75 mg total) into the skin every 7 days., Disp: 4 Syringe, Rfl: 3  estradioL (IMVEXXY MAINTENANCE PACK) 10 mcg Inst,  Place 10 mcg vaginally., Disp: , Rfl:   EUTHYROX 50 mcg tablet, Take 1 tablet by mouth once daily, Disp: 30 tablet, Rfl: 0  folic acid (FOLVITE) 1 MG tablet, Take 1 tablet (1 mg total) by mouth once daily., Disp: 90 tablet, Rfl: 3  gabapentin (NEURONTIN) 300 MG capsule, Take 1 capsule by mouth twice daily, Disp: 60 capsule, Rfl: 0  lancets (LANCETS,ULTRA THIN) Misc, Glucose testing daily., Disp: 50 each, Rfl: 11  metFORMIN (GLUCOPHAGE) 500 MG tablet, Take 1 tablet (500 mg total) by mouth 2 (two) times daily with meals., Disp: 60 tablet, Rfl: 3  methotrexate 2.5 MG Tab, Take 4 tablets (10 mg total) by mouth every 7 days., Disp: 48 tablet, Rfl: 1  peg 400-propylene glycol, PF, (SYSTANE ULTRA, PF,) 0.4-0.3 % Dpet, Place 1 drop into both eyes 4 (four) times daily., Disp: 1 each, Rfl:   rOPINIRole (REQUIP) 1 MG tablet, Take 1 tablet (1 mg total) by mouth every evening., Disp: 30 tablet, Rfl: 2  venlafaxine (EFFEXOR-XR) 150 MG Cp24, TAKE 1 CAPSULE BY MOUTH ONCE DAILY, Disp: 30 capsule, Rfl: 6    Allergies:  Review of patient's allergies indicates:   -- Mobic (meloxicam)    -- Topamax (topiramate)    -- Adhesive -- Rash        Review of Systems   Constitutional: Negative for activity change and unexpected weight change.   HENT: Negative for hearing loss, rhinorrhea and trouble swallowing.    Eyes: Negative for discharge and visual disturbance.   Respiratory: Positive for cough, chest tightness and wheezing.    Cardiovascular: Positive for palpitations. Negative for chest pain.   Gastrointestinal: Positive for diarrhea. Negative for blood in stool, constipation and vomiting.   Endocrine: Negative for polydipsia and polyuria.   Genitourinary: Negative for difficulty urinating, dysuria, hematuria and menstrual problem.   Musculoskeletal: Negative for arthralgias, joint swelling and neck pain.   Neurological: Positive for headaches. Negative for weakness.   Psychiatric/Behavioral: Negative for confusion and dysphoric mood.          Objective:      Physical Exam  Vitals signs reviewed.   Constitutional:       General: She is not in acute distress.     Appearance: She is well-developed.   Pulmonary:      Effort: Pulmonary effort is normal. No respiratory distress.   Neurological:      Mental Status: She is alert and oriented to person, place, and time.   Psychiatric:         Behavior: Behavior normal.         Thought Content: Thought content normal.         Judgment: Judgment normal.         Assessment:       1. COVID-19    2. Cough    3. Mild intermittent asthma without complication    4. Tobacco use disorder        Plan:       Diana was seen today for follow-up.    Diagnoses and all orders for this visit:    COVID-19  -     Afebrile  -     Cough lingers, but slightly improved  -     Advised to continue checking oxygen saturations  -     Notified of red flags  -     Handout provided    Cough  -     promethazine-dextromethorphan (PROMETHAZINE-DM) 6.25-15 mg/5 mL Syrp; Take 5 mLs by mouth every 8 (eight) hours as needed.    Mild intermittent asthma without complication  -     Continue current management  -     Recommend smoking cessation    Tobacco use disorder  -     Discussed cessation    RTC as needed.

## 2020-08-12 DIAGNOSIS — J45.20 MILD INTERMITTENT ASTHMA WITHOUT COMPLICATION: Primary | ICD-10-CM

## 2020-08-18 DIAGNOSIS — J45.20 MILD INTERMITTENT ASTHMA WITHOUT COMPLICATION: Primary | ICD-10-CM

## 2020-08-24 ENCOUNTER — CLINICAL SUPPORT (OUTPATIENT)
Dept: SMOKING CESSATION | Facility: CLINIC | Age: 55
End: 2020-08-24
Payer: COMMERCIAL

## 2020-08-24 DIAGNOSIS — F17.200 NICOTINE DEPENDENCE: Primary | ICD-10-CM

## 2020-08-24 PROCEDURE — 99407 PR TOBACCO USE CESSATION INTENSIVE >10 MINUTES: ICD-10-PCS | Mod: S$GLB,,,

## 2020-08-24 PROCEDURE — 99407 BEHAV CHNG SMOKING > 10 MIN: CPT | Mod: S$GLB,,,

## 2020-08-24 NOTE — PROGRESS NOTES
Spoke with patient today in regard to smoking cessation progress for 12 month phone follow up on Quit 2. Patient not tobacco free at this time. Patient has scheduled an appointment to return to the program for Quit attempt #3. Informed patient of benefit period, future follow ups, and contact information if any further help or support is needed. Will resolve episode and complete smart form for Quit attempt #2.

## 2020-08-26 ENCOUNTER — PATIENT OUTREACH (OUTPATIENT)
Dept: ADMINISTRATIVE | Facility: OTHER | Age: 55
End: 2020-08-26

## 2020-08-27 NOTE — PROGRESS NOTES
Chart reviewed.   Immunizations: Triggered Imm Registry     Orders placed: n/a  Upcoming appts to satisfy ZENOBIA topics: n/a

## 2020-08-31 ENCOUNTER — PATIENT OUTREACH (OUTPATIENT)
Dept: ADMINISTRATIVE | Facility: HOSPITAL | Age: 55
End: 2020-08-31

## 2020-08-31 NOTE — PROGRESS NOTES
Working mammogram report; I sent mammogram request to [facility] to request mammogram from [facility] provider (Jesus Castro).

## 2020-09-07 ENCOUNTER — PATIENT MESSAGE (OUTPATIENT)
Dept: RHEUMATOLOGY | Facility: CLINIC | Age: 55
End: 2020-09-07

## 2020-09-07 DIAGNOSIS — M06.09 RHEUMATOID ARTHRITIS OF MULTIPLE SITES WITH NEGATIVE RHEUMATOID FACTOR: Primary | ICD-10-CM

## 2020-09-08 ENCOUNTER — CLINICAL SUPPORT (OUTPATIENT)
Dept: SMOKING CESSATION | Facility: CLINIC | Age: 55
End: 2020-09-08
Payer: COMMERCIAL

## 2020-09-08 DIAGNOSIS — F17.200 NICOTINE DEPENDENCE: Primary | ICD-10-CM

## 2020-09-08 PROCEDURE — 99404 PREV MED CNSL INDIV APPRX 60: CPT | Mod: S$GLB,,, | Performed by: GENERAL PRACTICE

## 2020-09-08 PROCEDURE — 99999 PR PBB SHADOW E&M-EST. PATIENT-LVL I: ICD-10-PCS | Mod: PBBFAC,,,

## 2020-09-08 PROCEDURE — 99404 PR PREVENT COUNSEL,INDIV,60 MIN: ICD-10-PCS | Mod: S$GLB,,, | Performed by: GENERAL PRACTICE

## 2020-09-08 PROCEDURE — 99999 PR PBB SHADOW E&M-EST. PATIENT-LVL I: CPT | Mod: PBBFAC,,,

## 2020-09-08 RX ORDER — VARENICLINE TARTRATE 0.5 (11)-1
KIT ORAL
Qty: 53 TABLET | Refills: 0 | Status: SHIPPED | OUTPATIENT
Start: 2020-09-08 | End: 2020-10-15

## 2020-09-08 RX ORDER — TOFACITINIB 11 MG/1
11 TABLET, FILM COATED, EXTENDED RELEASE ORAL DAILY
Qty: 30 TABLET | Refills: 11 | Status: SHIPPED | OUTPATIENT
Start: 2020-09-08 | End: 2020-09-16

## 2020-09-08 RX ORDER — METHYLPREDNISOLONE 4 MG/1
TABLET ORAL
Qty: 1 PACKAGE | Refills: 2 | Status: SHIPPED | OUTPATIENT
Start: 2020-09-08 | End: 2020-10-09

## 2020-09-08 NOTE — TELEPHONE ENCOUNTER
----- Message from Tatianna Retana sent at 9/8/2020  1:47 PM CDT -----  Contact: pt  The pt request a return call, no additional info given and can be reached at [phone]///[email or web address]

## 2020-09-08 NOTE — TELEPHONE ENCOUNTER
Spoke with pt and she is having feet and wrist swelling states by 2/3 pm she can hardly walk. Also having tingly in her feet. Would like to know if this is a side effect from Humira or if something else is going on. States she has list 8 lbs since starting the Humira as well.

## 2020-09-09 ENCOUNTER — TELEPHONE (OUTPATIENT)
Dept: PHARMACY | Facility: CLINIC | Age: 55
End: 2020-09-09

## 2020-09-09 ENCOUNTER — LAB VISIT (OUTPATIENT)
Dept: LAB | Facility: HOSPITAL | Age: 55
End: 2020-09-09
Attending: INTERNAL MEDICINE
Payer: COMMERCIAL

## 2020-09-09 DIAGNOSIS — M06.09 RHEUMATOID ARTHRITIS OF MULTIPLE SITES WITH NEGATIVE RHEUMATOID FACTOR: ICD-10-CM

## 2020-09-09 LAB
ALBUMIN SERPL BCP-MCNC: 4.3 G/DL (ref 3.5–5.2)
ALP SERPL-CCNC: 96 U/L (ref 55–135)
ALT SERPL W/O P-5'-P-CCNC: 20 U/L (ref 10–44)
ANION GAP SERPL CALC-SCNC: 11 MMOL/L (ref 8–16)
AST SERPL-CCNC: 18 U/L (ref 10–40)
BASOPHILS # BLD AUTO: 0.08 K/UL (ref 0–0.2)
BASOPHILS NFR BLD: 0.8 % (ref 0–1.9)
BILIRUB SERPL-MCNC: 0.3 MG/DL (ref 0.1–1)
BUN SERPL-MCNC: 10 MG/DL (ref 6–20)
CALCIUM SERPL-MCNC: 9.6 MG/DL (ref 8.7–10.5)
CHLORIDE SERPL-SCNC: 104 MMOL/L (ref 95–110)
CO2 SERPL-SCNC: 29 MMOL/L (ref 23–29)
CREAT SERPL-MCNC: 0.7 MG/DL (ref 0.5–1.4)
DIFFERENTIAL METHOD: ABNORMAL
EOSINOPHIL # BLD AUTO: 0.2 K/UL (ref 0–0.5)
EOSINOPHIL NFR BLD: 2 % (ref 0–8)
ERYTHROCYTE [DISTWIDTH] IN BLOOD BY AUTOMATED COUNT: 13.6 % (ref 11.5–14.5)
EST. GFR  (AFRICAN AMERICAN): >60 ML/MIN/1.73 M^2
EST. GFR  (NON AFRICAN AMERICAN): >60 ML/MIN/1.73 M^2
GLUCOSE SERPL-MCNC: 82 MG/DL (ref 70–110)
HCT VFR BLD AUTO: 44.6 % (ref 37–48.5)
HGB BLD-MCNC: 14.2 G/DL (ref 12–16)
IMM GRANULOCYTES # BLD AUTO: 0.09 K/UL (ref 0–0.04)
IMM GRANULOCYTES NFR BLD AUTO: 0.9 % (ref 0–0.5)
LYMPHOCYTES # BLD AUTO: 3.2 K/UL (ref 1–4.8)
LYMPHOCYTES NFR BLD: 31.1 % (ref 18–48)
MCH RBC QN AUTO: 32 PG (ref 27–31)
MCHC RBC AUTO-ENTMCNC: 31.8 G/DL (ref 32–36)
MCV RBC AUTO: 101 FL (ref 82–98)
MONOCYTES # BLD AUTO: 0.7 K/UL (ref 0.3–1)
MONOCYTES NFR BLD: 7.1 % (ref 4–15)
NEUTROPHILS # BLD AUTO: 6 K/UL (ref 1.8–7.7)
NEUTROPHILS NFR BLD: 58.1 % (ref 38–73)
NRBC BLD-RTO: 0 /100 WBC
PLATELET # BLD AUTO: 192 K/UL (ref 150–350)
PMV BLD AUTO: 11.8 FL (ref 9.2–12.9)
POTASSIUM SERPL-SCNC: 4 MMOL/L (ref 3.5–5.1)
PROT SERPL-MCNC: 7.1 G/DL (ref 6–8.4)
RBC # BLD AUTO: 4.44 M/UL (ref 4–5.4)
SODIUM SERPL-SCNC: 144 MMOL/L (ref 136–145)
WBC # BLD AUTO: 10.35 K/UL (ref 3.9–12.7)

## 2020-09-09 PROCEDURE — 85025 COMPLETE CBC W/AUTO DIFF WBC: CPT

## 2020-09-09 PROCEDURE — 80053 COMPREHEN METABOLIC PANEL: CPT

## 2020-09-11 ENCOUNTER — PATIENT OUTREACH (OUTPATIENT)
Dept: ADMINISTRATIVE | Facility: HOSPITAL | Age: 55
End: 2020-09-11

## 2020-09-11 NOTE — PROGRESS NOTES
Received most recent request from [facility] for mammogram and left mammogram scanned into chart today and sent to [facility] from Dr. Castro.

## 2020-09-16 ENCOUNTER — TELEPHONE (OUTPATIENT)
Dept: PHARMACY | Facility: CLINIC | Age: 55
End: 2020-09-16

## 2020-09-16 RX ORDER — TOFACITINIB 11 MG/1
11 TABLET, FILM COATED, EXTENDED RELEASE ORAL DAILY
Qty: 30 TABLET | Refills: 11 | Status: SHIPPED | OUTPATIENT
Start: 2020-09-16 | End: 2021-09-13 | Stop reason: SDUPTHER

## 2020-09-16 NOTE — TELEPHONE ENCOUNTER
FYI:  No Prior Authorization needed for XELJANZ Patient's insurance requires the patient to fill through [facility]. Please send prescription to [facility], which has been added to the patients EPIC profile. Patient has been notified and provided with the necessary info to call and schedule a delivery.    To complete this in EPIC, the original order MUST be discontinued and re-typed as a new prescription with the updated pharmacy listed. Clicking reorder will continue to route the rx to [facility] even if the pharmacy is changed. Please opt the patient out of [facility] when the BPA is fired.

## 2020-09-16 NOTE — TELEPHONE ENCOUNTER
"----- Message from Yasminyesy Cardoso sent at 9/16/2020 10:59 AM CDT -----  Regarding: Transfer Out Xeljanz to [facility]  FYI:  No Prior Authorization needed for XELJANZ Patient's insurance requires the patient to fill through [facility]. Please send prescription to [facility], which has been added to the patients EPIC profile. Patient has been notified and provided with the necessary info to call and schedule a delivery.    To complete this in EPIC, the original order MUST be discontinued and re-typed as a new prescription with the updated pharmacy listed. Clicking "reorder" will continue to route the rx to [facility] even if the pharmacy is changed. Please opt the patient out of [facility] when the BPA is fired.      Thank you       Yasmin Cardoso [facility]  Patient Advocate   [facility]  Direct ext: [phone]  Fax: [phone]  [email or web address]      "

## 2020-10-05 ENCOUNTER — PATIENT MESSAGE (OUTPATIENT)
Dept: ADMINISTRATIVE | Facility: HOSPITAL | Age: 55
End: 2020-10-05

## 2020-10-07 ENCOUNTER — PATIENT MESSAGE (OUTPATIENT)
Dept: INTERNAL MEDICINE | Facility: CLINIC | Age: 55
End: 2020-10-07

## 2020-10-07 ENCOUNTER — PATIENT MESSAGE (OUTPATIENT)
Dept: RHEUMATOLOGY | Facility: CLINIC | Age: 55
End: 2020-10-07

## 2020-10-07 DIAGNOSIS — F33.9 MAJOR DEPRESSION, RECURRENT, CHRONIC: ICD-10-CM

## 2020-10-07 DIAGNOSIS — F41.9 ANXIETY: ICD-10-CM

## 2020-10-08 RX ORDER — VENLAFAXINE HYDROCHLORIDE 150 MG/1
CAPSULE, EXTENDED RELEASE ORAL
Qty: 30 CAPSULE | Refills: 6 | Status: SHIPPED | OUTPATIENT
Start: 2020-10-08 | End: 2020-11-24 | Stop reason: SDUPTHER

## 2020-10-09 ENCOUNTER — TELEPHONE (OUTPATIENT)
Dept: RHEUMATOLOGY | Facility: CLINIC | Age: 55
End: 2020-10-09

## 2020-10-09 DIAGNOSIS — M06.09 RHEUMATOID ARTHRITIS OF MULTIPLE SITES WITH NEGATIVE RHEUMATOID FACTOR: Primary | ICD-10-CM

## 2020-10-09 RX ORDER — METHYLPREDNISOLONE 4 MG/1
TABLET ORAL
Qty: 1 PACKAGE | Refills: 0 | Status: SHIPPED | OUTPATIENT
Start: 2020-10-09 | End: 2020-10-29

## 2020-10-12 ENCOUNTER — PATIENT MESSAGE (OUTPATIENT)
Dept: RHEUMATOLOGY | Facility: CLINIC | Age: 55
End: 2020-10-12

## 2020-10-12 ENCOUNTER — HOSPITAL ENCOUNTER (EMERGENCY)
Facility: HOSPITAL | Age: 55
Discharge: HOME | End: 2020-10-13
Attending: FAMILY MEDICINE
Payer: COMMERCIAL

## 2020-10-12 ENCOUNTER — PATIENT MESSAGE (OUTPATIENT)
Dept: INTERNAL MEDICINE | Facility: CLINIC | Age: 55
End: 2020-10-12

## 2020-10-12 ENCOUNTER — HOSPITAL ENCOUNTER (EMERGENCY)
Facility: HOSPITAL | Age: 55
Discharge: HOME | End: 2020-10-12
Attending: EMERGENCY MEDICINE
Payer: COMMERCIAL

## 2020-10-12 VITALS
OXYGEN SATURATION: 95 % | TEMPERATURE: 98 F | RESPIRATION RATE: 16 BRPM | SYSTOLIC BLOOD PRESSURE: 140 MMHG | HEART RATE: 96 BPM | WEIGHT: 196 LBS | DIASTOLIC BLOOD PRESSURE: 80 MMHG | HEIGHT: 62 IN | BODY MASS INDEX: 36.07 KG/M2

## 2020-10-12 DIAGNOSIS — L02.91 ABSCESS: Primary | ICD-10-CM

## 2020-10-12 DIAGNOSIS — Z76.89 ENCOUNTER FOR INCISION AND DRAINAGE PROCEDURE: ICD-10-CM

## 2020-10-12 DIAGNOSIS — L02.31 ABSCESS OF RIGHT BUTTOCK: Primary | ICD-10-CM

## 2020-10-12 PROCEDURE — 99284 EMERGENCY DEPT VISIT MOD MDM: CPT | Mod: 25,27

## 2020-10-12 PROCEDURE — 96365 THER/PROPH/DIAG IV INF INIT: CPT

## 2020-10-12 PROCEDURE — 96375 TX/PRO/DX INJ NEW DRUG ADDON: CPT

## 2020-10-12 PROCEDURE — 10060 I&D ABSCESS SIMPLE/SINGLE: CPT

## 2020-10-12 PROCEDURE — 25000003 PHARM REV CODE 250: Performed by: NURSE PRACTITIONER

## 2020-10-12 PROCEDURE — 99283 EMERGENCY DEPT VISIT LOW MDM: CPT | Mod: 25

## 2020-10-12 RX ORDER — SULFAMETHOXAZOLE AND TRIMETHOPRIM 800; 160 MG/1; MG/1
1 TABLET ORAL 2 TIMES DAILY
Qty: 14 TABLET | Refills: 0 | Status: SHIPPED | OUTPATIENT
Start: 2020-10-12 | End: 2020-10-19

## 2020-10-12 RX ORDER — LIDOCAINE HYDROCHLORIDE 10 MG/ML
10 INJECTION, SOLUTION EPIDURAL; INFILTRATION; INTRACAUDAL; PERINEURAL
Status: COMPLETED | OUTPATIENT
Start: 2020-10-12 | End: 2020-10-12

## 2020-10-12 RX ADMIN — LIDOCAINE HYDROCHLORIDE 100 MG: 10 INJECTION, SOLUTION EPIDURAL; INFILTRATION; INTRACAUDAL at 08:10

## 2020-10-12 NOTE — Clinical Note
"Diana Atnoniojeremiah Escobar was seen and treated in our emergency department on 10/12/2020.  She may return to work on 10/14/2020.       If you have any questions or concerns, please don't hesitate to call.      Pierre Martinez NP"

## 2020-10-12 NOTE — Clinical Note
"Diana Antoniojeremiah Escobar was seen and treated in our emergency department on 10/12/2020.  She may return to work on 10/13/2020.       If you have any questions or concerns, please don't hesitate to call.      Pierre Martinez NP"

## 2020-10-12 NOTE — ED NOTES
Placed non-stick dressing and 4 x 4's and taped with paper tape. Pt given discharge instructions and walked to [facility].

## 2020-10-12 NOTE — ED PROVIDER NOTES
HISTORY     Chief Complaint   Patient presents with    Abscess     pt c/o painful abscess on right buttocks x 1 week. pt has Hx of MRSA. pt rates the pain a 8/10     Review of patient's allergies indicates:   Allergen Reactions    Mobic [meloxicam]     Topamax [topiramate]     Adhesive Rash        HPI   The history is provided by the patient. No  was used.   Abscess   This is a new problem. Illness onset: 1 week agp. The problem has been gradually worsening. Affected Location: right buttock. The pain is at a severity of 4/10. The abscess is characterized by painfulness. Pertinent negatives include no anorexia, no decrease in physical activity, not sleeping less, not drinking less, no fever, no diarrhea, no vomiting, no congestion, no rhinorrhea, no sore throat, no decreased responsiveness and no cough.        PCP: Jory Alcocer DO     Past Medical History:  Past Medical History:   Diagnosis Date    Asthma     Chronic low back pain     Degenerative disc disease, lumbar     Depression     Diabetes mellitus 2014    DM (diabetes mellitus) 2014     am 02/17/2020    Fibromyalgia     Hyperlipidemia     Hypertension     Hypothyroidism     MRSA (methicillin resistant Staphylococcus aureus) carrier     Nodule of left lung 2/6/2017    CT chest : 04/06/16: Small 3 mm pleural-based nodule laterally in the left lower lobe.    Palpitations     Dr. Jesus Lao--cardiology    Stress incontinence     Dr. Zurdo Lopez--urology    Stroke     TIA    Vitamin D deficiency disease         Past Surgical History:  Past Surgical History:   Procedure Laterality Date    HYSTERECTOMY      left ankle surgery      loop recorder  05/2017    cardiac device    NECK SURGERY  06/2016    ROTATOR CUFF REPAIR      TONSILLECTOMY          Family History:  Family History   Problem Relation Age of Onset    Cancer Mother     Stroke Mother     Heart disease Mother     Diabetes Mother      Cataracts Mother     Hypertension Mother     Breast cancer Mother     Heart disease Father     COPD Father     Hypertension Father     Diabetes Maternal Aunt     Breast cancer Sister         Social History:  Social History     Tobacco Use    Smoking status: Current Every Day Smoker     Packs/day: 1.00     Years: 25.00     Pack years: 25.00     Types: Cigarettes    Smokeless tobacco: Never Used    Tobacco comment: in smoking cessation program used Chantix    Substance and Sexual Activity    Alcohol use: No     Alcohol/week: 0.0 standard drinks     Frequency: Monthly or less     Drinks per session: 1 or 2     Binge frequency: Never     Comment: OCC    Drug use: No    Sexual activity: Yes         ROS   Review of Systems   Constitutional: Negative for decreased responsiveness and fever.   HENT: Negative for congestion, rhinorrhea and sore throat.    Respiratory: Negative for cough and shortness of breath.    Cardiovascular: Negative for chest pain.   Gastrointestinal: Negative for anorexia, diarrhea, nausea and vomiting.   Genitourinary: Negative for dysuria.   Musculoskeletal: Negative for back pain.   Skin: Negative for rash.        +abscess     Neurological: Negative for dizziness and weakness.   Hematological: Does not bruise/bleed easily.       PHYSICAL EXAM     Initial Vitals [10/12/20 0804]   BP Pulse Resp Temp SpO2   135/79 110 18 98.4 °F (36.9 °C) 95 %      MAP       --           Physical Exam    Nursing note and vitals reviewed.  Constitutional: She appears well-developed and well-nourished. She is not diaphoretic. No distress.   HENT:   Head: Normocephalic and atraumatic.   Eyes: Right eye exhibits no discharge. Left eye exhibits no discharge.   Neck: Normal range of motion.   Cardiovascular:   Tachycardic     Pulmonary/Chest: No respiratory distress.   Abdominal: She exhibits no distension.   Musculoskeletal: Normal range of motion.   Neurological: She is alert and oriented to person, place, and  "time. She has normal strength.   Skin: Skin is warm and dry.   3cm abscess to right mid buttock. Mild induration and fluctuance. Female chaperone present    Psychiatric: She has a normal mood and affect. Her behavior is normal. Thought content normal.          ED COURSE   I & D - Incision and Drainage    Date/Time: 10/12/2020 8:41 AM  Location procedure was performed: [facility] EMERGENCY DEPARTMENT  Performed by: Pierre Martinez NP  Authorized by: Manpreet Abad MD   Consent Done: Yes  Consent: Verbal consent obtained. Written consent not obtained.  Risks and benefits: risks, benefits and alternatives were discussed  Consent given by: patient  Patient understanding: patient states understanding of the procedure being performed  Patient consent: the patient's understanding of the procedure matches consent given  Procedure consent: procedure consent matches procedure scheduled  Patient identity confirmed: name  Time out: Immediately prior to procedure a "time out" was called to verify the correct patient, procedure, equipment, support staff and site/side marked as required.  Type: abscess  Location: right buttock.  Anesthesia: local infiltration    Anesthesia:  Local Anesthetic: lidocaine 1% without epinephrine  Anesthetic total: 7 mL  Patient sedated: no  Risk factor: underlying major vessel and  underlying major nerve  Scalpel size: 11  Incision type: single straight  Complexity: simple  Drainage: pus and  bloody  Drainage amount: scant  Wound treatment: incision,  wound left open,  drainage,  expression of material and  wound packed  Packing material: 1/4 in gauze  Complications: No  Patient tolerance: Patient tolerated the procedure well with no immediate complications (female chaperone present )        ED ONGOING VITALS:  Vitals:    10/12/20 0804   BP: 135/79   Pulse: 110   Resp: 18   Temp: 98.4 °F (36.9 °C)   TempSrc: Oral   SpO2: 95%   Weight: 88.9 kg (195 lb 15.8 oz)   Height: 5' 2" (1.575 m) "         ABNORMAL LAB VALUES:  Labs Reviewed - No data to display      ALL LAB VALUES:  none      RADIOLOGY STUDIES:  Imaging Results    None                   The above vital signs and test results have been reviewed by the emergency provider.     ED Medications:  Current Discharge Medication List        Discharge Medications:  New Prescriptions    SULFAMETHOXAZOLE-TRIMETHOPRIM 800-160MG (BACTRIM DS) 800-160 MG TAB    Take 1 tablet by mouth 2 (two) times daily. for 7 days      Follow-up Information     Jory Alcocer DO In 1 week.    Specialty: Internal Medicine  Why: For wound re-check  Contact information:  [address] [place] [address] [address] [place] [address]  [phone]                  8:42 AM    I discussed with patient and/or family/caretaker that evaluation in the ED does not suggest any emergent or life threatening medical conditions requiring immediate intervention beyond what was provided in the ED, and I believe patient is safe for discharge. Regardless, an unremarkable evaluation in the ED does not preclude the development or presence of a serious or life threatening condition. As such, patient was instructed to return immediately for any worsening or change in current symptoms.    Pre-hypertension/Hypertension: The pt has been informed that they may have pre-hypertension or hypertension based on a blood pressure reading in the ED. I recommend that the pt call the PCP listed on their discharge instructions or a physician of their choice this week to arrange f/u for further evaluation of possible pre-hypertension or hypertension.       MEDICAL DECISION MAKING                 CLINICAL IMPRESSION       ICD-10-CM ICD-9-CM   1. Abscess  L02.91 682.9   2. Encounter for incision and drainage procedure  Z76.89 V72.85       Disposition:   Disposition: Discharged  Condition: Stable         Pierre Martinez NP  10/12/20 0842

## 2020-10-13 VITALS
HEIGHT: 62 IN | WEIGHT: 197.44 LBS | HEART RATE: 95 BPM | BODY MASS INDEX: 36.33 KG/M2 | OXYGEN SATURATION: 96 % | DIASTOLIC BLOOD PRESSURE: 56 MMHG | SYSTOLIC BLOOD PRESSURE: 103 MMHG | TEMPERATURE: 99 F | RESPIRATION RATE: 16 BRPM

## 2020-10-13 LAB
ALBUMIN SERPL BCP-MCNC: 4 G/DL (ref 3.5–5.2)
ALP SERPL-CCNC: 97 U/L (ref 55–135)
ALT SERPL W/O P-5'-P-CCNC: 34 U/L (ref 10–44)
ANION GAP SERPL CALC-SCNC: 12 MMOL/L (ref 8–16)
AST SERPL-CCNC: 14 U/L (ref 10–40)
BASOPHILS # BLD AUTO: 0.12 K/UL (ref 0–0.2)
BASOPHILS NFR BLD: 0.7 % (ref 0–1.9)
BILIRUB SERPL-MCNC: 0.3 MG/DL (ref 0.1–1)
BILIRUB UR QL STRIP: NEGATIVE
BUN SERPL-MCNC: 12 MG/DL (ref 6–20)
CALCIUM SERPL-MCNC: 9 MG/DL (ref 8.7–10.5)
CHLORIDE SERPL-SCNC: 101 MMOL/L (ref 95–110)
CLARITY UR: CLEAR
CO2 SERPL-SCNC: 24 MMOL/L (ref 23–29)
COLOR UR: YELLOW
CREAT SERPL-MCNC: 0.8 MG/DL (ref 0.5–1.4)
DIFFERENTIAL METHOD: ABNORMAL
EOSINOPHIL # BLD AUTO: 0 K/UL (ref 0–0.5)
EOSINOPHIL NFR BLD: 0.2 % (ref 0–8)
ERYTHROCYTE [DISTWIDTH] IN BLOOD BY AUTOMATED COUNT: 14.3 % (ref 11.5–14.5)
EST. GFR  (AFRICAN AMERICAN): >60 ML/MIN/1.73 M^2
EST. GFR  (NON AFRICAN AMERICAN): >60 ML/MIN/1.73 M^2
GLUCOSE SERPL-MCNC: 154 MG/DL (ref 70–110)
GLUCOSE UR QL STRIP: NEGATIVE
HCT VFR BLD AUTO: 42.2 % (ref 37–48.5)
HGB BLD-MCNC: 13.4 G/DL (ref 12–16)
HGB UR QL STRIP: ABNORMAL
IMM GRANULOCYTES # BLD AUTO: 0.42 K/UL (ref 0–0.04)
IMM GRANULOCYTES NFR BLD AUTO: 2.4 % (ref 0–0.5)
KETONES UR QL STRIP: NEGATIVE
LEUKOCYTE ESTERASE UR QL STRIP: NEGATIVE
LYMPHOCYTES # BLD AUTO: 3.2 K/UL (ref 1–4.8)
LYMPHOCYTES NFR BLD: 18.2 % (ref 18–48)
MCH RBC QN AUTO: 32.1 PG (ref 27–31)
MCHC RBC AUTO-ENTMCNC: 31.8 G/DL (ref 32–36)
MCV RBC AUTO: 101 FL (ref 82–98)
MONOCYTES # BLD AUTO: 1.5 K/UL (ref 0.3–1)
MONOCYTES NFR BLD: 8.4 % (ref 4–15)
NEUTROPHILS # BLD AUTO: 12.4 K/UL (ref 1.8–7.7)
NEUTROPHILS NFR BLD: 70.1 % (ref 38–73)
NITRITE UR QL STRIP: NEGATIVE
NRBC BLD-RTO: 0 /100 WBC
PH UR STRIP: 6 [PH] (ref 5–8)
PLATELET # BLD AUTO: 207 K/UL (ref 150–350)
PMV BLD AUTO: 11.9 FL (ref 9.2–12.9)
POTASSIUM SERPL-SCNC: 4.2 MMOL/L (ref 3.5–5.1)
PROT SERPL-MCNC: 7.1 G/DL (ref 6–8.4)
PROT UR QL STRIP: NEGATIVE
RBC # BLD AUTO: 4.17 M/UL (ref 4–5.4)
SODIUM SERPL-SCNC: 137 MMOL/L (ref 136–145)
SP GR UR STRIP: 1.02 (ref 1–1.03)
URN SPEC COLLECT METH UR: ABNORMAL
UROBILINOGEN UR STRIP-ACNC: NEGATIVE EU/DL
WBC # BLD AUTO: 17.71 K/UL (ref 3.9–12.7)

## 2020-10-13 PROCEDURE — 85025 COMPLETE CBC W/AUTO DIFF WBC: CPT

## 2020-10-13 PROCEDURE — 80053 COMPREHEN METABOLIC PANEL: CPT

## 2020-10-13 PROCEDURE — 25000003 PHARM REV CODE 250: Performed by: FAMILY MEDICINE

## 2020-10-13 PROCEDURE — 63600175 PHARM REV CODE 636 W HCPCS: Performed by: FAMILY MEDICINE

## 2020-10-13 PROCEDURE — 81003 URINALYSIS AUTO W/O SCOPE: CPT

## 2020-10-13 RX ORDER — ONDANSETRON 2 MG/ML
4 INJECTION INTRAMUSCULAR; INTRAVENOUS
Status: COMPLETED | OUTPATIENT
Start: 2020-10-13 | End: 2020-10-13

## 2020-10-13 RX ORDER — CLINDAMYCIN PHOSPHATE 900 MG/50ML
900 INJECTION, SOLUTION INTRAVENOUS
Status: COMPLETED | OUTPATIENT
Start: 2020-10-13 | End: 2020-10-13

## 2020-10-13 RX ORDER — MORPHINE SULFATE 4 MG/ML
4 INJECTION, SOLUTION INTRAMUSCULAR; INTRAVENOUS
Status: COMPLETED | OUTPATIENT
Start: 2020-10-13 | End: 2020-10-13

## 2020-10-13 RX ADMIN — ONDANSETRON 4 MG: 2 INJECTION INTRAMUSCULAR; INTRAVENOUS at 01:10

## 2020-10-13 RX ADMIN — MORPHINE SULFATE 4 MG: 4 INJECTION INTRAVENOUS at 01:10

## 2020-10-13 RX ADMIN — CLINDAMYCIN IN 5 PERCENT DEXTROSE 900 MG: 18 INJECTION, SOLUTION INTRAVENOUS at 01:10

## 2020-10-13 RX ADMIN — SODIUM CHLORIDE 1000 ML: 0.9 INJECTION, SOLUTION INTRAVENOUS at 01:10

## 2020-10-13 NOTE — ED PROVIDER NOTES
SCRIBE #1 NOTE: I, Eloise Al, am scribing for, and in the presence of, Anitra Rendon MD. I have scribed the entire note.       History     Chief Complaint   Patient presents with    Fever     States began with fever and chills a few hours ago.  States had abscess drained here this morning.  States increased pain.     Review of patient's allergies indicates:   Allergen Reactions    Mobic [meloxicam]     Topamax [topiramate]     Adhesive Rash         History of Present Illness     HPI    10/13/2020, 2:26 AM  History obtained from the patient      History of Present Illness: Diana Escobar is a 55 y.o. female patient with a PMHx of asthma, DM, fibromyalgia, HLD, HTN, MRSA, and stroke who presents to the Emergency Department for evaluation of fever which onset gradually several hours ago. Symptoms are constant and moderate in severity. No mitigating or exacerbating factors reported. Pt had an abscess removed in the ED this morning on her R buttock. Associated sxs include chills and increased pain to buttock. Patient denies any diaphoresis, dysuria, hematuria, n/v/d, rash, and all other sxs at this time. No further complaints or concerns at this time.       Arrival mode: Personal vehicle     PCP: Jory Alcocer DO        Past Medical History:  Past Medical History:   Diagnosis Date    Asthma     Chronic low back pain     Degenerative disc disease, lumbar     Depression     Diabetes mellitus 2014    DM (diabetes mellitus) 2014     am 02/17/2020    Fibromyalgia     Hyperlipidemia     Hypertension     Hypothyroidism     MRSA (methicillin resistant Staphylococcus aureus) carrier     Nodule of left lung 2/6/2017    CT chest : 04/06/16: Small 3 mm pleural-based nodule laterally in the left lower lobe.    Palpitations     Dr. Jesus Lao--cardiology    Stress incontinence     Dr. Zurdo Lopez--urology    Stroke     TIA    Vitamin D deficiency disease        Past Surgical History:  Past  Surgical History:   Procedure Laterality Date    HYSTERECTOMY      left ankle surgery      loop recorder  05/2017    cardiac device    NECK SURGERY  06/2016    ROTATOR CUFF REPAIR      TONSILLECTOMY           Family History:  Family History   Problem Relation Age of Onset    Cancer Mother     Stroke Mother     Heart disease Mother     Diabetes Mother     Cataracts Mother     Hypertension Mother     Breast cancer Mother     Heart disease Father     COPD Father     Hypertension Father     Diabetes Maternal Aunt     Breast cancer Sister        Social History:  Social History     Tobacco Use    Smoking status: Current Every Day Smoker     Packs/day: 1.00     Years: 25.00     Pack years: 25.00     Types: Cigarettes    Smokeless tobacco: Never Used    Tobacco comment: in smoking cessation program used Chantix    Substance and Sexual Activity    Alcohol use: No     Alcohol/week: 0.0 standard drinks     Frequency: Monthly or less     Drinks per session: 1 or 2     Binge frequency: Never     Comment: OCC    Drug use: No    Sexual activity: Yes        Review of Systems     Review of Systems   Constitutional: Positive for chills and fever. Negative for diaphoresis.   HENT: Negative for sore throat.    Respiratory: Negative for shortness of breath.    Cardiovascular: Negative for chest pain.   Gastrointestinal: Negative for diarrhea, nausea and vomiting.   Genitourinary: Negative for dysuria and hematuria.   Musculoskeletal: Positive for myalgias (R buttock). Negative for back pain.   Skin: Negative for rash.   Neurological: Negative for weakness.   Hematological: Does not bruise/bleed easily.   All other systems reviewed and are negative.     Physical Exam     Initial Vitals [10/12/20 2018]   BP Pulse Resp Temp SpO2   138/82 (!) 113 20 98.6 °F (37 °C) (!) 92 %      MAP       --          Physical Exam  Nursing Notes and Vital Signs Reviewed.  Constitutional: Patient is in no acute distress.  "Well-developed and well-nourished.  Head: Atraumatic. Normocephalic.  Eyes: PERRL. EOM intact. Conjunctivae are not pale. No scleral icterus.  ENT: Mucous membranes are moist. Oropharynx is clear and symmetric.    Neck: Supple. Full ROM. No lymphadenopathy.  Cardiovascular: Regular rate. Regular rhythm. No murmurs, rubs, or gallops. Distal pulses are 2+ and symmetric.  Pulmonary/Chest: No respiratory distress. Clear to auscultation bilaterally. No wheezing or rales.  Abdominal: Soft and non-distended.  There is no tenderness.  No rebound, guarding, or rigidity. Good bowel sounds.  Genitourinary: No CVA tenderness  Musculoskeletal: Moves all extremities. No obvious deformities. No edema. No calf tenderness.  Skin: Warm and dry.  Neurological:  Alert, awake, and appropriate.  Normal speech.  No acute focal neurological deficits are appreciated.  Psychiatric: Normal affect. Good eye contact. Appropriate in content.     ED Course   Procedures  ED Vital Signs:  Vitals:    10/12/20 2018 10/13/20 0008 10/13/20 0116 10/13/20 0229   BP: 138/82 126/63  (!) 103/56   Pulse: (!) 113 103  95   Resp: 20 16 18 16   Temp: 98.6 °F (37 °C)      TempSrc: Oral      SpO2: (!) 92% (!) 94%  96%   Weight: 89.5 kg (197 lb 6.8 oz)      Height: 5' 2" (1.575 m)          Abnormal Lab Results:  Labs Reviewed   CBC W/ AUTO DIFFERENTIAL - Abnormal; Notable for the following components:       Result Value    WBC 17.71 (*)     Mean Corpuscular Volume 101 (*)     Mean Corpuscular Hemoglobin 32.1 (*)     Mean Corpuscular Hemoglobin Conc 31.8 (*)     Immature Granulocytes 2.4 (*)     Gran # (ANC) 12.4 (*)     Immature Grans (Abs) 0.42 (*)     Mono # 1.5 (*)     All other components within normal limits   COMPREHENSIVE METABOLIC PANEL - Abnormal; Notable for the following components:    Glucose 154 (*)     All other components within normal limits   URINALYSIS - Abnormal; Notable for the following components:    Occult Blood UA Trace (*)     All other " components within normal limits        All Lab Results:  Results for orders placed or performed during the hospital encounter of 10/12/20   CBC auto differential   Result Value Ref Range    WBC 17.71 (H) 3.90 - 12.70 K/uL    RBC 4.17 4.00 - 5.40 M/uL    Hemoglobin 13.4 12.0 - 16.0 g/dL    Hematocrit 42.2 37.0 - 48.5 %    Mean Corpuscular Volume 101 (H) 82 - 98 fL    Mean Corpuscular Hemoglobin 32.1 (H) 27.0 - 31.0 pg    Mean Corpuscular Hemoglobin Conc 31.8 (L) 32.0 - 36.0 g/dL    RDW 14.3 11.5 - 14.5 %    Platelets 207 150 - 350 K/uL    MPV 11.9 9.2 - 12.9 fL    Immature Granulocytes 2.4 (H) 0.0 - 0.5 %    Gran # (ANC) 12.4 (H) 1.8 - 7.7 K/uL    Immature Grans (Abs) 0.42 (H) 0.00 - 0.04 K/uL    Lymph # 3.2 1.0 - 4.8 K/uL    Mono # 1.5 (H) 0.3 - 1.0 K/uL    Eos # 0.0 0.0 - 0.5 K/uL    Baso # 0.12 0.00 - 0.20 K/uL    nRBC 0 0 /100 WBC    Gran% 70.1 38.0 - 73.0 %    Lymph% 18.2 18.0 - 48.0 %    Mono% 8.4 4.0 - 15.0 %    Eosinophil% 0.2 0.0 - 8.0 %    Basophil% 0.7 0.0 - 1.9 %    Differential Method Automated    Comprehensive metabolic panel   Result Value Ref Range    Sodium 137 136 - 145 mmol/L    Potassium 4.2 3.5 - 5.1 mmol/L    Chloride 101 95 - 110 mmol/L    CO2 24 23 - 29 mmol/L    Glucose 154 (H) 70 - 110 mg/dL    BUN, Bld 12 6 - 20 mg/dL    Creatinine 0.8 0.5 - 1.4 mg/dL    Calcium 9.0 8.7 - 10.5 mg/dL    Total Protein 7.1 6.0 - 8.4 g/dL    Albumin 4.0 3.5 - 5.2 g/dL    Total Bilirubin 0.3 0.1 - 1.0 mg/dL    Alkaline Phosphatase 97 55 - 135 U/L    AST 14 10 - 40 U/L    ALT 34 10 - 44 U/L    Anion Gap 12 8 - 16 mmol/L    eGFR if African American >60 >60 mL/min/1.73 m^2    eGFR if non African American >60 >60 mL/min/1.73 m^2   Urinalysis - Clean Catch   Result Value Ref Range    Specimen UA Urine, Clean Catch     Color, UA Yellow Yellow, Straw, Carrie    Appearance, UA Clear Clear    pH, UA 6.0 5.0 - 8.0    Specific Gravity, UA 1.025 1.005 - 1.030    Protein, UA Negative Negative    Glucose, UA Negative Negative     Ketones, UA Negative Negative    Bilirubin (UA) Negative Negative    Occult Blood UA Trace (A) Negative    Nitrite, UA Negative Negative    Urobilinogen, UA Negative <2.0 EU/dL    Leukocytes, UA Negative Negative         Imaging Results:  Imaging Results    None                 The Emergency Provider reviewed the vital signs and test results, which are outlined above.     ED Discussion       2:31 AM: Reassessed pt at this time. Discussed with pt all pertinent ED information and results. Discussed pt dx and plan of tx. Gave pt all f/u and return to the ED instructions. All questions and concerns were addressed at this time. Pt expresses understanding of information and instructions, and is comfortable with plan to discharge. Pt is stable for discharge.    I discussed with patient and/or family/caretaker that evaluation in the ED does not suggest any emergent or life threatening medical conditions requiring immediate intervention beyond what was provided in the ED, and I believe patient is safe for discharge.  Regardless, an unremarkable evaluation in the ED does not preclude the development or presence of a serious of life threatening condition. As such, patient was instructed to return immediately for any worsening or change in current symptoms.       Medical Decision Making:   Clinical Tests:   Lab Tests: Ordered and Reviewed           ED Medication(s):  Medications   clindamycin in D5W 900 mg/50 mL IVPB 900 mg (0 mg Intravenous Stopped 10/13/20 0230)   morphine injection 4 mg (4 mg Intravenous Given 10/13/20 0116)   sodium chloride 0.9% bolus 1,000 mL (0 mLs Intravenous Stopped 10/13/20 0230)   ondansetron injection 4 mg (4 mg Intravenous Given 10/13/20 0115)       Discharge Medication List as of 10/13/2020  2:23 AM          Follow-up Information     Jory Alcocer DO. Schedule an appointment as soon as possible for a visit in 1 week.    Specialty: Internal Medicine  Contact information:  [address] [facility]  [address] [place] [address]  [phone]                       Scribe Attestation:   Scribe #1: I performed the above scribed service and the documentation accurately describes the services I performed. I attest to the accuracy of the note.     Attending:   Physician Attestation Statement for Scribe #1: I, Anitra Rendon MD, personally performed the services described in this documentation, as scribed by Eloise Al, in my presence, and it is both accurate and complete.           Clinical Impression       ICD-10-CM ICD-9-CM   1. Abscess of right buttock  L02.31 682.5       Disposition:   Disposition: Discharged  Condition: Stable         Anitra Rendon MD  10/14/20 6965

## 2020-10-14 ENCOUNTER — PES CALL (OUTPATIENT)
Dept: ADMINISTRATIVE | Facility: CLINIC | Age: 55
End: 2020-10-14

## 2020-10-15 ENCOUNTER — OFFICE VISIT (OUTPATIENT)
Dept: INTERNAL MEDICINE | Facility: CLINIC | Age: 55
End: 2020-10-15
Payer: COMMERCIAL

## 2020-10-15 ENCOUNTER — PATIENT MESSAGE (OUTPATIENT)
Dept: INTERNAL MEDICINE | Facility: CLINIC | Age: 55
End: 2020-10-15

## 2020-10-15 VITALS
SYSTOLIC BLOOD PRESSURE: 102 MMHG | OXYGEN SATURATION: 96 % | TEMPERATURE: 99 F | BODY MASS INDEX: 35.22 KG/M2 | WEIGHT: 191.38 LBS | DIASTOLIC BLOOD PRESSURE: 74 MMHG | HEART RATE: 95 BPM | HEIGHT: 62 IN

## 2020-10-15 DIAGNOSIS — L03.317 CELLULITIS OF RIGHT BUTTOCK: Primary | ICD-10-CM

## 2020-10-15 DIAGNOSIS — L03.317 CELLULITIS OF RIGHT BUTTOCK: ICD-10-CM

## 2020-10-15 DIAGNOSIS — L02.31 ABSCESS OF RIGHT BUTTOCK: ICD-10-CM

## 2020-10-15 DIAGNOSIS — E11.9 CONTROLLED TYPE 2 DIABETES MELLITUS WITHOUT COMPLICATION, WITHOUT LONG-TERM CURRENT USE OF INSULIN: ICD-10-CM

## 2020-10-15 PROCEDURE — 99213 PR OFFICE/OUTPT VISIT, EST, LEVL III, 20-29 MIN: ICD-10-PCS | Mod: S$GLB,,, | Performed by: INTERNAL MEDICINE

## 2020-10-15 PROCEDURE — 3074F SYST BP LT 130 MM HG: CPT | Mod: CPTII,S$GLB,, | Performed by: INTERNAL MEDICINE

## 2020-10-15 PROCEDURE — 3044F HG A1C LEVEL LT 7.0%: CPT | Mod: CPTII,S$GLB,, | Performed by: INTERNAL MEDICINE

## 2020-10-15 PROCEDURE — 99213 OFFICE O/P EST LOW 20 MIN: CPT | Mod: S$GLB,,, | Performed by: INTERNAL MEDICINE

## 2020-10-15 PROCEDURE — 3078F DIAST BP <80 MM HG: CPT | Mod: CPTII,S$GLB,, | Performed by: INTERNAL MEDICINE

## 2020-10-15 PROCEDURE — 3078F PR MOST RECENT DIASTOLIC BLOOD PRESSURE < 80 MM HG: ICD-10-PCS | Mod: CPTII,S$GLB,, | Performed by: INTERNAL MEDICINE

## 2020-10-15 PROCEDURE — 99999 PR PBB SHADOW E&M-EST. PATIENT-LVL V: ICD-10-PCS | Mod: PBBFAC,,, | Performed by: INTERNAL MEDICINE

## 2020-10-15 PROCEDURE — 3008F BODY MASS INDEX DOCD: CPT | Mod: CPTII,S$GLB,, | Performed by: INTERNAL MEDICINE

## 2020-10-15 PROCEDURE — 3008F PR BODY MASS INDEX (BMI) DOCUMENTED: ICD-10-PCS | Mod: CPTII,S$GLB,, | Performed by: INTERNAL MEDICINE

## 2020-10-15 PROCEDURE — 99999 PR PBB SHADOW E&M-EST. PATIENT-LVL V: CPT | Mod: PBBFAC,,, | Performed by: INTERNAL MEDICINE

## 2020-10-15 PROCEDURE — 3044F PR MOST RECENT HEMOGLOBIN A1C LEVEL <7.0%: ICD-10-PCS | Mod: CPTII,S$GLB,, | Performed by: INTERNAL MEDICINE

## 2020-10-15 PROCEDURE — 3074F PR MOST RECENT SYSTOLIC BLOOD PRESSURE < 130 MM HG: ICD-10-PCS | Mod: CPTII,S$GLB,, | Performed by: INTERNAL MEDICINE

## 2020-10-15 RX ORDER — HYDROCODONE BITARTRATE AND ACETAMINOPHEN 5; 325 MG/1; MG/1
1 TABLET ORAL
COMMUNITY
Start: 2020-10-08 | End: 2020-10-15

## 2020-10-15 RX ORDER — CLINDAMYCIN HYDROCHLORIDE 300 MG/1
300 CAPSULE ORAL EVERY 8 HOURS
Qty: 30 CAPSULE | Refills: 0 | Status: SHIPPED | OUTPATIENT
Start: 2020-10-15 | End: 2020-10-15 | Stop reason: SDUPTHER

## 2020-10-15 RX ORDER — HYDROCODONE BITARTRATE AND ACETAMINOPHEN 5; 325 MG/1; MG/1
TABLET ORAL
COMMUNITY
Start: 2020-10-08 | End: 2020-10-15

## 2020-10-15 RX ORDER — CLINDAMYCIN HYDROCHLORIDE 300 MG/1
300 CAPSULE ORAL EVERY 8 HOURS
Qty: 30 CAPSULE | Refills: 0 | Status: SHIPPED | OUTPATIENT
Start: 2020-10-15 | End: 2020-10-29

## 2020-10-15 NOTE — PROGRESS NOTES
Subjective:       Patient ID: Diana Escobar is a 55 y.o. female.    Chief Complaint: Hospital Follow Up (10/12 ER visit)    Diana Escobar  55 y.o. White female    Patient presents with:  Hospital Follow Up: 10/12 ER visit    HPI: Presents to the clinic for an ER visit follow up. She was seen twice on 10/12 for a right buttock abscess. She is s/p I&D. She was placed on Bactrim. When she returned she was given IV clindamycin and Bactrim was continued. She states she is feeling slightly better. She denies having a fever and denies a significant amount of drainage. She is on day 2 of Bactrim.   She has a history of controlled diabetes.                      HGBA1C                   5.8 (H)             07/01/2020              Past Medical History:  Asthma  Chronic low back pain  Degenerative disc disease, lumbar  Depression  2014: Diabetes mellitus  Fibromyalgia  Hyperlipidemia  Hypertension  Hypothyroidism  MRSA (methicillin resistant Staphylococcus aureus) carrier  2/6/2017: Nodule of left lung      Comment:  CT chest : 04/06/16: Small 3 mm pleural-based nodule                laterally in the left lower lobe.  Palpitations      Comment:  Dr. Jesus Lao--cardiology  Stress incontinence      Comment:  Dr. Zurdo Lopez--urology  Stroke      Comment:  TIA  Vitamin D deficiency disease    Current Outpatient Medications on File Prior to Visit:  albuterol (PROVENTIL/VENTOLIN HFA) 90 mcg/actuation inhaler, Inhale 2 puffs into the lungs every 6 (six) hours as needed for Wheezing., Disp: 18 g, Rfl: 6  amLODIPine (NORVASC) 5 MG tablet, Take 1 tablet by mouth once daily, Disp: 90 tablet, Rfl: 1  atorvastatin (LIPITOR) 40 MG tablet, Take 40 mg by mouth once daily., Disp: , Rfl:   blood sugar diagnostic (ONETOUCH VERIO) Strp, Glucose testing once daily., Disp: 30 each, Rfl: 6  blood-glucose meter Misc, Use as directed., Disp: 1 each, Rfl: 0  BREO ELLIPTA 200-25 mcg/dose DsDv diskus inhaler, Inhale 1 puff by mouth once  daily, Disp: 180 each, Rfl: 3  busPIRone (BUSPAR) 5 MG Tab, Take 1 tablet (5 mg total) by mouth 2 (two) times daily. (Patient taking differently: Take 5 mg by mouth 2 (two) times daily as needed. ), Disp: 60 tablet, Rfl: 6  clopidogrel (PLAVIX) 75 mg tablet, Take 75 mg by mouth once daily., Disp: , Rfl:   diclofenac sodium (VOLTAREN) 1 % Gel, Apply topically 2 (two) times daily as needed. Apply to affected area, Disp: 1 Tube, Rfl: 1  estradioL (IMVEXXY MAINTENANCE PACK) 10 mcg Inst, Place 10 mcg vaginally., Disp: , Rfl:   EUTHYROX 50 mcg tablet, Take 1 tablet by mouth once daily, Disp: 30 tablet, Rfl: 11  folic acid (FOLVITE) 1 MG tablet, Take 1 tablet (1 mg total) by mouth once daily., Disp: 90 tablet, Rfl: 3  gabapentin (NEURONTIN) 300 MG capsule, Take 1 capsule by mouth twice daily, Disp: 60 capsule, Rfl: 0  lancets (LANCETS,ULTRA THIN) Misc, Glucose testing daily., Disp: 50 each, Rfl: 11  metFORMIN (GLUCOPHAGE) 500 MG tablet, Take 1 tablet (500 mg total) by mouth 2 (two) times daily with meals., Disp: 60 tablet, Rfl: 3  methotrexate 2.5 MG Tab, Take 4 tablets (10 mg total) by mouth every 7 days., Disp: 48 tablet, Rfl: 1  methylPREDNISolone (MEDROL DOSEPACK) 4 mg tablet, use as directed, Disp: 1 Package, Rfl: 0  peg 400-propylene glycol, PF, (SYSTANE ULTRA, PF,) 0.4-0.3 % Dpet, Place 1 drop into both eyes 4 (four) times daily., Disp: 1 each, Rfl:   rOPINIRole (REQUIP) 1 MG tablet, Take 1 tablet (1 mg total) by mouth every evening., Disp: 30 tablet, Rfl: 2  sulfamethoxazole-trimethoprim 800-160mg (BACTRIM DS) 800-160 mg Tab, Take 1 tablet by mouth 2 (two) times daily. for 7 days, Disp: 14 tablet, Rfl: 0  tofacitinib (XELJANZ XR) 11 mg Tb24, Take 11 mg by mouth once daily., Disp: 30 tablet, Rfl: 11  TRULICITY 0.75 mg/0.5 mL pen injector, INJECT 0.5 MLS(0.75 MG TOTAL) INTO THE SKIN EVERY 7 DAYS, Disp: 4 pen, Rfl: 6  venlafaxine (EFFEXOR-XR) 150 MG Cp24, Take 1 capsule by mouth once daily, Disp: 30 capsule, Rfl:  6    Allergies:  Review of patient's allergies indicates:   -- Mobic (meloxicam)    -- Topamax (topiramate)    -- Adhesive -- Rash          Review of Systems   Constitutional: Negative for fever.   Integumentary:  Positive for wound.         Objective:      Physical Exam  Constitutional:       General: She is not in acute distress.     Appearance: She is well-developed.   Cardiovascular:      Rate and Rhythm: Normal rate.      Pulses:           Dorsalis pedis pulses are 2+ on the right side and 2+ on the left side.   Pulmonary:      Effort: Pulmonary effort is normal. No respiratory distress.   Feet:      Right foot:      Protective Sensation: 5 sites tested. 5 sites sensed.      Skin integrity: Callus present. No ulcer.      Left foot:      Protective Sensation: 5 sites tested. 5 sites sensed.      Skin integrity: Callus present. No ulcer.   Skin:     Comments: Right buttock I&D site with surrounding erythema, significant tenderness, warmth and no active drainage.    Neurological:      Mental Status: She is alert and oriented to person, place, and time.   Psychiatric:         Behavior: Behavior normal.         Thought Content: Thought content normal.         Judgment: Judgment normal.         Assessment:       1. Cellulitis of right buttock    2. Abscess of right buttock    3. Controlled type 2 diabetes mellitus without complication, without long-term current use of insulin        Plan:       Diana was seen today for hospital follow up.    Diagnoses and all orders for this visit:    Cellulitis of right buttock  -     Change to clindamycin (CLEOCIN) 300 MG capsule; Take 1 capsule (300 mg total) by mouth every 8 (eight) hours. for 10 days    Abscess of right buttock  -     Change to clindamycin (CLEOCIN) 300 MG capsule; Take 1 capsule (300 mg total) by mouth every 8 (eight) hours. for 10 days  -     Recommend warm compresses to the area    Controlled type 2 diabetes mellitus without complication, without long-term  current use of insulin    RTC on Monday (4 days). Advised to return to the ER if symptoms worsen prior to Monday.

## 2020-10-19 ENCOUNTER — LAB VISIT (OUTPATIENT)
Dept: LAB | Facility: HOSPITAL | Age: 55
End: 2020-10-19
Payer: COMMERCIAL

## 2020-10-19 ENCOUNTER — PATIENT OUTREACH (OUTPATIENT)
Dept: ADMINISTRATIVE | Facility: OTHER | Age: 55
End: 2020-10-19

## 2020-10-19 ENCOUNTER — PATIENT MESSAGE (OUTPATIENT)
Dept: INTERNAL MEDICINE | Facility: CLINIC | Age: 55
End: 2020-10-19

## 2020-10-19 ENCOUNTER — HOSPITAL ENCOUNTER (OUTPATIENT)
Dept: CARDIOLOGY | Facility: HOSPITAL | Age: 55
Discharge: HOME | End: 2020-10-19
Attending: SURGERY
Payer: COMMERCIAL

## 2020-10-19 ENCOUNTER — OFFICE VISIT (OUTPATIENT)
Dept: INTERNAL MEDICINE | Facility: CLINIC | Age: 55
End: 2020-10-19
Payer: COMMERCIAL

## 2020-10-19 ENCOUNTER — OFFICE VISIT (OUTPATIENT)
Dept: SURGERY | Facility: CLINIC | Age: 55
End: 2020-10-19
Payer: COMMERCIAL

## 2020-10-19 VITALS
BODY MASS INDEX: 35.86 KG/M2 | TEMPERATURE: 99 F | HEART RATE: 96 BPM | RESPIRATION RATE: 18 BRPM | DIASTOLIC BLOOD PRESSURE: 68 MMHG | SYSTOLIC BLOOD PRESSURE: 132 MMHG | OXYGEN SATURATION: 98 % | HEIGHT: 62 IN | WEIGHT: 194.88 LBS

## 2020-10-19 VITALS
TEMPERATURE: 99 F | HEART RATE: 90 BPM | WEIGHT: 194 LBS | BODY MASS INDEX: 35.7 KG/M2 | SYSTOLIC BLOOD PRESSURE: 118 MMHG | HEIGHT: 62 IN | DIASTOLIC BLOOD PRESSURE: 81 MMHG

## 2020-10-19 DIAGNOSIS — L03.317 CELLULITIS OF RIGHT BUTTOCK: ICD-10-CM

## 2020-10-19 DIAGNOSIS — L02.31 ABSCESS OF RIGHT BUTTOCK: ICD-10-CM

## 2020-10-19 DIAGNOSIS — L03.317 CELLULITIS OF RIGHT BUTTOCK: Primary | ICD-10-CM

## 2020-10-19 LAB
BASOPHILS # BLD AUTO: ABNORMAL K/UL (ref 0–0.2)
BASOPHILS NFR BLD: 1 % (ref 0–1.9)
DIFFERENTIAL METHOD: ABNORMAL
EOSINOPHIL # BLD AUTO: ABNORMAL K/UL (ref 0–0.5)
EOSINOPHIL NFR BLD: 2 % (ref 0–8)
ERYTHROCYTE [DISTWIDTH] IN BLOOD BY AUTOMATED COUNT: 14.3 % (ref 11.5–14.5)
HCT VFR BLD AUTO: 43.5 % (ref 37–48.5)
HGB BLD-MCNC: 14 G/DL (ref 12–16)
IMM GRANULOCYTES # BLD AUTO: ABNORMAL K/UL (ref 0–0.04)
IMM GRANULOCYTES NFR BLD AUTO: ABNORMAL % (ref 0–0.5)
LYMPHOCYTES # BLD AUTO: ABNORMAL K/UL (ref 1–4.8)
LYMPHOCYTES NFR BLD: 30 % (ref 18–48)
MCH RBC QN AUTO: 32.8 PG (ref 27–31)
MCHC RBC AUTO-ENTMCNC: 32.2 G/DL (ref 32–36)
MCV RBC AUTO: 102 FL (ref 82–98)
METAMYELOCYTES NFR BLD MANUAL: 3 %
MONOCYTES # BLD AUTO: ABNORMAL K/UL (ref 0.3–1)
MONOCYTES NFR BLD: 4 % (ref 4–15)
NEUTROPHILS NFR BLD: 58 % (ref 38–73)
NEUTS BAND NFR BLD MANUAL: 2 %
NRBC BLD-RTO: 0 /100 WBC
PLATELET # BLD AUTO: 285 K/UL (ref 150–350)
PLATELET BLD QL SMEAR: ABNORMAL
PMV BLD AUTO: 11.1 FL (ref 9.2–12.9)
RBC # BLD AUTO: 4.27 M/UL (ref 4–5.4)
WBC # BLD AUTO: 14.16 K/UL (ref 3.9–12.7)

## 2020-10-19 PROCEDURE — 99999 PR PBB SHADOW E&M-EST. PATIENT-LVL IV: ICD-10-PCS | Mod: PBBFAC,,, | Performed by: SURGERY

## 2020-10-19 PROCEDURE — 3008F PR BODY MASS INDEX (BMI) DOCUMENTED: ICD-10-PCS | Mod: CPTII,S$GLB,, | Performed by: SURGERY

## 2020-10-19 PROCEDURE — 93010 EKG 12-LEAD: ICD-10-PCS | Mod: ,,, | Performed by: INTERNAL MEDICINE

## 2020-10-19 PROCEDURE — 85027 COMPLETE CBC AUTOMATED: CPT

## 2020-10-19 PROCEDURE — 99999 PR PBB SHADOW E&M-EST. PATIENT-LVL IV: CPT | Mod: PBBFAC,,, | Performed by: SURGERY

## 2020-10-19 PROCEDURE — 3074F PR MOST RECENT SYSTOLIC BLOOD PRESSURE < 130 MM HG: ICD-10-PCS | Mod: CPTII,S$GLB,, | Performed by: SURGERY

## 2020-10-19 PROCEDURE — 3074F SYST BP LT 130 MM HG: CPT | Mod: CPTII,S$GLB,, | Performed by: SURGERY

## 2020-10-19 PROCEDURE — 3078F DIAST BP <80 MM HG: CPT | Mod: CPTII,S$GLB,, | Performed by: PHYSICIAN ASSISTANT

## 2020-10-19 PROCEDURE — 3078F PR MOST RECENT DIASTOLIC BLOOD PRESSURE < 80 MM HG: ICD-10-PCS | Mod: CPTII,S$GLB,, | Performed by: PHYSICIAN ASSISTANT

## 2020-10-19 PROCEDURE — 99999 PR PBB SHADOW E&M-EST. PATIENT-LVL V: CPT | Mod: PBBFAC,,, | Performed by: PHYSICIAN ASSISTANT

## 2020-10-19 PROCEDURE — 3075F PR MOST RECENT SYSTOLIC BLOOD PRESS GE 130-139MM HG: ICD-10-PCS | Mod: CPTII,S$GLB,, | Performed by: PHYSICIAN ASSISTANT

## 2020-10-19 PROCEDURE — 3075F SYST BP GE 130 - 139MM HG: CPT | Mod: CPTII,S$GLB,, | Performed by: PHYSICIAN ASSISTANT

## 2020-10-19 PROCEDURE — 99999 PR PBB SHADOW E&M-EST. PATIENT-LVL V: ICD-10-PCS | Mod: PBBFAC,,, | Performed by: PHYSICIAN ASSISTANT

## 2020-10-19 PROCEDURE — 99214 OFFICE O/P EST MOD 30 MIN: CPT | Mod: S$GLB,,, | Performed by: PHYSICIAN ASSISTANT

## 2020-10-19 PROCEDURE — 3008F BODY MASS INDEX DOCD: CPT | Mod: CPTII,S$GLB,, | Performed by: SURGERY

## 2020-10-19 PROCEDURE — 93010 ELECTROCARDIOGRAM REPORT: CPT | Mod: ,,, | Performed by: INTERNAL MEDICINE

## 2020-10-19 PROCEDURE — 99214 PR OFFICE/OUTPT VISIT, EST, LEVL IV, 30-39 MIN: ICD-10-PCS | Mod: S$GLB,,, | Performed by: PHYSICIAN ASSISTANT

## 2020-10-19 PROCEDURE — 93005 ELECTROCARDIOGRAM TRACING: CPT

## 2020-10-19 PROCEDURE — 36415 COLL VENOUS BLD VENIPUNCTURE: CPT

## 2020-10-19 PROCEDURE — 3008F BODY MASS INDEX DOCD: CPT | Mod: CPTII,S$GLB,, | Performed by: PHYSICIAN ASSISTANT

## 2020-10-19 PROCEDURE — 3008F PR BODY MASS INDEX (BMI) DOCUMENTED: ICD-10-PCS | Mod: CPTII,S$GLB,, | Performed by: PHYSICIAN ASSISTANT

## 2020-10-19 PROCEDURE — 3079F DIAST BP 80-89 MM HG: CPT | Mod: CPTII,S$GLB,, | Performed by: SURGERY

## 2020-10-19 PROCEDURE — 99203 PR OFFICE/OUTPT VISIT, NEW, LEVL III, 30-44 MIN: ICD-10-PCS | Mod: S$GLB,,, | Performed by: SURGERY

## 2020-10-19 PROCEDURE — 99203 OFFICE O/P NEW LOW 30 MIN: CPT | Mod: S$GLB,,, | Performed by: SURGERY

## 2020-10-19 PROCEDURE — 3079F PR MOST RECENT DIASTOLIC BLOOD PRESSURE 80-89 MM HG: ICD-10-PCS | Mod: CPTII,S$GLB,, | Performed by: SURGERY

## 2020-10-19 RX ORDER — LIDOCAINE HYDROCHLORIDE 10 MG/ML
1 INJECTION, SOLUTION EPIDURAL; INFILTRATION; INTRACAUDAL; PERINEURAL ONCE
Status: DISCONTINUED | OUTPATIENT
Start: 2020-10-19 | End: 2020-10-20 | Stop reason: HOSPADM

## 2020-10-19 RX ORDER — ONDANSETRON 4 MG/1
8 TABLET, ORALLY DISINTEGRATING ORAL EVERY 8 HOURS PRN
Status: CANCELLED | OUTPATIENT
Start: 2020-10-19

## 2020-10-19 NOTE — H&P (VIEW-ONLY)
Patient ID: Diana Escobar is a 55 y.o. female.    Worsening abscess    Chief Complaint: Consult (buttock abscess)      HPI:  Patient went to the emergency room.  She had incision and drainage of a right buttock abscess done.  She return to the emergency room shortly thereafter complaining of pain she was re-evaluated.  White count was found to be 17,000 she was discharged home.  She states that a small incision was made in the area of the abscess.  She removed the packing and copious amounts of purulent material came out She followed up with her primary care doctor who felt that the area was enlarging as the patient complained about increasing pain.  She denies any fever and chills.  She presents now for surgical evaluation      Review of Systems   Constitutional: Negative for appetite change, chills, fatigue, fever and unexpected weight change.   HENT: Negative for hearing loss and rhinorrhea.    Eyes: Negative for visual disturbance.   Respiratory: Negative for apnea, cough, shortness of breath (Occasional) and wheezing.    Cardiovascular: Negative for chest pain and palpitations.   Gastrointestinal: Negative for abdominal distention, abdominal pain, blood in stool, constipation, diarrhea, nausea and vomiting.   Genitourinary: Negative for dysuria, frequency and urgency.   Musculoskeletal: Negative for arthralgias and neck pain.   Skin: Negative for rash.        Pain and swelling of the medial aspect of the right buttocks   Neurological: Negative for seizures, weakness, numbness and headaches.   Hematological: Negative for adenopathy. Does not bruise/bleed easily.   Psychiatric/Behavioral: Negative for hallucinations. The patient is not nervous/anxious.        Current Outpatient Medications   Medication Sig Dispense Refill    albuterol (PROVENTIL/VENTOLIN HFA) 90 mcg/actuation inhaler Inhale 2 puffs into the lungs every 6 (six) hours as needed for Wheezing. 18 g 6    amLODIPine (NORVASC) 5 MG tablet Take 1  tablet by mouth once daily 90 tablet 1    atorvastatin (LIPITOR) 40 MG tablet Take 40 mg by mouth once daily.      blood sugar diagnostic (ONETOUCH VERIO) Strp Glucose testing once daily. 30 each 6    blood-glucose meter Misc Use as directed. 1 each 0    BREO ELLIPTA 200-25 mcg/dose DsDv diskus inhaler Inhale 1 puff by mouth once daily 180 each 3    busPIRone (BUSPAR) 5 MG Tab Take 1 tablet (5 mg total) by mouth 2 (two) times daily. (Patient taking differently: Take 5 mg by mouth 2 (two) times daily as needed. ) 60 tablet 6    clindamycin (CLEOCIN) 300 MG capsule Take 1 capsule (300 mg total) by mouth every 8 (eight) hours. for 10 days 30 capsule 0    clopidogrel (PLAVIX) 75 mg tablet Take 75 mg by mouth once daily.      diclofenac sodium (VOLTAREN) 1 % Gel Apply topically 2 (two) times daily as needed. Apply to affected area 1 Tube 1    estradioL (IMVEXXY MAINTENANCE PACK) 10 mcg Inst Place 10 mcg vaginally.      EUTHYROX 50 mcg tablet Take 1 tablet by mouth once daily 30 tablet 11    folic acid (FOLVITE) 1 MG tablet Take 1 tablet (1 mg total) by mouth once daily. 90 tablet 3    gabapentin (NEURONTIN) 300 MG capsule Take 1 capsule by mouth twice daily 60 capsule 0    lancets (LANCETS,ULTRA THIN) Misc Glucose testing daily. 50 each 11    metFORMIN (GLUCOPHAGE) 500 MG tablet Take 1 tablet (500 mg total) by mouth 2 (two) times daily with meals. 60 tablet 3    methotrexate 2.5 MG Tab Take 4 tablets (10 mg total) by mouth every 7 days. 48 tablet 1    methylPREDNISolone (MEDROL DOSEPACK) 4 mg tablet use as directed 1 Package 0    peg 400-propylene glycol, PF, (SYSTANE ULTRA, PF,) 0.4-0.3 % Dpet Place 1 drop into both eyes 4 (four) times daily. 1 each     rOPINIRole (REQUIP) 1 MG tablet Take 1 tablet (1 mg total) by mouth every evening. 30 tablet 2    tofacitinib (XELJANZ XR) 11 mg Tb24 Take 11 mg by mouth once daily. 30 tablet 11    TRULICITY 0.75 mg/0.5 mL pen injector INJECT 0.5 MLS(0.75 MG TOTAL)  INTO THE SKIN EVERY 7 DAYS 4 pen 6    venlafaxine (EFFEXOR-XR) 150 MG Cp24 Take 1 capsule by mouth once daily 30 capsule 6    sulfamethoxazole-trimethoprim 800-160mg (BACTRIM DS) 800-160 mg Tab Take 1 tablet by mouth 2 (two) times daily. for 7 days (Patient not taking: Reported on 10/19/2020) 14 tablet 0     Current Facility-Administered Medications   Medication Dose Route Frequency Provider Last Rate Last Dose    lidocaine (PF) 10 mg/ml (1%) injection 10 mg  1 mL Intradermal Once Mike Dye MD           Review of patient's allergies indicates:   Allergen Reactions    Mobic [meloxicam]     Topamax [topiramate]     Adhesive Rash       Past Medical History:   Diagnosis Date    Asthma     Chronic low back pain     Degenerative disc disease, lumbar     Depression     Diabetes mellitus 2014    Fibromyalgia     Hyperlipidemia     Hypertension     Hypothyroidism     MRSA (methicillin resistant Staphylococcus aureus) carrier     Nodule of left lung 2/6/2017    CT chest : 04/06/16: Small 3 mm pleural-based nodule laterally in the left lower lobe.    Palpitations     Dr. Jesus Lao--cardiology    Stress incontinence     Dr. Zurdo Lopez--urology    Stroke     TIA    Vitamin D deficiency disease        Past Surgical History:   Procedure Laterality Date    HYSTERECTOMY      left ankle surgery      loop recorder  05/2017    cardiac device    NECK SURGERY  06/2016    ROTATOR CUFF REPAIR      TONSILLECTOMY         Family History   Problem Relation Age of Onset    Cancer Mother     Stroke Mother     Heart disease Mother     Diabetes Mother     Cataracts Mother     Hypertension Mother     Breast cancer Mother     Heart disease Father     COPD Father     Hypertension Father     Diabetes Maternal Aunt     Breast cancer Sister        Social History     Socioeconomic History    Marital status:      Spouse name: Not on file    Number of children: Not on file    Years of  education: Not on file    Highest education level: Not on file   Occupational History     Employer: [employer]     Employer: [employer]   Social Needs    Financial resource strain: Not hard at all    Food insecurity     Worry: Never true     Inability: Never true    Transportation needs     Medical: No     Non-medical: No   Tobacco Use    Smoking status: Current Every Day Smoker     Packs/day: 1.00     Years: 25.00     Pack years: 25.00     Types: Cigarettes    Smokeless tobacco: Never Used    Tobacco comment: in smoking cessation program used Chantix    Substance and Sexual Activity    Alcohol use: No     Alcohol/week: 0.0 standard drinks     Frequency: Monthly or less     Drinks per session: 1 or 2     Binge frequency: Never     Comment: OCC    Drug use: No    Sexual activity: Yes   Lifestyle    Physical activity     Days per week: 2 days     Minutes per session: 30 min    Stress: To some extent   Relationships    Social connections     Talks on phone: Three times a week     Gets together: Twice a week     Attends Christianity service: Not on file     Active member of club or organization: No     Attends meetings of clubs or organizations: Never     Relationship status:    Other Topics Concern    Not on file   Social History Narrative    Not on file       Vitals:    10/19/20 1537   BP: 118/81   Pulse: 90   Temp: 98.7 °F (37.1 °C)       Physical Exam  Constitutional:       Appearance: She is well-developed.   HENT:      Head: Normocephalic.   Neck:      Thyroid: No thyromegaly.      Vascular: No JVD.      Trachea: No tracheal deviation.   Cardiovascular:      Rate and Rhythm: Normal rate and regular rhythm.      Heart sounds: Normal heart sounds.   Pulmonary:      Breath sounds: Normal breath sounds. No wheezing.   Abdominal:      General: Bowel sounds are normal. There is no distension.      Palpations: Abdomen is soft. Abdomen is not rigid. There is no mass.      Tenderness: There is no  abdominal tenderness. There is no guarding or rebound.   Musculoskeletal: Normal range of motion.   Lymphadenopathy:      Cervical: No cervical adenopathy.   Skin:     General: Skin is warm and dry.      Findings: No erythema or rash.      Comments: On the medial aspect of the right buttock there is a 8 x 4 cm area of induration with a small 1 cm incision in the midst of it   Neurological:      Mental Status: She is oriented to person, place, and time.      Labs were reviewed.    Urgent code testing cannot be done from the clinic    Assessment & Plan:    incompletely drained right buttock abscess.    Incision and drainage of the right buttock abscess in the operating room with sedation and local anesthetic verses general anesthetic per Anesthesiology.  The patient will need an EKG and COVID testing.    Surgery performed tomorrow.    The risks of the surgery are infection, bleeding, recurrence, need to create a large open wound

## 2020-10-19 NOTE — PROGRESS NOTES
Health Maintenance Due   Topic Date Due    Shingles Vaccine (1 of 2) 08/05/2016    Colorectal Cancer Screening  03/05/2019    Urine Microalbumin  02/19/2020    Influenza Vaccine (1) 08/01/2020     Updates were requested from care everywhere.  Chart was reviewed for overdue Proactive [facility] Encounters ([facility]) topics (CRS, Breast Cancer Screening, Eye exam)  Health Maintenance has been updated.  LINKS immunization registry triggered.  Immunizations were reconciled.

## 2020-10-19 NOTE — PATIENT INSTRUCTIONS
"Surgeries tomorrow at [facility] on [address] at approximately 11:30 a.m. in the morning.    Please arrive at the hospital between 930 and 10 as you need to have COVID testing done.    No solid food after midnight but you may take the rest of your morning medications with a sip of water.      Please take all your usual managing medications.  Do not however take her metformin or any other medication for diabetes    [facility]  Instructions for Patients and Families    You are invited to share your experience with me and my staff.  If you receive a survey in the mail, please return it at your convenience, or complete a brief survey on trippiece.  We value your opinion!        Did you know that [facility] can be used to make appointments?  Just select "Schedule Appointment" under the "Visits" menu.    Notify you if any of your preop laboratories show abnormalities.  I    Before surgery:  The pre-op nurse from the hospital will call you before the day of your surgery to confirm your arrival time.  The time of your surgery may change due to emergencies or other unforeseen events.  Do not eat or drink anything after midnight the night before your procedure, except for clear liquids up to three hours before your surgery time, and sips of water with medication.  If you are not diabetic, it is recommended that you drink a glass of clear fruit juice (apple, grape, cranberry, not orange) three hours before your surgery time, but nothing after that.  If you are diabetic, you may have water or sugar-free clear liquids such as Crystal Light up to three hours before your surgery time.    Day of Surgery:  · You will go to a pre-op area where an IV will be started and you will speak to the anesthesiologist and surgeon.  · Your family will be updated throughout the operation.  After surgery, your family member may be taken to a private room for consultation with the surgeon.  This is for " the privacy of your medical information and does not necessarily mean there is anything wrong.    If your incisions have:  · Glue:  You may take a bath or shower immediately and wash your skin as you normally do.  The glue will eventually crumble or peel off. Do not let your incisions soak under water.  · Strips: Leave them on, but it is OK if they fall off on their own. It is OK to get them wet 48 hours after surgery.  · Bandage: You may remove it 2 days after your surgery, and then you may leave the incision open and take a shower or bath, unless otherwise instructed. If your bandage has clear plastic, you may shower with it on and then remove it 2 days after surgery.    Activities  · Walking is recommended after surgery; bed rest is not recommended unless specifically ordered.  · If you have had abdominal surgery, do not lift over 20 pounds for 4 weeks after surgery.  · If you have had hernia surgery, do not lift over 20 pounds for 6 weeks after surgery.  · You may drive when you are off your post-operative pain medication.  · Do not smoke after surgery, it decreases your ability to heal and increases the risk of infection and pneumonia.    Diet:  Drink lots of fluids after surgery.  You might not have much of an appetite at first, you may eat regular food when you feel ready, unless you are given special diet instructions.    Post-operative symptoms and medications  · It is safe to take over-the-counter medications for constipation, heartburn, sleep, or itching if needed.  Prescription pain medication may contain acetaminophen (Tylenol), so you should not take additional acetaminophen (Tylenol) at the same time as your pain medication.  · You may experience nausea, low fever/chills, and clear drainage from your incision, sometimes up to a month after surgery.  Notify our office if you have fever over 101 degrees, worsening redness around your incision, thick cloudy drainage, or inability to drink any  "liquids.  · You will experience some level of pain after surgery.  Your pain medication should help with the pain, but may not be able to eliminate it entirely.  Pain will decrease with time, and most pain will be gone by 4 to 6 weeks after surgery.  · We are not able to call in prescriptions for pain medication after hours or on weekends.  If your pain medication is ineffective or you will run out soon and need a refill, please call our office at [phone].  We are not able to replace pain medication that has been lost or stolen.    After surgery, you will either be discharged home or admitted to the hospital.  If you are admitted to the hospital, one of the surgeons or a physician assistant will see you once a day.  Due to scheduled surgery, we may see you in the afternoon or at night; however, your nurse is able to page us at any time.  If you feel there is a situation that is not being addressed properly, please dial [phone] from the phone in your room.    Follow-up appointment  · You will see your surgeon or a physician assistant in clinic for a follow-up appointment at either our [facility] (off [facility] or [facility] (off [facility]) locations, usually between one and four weeks after your surgery.  · The hospital nurses can make your follow up appointment, or you can make it online at [email or web address] or call [phone].  · If you have a smartphone with the [facility] sid, please let us know if you would like to do a phone visit instead of a post-op office visit.    If you are signed up for [facility], install the "[facility]" sid to access your test results, send messages to your doctors, and schedule appointments from your smartphone!        "

## 2020-10-19 NOTE — PROGRESS NOTES
Patient ID: Diana Escobar is a 55 y.o. female.    Worsening abscess    Chief Complaint: Consult (buttock abscess)      HPI:  Patient went to the emergency room.  She had incision and drainage of a right buttock abscess done.  She return to the emergency room shortly thereafter complaining of pain she was re-evaluated.  White count was found to be 17,000 she was discharged home.  She states that a small incision was made in the area of the abscess.  She removed the packing and copious amounts of purulent material came out She followed up with her primary care doctor who felt that the area was enlarging as the patient complained about increasing pain.  She denies any fever and chills.  She presents now for surgical evaluation      Review of Systems   Constitutional: Negative for appetite change, chills, fatigue, fever and unexpected weight change.   HENT: Negative for hearing loss and rhinorrhea.    Eyes: Negative for visual disturbance.   Respiratory: Negative for apnea, cough, shortness of breath (Occasional) and wheezing.    Cardiovascular: Negative for chest pain and palpitations.   Gastrointestinal: Negative for abdominal distention, abdominal pain, blood in stool, constipation, diarrhea, nausea and vomiting.   Genitourinary: Negative for dysuria, frequency and urgency.   Musculoskeletal: Negative for arthralgias and neck pain.   Skin: Negative for rash.        Pain and swelling of the medial aspect of the right buttocks   Neurological: Negative for seizures, weakness, numbness and headaches.   Hematological: Negative for adenopathy. Does not bruise/bleed easily.   Psychiatric/Behavioral: Negative for hallucinations. The patient is not nervous/anxious.        Current Outpatient Medications   Medication Sig Dispense Refill    albuterol (PROVENTIL/VENTOLIN HFA) 90 mcg/actuation inhaler Inhale 2 puffs into the lungs every 6 (six) hours as needed for Wheezing. 18 g 6    amLODIPine (NORVASC) 5 MG tablet Take 1  tablet by mouth once daily 90 tablet 1    atorvastatin (LIPITOR) 40 MG tablet Take 40 mg by mouth once daily.      blood sugar diagnostic (ONETOUCH VERIO) Strp Glucose testing once daily. 30 each 6    blood-glucose meter Misc Use as directed. 1 each 0    BREO ELLIPTA 200-25 mcg/dose DsDv diskus inhaler Inhale 1 puff by mouth once daily 180 each 3    busPIRone (BUSPAR) 5 MG Tab Take 1 tablet (5 mg total) by mouth 2 (two) times daily. (Patient taking differently: Take 5 mg by mouth 2 (two) times daily as needed. ) 60 tablet 6    clindamycin (CLEOCIN) 300 MG capsule Take 1 capsule (300 mg total) by mouth every 8 (eight) hours. for 10 days 30 capsule 0    clopidogrel (PLAVIX) 75 mg tablet Take 75 mg by mouth once daily.      diclofenac sodium (VOLTAREN) 1 % Gel Apply topically 2 (two) times daily as needed. Apply to affected area 1 Tube 1    estradioL (IMVEXXY MAINTENANCE PACK) 10 mcg Inst Place 10 mcg vaginally.      EUTHYROX 50 mcg tablet Take 1 tablet by mouth once daily 30 tablet 11    folic acid (FOLVITE) 1 MG tablet Take 1 tablet (1 mg total) by mouth once daily. 90 tablet 3    gabapentin (NEURONTIN) 300 MG capsule Take 1 capsule by mouth twice daily 60 capsule 0    lancets (LANCETS,ULTRA THIN) Misc Glucose testing daily. 50 each 11    metFORMIN (GLUCOPHAGE) 500 MG tablet Take 1 tablet (500 mg total) by mouth 2 (two) times daily with meals. 60 tablet 3    methotrexate 2.5 MG Tab Take 4 tablets (10 mg total) by mouth every 7 days. 48 tablet 1    methylPREDNISolone (MEDROL DOSEPACK) 4 mg tablet use as directed 1 Package 0    peg 400-propylene glycol, PF, (SYSTANE ULTRA, PF,) 0.4-0.3 % Dpet Place 1 drop into both eyes 4 (four) times daily. 1 each     rOPINIRole (REQUIP) 1 MG tablet Take 1 tablet (1 mg total) by mouth every evening. 30 tablet 2    tofacitinib (XELJANZ XR) 11 mg Tb24 Take 11 mg by mouth once daily. 30 tablet 11    TRULICITY 0.75 mg/0.5 mL pen injector INJECT 0.5 MLS(0.75 MG TOTAL)  INTO THE SKIN EVERY 7 DAYS 4 pen 6    venlafaxine (EFFEXOR-XR) 150 MG Cp24 Take 1 capsule by mouth once daily 30 capsule 6    sulfamethoxazole-trimethoprim 800-160mg (BACTRIM DS) 800-160 mg Tab Take 1 tablet by mouth 2 (two) times daily. for 7 days (Patient not taking: Reported on 10/19/2020) 14 tablet 0     Current Facility-Administered Medications   Medication Dose Route Frequency Provider Last Rate Last Dose    lidocaine (PF) 10 mg/ml (1%) injection 10 mg  1 mL Intradermal Once Mike Dye MD           Review of patient's allergies indicates:   Allergen Reactions    Mobic [meloxicam]     Topamax [topiramate]     Adhesive Rash       Past Medical History:   Diagnosis Date    Asthma     Chronic low back pain     Degenerative disc disease, lumbar     Depression     Diabetes mellitus 2014    Fibromyalgia     Hyperlipidemia     Hypertension     Hypothyroidism     MRSA (methicillin resistant Staphylococcus aureus) carrier     Nodule of left lung 2/6/2017    CT chest : 04/06/16: Small 3 mm pleural-based nodule laterally in the left lower lobe.    Palpitations     Dr. Jesus Lao--cardiology    Stress incontinence     Dr. Zurdo Lopez--urology    Stroke     TIA    Vitamin D deficiency disease        Past Surgical History:   Procedure Laterality Date    HYSTERECTOMY      left ankle surgery      loop recorder  05/2017    cardiac device    NECK SURGERY  06/2016    ROTATOR CUFF REPAIR      TONSILLECTOMY         Family History   Problem Relation Age of Onset    Cancer Mother     Stroke Mother     Heart disease Mother     Diabetes Mother     Cataracts Mother     Hypertension Mother     Breast cancer Mother     Heart disease Father     COPD Father     Hypertension Father     Diabetes Maternal Aunt     Breast cancer Sister        Social History     Socioeconomic History    Marital status:      Spouse name: Not on file    Number of children: Not on file    Years of  education: Not on file    Highest education level: Not on file   Occupational History     Employer: [employer]     Employer: [employer]   Social Needs    Financial resource strain: Not hard at all    Food insecurity     Worry: Never true     Inability: Never true    Transportation needs     Medical: No     Non-medical: No   Tobacco Use    Smoking status: Current Every Day Smoker     Packs/day: 1.00     Years: 25.00     Pack years: 25.00     Types: Cigarettes    Smokeless tobacco: Never Used    Tobacco comment: in smoking cessation program used Chantix    Substance and Sexual Activity    Alcohol use: No     Alcohol/week: 0.0 standard drinks     Frequency: Monthly or less     Drinks per session: 1 or 2     Binge frequency: Never     Comment: OCC    Drug use: No    Sexual activity: Yes   Lifestyle    Physical activity     Days per week: 2 days     Minutes per session: 30 min    Stress: To some extent   Relationships    Social connections     Talks on phone: Three times a week     Gets together: Twice a week     Attends Tenriism service: Not on file     Active member of club or organization: No     Attends meetings of clubs or organizations: Never     Relationship status:    Other Topics Concern    Not on file   Social History Narrative    Not on file       Vitals:    10/19/20 1537   BP: 118/81   Pulse: 90   Temp: 98.7 °F (37.1 °C)       Physical Exam  Constitutional:       Appearance: She is well-developed.   HENT:      Head: Normocephalic.   Neck:      Thyroid: No thyromegaly.      Vascular: No JVD.      Trachea: No tracheal deviation.   Cardiovascular:      Rate and Rhythm: Normal rate and regular rhythm.      Heart sounds: Normal heart sounds.   Pulmonary:      Breath sounds: Normal breath sounds. No wheezing.   Abdominal:      General: Bowel sounds are normal. There is no distension.      Palpations: Abdomen is soft. Abdomen is not rigid. There is no mass.      Tenderness: There is no  abdominal tenderness. There is no guarding or rebound.   Musculoskeletal: Normal range of motion.   Lymphadenopathy:      Cervical: No cervical adenopathy.   Skin:     General: Skin is warm and dry.      Findings: No erythema or rash.      Comments: On the medial aspect of the right buttock there is a 8 x 4 cm area of induration with a small 1 cm incision in the midst of it   Neurological:      Mental Status: She is oriented to person, place, and time.      Labs were reviewed.    Urgent code testing cannot be done from the clinic    Assessment & Plan:    incompletely drained right buttock abscess.    Incision and drainage of the right buttock abscess in the operating room with sedation and local anesthetic verses general anesthetic per Anesthesiology.  The patient will need an EKG and COVID testing.    Surgery performed tomorrow.    The risks of the surgery are infection, bleeding, recurrence, need to create a large open wound

## 2020-10-19 NOTE — PROGRESS NOTES
"Subjective:      Patient ID: Diana Escobar is a 55 y.o. female.    Chief Complaint: Follow-up (wound buttocks) and Immunizations (flu and shingles)     Patient is new to me, being seen today for f/u for cellulitis of R buttock.     Originally seen in ED on 10/12 with I&D, Rx Bactrim (IV Clinda inpatient).  Seen by Dr. Alcocer on 10/15 and wound with significant drainage and warmth, antibiotic switched to Clinda.  No wound culture on file.  WBC 17.7 in ER.    Still with some tenderness, mildly improved.  Continues warm soaks and compresses.  Reports purulent drainage on packing last week but no drainage since that time.  Reports h/o of MRSA under her arm that required surgical intervention.     Review of Systems   Constitutional: Positive for fever (99/100, low grade). Negative for chills and diaphoresis.   HENT: Negative for congestion, rhinorrhea and sore throat.    Respiratory: Negative for cough, shortness of breath and wheezing.    Gastrointestinal: Positive for nausea. Negative for abdominal pain, constipation, diarrhea and vomiting.   Skin: Positive for wound (buttocks). Negative for rash.   Neurological: Negative for dizziness, light-headedness and headaches.       Objective:   /68 (BP Location: Right arm, Patient Position: Sitting, BP Method: Large (Manual))   Pulse 96   Temp 98.8 °F (37.1 °C) (Tympanic)   Resp 18   Ht 5' 2" (1.575 m)   Wt 88.4 kg (194 lb 14.2 oz)   SpO2 98%   BMI 35.65 kg/m²   Physical Exam  Constitutional:       General: She is not in acute distress.     Appearance: Normal appearance. She is well-developed. She is not ill-appearing.   HENT:      Head: Normocephalic and atraumatic.   Cardiovascular:      Rate and Rhythm: Normal rate and regular rhythm.      Heart sounds: Normal heart sounds. No murmur.   Pulmonary:      Effort: Pulmonary effort is normal. No respiratory distress.      Breath sounds: Normal breath sounds. No decreased breath sounds.   Skin:     General: Skin " is warm and dry.      Findings: No rash.             Comments: Chaperoned by Misa Epperson MA   Psychiatric:         Speech: Speech normal.         Behavior: Behavior normal.         Thought Content: Thought content normal.       Assessment:      1. Cellulitis of right buttock    2. Abscess of right buttock       Plan:   Cellulitis of right buttock  -     CBC auto differential; Future; Expected date: 10/19/2020  -     Ambulatory referral/consult to General Surgery; Future; Expected date: 10/26/2020    Abscess of right buttock  -     Ambulatory referral/consult to General Surgery; Future; Expected date: 10/26/2020    Gen Surg appt scheduled for this afternoon    Discussed worsening signs/symptoms and when to return to clinic or go to ED.   Patient expresses understanding and agrees with treatment plan.

## 2020-10-19 NOTE — LETTER
October 19, 2020      Phyllis Man PA-C  [address] [place] [address] [address] Carissa [place] [id number]           [facility] General [facility]  [address] [place] [address] [address]HA [place] [address]  Phone: [phone]  Fax: [phone]          Patient: Diana Escobar   MR Number: [id number]   YOB: 1965   Date of Visit: 10/19/2020       Dear Phyllis Man:    Thank you for referring Diana Escobar to me for evaluation. Attached you will find relevant portions of my assessment and plan of care.    If you have questions, please do not hesitate to call me. I look forward to following Diana Escobar along with you.    Sincerely,    Mike Dye MD    Enclosure  CC:  No Recipients    If you would like to receive this communication electronically, please contact [email or web address] or [phone] to request more information on [facility] access.    For providers and/or their staff who would like to refer a patient to [facility], please contact us through our one-stop-shop provider referral line, [facility] , at [phone].    If you feel you have received this communication in error or would no longer like to receive these types of communications, please e-mail [email or web address]

## 2020-10-20 ENCOUNTER — HOSPITAL ENCOUNTER (OUTPATIENT)
Facility: HOSPITAL | Age: 55
Discharge: HOME | End: 2020-10-20
Attending: SURGERY | Admitting: SURGERY
Payer: COMMERCIAL

## 2020-10-20 ENCOUNTER — ANESTHESIA (OUTPATIENT)
Dept: SURGERY | Facility: HOSPITAL | Age: 55
End: 2020-10-20
Payer: COMMERCIAL

## 2020-10-20 ENCOUNTER — ANESTHESIA EVENT (OUTPATIENT)
Dept: SURGERY | Facility: HOSPITAL | Age: 55
End: 2020-10-20
Payer: COMMERCIAL

## 2020-10-20 DIAGNOSIS — L03.317 CELLULITIS OF RIGHT BUTTOCK: ICD-10-CM

## 2020-10-20 LAB — POCT GLUCOSE: 96 MG/DL (ref 70–110)

## 2020-10-20 PROCEDURE — 87070 CULTURE OTHR SPECIMN AEROBIC: CPT

## 2020-10-20 PROCEDURE — 25000003 PHARM REV CODE 250: Performed by: SURGERY

## 2020-10-20 PROCEDURE — 87077 CULTURE AEROBIC IDENTIFY: CPT

## 2020-10-20 PROCEDURE — 37000008 HC ANESTHESIA 1ST 15 MINUTES: Performed by: SURGERY

## 2020-10-20 PROCEDURE — 10061 PR DRAIN SKIN ABSCESS COMPLIC: ICD-10-PCS | Mod: ,,, | Performed by: SURGERY

## 2020-10-20 PROCEDURE — 36000704 HC OR TIME LEV I 1ST 15 MIN: Performed by: SURGERY

## 2020-10-20 PROCEDURE — 63600175 PHARM REV CODE 636 W HCPCS: Performed by: ANESTHESIOLOGY

## 2020-10-20 PROCEDURE — 25000003 PHARM REV CODE 250: Performed by: NURSE ANESTHETIST, CERTIFIED REGISTERED

## 2020-10-20 PROCEDURE — 71000033 HC RECOVERY, INTIAL HOUR: Performed by: SURGERY

## 2020-10-20 PROCEDURE — 10061 I&D ABSCESS COMP/MULTIPLE: CPT | Mod: ,,, | Performed by: SURGERY

## 2020-10-20 PROCEDURE — 36000705 HC OR TIME LEV I EA ADD 15 MIN: Performed by: SURGERY

## 2020-10-20 PROCEDURE — 63600175 PHARM REV CODE 636 W HCPCS: Performed by: NURSE ANESTHETIST, CERTIFIED REGISTERED

## 2020-10-20 PROCEDURE — 37000009 HC ANESTHESIA EA ADD 15 MINS: Performed by: SURGERY

## 2020-10-20 PROCEDURE — 87186 SC STD MICRODIL/AGAR DIL: CPT

## 2020-10-20 PROCEDURE — 71000015 HC POSTOP RECOV 1ST HR: Performed by: SURGERY

## 2020-10-20 RX ORDER — FENTANYL CITRATE 50 UG/ML
INJECTION, SOLUTION INTRAMUSCULAR; INTRAVENOUS
Status: DISCONTINUED | OUTPATIENT
Start: 2020-10-20 | End: 2020-10-20

## 2020-10-20 RX ORDER — HYDROCODONE BITARTRATE AND ACETAMINOPHEN 5; 325 MG/1; MG/1
1 TABLET ORAL EVERY 6 HOURS PRN
Status: DISCONTINUED | OUTPATIENT
Start: 2020-10-20 | End: 2020-10-20 | Stop reason: HOSPADM

## 2020-10-20 RX ORDER — ONDANSETRON 2 MG/ML
4 INJECTION INTRAMUSCULAR; INTRAVENOUS DAILY PRN
Status: DISCONTINUED | OUTPATIENT
Start: 2020-10-20 | End: 2020-10-20 | Stop reason: HOSPADM

## 2020-10-20 RX ORDER — LIDOCAINE HYDROCHLORIDE 10 MG/ML
INJECTION, SOLUTION EPIDURAL; INFILTRATION; INTRACAUDAL; PERINEURAL
Status: DISCONTINUED | OUTPATIENT
Start: 2020-10-20 | End: 2020-10-20

## 2020-10-20 RX ORDER — HYDROCODONE BITARTRATE AND ACETAMINOPHEN 7.5; 325 MG/1; MG/1
1 TABLET ORAL EVERY 6 HOURS PRN
Status: DISCONTINUED | OUTPATIENT
Start: 2020-10-20 | End: 2020-10-20 | Stop reason: HOSPADM

## 2020-10-20 RX ORDER — CLINDAMYCIN PHOSPHATE 900 MG/50ML
900 INJECTION, SOLUTION INTRAVENOUS
Status: COMPLETED | OUTPATIENT
Start: 2020-10-20 | End: 2020-10-20

## 2020-10-20 RX ORDER — SODIUM CHLORIDE, SODIUM LACTATE, POTASSIUM CHLORIDE, CALCIUM CHLORIDE 600; 310; 30; 20 MG/100ML; MG/100ML; MG/100ML; MG/100ML
INJECTION, SOLUTION INTRAVENOUS CONTINUOUS PRN
Status: DISCONTINUED | OUTPATIENT
Start: 2020-10-20 | End: 2020-10-20

## 2020-10-20 RX ORDER — HYDROMORPHONE HYDROCHLORIDE 2 MG/ML
1 INJECTION, SOLUTION INTRAMUSCULAR; INTRAVENOUS; SUBCUTANEOUS EVERY 4 HOURS PRN
Status: DISCONTINUED | OUTPATIENT
Start: 2020-10-20 | End: 2020-10-20 | Stop reason: HOSPADM

## 2020-10-20 RX ORDER — PROPOFOL 10 MG/ML
VIAL (ML) INTRAVENOUS
Status: DISCONTINUED | OUTPATIENT
Start: 2020-10-20 | End: 2020-10-20

## 2020-10-20 RX ORDER — ONDANSETRON 8 MG/1
8 TABLET, ORALLY DISINTEGRATING ORAL EVERY 8 HOURS PRN
Status: DISCONTINUED | OUTPATIENT
Start: 2020-10-20 | End: 2020-10-20 | Stop reason: HOSPADM

## 2020-10-20 RX ORDER — BUPIVACAINE HYDROCHLORIDE 2.5 MG/ML
INJECTION, SOLUTION EPIDURAL; INFILTRATION; INTRACAUDAL
Status: DISCONTINUED | OUTPATIENT
Start: 2020-10-20 | End: 2020-10-20 | Stop reason: HOSPADM

## 2020-10-20 RX ORDER — HYDROCODONE BITARTRATE AND ACETAMINOPHEN 5; 325 MG/1; MG/1
1 TABLET ORAL EVERY 6 HOURS PRN
Qty: 20 TABLET | Refills: 0 | Status: SHIPPED | OUTPATIENT
Start: 2020-10-20 | End: 2020-10-29

## 2020-10-20 RX ORDER — LIDOCAINE HYDROCHLORIDE 10 MG/ML
INJECTION, SOLUTION EPIDURAL; INFILTRATION; INTRACAUDAL; PERINEURAL
Status: DISCONTINUED | OUTPATIENT
Start: 2020-10-20 | End: 2020-10-20 | Stop reason: HOSPADM

## 2020-10-20 RX ORDER — MIDAZOLAM HYDROCHLORIDE 1 MG/ML
INJECTION, SOLUTION INTRAMUSCULAR; INTRAVENOUS
Status: DISCONTINUED | OUTPATIENT
Start: 2020-10-20 | End: 2020-10-20

## 2020-10-20 RX ORDER — FENTANYL CITRATE 50 UG/ML
25 INJECTION, SOLUTION INTRAMUSCULAR; INTRAVENOUS EVERY 5 MIN PRN
Status: DISCONTINUED | OUTPATIENT
Start: 2020-10-20 | End: 2020-10-20 | Stop reason: HOSPADM

## 2020-10-20 RX ADMIN — LIDOCAINE HYDROCHLORIDE 50 MG: 10 INJECTION, SOLUTION EPIDURAL; INFILTRATION; INTRACAUDAL; PERINEURAL at 12:10

## 2020-10-20 RX ADMIN — MIDAZOLAM 2 MG: 1 INJECTION INTRAMUSCULAR; INTRAVENOUS at 12:10

## 2020-10-20 RX ADMIN — HYDROCODONE BITARTRATE AND ACETAMINOPHEN 1 TABLET: 7.5; 325 TABLET ORAL at 12:10

## 2020-10-20 RX ADMIN — CLINDAMYCIN PHOSPHATE 900 MG: 18 INJECTION, SOLUTION INTRAVENOUS at 12:10

## 2020-10-20 RX ADMIN — FENTANYL CITRATE 25 MCG: 50 INJECTION INTRAMUSCULAR; INTRAVENOUS at 12:10

## 2020-10-20 RX ADMIN — FENTANYL CITRATE 100 MCG: 50 INJECTION, SOLUTION INTRAMUSCULAR; INTRAVENOUS at 12:10

## 2020-10-20 RX ADMIN — FENTANYL CITRATE 25 MCG: 50 INJECTION INTRAMUSCULAR; INTRAVENOUS at 01:10

## 2020-10-20 RX ADMIN — SODIUM CHLORIDE, SODIUM LACTATE, POTASSIUM CHLORIDE, AND CALCIUM CHLORIDE: .6; .31; .03; .02 INJECTION, SOLUTION INTRAVENOUS at 12:10

## 2020-10-20 RX ADMIN — PROPOFOL 250 MG: 10 INJECTION, EMULSION INTRAVENOUS at 12:10

## 2020-10-20 NOTE — DISCHARGE INSTRUCTIONS
Please call for any fever, increase in pain, nausea or vomiting or redness or drainage from incision(s).        May shower, or soak in the tub      Please change the packing once a day., after showering or bathing removed the packing and gently filled the wound with gauze.       If you become constipated from the pain medication you can use over the counter laxatives,  Miralax or Glycolax, or Magnesium Citrate for severe constipation.

## 2020-10-20 NOTE — ANESTHESIA POSTPROCEDURE EVALUATION
Anesthesia Post Evaluation    Patient: Diana Escobar    Procedure(s) Performed: Procedure(s) (LRB):  INCISION AND DRAINAGE, ABSCESS (Right)    Final Anesthesia Type: MAC    Patient location during evaluation: PACU  Patient participation: Yes- Able to Participate  Level of consciousness: awake and alert  Post-procedure vital signs: reviewed and stable  Pain management: adequate  Airway patency: patent  SRAVAN mitigation strategies: Extubation while patient is awake  PONV status at discharge: No PONV  Anesthetic complications: no      Cardiovascular status: hemodynamically stable  Respiratory status: spontaneous ventilation  Hydration status: euvolemic  Follow-up not needed.          Vitals Value Taken Time   /54 10/20/20 1345   Temp 36.6 °C (97.9 °F) 10/20/20 1345   Pulse 79 10/20/20 1345   Resp 19 10/20/20 1345   SpO2 98 % 10/20/20 1345         Event Time   Out of Recovery 13:40:45         Pain/Sonal Score: Pain Rating Prior to Med Admin: 5 (10/20/2020  1:30 PM)  Sonal Score: 10 (10/20/2020  1:45 PM)

## 2020-10-20 NOTE — DISCHARGE SUMMARY
[facility]  Discharge Note  Short Stay    Procedure(s) (LRB):  INCISION AND DRAINAGE, ABSCESS (Right)    OUTCOME: Patient tolerated treatment/procedure well without complication and is now ready for discharge.       Drainage of a right buttock abscess      DISPOSITION: Home or Self Care    FINAL DIAGNOSIS:  Cellulitis of right buttock    FOLLOWUP: In clinic    DISCHARGE INSTRUCTIONS:    Discharge Procedure Orders   Diet general     Call MD for:  temperature >100.4     Call MD for:  persistent nausea and vomiting     Call MD for:  severe uncontrolled pain     Call MD for:  difficulty breathing, headache or visual disturbances     Call MD for:  redness, tenderness, or signs of infection (pain, swelling, redness, odor or green/yellow discharge around incision site)     Wound care routine (specify)   Order Comments: Wound care routine:  Remove and replace the packing daily starting tomorrow     Activity as tolerated        Clinical Reference Documents Added to Patient Instructions       Document    WOUND PACKING, DISCHARGE INSTRUCTIONS (ENGLISH)

## 2020-10-20 NOTE — TRANSFER OF CARE
"Anesthesia Transfer of Care Note    Patient: Diana Escobar    Procedure(s) Performed: Procedure(s) (LRB):  INCISION AND DRAINAGE, ABSCESS (Right)    Patient location: PACU    Anesthesia Type: MAC    Transport from OR: Transported from OR on room air with adequate spontaneous ventilation    Post pain: adequate analgesia    Post assessment: no apparent anesthetic complications    Post vital signs: stable    Level of consciousness: responds to stimulation    Nausea/Vomiting: no nausea/vomiting    Complications: none    Transfer of care protocol was followed      Last vitals:   Visit Vitals  /75 (BP Location: Right arm, Patient Position: Sitting)   Pulse 76   Temp 36.7 °C (98.1 °F) (Temporal)   Resp 18   Ht 5' 2" (1.575 m)   Wt 87.7 kg (193 lb 5.5 oz)   SpO2 97%   Breastfeeding No   BMI 35.36 kg/m²     "

## 2020-10-20 NOTE — INTERVAL H&P NOTE
The patient has been examined and the H&P has been reviewed:    I concur with the findings and no changes have occurred since H&P was written.     19-OCT-2020 16:57:47 EKG  System-Nuzzel ROUTINE RETRIEVAL  Normal sinus rhythm  Normal ECG  When compared with ECG of 15-AUG-2019 21:29,  No significant change was found  Confirmed by NICOLA TRUONG, DARIAN ([id number]) on 10/20/2020 10:42:57 AM    Surgery risks, benefits and alternative options discussed and understood by patient/family.          Active Hospital Problems    Diagnosis  POA    Cellulitis of right buttock [L03.317]  Yes      Resolved Hospital Problems   No resolved problems to display.

## 2020-10-20 NOTE — ANESTHESIA PREPROCEDURE EVALUATION
10/20/2020  Diana Escobar is a 55 y.o., female.    Anesthesia Evaluation    I have reviewed the Patient Summary Reports.    I have reviewed the Nursing Notes. I have reviewed the NPO Status.   I have reviewed the Medications.     Review of Systems  Anesthesia Hx:  No problems with previous Anesthesia Denies Hx of Anesthetic complications  Denies Family Hx of Anesthesia complications.   Denies Personal Hx of Anesthesia complications.   Social:  Non-Smoker, No Alcohol Use    Cardiovascular:   Hypertension ECG has been reviewed.    Pulmonary:   Asthma Sleep Apnea    Renal/:  Renal/ Normal     Hepatic/GI:  Hepatic/GI Normal    Musculoskeletal:   Arthritis     Neurological:   CVA    Endocrine:   Diabetes, type 2 Hypothyroidism    Psych:   Psychiatric History depression        Patient Active Problem List   Diagnosis    Diplopia    Hypertension associated with diabetes    Combined hyperlipidemia associated with type 2 diabetes mellitus    Major depression, recurrent, chronic    Nicotine dependence, cigarettes, uncomplicated    Fibromyalgia    Multiple lung nodules on CT    Positive anti-CCP test    History of transient cerebral ischemia    High risk medication use    Jaw pain    Paresthesia    Right sided weakness    HA (headache)    Seronegative rheumatoid arthritis    Osteopenia    Rheumatoid arthritis of multiple sites with negative rheumatoid factor    Myofacial muscle pain    Hypothyroidism (acquired)    Cervical spondylosis with radiculopathy    Bilateral foot pain    Restless legs syndrome (RLS)    Class 1 obesity due to excess calories with serious comorbidity and body mass index (BMI) of 34.0 to 34.9 in adult    Left hand pain    Chronic maxillary sinusitis    Chronic ethmoidal sinusitis    Chronic sphenoidal sinusitis    Chronic frontal sinusitis    Deviated nasal  septum    Nasal obstruction    Nasal turbinate hypertrophy    Obstructive sleep apnea    Mild intermittent asthma without complication    Immunosuppressed status    Obesity (BMI 30.0-34.9)    Periodic limb movement disorder (PLMD)    Generalized osteoarthritis of hand    Cellulitis of right buttock     Past Surgical History:   Procedure Laterality Date    HYSTERECTOMY      left ankle surgery      loop recorder  05/2017    cardiac device    NECK SURGERY  06/2016    ROTATOR CUFF REPAIR      TONSILLECTOMY       No current facility-administered medications on file prior to encounter.      Current Outpatient Medications on File Prior to Encounter   Medication Sig Dispense Refill    albuterol (PROVENTIL/VENTOLIN HFA) 90 mcg/actuation inhaler Inhale 2 puffs into the lungs every 6 (six) hours as needed for Wheezing. 18 g 6    amLODIPine (NORVASC) 5 MG tablet Take 1 tablet by mouth once daily 90 tablet 1    atorvastatin (LIPITOR) 40 MG tablet Take 40 mg by mouth once daily.      blood sugar diagnostic (ONETOUCH VERIO) Strp Glucose testing once daily. 30 each 6    blood-glucose meter Misc Use as directed. 1 each 0    BREO ELLIPTA 200-25 mcg/dose DsDv diskus inhaler Inhale 1 puff by mouth once daily 180 each 3    busPIRone (BUSPAR) 5 MG Tab Take 1 tablet (5 mg total) by mouth 2 (two) times daily. (Patient taking differently: Take 5 mg by mouth 2 (two) times daily as needed. ) 60 tablet 6    clindamycin (CLEOCIN) 300 MG capsule Take 1 capsule (300 mg total) by mouth every 8 (eight) hours. for 10 days 30 capsule 0    clopidogrel (PLAVIX) 75 mg tablet Take 75 mg by mouth once daily.      diclofenac sodium (VOLTAREN) 1 % Gel Apply topically 2 (two) times daily as needed. Apply to affected area 1 Tube 1    estradioL (IMVEXXY MAINTENANCE PACK) 10 mcg Inst Place 10 mcg vaginally.      EUTHYROX 50 mcg tablet Take 1 tablet by mouth once daily 30 tablet 11    folic acid (FOLVITE) 1 MG tablet Take 1 tablet (1  mg total) by mouth once daily. 90 tablet 3    gabapentin (NEURONTIN) 300 MG capsule Take 1 capsule by mouth twice daily 60 capsule 0    lancets (LANCETS,ULTRA THIN) Misc Glucose testing daily. 50 each 11    metFORMIN (GLUCOPHAGE) 500 MG tablet Take 1 tablet (500 mg total) by mouth 2 (two) times daily with meals. 60 tablet 3    methotrexate 2.5 MG Tab Take 4 tablets (10 mg total) by mouth every 7 days. 48 tablet 1    methylPREDNISolone (MEDROL DOSEPACK) 4 mg tablet use as directed 1 Package 0    peg 400-propylene glycol, PF, (SYSTANE ULTRA, PF,) 0.4-0.3 % Dpet Place 1 drop into both eyes 4 (four) times daily. 1 each     rOPINIRole (REQUIP) 1 MG tablet Take 1 tablet (1 mg total) by mouth every evening. 30 tablet 2    tofacitinib (XELJANZ XR) 11 mg Tb24 Take 11 mg by mouth once daily. 30 tablet 11    TRULICITY 0.75 mg/0.5 mL pen injector INJECT 0.5 MLS(0.75 MG TOTAL) INTO THE SKIN EVERY 7 DAYS 4 pen 6    venlafaxine (EFFEXOR-XR) 150 MG Cp24 Take 1 capsule by mouth once daily 30 capsule 6     Lab Results   Component Value Date    WBC 14.16 (H) 10/19/2020    HGB 14.0 10/19/2020    HCT 43.5 10/19/2020     (H) 10/19/2020     10/19/2020         Chemistry        Component Value Date/Time     10/13/2020 0009    K 4.2 10/13/2020 0009     10/13/2020 0009    CO2 24 10/13/2020 0009    BUN 12 10/13/2020 0009    CREATININE 0.8 10/13/2020 0009     (H) 10/13/2020 0009        Component Value Date/Time    CALCIUM 9.0 10/13/2020 0009    ALKPHOS 97 10/13/2020 0009    AST 14 10/13/2020 0009    ALT 34 10/13/2020 0009    BILITOT 0.3 10/13/2020 0009    ESTGFRAFRICA >60 10/13/2020 0009    EGFRNONAA >60 10/13/2020 0009              Physical Exam  General:  Well nourished    Airway/Jaw/Neck:  Airway Findings: Mouth Opening: Normal Tongue: Normal  General Airway Assessment: Adult  Mallampati: II  TM Distance: 4 - 6 cm  Jaw/Neck Findings:  Neck ROM: Normal ROM      Dental:  Dental Findings: In tact    Chest/Lungs:  Chest/Lungs Findings: Clear to auscultation, Normal Respiratory Rate     Heart/Vascular:  Heart Findings: Rate: Normal  Rhythm: Regular Rhythm  Sounds: Normal        Mental Status:  Mental Status Findings:  Cooperative, Alert and Oriented         Anesthesia Plan  Type of Anesthesia, risks & benefits discussed:  Anesthesia Type:  MAC  Patient's Preference:   Intra-op Monitoring Plan: standard ASA monitors  Intra-op Monitoring Plan Comments:   Post Op Pain Control Plan: per primary service following discharge from PACU  Post Op Pain Control Plan Comments:   Induction:   IV  Beta Blocker:  Patient is not currently on a Beta-Blocker (No further documentation required).       Informed Consent: Patient understands risks and agrees with Anesthesia plan.  Questions answered. Anesthesia consent signed with patient.  ASA Score: 3     Day of Surgery Review of History & Physical: I have interviewed and examined the patient. I have reviewed the patient's H&P dated:  There are no significant changes.  H&P update referred to the surgeon.         Ready For Surgery From Anesthesia Perspective.

## 2020-10-21 VITALS
HEIGHT: 62 IN | WEIGHT: 193.31 LBS | SYSTOLIC BLOOD PRESSURE: 109 MMHG | BODY MASS INDEX: 35.57 KG/M2 | RESPIRATION RATE: 19 BRPM | OXYGEN SATURATION: 98 % | TEMPERATURE: 98 F | DIASTOLIC BLOOD PRESSURE: 54 MMHG | HEART RATE: 79 BPM

## 2020-10-22 ENCOUNTER — PATIENT MESSAGE (OUTPATIENT)
Dept: INTERNAL MEDICINE | Facility: CLINIC | Age: 55
End: 2020-10-22

## 2020-10-22 ENCOUNTER — OFFICE VISIT (OUTPATIENT)
Dept: INTERNAL MEDICINE | Facility: CLINIC | Age: 55
End: 2020-10-22
Payer: COMMERCIAL

## 2020-10-22 ENCOUNTER — TELEPHONE (OUTPATIENT)
Dept: INTERNAL MEDICINE | Facility: CLINIC | Age: 55
End: 2020-10-22

## 2020-10-22 DIAGNOSIS — L02.31 ABSCESS OF RIGHT BUTTOCK: Primary | ICD-10-CM

## 2020-10-22 PROCEDURE — 99213 PR OFFICE/OUTPT VISIT, EST, LEVL III, 20-29 MIN: ICD-10-PCS | Mod: 95,,, | Performed by: INTERNAL MEDICINE

## 2020-10-22 PROCEDURE — 99213 OFFICE O/P EST LOW 20 MIN: CPT | Mod: 95,,, | Performed by: INTERNAL MEDICINE

## 2020-10-22 NOTE — TELEPHONE ENCOUNTER
LM for pt to call the office back. Pt needs to sign her portion of the paperwork Dr. Alcocer filled out in order for us to fax it. Also, sent pt a message to the pt portal.

## 2020-10-22 NOTE — PROGRESS NOTES
Subjective:       Patient ID: Diana Escobar is a 55 y.o. female.    Chief Complaint: Follow-up      Diana Escobar  55 y.o. White female    Patient presents with:  Follow-up    HPI: Presents for a telemedicine visit.   The patient location is: [place]   The chief complaint leading to consultation is: paperwork completion/recent I&D    Visit type: audiovisual    Face to Face time with patient: 8 minutes   15 minutes of total time spent on the encounter, which includes face to face time and non-face to face time preparing to see the patient (eg, review of tests), Obtaining and/or reviewing separately obtained history, Documenting clinical information in the electronic or other health record, Independently interpreting results (not separately reported) and communicating results to the patient/family/caregiver, or Care coordination (not separately reported).     Each patient to whom he or she provides medical services by telemedicine is:  (1) informed of the relationship between the physician and patient and the respective role of any other health care provider with respect to management of the patient; and (2) notified that he or she may decline to receive medical services by telemedicine and may withdraw from such care at any time.    Notes:   S/P I&D of a right buttock abscess on 10/20. She was seen in the ER on 10/12 for I&D and placed on antibiotics. Her antibiotics were changed but no improvement and was referred to general surgery.   She is doing slightly better. She denies a fever. Her daughter is performing dressing changes.   She is due to follow up with her surgeon on 11/2.   She needs [facility] paperwork completed.     Past Medical History:  Reviewed    Current Outpatient Medications on File Prior to Visit:  albuterol (PROVENTIL/VENTOLIN HFA) 90 mcg/actuation inhaler, Inhale 2 puffs into the lungs every 6 (six) hours as needed for Wheezing., Disp: 18 g, Rfl: 6  amLODIPine (NORVASC) 5 MG tablet, Take 1  tablet by mouth once daily, Disp: 90 tablet, Rfl: 1  atorvastatin (LIPITOR) 40 MG tablet, Take 40 mg by mouth once daily., Disp: , Rfl:   blood sugar diagnostic (ONETOUCH VERIO) Strp, Glucose testing once daily., Disp: 30 each, Rfl: 6  blood-glucose meter Misc, Use as directed., Disp: 1 each, Rfl: 0  BREO ELLIPTA 200-25 mcg/dose DsDv diskus inhaler, Inhale 1 puff by mouth once daily, Disp: 180 each, Rfl: 3  busPIRone (BUSPAR) 5 MG Tab, Take 1 tablet (5 mg total) by mouth 2 (two) times daily. (Patient taking differently: Take 5 mg by mouth 2 (two) times daily as needed. ), Disp: 60 tablet, Rfl: 6  clindamycin (CLEOCIN) 300 MG capsule, Take 1 capsule (300 mg total) by mouth every 8 (eight) hours. for 10 days, Disp: 30 capsule, Rfl: 0  clopidogrel (PLAVIX) 75 mg tablet, Take 75 mg by mouth once daily., Disp: , Rfl:   diclofenac sodium (VOLTAREN) 1 % Gel, Apply topically 2 (two) times daily as needed. Apply to affected area, Disp: 1 Tube, Rfl: 1  estradioL (IMVEXXY MAINTENANCE PACK) 10 mcg Inst, Place 10 mcg vaginally., Disp: , Rfl:   EUTHYROX 50 mcg tablet, Take 1 tablet by mouth once daily, Disp: 30 tablet, Rfl: 11  folic acid (FOLVITE) 1 MG tablet, Take 1 tablet (1 mg total) by mouth once daily., Disp: 90 tablet, Rfl: 3  gabapentin (NEURONTIN) 300 MG capsule, Take 1 capsule by mouth twice daily, Disp: 60 capsule, Rfl: 0  HYDROcodone-acetaminophen (NORCO) 5-325 mg per tablet, Take 1 tablet by mouth every 6 (six) hours as needed for Pain., Disp: 20 tablet, Rfl: 0  lancets (LANCETS,ULTRA THIN) Misc, Glucose testing daily., Disp: 50 each, Rfl: 11  metFORMIN (GLUCOPHAGE) 500 MG tablet, Take 1 tablet (500 mg total) by mouth 2 (two) times daily with meals., Disp: 60 tablet, Rfl: 3  methotrexate 2.5 MG Tab, Take 4 tablets (10 mg total) by mouth every 7 days., Disp: 48 tablet, Rfl: 1  methylPREDNISolone (MEDROL DOSEPACK) 4 mg tablet, use as directed, Disp: 1 Package, Rfl: 0  peg 400-propylene glycol, PF, (SYSTANE ULTRA, PF,)  0.4-0.3 % Dpet, Place 1 drop into both eyes 4 (four) times daily., Disp: 1 each, Rfl:   rOPINIRole (REQUIP) 1 MG tablet, Take 1 tablet (1 mg total) by mouth every evening., Disp: 30 tablet, Rfl: 2  tofacitinib (XELJANZ XR) 11 mg Tb24, Take 11 mg by mouth once daily., Disp: 30 tablet, Rfl: 11  TRULICITY 0.75 mg/0.5 mL pen injector, INJECT 0.5 MLS(0.75 MG TOTAL) INTO THE SKIN EVERY 7 DAYS, Disp: 4 pen, Rfl: 6  venlafaxine (EFFEXOR-XR) 150 MG Cp24, Take 1 capsule by mouth once daily, Disp: 30 capsule, Rfl: 6    Allergies:  Review of patient's allergies indicates:   -- Mobic (meloxicam)    -- Topamax (topiramate)    -- Adhesive -- Rash        Review of Systems   Constitutional: Negative for activity change, fever and unexpected weight change.   HENT: Negative for hearing loss, rhinorrhea and trouble swallowing.    Eyes: Negative for discharge and visual disturbance.   Respiratory: Negative for chest tightness and wheezing.    Cardiovascular: Negative for chest pain and palpitations.   Gastrointestinal: Negative for blood in stool, constipation, diarrhea and vomiting.   Endocrine: Negative for polydipsia and polyuria.   Genitourinary: Negative for difficulty urinating, dysuria, hematuria and menstrual problem.   Musculoskeletal: Negative for arthralgias, joint swelling and neck pain.   Integumentary:  Positive for wound.   Neurological: Negative for weakness and headaches.   Psychiatric/Behavioral: Negative for confusion and dysphoric mood.         Objective:      Physical Exam  Constitutional:       General: She is not in acute distress.     Appearance: She is well-developed.   Pulmonary:      Effort: Pulmonary effort is normal. No respiratory distress.   Neurological:      Mental Status: She is alert and oriented to person, place, and time.   Psychiatric:         Behavior: Behavior normal.         Thought Content: Thought content normal.         Judgment: Judgment normal.         Assessment:       1. Abscess of right  buttock        Plan:       Diana was seen today for follow-up.    Diagnoses and all orders for this visit:    Abscess of right buttock  -     S/P I&D  -     Continue wound care  -     F/U with surgeon    Paperwork completed.

## 2020-10-23 ENCOUNTER — PATIENT MESSAGE (OUTPATIENT)
Dept: SURGERY | Facility: CLINIC | Age: 55
End: 2020-10-23

## 2020-10-23 ENCOUNTER — TELEPHONE (OUTPATIENT)
Dept: SURGERY | Facility: HOSPITAL | Age: 55
End: 2020-10-23

## 2020-10-23 ENCOUNTER — TELEPHONE (OUTPATIENT)
Dept: INTERNAL MEDICINE | Facility: CLINIC | Age: 55
End: 2020-10-23

## 2020-10-23 LAB — BACTERIA SPEC AEROBE CULT: ABNORMAL

## 2020-10-23 NOTE — TELEPHONE ENCOUNTER
Pt came in this morning into the clinic and signed her portion of the paperwork. It was faxed please see previous message.

## 2020-10-23 NOTE — TELEPHONE ENCOUNTER
[place] paperwork faxed to [employer] at fax number [phone]. Pt came into the lobby and signed her portion of the form and got a copy of the form as well.

## 2020-10-23 NOTE — TELEPHONE ENCOUNTER
Cultures showed methicillin-resistant Staph aureus, sensitive to clindamycin.  Patient already has clindamycin will complete therapy

## 2020-10-26 ENCOUNTER — CLINICAL SUPPORT (OUTPATIENT)
Dept: INTERNAL MEDICINE | Facility: CLINIC | Age: 55
End: 2020-10-26
Payer: COMMERCIAL

## 2020-10-26 ENCOUNTER — PATIENT MESSAGE (OUTPATIENT)
Dept: PULMONOLOGY | Facility: CLINIC | Age: 55
End: 2020-10-26

## 2020-10-26 ENCOUNTER — TELEPHONE (OUTPATIENT)
Dept: SURGERY | Facility: CLINIC | Age: 55
End: 2020-10-26

## 2020-10-26 ENCOUNTER — PATIENT MESSAGE (OUTPATIENT)
Dept: SURGERY | Facility: CLINIC | Age: 55
End: 2020-10-26

## 2020-10-26 DIAGNOSIS — Z23 IMMUNIZATION DUE: Primary | ICD-10-CM

## 2020-10-26 PROCEDURE — 90750 HZV VACC RECOMBINANT IM: CPT | Mod: S$GLB,,, | Performed by: INTERNAL MEDICINE

## 2020-10-26 PROCEDURE — 90472 ZOSTER RECOMBINANT VACCINE: ICD-10-PCS | Mod: S$GLB,,, | Performed by: INTERNAL MEDICINE

## 2020-10-26 PROCEDURE — 90686 IIV4 VACC NO PRSV 0.5 ML IM: CPT | Mod: S$GLB,,, | Performed by: INTERNAL MEDICINE

## 2020-10-26 PROCEDURE — 90750 ZOSTER RECOMBINANT VACCINE: ICD-10-PCS | Mod: S$GLB,,, | Performed by: INTERNAL MEDICINE

## 2020-10-26 PROCEDURE — 90471 FLU VACCINE (QUAD) GREATER THAN OR EQUAL TO 3YO PRESERVATIVE FREE IM: ICD-10-PCS | Mod: S$GLB,,, | Performed by: INTERNAL MEDICINE

## 2020-10-26 PROCEDURE — 90472 IMMUNIZATION ADMIN EACH ADD: CPT | Mod: S$GLB,,, | Performed by: INTERNAL MEDICINE

## 2020-10-26 PROCEDURE — 90471 IMMUNIZATION ADMIN: CPT | Mod: S$GLB,,, | Performed by: INTERNAL MEDICINE

## 2020-10-26 PROCEDURE — 99999 PR PBB SHADOW E&M-EST. PATIENT-LVL III: CPT | Mod: PBBFAC,,,

## 2020-10-26 PROCEDURE — 99999 PR PBB SHADOW E&M-EST. PATIENT-LVL III: ICD-10-PCS | Mod: PBBFAC,,,

## 2020-10-26 PROCEDURE — 90686 FLU VACCINE (QUAD) GREATER THAN OR EQUAL TO 3YO PRESERVATIVE FREE IM: ICD-10-PCS | Mod: S$GLB,,, | Performed by: INTERNAL MEDICINE

## 2020-10-26 NOTE — TELEPHONE ENCOUNTER
----- Message from Brigette Villarreal sent at 10/26/2020  1:15 PM CDT -----  Contact: Pt  Pt would like a call back at [phone] (home) in regards to questions she has about her surgery last week 10/20/2020.    Thanks  Brigette Villarreal

## 2020-10-29 ENCOUNTER — OFFICE VISIT (OUTPATIENT)
Dept: SURGERY | Facility: CLINIC | Age: 55
End: 2020-10-29
Payer: COMMERCIAL

## 2020-10-29 VITALS
DIASTOLIC BLOOD PRESSURE: 89 MMHG | BODY MASS INDEX: 35.51 KG/M2 | SYSTOLIC BLOOD PRESSURE: 127 MMHG | TEMPERATURE: 98 F | HEIGHT: 62 IN | WEIGHT: 193 LBS | HEART RATE: 105 BPM

## 2020-10-29 DIAGNOSIS — L03.317 CELLULITIS OF RIGHT BUTTOCK: Primary | ICD-10-CM

## 2020-10-29 PROCEDURE — 99024 PR POST-OP FOLLOW-UP VISIT: ICD-10-PCS | Mod: S$GLB,,, | Performed by: SURGERY

## 2020-10-29 PROCEDURE — 99024 POSTOP FOLLOW-UP VISIT: CPT | Mod: S$GLB,,, | Performed by: SURGERY

## 2020-10-29 PROCEDURE — 99999 PR PBB SHADOW E&M-EST. PATIENT-LVL III: ICD-10-PCS | Mod: PBBFAC,,, | Performed by: SURGERY

## 2020-10-29 PROCEDURE — 99999 PR PBB SHADOW E&M-EST. PATIENT-LVL III: CPT | Mod: PBBFAC,,, | Performed by: SURGERY

## 2020-10-29 NOTE — PROGRESS NOTES
Subjective:       Patient ID: Diana Escobar is a 55 y.o. female.    Post drainage of a right buttock abscess    Chief Complaint: Post-op Evaluation    The wound became a little red and irritated then improved.  She says the edges are now soft she has no drainage.  She would like to be able to soak in the tub    Review of Systems   Skin:        Buttock wound is improved       Objective:      Physical Exam  Vitals signs and nursing note reviewed.   Constitutional:       Comments: Overweight   Skin:     Comments: Right buttock abscess is clean.  There is granulation tissue there is no surrounding erythema there is no surrounding edema   Neurological:      Mental Status: She is alert.         Assessment:      right buttock abscess is healing well.  The wound is about 50% smaller     Plan:       Continue local wound care.  When the wound can no longer be packed antibiotic ointment.    It is okay to soak in the tub for get the wound wet in the shower    We have asked her to follow up with us in 3 weeks

## 2020-10-29 NOTE — PATIENT INSTRUCTIONS
Continue to pack the wound   you can get wet  Soaking in tube in ok    When you can no longer pack the wound please use antibiotic ointment    Make an appointment to see me in three week

## 2020-10-30 ENCOUNTER — PATIENT MESSAGE (OUTPATIENT)
Dept: INTERNAL MEDICINE | Facility: CLINIC | Age: 55
End: 2020-10-30

## 2020-10-30 ENCOUNTER — PATIENT MESSAGE (OUTPATIENT)
Dept: ADMINISTRATIVE | Facility: HOSPITAL | Age: 55
End: 2020-10-30

## 2020-10-30 DIAGNOSIS — Z12.11 COLON CANCER SCREENING: Primary | ICD-10-CM

## 2020-11-03 ENCOUNTER — PATIENT MESSAGE (OUTPATIENT)
Dept: INTERNAL MEDICINE | Facility: CLINIC | Age: 55
End: 2020-11-03

## 2020-11-04 ENCOUNTER — PATIENT MESSAGE (OUTPATIENT)
Dept: INTERNAL MEDICINE | Facility: CLINIC | Age: 55
End: 2020-11-04

## 2020-11-04 ENCOUNTER — TELEPHONE (OUTPATIENT)
Dept: INTERNAL MEDICINE | Facility: CLINIC | Age: 55
End: 2020-11-04

## 2020-11-04 NOTE — TELEPHONE ENCOUNTER
----- Message from Alireza Jackman sent at 11/4/2020  9:29 AM CST -----  Contact: Pt  Type:  Patient Returning Call    Who Called: pt   Who Left Message for Patient:nurse   Does the patient know what this is regarding?:her previous phone call   Would the patient rather a call back or a response via [facility]? Phone   Best Call Back Number: [phone]  Additional Information:

## 2020-11-09 ENCOUNTER — TELEPHONE (OUTPATIENT)
Dept: INTERNAL MEDICINE | Facility: CLINIC | Age: 55
End: 2020-11-09

## 2020-11-09 NOTE — TELEPHONE ENCOUNTER
I called pt to come sign her form so we can fax it to [facility] at fax number [phone]. Pt will come tomorrow, pt verbalized understanding.

## 2020-11-12 ENCOUNTER — PATIENT MESSAGE (OUTPATIENT)
Dept: RHEUMATOLOGY | Facility: CLINIC | Age: 55
End: 2020-11-12

## 2020-11-16 ENCOUNTER — OFFICE VISIT (OUTPATIENT)
Dept: SURGERY | Facility: CLINIC | Age: 55
End: 2020-11-16
Payer: COMMERCIAL

## 2020-11-16 ENCOUNTER — PATIENT MESSAGE (OUTPATIENT)
Dept: RHEUMATOLOGY | Facility: CLINIC | Age: 55
End: 2020-11-16

## 2020-11-16 VITALS
HEIGHT: 62 IN | HEART RATE: 105 BPM | SYSTOLIC BLOOD PRESSURE: 128 MMHG | BODY MASS INDEX: 35.51 KG/M2 | DIASTOLIC BLOOD PRESSURE: 83 MMHG | WEIGHT: 193 LBS | TEMPERATURE: 98 F

## 2020-11-16 DIAGNOSIS — L03.317 CELLULITIS OF RIGHT BUTTOCK: Primary | ICD-10-CM

## 2020-11-16 PROCEDURE — 99024 PR POST-OP FOLLOW-UP VISIT: ICD-10-PCS | Mod: S$GLB,,, | Performed by: SURGERY

## 2020-11-16 PROCEDURE — 3008F BODY MASS INDEX DOCD: CPT | Mod: CPTII,S$GLB,, | Performed by: SURGERY

## 2020-11-16 PROCEDURE — 1126F AMNT PAIN NOTED NONE PRSNT: CPT | Mod: S$GLB,,, | Performed by: SURGERY

## 2020-11-16 PROCEDURE — 1126F PR PAIN SEVERITY QUANTIFIED, NO PAIN PRESENT: ICD-10-PCS | Mod: S$GLB,,, | Performed by: SURGERY

## 2020-11-16 PROCEDURE — 99024 POSTOP FOLLOW-UP VISIT: CPT | Mod: S$GLB,,, | Performed by: SURGERY

## 2020-11-16 PROCEDURE — 99999 PR PBB SHADOW E&M-EST. PATIENT-LVL V: CPT | Mod: PBBFAC,,, | Performed by: SURGERY

## 2020-11-16 PROCEDURE — 99999 PR PBB SHADOW E&M-EST. PATIENT-LVL V: ICD-10-PCS | Mod: PBBFAC,,, | Performed by: SURGERY

## 2020-11-16 PROCEDURE — 3008F PR BODY MASS INDEX (BMI) DOCUMENTED: ICD-10-PCS | Mod: CPTII,S$GLB,, | Performed by: SURGERY

## 2020-11-16 NOTE — PATIENT INSTRUCTIONS
The area is healing well.    Please use a washcloth and some soap and water to clean it when bathing.  Put a small amount of antibiotic ointment over the area once or twice a

## 2020-11-16 NOTE — PROGRESS NOTES
Subjective:       Patient ID: Diana Escobar is a 55 y.o. female.    Drainage of a buttock abscess    Chief Complaint: Post-op Evaluation    Patient is post drainage of a right buttock abscess.  She states the wound is better but is not completely closed    Review of Systems   Skin:        Abscess site is improved       Objective:      Physical Exam  Vitals signs reviewed.   Constitutional:       Appearance: She is obese.   Skin:     Comments: The right medial buttocks site is approximately 4 x 0.5 cm.  A small amount of granulation tissue was treated with silver nitrate         Assessment:      post drainage of right buttock abscess, healing well but slightly slowly     Plan:       Silver nitrate was applied  Local wound care  Follow up in 4 weeks

## 2020-11-20 ENCOUNTER — PATIENT MESSAGE (OUTPATIENT)
Dept: RHEUMATOLOGY | Facility: CLINIC | Age: 55
End: 2020-11-20

## 2020-11-23 ENCOUNTER — PATIENT MESSAGE (OUTPATIENT)
Dept: RHEUMATOLOGY | Facility: CLINIC | Age: 55
End: 2020-11-23

## 2020-11-24 DIAGNOSIS — M06.09 RHEUMATOID ARTHRITIS OF MULTIPLE SITES WITH NEGATIVE RHEUMATOID FACTOR: ICD-10-CM

## 2020-11-24 DIAGNOSIS — J45.20 MILD INTERMITTENT ASTHMA WITHOUT COMPLICATION: Chronic | ICD-10-CM

## 2020-11-24 DIAGNOSIS — G25.81 RESTLESS LEGS SYNDROME (RLS): Chronic | ICD-10-CM

## 2020-11-24 DIAGNOSIS — R25.2 MUSCLE CRAMPS AT NIGHT: ICD-10-CM

## 2020-11-24 RX ORDER — GABAPENTIN 300 MG/1
300 CAPSULE ORAL 2 TIMES DAILY
Qty: 60 CAPSULE | Refills: 0 | Status: SHIPPED | OUTPATIENT
Start: 2020-11-24 | End: 2020-12-28 | Stop reason: SDUPTHER

## 2020-11-24 RX ORDER — DICLOFENAC SODIUM 10 MG/G
GEL TOPICAL 2 TIMES DAILY PRN
Qty: 1 TUBE | Refills: 1 | Status: SHIPPED | OUTPATIENT
Start: 2020-11-24

## 2020-11-24 RX ORDER — FLUTICASONE FUROATE AND VILANTEROL TRIFENATATE 200; 25 UG/1; UG/1
1 POWDER RESPIRATORY (INHALATION) DAILY
Qty: 180 EACH | Refills: 3 | Status: SHIPPED | OUTPATIENT
Start: 2020-11-24 | End: 2020-12-15 | Stop reason: SDUPTHER

## 2020-11-24 RX ORDER — METHOTREXATE 2.5 MG/1
10 TABLET ORAL
Qty: 48 TABLET | Refills: 0 | Status: SHIPPED | OUTPATIENT
Start: 2020-11-24 | End: 2020-12-15 | Stop reason: SDUPTHER

## 2020-11-24 RX ORDER — ROPINIROLE 1 MG/1
1 TABLET, FILM COATED ORAL NIGHTLY
Qty: 30 TABLET | Refills: 0 | Status: SHIPPED | OUTPATIENT
Start: 2020-11-24 | End: 2020-12-15 | Stop reason: SDUPTHER

## 2020-11-24 RX ORDER — FOLIC ACID 1 MG/1
1 TABLET ORAL DAILY
Qty: 90 TABLET | Refills: 3 | Status: SHIPPED | OUTPATIENT
Start: 2020-11-24 | End: 2022-09-27

## 2020-11-30 ENCOUNTER — LAB VISIT (OUTPATIENT)
Dept: URGENT CARE | Facility: CLINIC | Age: 55
End: 2020-11-30
Payer: COMMERCIAL

## 2020-11-30 DIAGNOSIS — J45.20 MILD INTERMITTENT ASTHMA WITHOUT COMPLICATION: ICD-10-CM

## 2020-11-30 PROCEDURE — U0003 INFECTIOUS AGENT DETECTION BY NUCLEIC ACID (DNA OR RNA); SEVERE ACUTE RESPIRATORY SYNDROME CORONAVIRUS 2 (SARS-COV-2) (CORONAVIRUS DISEASE [COVID-19]), AMPLIFIED PROBE TECHNIQUE, MAKING USE OF HIGH THROUGHPUT TECHNOLOGIES AS DESCRIBED BY CMS-2020-01-R: HCPCS

## 2020-12-01 LAB — SARS-COV-2 RNA RESP QL NAA+PROBE: NOT DETECTED

## 2020-12-03 ENCOUNTER — PATIENT MESSAGE (OUTPATIENT)
Dept: RHEUMATOLOGY | Facility: CLINIC | Age: 55
End: 2020-12-03

## 2020-12-03 ENCOUNTER — CLINICAL SUPPORT (OUTPATIENT)
Dept: PULMONOLOGY | Facility: CLINIC | Age: 55
End: 2020-12-03
Payer: COMMERCIAL

## 2020-12-03 ENCOUNTER — OFFICE VISIT (OUTPATIENT)
Dept: PULMONOLOGY | Facility: CLINIC | Age: 55
End: 2020-12-03
Payer: COMMERCIAL

## 2020-12-03 VITALS
BODY MASS INDEX: 36.1 KG/M2 | HEART RATE: 94 BPM | RESPIRATION RATE: 16 BRPM | OXYGEN SATURATION: 96 % | HEIGHT: 62 IN | DIASTOLIC BLOOD PRESSURE: 80 MMHG | SYSTOLIC BLOOD PRESSURE: 112 MMHG | WEIGHT: 196.19 LBS

## 2020-12-03 DIAGNOSIS — F17.210 NICOTINE DEPENDENCE, CIGARETTES, UNCOMPLICATED: Chronic | ICD-10-CM

## 2020-12-03 DIAGNOSIS — E66.01 CLASS 2 SEVERE OBESITY DUE TO EXCESS CALORIES WITH SERIOUS COMORBIDITY AND BODY MASS INDEX (BMI) OF 35.0 TO 35.9 IN ADULT: Chronic | ICD-10-CM

## 2020-12-03 DIAGNOSIS — J44.89 ASTHMA WITH COPD: Primary | Chronic | ICD-10-CM

## 2020-12-03 DIAGNOSIS — R91.8 MULTIPLE LUNG NODULES ON CT: Chronic | ICD-10-CM

## 2020-12-03 DIAGNOSIS — J45.20 MILD INTERMITTENT ASTHMA WITHOUT COMPLICATION: Chronic | ICD-10-CM

## 2020-12-03 DIAGNOSIS — G25.81 RESTLESS LEGS SYNDROME (RLS): Chronic | ICD-10-CM

## 2020-12-03 LAB
BRPFT: ABNORMAL
DLCO ADJ PRE: 15.23 ML/(MIN*MMHG) (ref 16.53–28)
DLCO SINGLE BREATH LLN: 16.53
DLCO SINGLE BREATH PRE REF: 68.4 %
DLCO SINGLE BREATH REF: 22.27
DLCOC SBVA LLN: 3.23
DLCOC SBVA PRE REF: 83.4 %
DLCOC SBVA REF: 4.87
DLCOC SINGLE BREATH LLN: 16.53
DLCOC SINGLE BREATH PRE REF: 68.4 %
DLCOC SINGLE BREATH REF: 22.27
DLCOVA LLN: 3.23
DLCOVA PRE REF: 83.4 %
DLCOVA PRE: 4.06 ML/(MIN*MMHG*L) (ref 3.23–6.51)
DLCOVA REF: 4.87
DLVAADJ PRE: 4.06 ML/(MIN*MMHG*L) (ref 3.23–6.51)
ERV LLN: -16449.13
ERV PRE REF: 34.7 %
ERV REF: 0.87
FEF 25 75 LLN: 1.3
FEF 25 75 PRE REF: 33.8 %
FEF 25 75 REF: 2.41
FEV1 FVC LLN: 69
FEV1 FVC PRE REF: 78.6 %
FEV1 FVC REF: 80
FEV1 LLN: 1.9
FEV1 PRE REF: 77.7 %
FEV1 REF: 2.46
FRCPLETH LLN: 1.75
FRCPLETH PREREF: 125 %
FRCPLETH REF: 2.57
FVC LLN: 2.38
FVC PRE REF: 98.5 %
FVC REF: 3.08
IVC PRE: 2.19 L (ref 2.38–3.78)
IVC SINGLE BREATH LLN: 2.38
IVC SINGLE BREATH PRE REF: 71.1 %
IVC SINGLE BREATH REF: 3.08
MVV LLN: 77
MVV PRE REF: 71.1 %
MVV REF: 90
PEF LLN: 4.66
PEF PRE REF: 99 %
PEF REF: 6.25
PRE DLCO: 15.23 ML/(MIN*MMHG) (ref 16.53–28)
PRE ERV: 0.3 L (ref -16449.13–16450.87)
PRE FEF 25 75: 0.81 L/S (ref 1.3–3.51)
PRE FET 100: 11.26 SEC
PRE FEV1 FVC: 63.09 % (ref 68.81–91.8)
PRE FEV1: 1.92 L (ref 1.9–3.03)
PRE FRC PL: 3.21 L
PRE FVC: 3.04 L (ref 2.38–3.78)
PRE MVV: 64 L/MIN (ref 76.55–103.57)
PRE PEF: 6.19 L/S (ref 4.66–7.85)
PRE RV: 2.53 L (ref 1.13–2.28)
PRE TLC: 5.56 L (ref 3.58–5.56)
RAW LLN: 3.06
RAW PRE REF: 100.9 %
RAW PRE: 3.09 CMH2O*S/L (ref 3.06–3.06)
RAW REF: 3.06
RV LLN: 1.13
RV PRE REF: 148.1 %
RV REF: 1.71
RVTLC LLN: 28
RVTLC PRE REF: 121.7 %
RVTLC PRE: 45.41 % (ref 27.73–46.91)
RVTLC REF: 37
TLC LLN: 3.58
TLC PRE REF: 121.7 %
TLC REF: 4.57
VA PRE: 3.75 L (ref 4.42–4.42)
VA SINGLE BREATH LLN: 4.42
VA SINGLE BREATH PRE REF: 84.8 %
VA SINGLE BREATH REF: 4.42
VC LLN: 2.38
VC PRE REF: 98.5 %
VC PRE: 3.04 L (ref 2.38–3.78)
VC REF: 3.08
VTGRAWPRE: 3.56 L

## 2020-12-03 PROCEDURE — 94729 PR C02/MEMBANE DIFFUSE CAPACITY: ICD-10-PCS | Mod: S$GLB,,, | Performed by: INTERNAL MEDICINE

## 2020-12-03 PROCEDURE — 94726 PLETHYSMOGRAPHY LUNG VOLUMES: CPT | Mod: S$GLB,,, | Performed by: INTERNAL MEDICINE

## 2020-12-03 PROCEDURE — 94726 PULM FUNCT TST PLETHYSMOGRAP: ICD-10-PCS | Mod: S$GLB,,, | Performed by: INTERNAL MEDICINE

## 2020-12-03 PROCEDURE — 99215 PR OFFICE/OUTPT VISIT, EST, LEVL V, 40-54 MIN: ICD-10-PCS | Mod: 25,S$GLB,, | Performed by: INTERNAL MEDICINE

## 2020-12-03 PROCEDURE — 99215 OFFICE O/P EST HI 40 MIN: CPT | Mod: 25,S$GLB,, | Performed by: INTERNAL MEDICINE

## 2020-12-03 PROCEDURE — 3008F PR BODY MASS INDEX (BMI) DOCUMENTED: ICD-10-PCS | Mod: CPTII,S$GLB,, | Performed by: INTERNAL MEDICINE

## 2020-12-03 PROCEDURE — 99999 PR PBB SHADOW E&M-EST. PATIENT-LVL V: ICD-10-PCS | Mod: PBBFAC,,, | Performed by: INTERNAL MEDICINE

## 2020-12-03 PROCEDURE — 94010 BREATHING CAPACITY TEST: CPT | Mod: S$GLB,,, | Performed by: INTERNAL MEDICINE

## 2020-12-03 PROCEDURE — 3079F PR MOST RECENT DIASTOLIC BLOOD PRESSURE 80-89 MM HG: ICD-10-PCS | Mod: CPTII,S$GLB,, | Performed by: INTERNAL MEDICINE

## 2020-12-03 PROCEDURE — 3074F PR MOST RECENT SYSTOLIC BLOOD PRESSURE < 130 MM HG: ICD-10-PCS | Mod: CPTII,S$GLB,, | Performed by: INTERNAL MEDICINE

## 2020-12-03 PROCEDURE — 94729 DIFFUSING CAPACITY: CPT | Mod: S$GLB,,, | Performed by: INTERNAL MEDICINE

## 2020-12-03 PROCEDURE — 94010 BREATHING CAPACITY TEST: ICD-10-PCS | Mod: S$GLB,,, | Performed by: INTERNAL MEDICINE

## 2020-12-03 PROCEDURE — 3074F SYST BP LT 130 MM HG: CPT | Mod: CPTII,S$GLB,, | Performed by: INTERNAL MEDICINE

## 2020-12-03 PROCEDURE — 3079F DIAST BP 80-89 MM HG: CPT | Mod: CPTII,S$GLB,, | Performed by: INTERNAL MEDICINE

## 2020-12-03 PROCEDURE — 99999 PR PBB SHADOW E&M-EST. PATIENT-LVL V: CPT | Mod: PBBFAC,,, | Performed by: INTERNAL MEDICINE

## 2020-12-03 PROCEDURE — 3008F BODY MASS INDEX DOCD: CPT | Mod: CPTII,S$GLB,, | Performed by: INTERNAL MEDICINE

## 2020-12-03 NOTE — ASSESSMENT & PLAN NOTE
COPD with Asthma ROS: using bronchodilator MDI less than twice a week.   New concerns: Stable NYHA NAVA.   Exam: appears well, vitals normal, no respiratory distress, acyanotic, normal RR.   Assessment:  COPD with asthma stable.   Plan: Continue BREO, VENTOLIN,  SINGULAIR.

## 2020-12-03 NOTE — PROGRESS NOTES
Subjective:      Established patient    Patient ID: Diana Escobar is a 55 y.o. female.  Patient Active Problem List   Diagnosis    Diplopia    Hypertension associated with diabetes    Combined hyperlipidemia associated with type 2 diabetes mellitus    Major depression, recurrent, chronic    Nicotine dependence, cigarettes, uncomplicated    Fibromyalgia    Multiple lung nodules on CT    Positive anti-CCP test    History of transient cerebral ischemia    High risk medication use    Jaw pain    Paresthesia    Right sided weakness    HA (headache)    Seronegative rheumatoid arthritis    Osteopenia    Rheumatoid arthritis of multiple sites with negative rheumatoid factor    Myofacial muscle pain    Hypothyroidism (acquired)    Cervical spondylosis with radiculopathy    Bilateral foot pain    Restless legs syndrome (RLS)    Class 2 severe obesity due to excess calories with serious comorbidity and body mass index (BMI) of 35.0 to 35.9 in adult    Left hand pain    Chronic maxillary sinusitis    Chronic ethmoidal sinusitis    Chronic sphenoidal sinusitis    Chronic frontal sinusitis    Deviated nasal septum    Nasal obstruction    Nasal turbinate hypertrophy    Obstructive sleep apnea    Asthma with COPD    Immunosuppressed status    Obesity (BMI 30.0-34.9)    Periodic limb movement disorder (PLMD)    Generalized osteoarthritis of hand    Cellulitis of right buttock       Problem list has been reviewed.    Group Spirometric Classification Exacerbations / year mMRC CAT   Group B: Low risk; more symptoms  GOLD 1- 2  < = 1 >= 2 >=10     FEV1: 1.92  (77.7 % predicted)     GOLD 1 - mild: FEV1? 80% predicted   GOLD 2 - moderate: 50% ?FEV1 <80% predicted   GOLD 3 - severe: 30% ?FEV1 <50% predicted   GOLD 4 - very severe: FEV1 <30% predicted.    Chief Complaint: Pulmonary Nodules, Sleep Apnea, and Asthma with COPD    PFT reviewed with patient who voiced understanding. Lung nodule are  stable.       Patients reports stable NYHA I dyspnea    The patient does not have currently have symptoms / an exacerbation.      Her current respiratory therapy regimen is BREO & VENTOLIN which provides relief. She is  adherent with her regimen.     No recent change in breathing.     She is on APAP 4 - 20 CMWP.     She is not compliant with her PAP.    Complications of untreated sleep apnea discussed with pateint in detail including but not limited to  high blood pressure, heart attack, stroke, obesity,  diabetes and worsening heart failure. Patient voiced understanding.    She definitely thinks PAP is beneficial to her health and she wants to continue with PAP therapy.      Compliance Summary  10/30/2020 - 11/28/2020 (30 days)  Days with Device Usage 6 days  Days without Device Usage 24 days  Percent Days with Device Usage 20.0%  Cumulative Usage 15 hrs. 25 mins. 33 secs.  Maximum Usage (1 Day) 5 hrs. 7 mins. 19 secs.  Average Usage (All Days) 30 mins. 51 secs.  Average Usage (Days Used) 2 hrs. 34 mins. 15 secs.  Minimum Usage (1 Day) 37 mins. 20 secs.  Percent of Days with Usage >= 4 Hours 6.7%  Percent of Days with Usage < 4 Hours 93.3%  Total Blower Time 15 hrs. 25 mins. 37 secs.  Average AHI 3.8  Auto-CPAP Summary  Auto-CPAP Mean Pressure 8.0 cmH2O  Auto-CPAP Peak Average Pressure 13.4 cmH2O  Average Device Pressure <= 90% of Time 12.2 cmH2O  Average Time in Large Leak Per Day 17 mins. 50 secs.      [place] Sleepiness Scale   EPWORTH SLEEPINESS SCALE 12/3/2020 2/28/2020 1/17/2020 9/3/2019 8/13/2019   Sitting and reading 1 0 3 3 2   Watching TV 1 1 3 2 1   Sitting, inactive in a public place (e.g. a theatre or a meeting) 1 0 0 3 0   As a passenger in a car for an hour without a break 3 0 3 2 2   Lying down to rest in the afternoon when circumstances permit 0 0 0 0 0   Sitting and talking to someone 0 0 0 0 0   Sitting quietly after a lunch without alcohol 1 0 1 0 0   In a car, while stopped for a few minutes in  traffic 1 0 1 1 1   Total score 8 1 11 11 6       Asthma with COPD  is well controlled.     COPD Questionnaire  How often do you cough?: Some of the time  How often do you have phlegm (mucus) in your chest?: Never  How often does your chest feel tight?: Almost never  When you walk up a hill or one flight of stairs, how often are you breathless?: All of the time  How often are you limited doing any activities at home?: A little of the time  How often are you confident leaving the house despite your lung condition?: Most of the time  How often do you sleep soundly?: Almost never  How often do you have energy?: Never  Total score: 21     Asthma Control Test  In the past 4  weeks, how much of the time did your asthma keep you from getting as much done at work, school or at home?: A little of the time  During the past 4 weeks, how often have you had shortness of breath?: Once or twice a week  During the past 4 weeks, how often did your asthma symptoms (wheezing, couging, shortness of breath, chest tightness or pain) wake you up at night or earlier than usual in the morning?: Not at all  During the past 4 weeks, how often have you used your rescue inhaler or nebulizer medication (such as albuterol)?: 2 or 3 times a week  How would you rate your asthma control during the past 4 weeks?: Somewhat controlled  If your score is 19 or less, your asthma may not be under control: 19     Current Disease Severity  frequent daytime asthma symptoms.   no nighttime asthma symptoms.   Frequency B-agonist use:more than 2 days per week but not more than once a day.   Number of urgent/emergent visit in last year: 0  Current limitations in activity from asthma: Sometimes.   Number of days of school or work missed in the last month: not applicable.     Asthma Classification (General Symptom Frequency):  Mild Intermittent (< 2 x wk)  Mild Persistent (> 2 x wk, < daily)  Moderate Persistent (daily; almost daily inhaler)  Severe Persistent  (continual; limited activities)    She has relapsed with her smoking.   She smokes to 10 cigarettes per day.     PLMD: MIRAPEX not effective. Will try REQUIP.    Multiple lung nodules. Stable on CT chest . < 4 mm.      Immunization status reviewed and up todated.    A full  review of systems, past , family  and social histories was performed except as mentioned in the note above, these are non contributory to the main issues discussed today.         Previous Report Reviewed: lab reports, office notes and radiology reports     The following portions of the patient's history were reviewed and updated as appropriate: She  has a past medical history of Asthma, Chronic low back pain, Degenerative disc disease, lumbar, Depression, Diabetes mellitus (2014), Fibromyalgia, Hyperlipidemia, Hypertension, Hypothyroidism, MRSA (methicillin resistant Staphylococcus aureus) carrier, Nodule of left lung (2/6/2017), Palpitations, Sleep apnea, Stress incontinence, Stroke, and Vitamin D deficiency disease.  She  has a past surgical history that includes Hysterectomy; Rotator cuff repair; left ankle surgery; Tonsillectomy; Neck surgery (06/2016); loop recorder (05/2017); and Incision and drainage of abscess (Right, 10/20/2020).  Her family history includes Breast cancer in her mother and sister; COPD in her father; Cancer in her mother; Cataracts in her mother; Diabetes in her maternal aunt and mother; Heart disease in her father and mother; Hypertension in her father and mother; Stroke in her mother.  She  reports that she has been smoking cigarettes. She has a 25.00 pack-year smoking history. She has never used smokeless tobacco. She reports that she does not drink alcohol or use drugs.  She has a current medication list which includes the following prescription(s): albuterol, amlodipine, atorvastatin, onetouch verio test strips, blood-glucose meter, buspirone, clopidogrel, diclofenac sodium, imvexxy maintenance pack, breo ellipta,  "folic acid, gabapentin, lancets, levothyroxine, metformin, methotrexate, peg 400-propylene glycol (pf), ropinirole, xeljanz xr, trulicity, and venlafaxine.  She is allergic to mobic [meloxicam]; topamax [topiramate]; and adhesive..    Review of Systems   Constitutional: Positive for appetite change and fatigue.   HENT: Positive for postnasal drip, trouble swallowing and congestion. Negative for rhinorrhea.    Eyes: Negative for redness and itching.   Respiratory: Positive for cough, shortness of breath, wheezing and dyspnea on extertion. Negative for sputum production and chest tightness.    Cardiovascular: Positive for leg swelling.   Genitourinary: Negative for difficulty urinating.   Endocrine: Negative for polydipsia, cold intolerance and heat intolerance.    Musculoskeletal: Positive for arthralgias, back pain and myalgias.   Skin: Negative for rash.   Gastrointestinal: Positive for acid reflux.   Neurological: Negative for dizziness, light-headedness and headaches.   Psychiatric/Behavioral: The patient is nervous/anxious.         Objective:     /80   Pulse 94   Resp 16   Ht 5' 2" (1.575 m)   Wt 89 kg (196 lb 3.4 oz)   SpO2 96%   BMI 35.89 kg/m²   Body mass index is 35.89 kg/m².     Physical Exam  Vitals signs and nursing note reviewed.   Constitutional:       General: She is not in acute distress.     Appearance: She is well-developed. She is not diaphoretic.   HENT:      Head: Normocephalic and atraumatic.      Right Ear: Tympanic membrane and ear canal normal.      Left Ear: Tympanic membrane and ear canal normal.      Nose: Mucosal edema and rhinorrhea present.      Mouth/Throat:      Pharynx: Uvula midline. Posterior oropharyngeal erythema present.      Tonsils: No tonsillar exudate.   Eyes:      Pupils: Pupils are equal, round, and reactive to light.   Neck:      Musculoskeletal: Normal range of motion and neck supple.      Comments: 13"  Cardiovascular:      Rate and Rhythm: Normal rate and " regular rhythm.      Heart sounds: No murmur. No friction rub. No gallop.    Pulmonary:      Effort: Pulmonary effort is normal. No respiratory distress.      Breath sounds: Normal breath sounds. No stridor. No wheezing or rales.   Abdominal:      General: Bowel sounds are normal.      Palpations: Abdomen is soft.   Musculoskeletal: Normal range of motion.   Skin:     General: Skin is warm and dry.      Capillary Refill: Capillary refill takes 2 to 3 seconds.   Neurological:      Mental Status: She is alert and oriented to person, place, and time.         Personal Diagnostic Review    Pulmonary function tests: 20: FEV1: 1.92  (77.7 % predicted), FVC:  3.04 (98.5 % predicted), FEV1/FVC:  63, T.56 (121.7 % predicted), RV/TLVC: 45 (121.7 % predicted), DLCO: 15.23 (68.4 % predicted)  : Mild obstruction. Lung volumes reveal both air trapping and hyperinflation. Diffusion capacity is mildly reduced.        Assessment / Plan:       Discussed diagnosis, its evaluation, treatment and usual course. All questions answered.    Problem List Items Addressed This Visit        Neuro    Restless legs syndrome (RLS)    Current Assessment & Plan     Stable and controlled. REQUIP.              Pulmonary    Multiple lung nodules on CT (Chronic)    Overview     CT chest: 20: Stable CT scan of the chest when compared with 2019.  Multiple small less than 4 mm in diameter nodules.  No new nodules identified.  No evidence of lymphadenopathy.         Current Assessment & Plan     Repeat CT chest in 6 months.    Fleischner Society Guidelines:    High risk patient:   Smoking history, history of malignancy or risk factors for malignancy.( non-solid ground glass opacities and partially solid nodules may require longer follow up to exclude indolent adenocarcinoma)      Nodule size Low risk patient High risk patient   4 mm  No follow up Follow up CT in 12 months. If unchanged, no further follow up.   4 - 6 mm Follow up CT in  12 months; if unchanged, no further follow up.  Initial follow up CT in 6 - 12 months, then at 18 - 24 months if no change.      6 - 8 mm  Initial follow up CT at 6 - 12 months and at 18 months if no change.  Initial follow up CT at 3 - 6 months. Then at 9-12 months and 24 months if no change.      > 8 mm Initial follow up CT at 3, 6, 9 and 24 months, dynamic contrast enhanced CT, PET-CT and/or biopsy. Initial follow up CT at 3, 6, 9 and 24 months, dynamic contrast enhanced CT, PET-CT and/or biopsy.            Relevant Orders    CT Chest Without Contrast    Asthma with COPD - Primary (Chronic)    Current Assessment & Plan     COPD with Asthma ROS: using bronchodilator MDI less than twice a week.   New concerns: Stable NYHA NAVA.   Exam: appears well, vitals normal, no respiratory distress, acyanotic, normal RR.   Assessment:  COPD with asthma stable.   Plan: Continue BREO, VENTOLIN,  SINGULAIR.          Relevant Orders    Ambulatory referral/consult to Smoking Cessation Program       Other    Nicotine dependence, cigarettes, uncomplicated (Chronic)    Current Assessment & Plan     Assistance with smoking cessation was offered, including:  []  Medications  [x]  Counseling  []  Printed Information on Smoking Cessation  [x]  Referred to a Smoking Cessation Program      Patient was counseled regarding smoking for 3-10 minutes.         Class 2 severe obesity due to excess calories with serious comorbidity and body mass index (BMI) of 35.0 to 35.9 in adult (Chronic)    Current Assessment & Plan     General weight loss/lifestyle modification strategies discussed (elicit support from others; identify saboteurs; non-food rewards, etc).  Behavioral treatment: Slim Fast.  Diet interventions: low calorie (1000 kCal/d) deficit diet.  Informal exercise measures discussed, e.g. taking stairs instead of elevator.  Regular aerobic exercise program discussed.                 TIME SPENT WITH PATIENT: Time spent: 40 minutes in face to  face  discussion concerning diagnosis, prognosis, review of lab and test results, benefits of treatment as well as management of disease, counseling of patient and coordination of care between various health  care providers . Greater than half the time spent was used for coordination of care and counseling of patient.       Follow up in about 6 months (around 6/3/2021) for SRAVAN, Obesity, Asthma with COPD, Tobacco use disorder.

## 2020-12-03 NOTE — PATIENT INSTRUCTIONS
Endoscopic Diagnosis of Chest, Lung Problems  Youve been told you need an endoscopic procedure to diagnose a problem in your chest or lung. This procedure allows your healthcare provider to view the airway of your lungs and take a tissue sample (biopsy) or treat a lung condition, if needed.     With bronchoscopy, a flexible scope allows the healthcare provider to view and biopsy the airway.   Bronchoscopy  A bronchoscopy allows the healthcare provider to look directly into the airways in your lungs. This is done using a bronchoscope. A bronchoscope is a thin, flexible, hollow, lighted tube that lets the doctor see inside the lung. Tools can be passed down the middle of the scope.  Transbronchial biopsy  Transbronchial biopsy (TBB) is a procedure used mainly to take samples of tissue near the airway. This is done using a bronchoscope and tiny forceps. The forceps are passed through the scope into the airway, and a sample is taken.  Endobronchial ultrasound  Endobronchial ultrasound (EBUS) is a type of bronchoscopy. With EBUS, the lungs and the space between the lungs (mediastinum) are looked at using a flexible bronchoscope and ultrasound (images created using sound waves). Ultrasound guides the healthcare provider and allows him or her to see through the airway walls.  Preparing for the procedure  Before your procedure, do the following:  · Follow your healthcare providers instructions about eating and drinking.  · Tell your healthcare provider about the medicines you take. You may need to stop taking some of them before the procedure, especially aspirin, Coumadin, or other blood thinners.  · Talk with your healthcare provider about any allergies and health problems you have.  · Tell your healthcare provider if you are pregnant.  During the procedure  You will get medicine through an intravenous (IV) line to help you relax and sleep during the procedure. You may also receive local anesthesia (numbing medicine)  with a needle. Then a special spray is used to numb your throat and nose or mouth. This is to help keep you comfortable and prevent coughing during the procedure.  After the procedure  You are sent to the recovery room until the sedation wears off. This takes about 1 to 2 hours. Once you are fully awake, you can go home. You will need an adult family member or friend to drive you home. Your throat will be sore for a day or two. At first, there may be a small amount of blood in your sputum. This is normal. It should go away after the second day.  Risks and complications  · Bleeding  · Infection  · Injury to vocal cords  · Pneumothorax (collapsed lung)   When to call your healthcare provider  · Large amounts of blood in sputum  · Blood in sputum after 2 days  · Shortness of breath  · Chest pain  · Fever of 100.4ºF (38°C) or higher, or as directed by your healthcare provider  · Hoarseness that wont go away   Date Last Reviewed: 11/1/2016  © 6207-7831 [employer]. [address] [place] [address], [place] [address]. All rights reserved. This information is not intended as a substitute for professional medical care. Always follow your healthcare professional's instructions.

## 2020-12-05 DIAGNOSIS — Z12.11 SPECIAL SCREENING FOR MALIGNANT NEOPLASMS, COLON: Primary | ICD-10-CM

## 2020-12-15 DIAGNOSIS — J45.20 MILD INTERMITTENT ASTHMA WITHOUT COMPLICATION: Chronic | ICD-10-CM

## 2020-12-15 RX ORDER — FLUTICASONE FUROATE AND VILANTEROL TRIFENATATE 200; 25 UG/1; UG/1
1 POWDER RESPIRATORY (INHALATION) DAILY
Qty: 180 EACH | Refills: 3 | Status: SHIPPED | OUTPATIENT
Start: 2020-12-15 | End: 2021-05-10 | Stop reason: SDUPTHER

## 2020-12-28 ENCOUNTER — CLINICAL SUPPORT (OUTPATIENT)
Dept: INTERNAL MEDICINE | Facility: CLINIC | Age: 55
End: 2020-12-28
Payer: COMMERCIAL

## 2020-12-28 DIAGNOSIS — Z23 NEED FOR SHINGLES VACCINE: Primary | ICD-10-CM

## 2020-12-28 DIAGNOSIS — G25.81 RESTLESS LEGS SYNDROME (RLS): Chronic | ICD-10-CM

## 2020-12-28 DIAGNOSIS — R25.2 MUSCLE CRAMPS AT NIGHT: ICD-10-CM

## 2020-12-28 PROCEDURE — 90750 HZV VACC RECOMBINANT IM: CPT | Mod: S$GLB,,, | Performed by: PHYSICIAN ASSISTANT

## 2020-12-28 PROCEDURE — 90471 ZOSTER RECOMBINANT VACCINE: ICD-10-PCS | Mod: S$GLB,,, | Performed by: PHYSICIAN ASSISTANT

## 2020-12-28 PROCEDURE — 90750 ZOSTER RECOMBINANT VACCINE: ICD-10-PCS | Mod: S$GLB,,, | Performed by: PHYSICIAN ASSISTANT

## 2020-12-28 PROCEDURE — 99999 PR PBB SHADOW E&M-EST. PATIENT-LVL III: CPT | Mod: PBBFAC,,,

## 2020-12-28 PROCEDURE — 99999 PR PBB SHADOW E&M-EST. PATIENT-LVL III: ICD-10-PCS | Mod: PBBFAC,,,

## 2020-12-28 PROCEDURE — 90471 IMMUNIZATION ADMIN: CPT | Mod: S$GLB,,, | Performed by: PHYSICIAN ASSISTANT

## 2020-12-28 RX ORDER — ROPINIROLE 1 MG/1
1 TABLET, FILM COATED ORAL NIGHTLY
Qty: 30 TABLET | Refills: 0 | Status: SHIPPED | OUTPATIENT
Start: 2020-12-28 | End: 2021-01-17 | Stop reason: SDUPTHER

## 2020-12-28 RX ORDER — GABAPENTIN 300 MG/1
300 CAPSULE ORAL 2 TIMES DAILY
Qty: 60 CAPSULE | Refills: 0 | Status: SHIPPED | OUTPATIENT
Start: 2020-12-28 | End: 2021-01-17 | Stop reason: SDUPTHER

## 2020-12-30 ENCOUNTER — PATIENT MESSAGE (OUTPATIENT)
Dept: INTERNAL MEDICINE | Facility: CLINIC | Age: 55
End: 2020-12-30

## 2020-12-31 ENCOUNTER — OFFICE VISIT (OUTPATIENT)
Dept: INTERNAL MEDICINE | Facility: CLINIC | Age: 55
End: 2020-12-31
Payer: COMMERCIAL

## 2020-12-31 DIAGNOSIS — T80.29XA INJECTION SITE ABSCESS, INITIAL ENCOUNTER: ICD-10-CM

## 2020-12-31 DIAGNOSIS — J44.89 ASTHMA WITH COPD: Chronic | ICD-10-CM

## 2020-12-31 DIAGNOSIS — D84.9 IMMUNOSUPPRESSED STATUS: ICD-10-CM

## 2020-12-31 DIAGNOSIS — J32.9 SINUSITIS, UNSPECIFIED CHRONICITY, UNSPECIFIED LOCATION: Primary | ICD-10-CM

## 2020-12-31 DIAGNOSIS — I15.2 HYPERTENSION ASSOCIATED WITH DIABETES: ICD-10-CM

## 2020-12-31 DIAGNOSIS — E66.9 OBESITY (BMI 30.0-34.9): ICD-10-CM

## 2020-12-31 DIAGNOSIS — F17.210 NICOTINE DEPENDENCE, CIGARETTES, UNCOMPLICATED: Chronic | ICD-10-CM

## 2020-12-31 DIAGNOSIS — E11.59 HYPERTENSION ASSOCIATED WITH DIABETES: ICD-10-CM

## 2020-12-31 PROCEDURE — 3044F PR MOST RECENT HEMOGLOBIN A1C LEVEL <7.0%: ICD-10-PCS | Mod: CPTII,,, | Performed by: NURSE PRACTITIONER

## 2020-12-31 PROCEDURE — 99214 OFFICE O/P EST MOD 30 MIN: CPT | Mod: 95,,, | Performed by: NURSE PRACTITIONER

## 2020-12-31 PROCEDURE — 3044F HG A1C LEVEL LT 7.0%: CPT | Mod: CPTII,,, | Performed by: NURSE PRACTITIONER

## 2020-12-31 PROCEDURE — 99214 PR OFFICE/OUTPT VISIT, EST, LEVL IV, 30-39 MIN: ICD-10-PCS | Mod: 95,,, | Performed by: NURSE PRACTITIONER

## 2020-12-31 RX ORDER — AZELASTINE 1 MG/ML
1 SPRAY, METERED NASAL 2 TIMES DAILY
Qty: 30 ML | Refills: 0 | Status: SHIPPED | OUTPATIENT
Start: 2020-12-31 | End: 2021-02-01

## 2020-12-31 RX ORDER — LEVOCETIRIZINE DIHYDROCHLORIDE 5 MG/1
5 TABLET, FILM COATED ORAL NIGHTLY
Qty: 30 TABLET | Refills: 0 | Status: SHIPPED | OUTPATIENT
Start: 2020-12-31 | End: 2021-01-27

## 2020-12-31 RX ORDER — AMOXICILLIN AND CLAVULANATE POTASSIUM 875; 125 MG/1; MG/1
1 TABLET, FILM COATED ORAL EVERY 12 HOURS
Qty: 20 TABLET | Refills: 0 | Status: SHIPPED | OUTPATIENT
Start: 2020-12-31 | End: 2021-01-20 | Stop reason: ALTCHOICE

## 2020-12-31 NOTE — PATIENT INSTRUCTIONS
- mucinex (not D/DM) twice daily   - increase fluids and rest   - humidifier at night   - sleep with your head elevated as much as possible   - throat lozenges all day long   - tylenol as needed for pain/body aches

## 2020-12-31 NOTE — PROGRESS NOTES
"Soledadioanasa TRESA Escobar 55 y.o. female     History of Present Illness:  The patient location is: work parked in [facility], [place]   The chief complaint leading to consultation is: sore throat, cough   Visit type: Virtual visit with synchronous audio and video  Total time spent with patient: 10 minutes   Each patient to whom he or she provides medical services by telemedicine is:  (1) informed of the relationship between the physician and patient and the respective role of any other health care provider with respect to management of the patient; and (2) notified that he or she may decline to receive medical services by telemedicine and may withdraw from such care at any time.      Pt is new to provider, but established in practice and c/o:      DORYS pain   12/28 got shingles shot   Since increasingly red warm, swollen, getting bigger    recent abscess to buttocks     Home cbgs "normal" highest 180 pp     Cough   Started 2.5 weeks ago   [facility] - COVID negative   Thought allergies - took 4 days steroids   Tessalon perles   Waking up with dry mouth in AM   NAVA   spO2 89-94% with activity   Followed by pulm due to Asthma/COPD      Exam:  Review of Systems  Physical Exam    Most Recent Laboratory Results Reviewed ({Yes)  Lab Results   Component Value Date    WBC 14.16 (H) 10/19/2020    HGB 14.0 10/19/2020    HCT 43.5 10/19/2020     10/19/2020    CHOL 140 07/01/2020    TRIG 160 (H) 07/01/2020    HDL 45 07/01/2020    ALT 34 10/13/2020    AST 14 10/13/2020     10/13/2020    K 4.2 10/13/2020     10/13/2020    CREATININE 0.8 10/13/2020    BUN 12 10/13/2020    CO2 24 10/13/2020    TSH 0.468 07/01/2020    INR 0.9 03/08/2017    HGBA1C 5.8 (H) 07/01/2020       Assessment     ICD-10-CM ICD-9-CM   1. Sinusitis, unspecified chronicity, unspecified location  J32.9 473.9   2. Injection site abscess, initial encounter  T80.29XA 999.39   3. Asthma with COPD  J44.9 493.20   4. Immunosuppressed status  D84.9 279.9   5. Hypertension " associated with diabetes  E11.59 250.80    I10 401.9   6. Nicotine dependence, cigarettes, uncomplicated  F17.210 305.1   7. Obesity (BMI 30.0-34.9)  E66.9 278.00        Plan   Sinusitis, unspecified chronicity, unspecified location  -     azelastine (ASTELIN) 137 mcg (0.1 %) nasal spray; 1 spray (137 mcg total) by Nasal route 2 (two) times daily.  Dispense: 30 mL; Refill: 0  -     levocetirizine (XYZAL) 5 MG tablet; Take 1 tablet (5 mg total) by mouth every evening.  Dispense: 30 tablet; Refill: 0    Injection site abscess, initial encounter  -     amoxicillin-clavulanate 875-125mg (AUGMENTIN) 875-125 mg per tablet; Take 1 tablet by mouth every 12 (twelve) hours.  Dispense: 20 tablet; Refill: 0    Immunosuppressed status  Increases risk of infection     Hypertension associated with diabetes  Suspect DMII contributing to multiple infections, needs repeat A1c     Nicotine dependence, cigarettes, uncomplicated  Contributing to poor healing     Asthma/COPD   Instructed to f/u with pulm if does not improve     Obesity (BMI 30.0-34.9)  Contributing to chronic conditions         Follow up if symptoms worsen or fail to improve.  Future Appointments     Date Provider Specialty Appt Notes    1/4/2021  Chuck Alcocer    1/12/2021 Jory Alcocer DO Internal Medicine 6 month f/u     2/22/2021 Chris Mario OD Ophthalmology Annual diabetic eye exam//////CW

## 2021-01-06 ENCOUNTER — PATIENT MESSAGE (OUTPATIENT)
Dept: INTERNAL MEDICINE | Facility: CLINIC | Age: 56
End: 2021-01-06

## 2021-01-06 ENCOUNTER — PATIENT MESSAGE (OUTPATIENT)
Dept: PULMONOLOGY | Facility: CLINIC | Age: 56
End: 2021-01-06

## 2021-01-06 ENCOUNTER — TELEPHONE (OUTPATIENT)
Dept: PULMONOLOGY | Facility: CLINIC | Age: 56
End: 2021-01-06

## 2021-01-13 ENCOUNTER — LAB VISIT (OUTPATIENT)
Dept: LAB | Facility: HOSPITAL | Age: 56
End: 2021-01-13
Attending: INTERNAL MEDICINE
Payer: COMMERCIAL

## 2021-01-13 DIAGNOSIS — I10 HYPERTENSION GOAL BP (BLOOD PRESSURE) < 130/80: ICD-10-CM

## 2021-01-13 DIAGNOSIS — E78.5 HYPERLIPIDEMIA LDL GOAL <70: ICD-10-CM

## 2021-01-13 DIAGNOSIS — E11.9 CONTROLLED TYPE 2 DIABETES MELLITUS WITHOUT COMPLICATION, WITHOUT LONG-TERM CURRENT USE OF INSULIN: ICD-10-CM

## 2021-01-13 LAB
ALBUMIN SERPL BCP-MCNC: 4.3 G/DL (ref 3.5–5.2)
ALP SERPL-CCNC: 86 U/L (ref 55–135)
ALT SERPL W/O P-5'-P-CCNC: 25 U/L (ref 10–44)
ANION GAP SERPL CALC-SCNC: 10 MMOL/L (ref 8–16)
AST SERPL-CCNC: 20 U/L (ref 10–40)
BILIRUB SERPL-MCNC: 0.3 MG/DL (ref 0.1–1)
BUN SERPL-MCNC: 9 MG/DL (ref 6–20)
CALCIUM SERPL-MCNC: 10 MG/DL (ref 8.7–10.5)
CHLORIDE SERPL-SCNC: 103 MMOL/L (ref 95–110)
CHOLEST SERPL-MCNC: 155 MG/DL (ref 120–199)
CHOLEST/HDLC SERPL: 2.8 {RATIO} (ref 2–5)
CO2 SERPL-SCNC: 30 MMOL/L (ref 23–29)
CREAT SERPL-MCNC: 0.7 MG/DL (ref 0.5–1.4)
EST. GFR  (AFRICAN AMERICAN): >60 ML/MIN/1.73 M^2
EST. GFR  (NON AFRICAN AMERICAN): >60 ML/MIN/1.73 M^2
GLUCOSE SERPL-MCNC: 87 MG/DL (ref 70–110)
HDLC SERPL-MCNC: 55 MG/DL (ref 40–75)
HDLC SERPL: 35.5 % (ref 20–50)
LDLC SERPL CALC-MCNC: 73.2 MG/DL (ref 63–159)
NONHDLC SERPL-MCNC: 100 MG/DL
POTASSIUM SERPL-SCNC: 4.5 MMOL/L (ref 3.5–5.1)
PROT SERPL-MCNC: 7.1 G/DL (ref 6–8.4)
SODIUM SERPL-SCNC: 143 MMOL/L (ref 136–145)
TRIGL SERPL-MCNC: 134 MG/DL (ref 30–150)

## 2021-01-13 PROCEDURE — 80061 LIPID PANEL: CPT

## 2021-01-13 PROCEDURE — 80053 COMPREHEN METABOLIC PANEL: CPT

## 2021-01-13 PROCEDURE — 36415 COLL VENOUS BLD VENIPUNCTURE: CPT

## 2021-01-13 PROCEDURE — 83036 HEMOGLOBIN GLYCOSYLATED A1C: CPT

## 2021-01-14 LAB
ESTIMATED AVG GLUCOSE: 128 MG/DL (ref 68–131)
HBA1C MFR BLD HPLC: 6.1 % (ref 4–5.6)

## 2021-01-17 DIAGNOSIS — E11.59 HYPERTENSION ASSOCIATED WITH DIABETES: ICD-10-CM

## 2021-01-17 DIAGNOSIS — E11.9 CONTROLLED TYPE 2 DIABETES MELLITUS WITHOUT COMPLICATION, WITHOUT LONG-TERM CURRENT USE OF INSULIN: ICD-10-CM

## 2021-01-17 DIAGNOSIS — I15.2 HYPERTENSION ASSOCIATED WITH DIABETES: ICD-10-CM

## 2021-01-19 RX ORDER — AMLODIPINE BESYLATE 5 MG/1
5 TABLET ORAL DAILY
Qty: 90 TABLET | Refills: 1 | Status: SHIPPED | OUTPATIENT
Start: 2021-01-19 | End: 2021-07-17

## 2021-01-19 RX ORDER — METFORMIN HYDROCHLORIDE 500 MG/1
500 TABLET ORAL 2 TIMES DAILY WITH MEALS
Qty: 60 TABLET | Refills: 6 | Status: SHIPPED | OUTPATIENT
Start: 2021-01-19 | End: 2021-08-16

## 2021-01-20 ENCOUNTER — HOSPITAL ENCOUNTER (OUTPATIENT)
Dept: RADIOLOGY | Facility: HOSPITAL | Age: 56
Discharge: HOME | End: 2021-01-20
Attending: INTERNAL MEDICINE
Payer: COMMERCIAL

## 2021-01-20 ENCOUNTER — OFFICE VISIT (OUTPATIENT)
Dept: INTERNAL MEDICINE | Facility: CLINIC | Age: 56
End: 2021-01-20
Payer: COMMERCIAL

## 2021-01-20 VITALS
OXYGEN SATURATION: 96 % | HEART RATE: 105 BPM | WEIGHT: 196.63 LBS | BODY MASS INDEX: 36.18 KG/M2 | SYSTOLIC BLOOD PRESSURE: 116 MMHG | HEIGHT: 62 IN | TEMPERATURE: 99 F | DIASTOLIC BLOOD PRESSURE: 74 MMHG

## 2021-01-20 DIAGNOSIS — J44.89 ASTHMA WITH COPD: Chronic | ICD-10-CM

## 2021-01-20 DIAGNOSIS — R05.9 COUGH: ICD-10-CM

## 2021-01-20 DIAGNOSIS — I15.2 HYPERTENSION ASSOCIATED WITH DIABETES: Primary | ICD-10-CM

## 2021-01-20 DIAGNOSIS — F41.9 ANXIETY: ICD-10-CM

## 2021-01-20 DIAGNOSIS — E11.69 COMBINED HYPERLIPIDEMIA ASSOCIATED WITH TYPE 2 DIABETES MELLITUS: ICD-10-CM

## 2021-01-20 DIAGNOSIS — F33.9 MAJOR DEPRESSION, RECURRENT, CHRONIC: ICD-10-CM

## 2021-01-20 DIAGNOSIS — E11.59 HYPERTENSION ASSOCIATED WITH DIABETES: Primary | ICD-10-CM

## 2021-01-20 DIAGNOSIS — E78.2 COMBINED HYPERLIPIDEMIA ASSOCIATED WITH TYPE 2 DIABETES MELLITUS: ICD-10-CM

## 2021-01-20 PROCEDURE — 3074F SYST BP LT 130 MM HG: CPT | Mod: CPTII,S$GLB,, | Performed by: INTERNAL MEDICINE

## 2021-01-20 PROCEDURE — 71046 X-RAY EXAM CHEST 2 VIEWS: CPT | Mod: 26,,, | Performed by: RADIOLOGY

## 2021-01-20 PROCEDURE — 1126F AMNT PAIN NOTED NONE PRSNT: CPT | Mod: S$GLB,,, | Performed by: INTERNAL MEDICINE

## 2021-01-20 PROCEDURE — 3074F PR MOST RECENT SYSTOLIC BLOOD PRESSURE < 130 MM HG: ICD-10-PCS | Mod: CPTII,S$GLB,, | Performed by: INTERNAL MEDICINE

## 2021-01-20 PROCEDURE — 3008F BODY MASS INDEX DOCD: CPT | Mod: CPTII,S$GLB,, | Performed by: INTERNAL MEDICINE

## 2021-01-20 PROCEDURE — 99999 PR PBB SHADOW E&M-EST. PATIENT-LVL V: CPT | Mod: PBBFAC,,, | Performed by: INTERNAL MEDICINE

## 2021-01-20 PROCEDURE — 3044F HG A1C LEVEL LT 7.0%: CPT | Mod: CPTII,S$GLB,, | Performed by: INTERNAL MEDICINE

## 2021-01-20 PROCEDURE — 99214 OFFICE O/P EST MOD 30 MIN: CPT | Mod: S$GLB,,, | Performed by: INTERNAL MEDICINE

## 2021-01-20 PROCEDURE — 71046 X-RAY EXAM CHEST 2 VIEWS: CPT | Mod: TC

## 2021-01-20 PROCEDURE — 3008F PR BODY MASS INDEX (BMI) DOCUMENTED: ICD-10-PCS | Mod: CPTII,S$GLB,, | Performed by: INTERNAL MEDICINE

## 2021-01-20 PROCEDURE — 99999 PR PBB SHADOW E&M-EST. PATIENT-LVL V: ICD-10-PCS | Mod: PBBFAC,,, | Performed by: INTERNAL MEDICINE

## 2021-01-20 PROCEDURE — 3078F PR MOST RECENT DIASTOLIC BLOOD PRESSURE < 80 MM HG: ICD-10-PCS | Mod: CPTII,S$GLB,, | Performed by: INTERNAL MEDICINE

## 2021-01-20 PROCEDURE — 1126F PR PAIN SEVERITY QUANTIFIED, NO PAIN PRESENT: ICD-10-PCS | Mod: S$GLB,,, | Performed by: INTERNAL MEDICINE

## 2021-01-20 PROCEDURE — 71046 XR CHEST PA AND LATERAL: ICD-10-PCS | Mod: 26,,, | Performed by: RADIOLOGY

## 2021-01-20 PROCEDURE — 3078F DIAST BP <80 MM HG: CPT | Mod: CPTII,S$GLB,, | Performed by: INTERNAL MEDICINE

## 2021-01-20 PROCEDURE — 3044F PR MOST RECENT HEMOGLOBIN A1C LEVEL <7.0%: ICD-10-PCS | Mod: CPTII,S$GLB,, | Performed by: INTERNAL MEDICINE

## 2021-01-20 PROCEDURE — 99214 PR OFFICE/OUTPT VISIT, EST, LEVL IV, 30-39 MIN: ICD-10-PCS | Mod: S$GLB,,, | Performed by: INTERNAL MEDICINE

## 2021-01-20 RX ORDER — VENLAFAXINE HYDROCHLORIDE 150 MG/1
CAPSULE, EXTENDED RELEASE ORAL
Qty: 30 CAPSULE | Refills: 6
Start: 2021-01-20 | End: 2021-04-24

## 2021-01-20 RX ORDER — VENLAFAXINE HYDROCHLORIDE 75 MG/1
CAPSULE, EXTENDED RELEASE ORAL
Qty: 30 CAPSULE | Refills: 1 | Status: SHIPPED | OUTPATIENT
Start: 2021-01-20 | End: 2021-03-28

## 2021-01-26 ENCOUNTER — HOSPITAL ENCOUNTER (EMERGENCY)
Facility: HOSPITAL | Age: 56
Discharge: HOME | End: 2021-01-26
Attending: EMERGENCY MEDICINE
Payer: COMMERCIAL

## 2021-01-26 VITALS
HEIGHT: 62 IN | RESPIRATION RATE: 18 BRPM | BODY MASS INDEX: 36.35 KG/M2 | WEIGHT: 197.56 LBS | TEMPERATURE: 99 F | DIASTOLIC BLOOD PRESSURE: 72 MMHG | HEART RATE: 79 BPM | SYSTOLIC BLOOD PRESSURE: 118 MMHG | OXYGEN SATURATION: 96 %

## 2021-01-26 DIAGNOSIS — R10.13 EPIGASTRIC PAIN: ICD-10-CM

## 2021-01-26 DIAGNOSIS — R42 DIZZINESS: ICD-10-CM

## 2021-01-26 DIAGNOSIS — K85.90 ACUTE PANCREATITIS, UNSPECIFIED COMPLICATION STATUS, UNSPECIFIED PANCREATITIS TYPE: Primary | ICD-10-CM

## 2021-01-26 LAB
ALBUMIN SERPL BCP-MCNC: 4.1 G/DL (ref 3.5–5.2)
ALP SERPL-CCNC: 85 U/L (ref 55–135)
ALT SERPL W/O P-5'-P-CCNC: 25 U/L (ref 10–44)
ANION GAP SERPL CALC-SCNC: 12 MMOL/L (ref 8–16)
AST SERPL-CCNC: 23 U/L (ref 10–40)
BASOPHILS # BLD AUTO: 0.09 K/UL (ref 0–0.2)
BASOPHILS NFR BLD: 1 % (ref 0–1.9)
BILIRUB SERPL-MCNC: 0.2 MG/DL (ref 0.1–1)
BILIRUB UR QL STRIP: NEGATIVE
BNP SERPL-MCNC: <10 PG/ML (ref 0–99)
BUN SERPL-MCNC: 14 MG/DL (ref 6–20)
CALCIUM SERPL-MCNC: 8.9 MG/DL (ref 8.7–10.5)
CHLORIDE SERPL-SCNC: 102 MMOL/L (ref 95–110)
CLARITY UR: CLEAR
CO2 SERPL-SCNC: 24 MMOL/L (ref 23–29)
COLOR UR: YELLOW
CREAT SERPL-MCNC: 0.8 MG/DL (ref 0.5–1.4)
DIFFERENTIAL METHOD: ABNORMAL
EOSINOPHIL # BLD AUTO: 0.1 K/UL (ref 0–0.5)
EOSINOPHIL NFR BLD: 1.1 % (ref 0–8)
ERYTHROCYTE [DISTWIDTH] IN BLOOD BY AUTOMATED COUNT: 14.2 % (ref 11.5–14.5)
EST. GFR  (AFRICAN AMERICAN): >60 ML/MIN/1.73 M^2
EST. GFR  (NON AFRICAN AMERICAN): >60 ML/MIN/1.73 M^2
GLUCOSE SERPL-MCNC: 158 MG/DL (ref 70–110)
GLUCOSE UR QL STRIP: NEGATIVE
HCT VFR BLD AUTO: 43.2 % (ref 37–48.5)
HCV AB SERPL QL IA: NEGATIVE
HGB BLD-MCNC: 13.6 G/DL (ref 12–16)
HGB UR QL STRIP: NEGATIVE
HIV 1+2 AB+HIV1 P24 AG SERPL QL IA: NEGATIVE
IMM GRANULOCYTES # BLD AUTO: 0.15 K/UL (ref 0–0.04)
IMM GRANULOCYTES NFR BLD AUTO: 1.7 % (ref 0–0.5)
KETONES UR QL STRIP: NEGATIVE
LEUKOCYTE ESTERASE UR QL STRIP: NEGATIVE
LIPASE SERPL-CCNC: 224 U/L (ref 4–60)
LYMPHOCYTES # BLD AUTO: 2.4 K/UL (ref 1–4.8)
LYMPHOCYTES NFR BLD: 27.1 % (ref 18–48)
MCH RBC QN AUTO: 32.2 PG (ref 27–31)
MCHC RBC AUTO-ENTMCNC: 31.5 G/DL (ref 32–36)
MCV RBC AUTO: 102 FL (ref 82–98)
MONOCYTES # BLD AUTO: 0.7 K/UL (ref 0.3–1)
MONOCYTES NFR BLD: 7.5 % (ref 4–15)
NEUTROPHILS # BLD AUTO: 5.5 K/UL (ref 1.8–7.7)
NEUTROPHILS NFR BLD: 61.6 % (ref 38–73)
NITRITE UR QL STRIP: NEGATIVE
NRBC BLD-RTO: 0 /100 WBC
PH UR STRIP: 6 [PH] (ref 5–8)
PLATELET # BLD AUTO: 220 K/UL (ref 150–350)
PMV BLD AUTO: 11.9 FL (ref 9.2–12.9)
POTASSIUM SERPL-SCNC: 4 MMOL/L (ref 3.5–5.1)
PROT SERPL-MCNC: 7.2 G/DL (ref 6–8.4)
PROT UR QL STRIP: NEGATIVE
RBC # BLD AUTO: 4.23 M/UL (ref 4–5.4)
SODIUM SERPL-SCNC: 138 MMOL/L (ref 136–145)
SP GR UR STRIP: 1.01 (ref 1–1.03)
TROPONIN I SERPL DL<=0.01 NG/ML-MCNC: <0.006 NG/ML (ref 0–0.03)
URN SPEC COLLECT METH UR: NORMAL
UROBILINOGEN UR STRIP-ACNC: NEGATIVE EU/DL
WBC # BLD AUTO: 8.94 K/UL (ref 3.9–12.7)

## 2021-01-26 PROCEDURE — 96376 TX/PRO/DX INJ SAME DRUG ADON: CPT

## 2021-01-26 PROCEDURE — 81003 URINALYSIS AUTO W/O SCOPE: CPT

## 2021-01-26 PROCEDURE — 86703 HIV-1/HIV-2 1 RESULT ANTBDY: CPT

## 2021-01-26 PROCEDURE — 83690 ASSAY OF LIPASE: CPT

## 2021-01-26 PROCEDURE — 84484 ASSAY OF TROPONIN QUANT: CPT

## 2021-01-26 PROCEDURE — 93010 EKG 12-LEAD: ICD-10-PCS | Mod: ,,, | Performed by: INTERNAL MEDICINE

## 2021-01-26 PROCEDURE — 85025 COMPLETE CBC W/AUTO DIFF WBC: CPT

## 2021-01-26 PROCEDURE — 80053 COMPREHEN METABOLIC PANEL: CPT

## 2021-01-26 PROCEDURE — 83880 ASSAY OF NATRIURETIC PEPTIDE: CPT

## 2021-01-26 PROCEDURE — 96375 TX/PRO/DX INJ NEW DRUG ADDON: CPT

## 2021-01-26 PROCEDURE — 93005 ELECTROCARDIOGRAM TRACING: CPT

## 2021-01-26 PROCEDURE — 96361 HYDRATE IV INFUSION ADD-ON: CPT

## 2021-01-26 PROCEDURE — 86803 HEPATITIS C AB TEST: CPT

## 2021-01-26 PROCEDURE — 96374 THER/PROPH/DIAG INJ IV PUSH: CPT | Mod: 59

## 2021-01-26 PROCEDURE — 63600175 PHARM REV CODE 636 W HCPCS: Performed by: EMERGENCY MEDICINE

## 2021-01-26 PROCEDURE — 25500020 PHARM REV CODE 255: Performed by: EMERGENCY MEDICINE

## 2021-01-26 PROCEDURE — 99285 EMERGENCY DEPT VISIT HI MDM: CPT | Mod: 25

## 2021-01-26 PROCEDURE — 93010 ELECTROCARDIOGRAM REPORT: CPT | Mod: ,,, | Performed by: INTERNAL MEDICINE

## 2021-01-26 RX ORDER — MORPHINE SULFATE 4 MG/ML
4 INJECTION, SOLUTION INTRAMUSCULAR; INTRAVENOUS
Status: COMPLETED | OUTPATIENT
Start: 2021-01-26 | End: 2021-01-26

## 2021-01-26 RX ORDER — MORPHINE SULFATE 4 MG/ML
8 INJECTION, SOLUTION INTRAMUSCULAR; INTRAVENOUS
Status: COMPLETED | OUTPATIENT
Start: 2021-01-26 | End: 2021-01-26

## 2021-01-26 RX ORDER — ONDANSETRON 4 MG/1
4 TABLET, ORALLY DISINTEGRATING ORAL EVERY 8 HOURS PRN
Qty: 11 TABLET | Refills: 0 | Status: SHIPPED | OUTPATIENT
Start: 2021-01-26 | End: 2021-01-29

## 2021-01-26 RX ORDER — HYDROCODONE BITARTRATE AND ACETAMINOPHEN 10; 325 MG/1; MG/1
1 TABLET ORAL EVERY 4 HOURS PRN
Qty: 11 TABLET | Refills: 0 | Status: SHIPPED | OUTPATIENT
Start: 2021-01-26 | End: 2021-02-05 | Stop reason: ALTCHOICE

## 2021-01-26 RX ORDER — ONDANSETRON 2 MG/ML
4 INJECTION INTRAMUSCULAR; INTRAVENOUS
Status: COMPLETED | OUTPATIENT
Start: 2021-01-26 | End: 2021-01-26

## 2021-01-26 RX ADMIN — MORPHINE SULFATE 8 MG: 4 INJECTION INTRAVENOUS at 12:01

## 2021-01-26 RX ADMIN — MORPHINE SULFATE 4 MG: 4 INJECTION INTRAVENOUS at 03:01

## 2021-01-26 RX ADMIN — IOHEXOL 100 ML: 350 INJECTION, SOLUTION INTRAVENOUS at 01:01

## 2021-01-26 RX ADMIN — ONDANSETRON 4 MG: 2 INJECTION INTRAMUSCULAR; INTRAVENOUS at 11:01

## 2021-01-26 RX ADMIN — SODIUM CHLORIDE, SODIUM LACTATE, POTASSIUM CHLORIDE, AND CALCIUM CHLORIDE 1000 ML: .6; .31; .03; .02 INJECTION, SOLUTION INTRAVENOUS at 11:01

## 2021-01-26 RX ADMIN — SODIUM CHLORIDE, SODIUM LACTATE, POTASSIUM CHLORIDE, AND CALCIUM CHLORIDE 1000 ML: .6; .31; .03; .02 INJECTION, SOLUTION INTRAVENOUS at 02:01

## 2021-01-27 ENCOUNTER — OFFICE VISIT (OUTPATIENT)
Dept: INTERNAL MEDICINE | Facility: CLINIC | Age: 56
End: 2021-01-27
Payer: COMMERCIAL

## 2021-01-27 ENCOUNTER — PES CALL (OUTPATIENT)
Dept: ADMINISTRATIVE | Facility: CLINIC | Age: 56
End: 2021-01-27

## 2021-01-27 ENCOUNTER — LAB VISIT (OUTPATIENT)
Dept: LAB | Facility: HOSPITAL | Age: 56
End: 2021-01-27
Attending: FAMILY MEDICINE
Payer: COMMERCIAL

## 2021-01-27 VITALS
OXYGEN SATURATION: 94 % | HEART RATE: 95 BPM | DIASTOLIC BLOOD PRESSURE: 78 MMHG | TEMPERATURE: 98 F | HEIGHT: 62 IN | BODY MASS INDEX: 36.1 KG/M2 | SYSTOLIC BLOOD PRESSURE: 114 MMHG | WEIGHT: 196.19 LBS

## 2021-01-27 DIAGNOSIS — R42 FEELING FAINT: ICD-10-CM

## 2021-01-27 DIAGNOSIS — K85.90 ACUTE PANCREATITIS WITHOUT INFECTION OR NECROSIS, UNSPECIFIED PANCREATITIS TYPE: Primary | ICD-10-CM

## 2021-01-27 DIAGNOSIS — K85.90 ACUTE PANCREATITIS WITHOUT INFECTION OR NECROSIS, UNSPECIFIED PANCREATITIS TYPE: ICD-10-CM

## 2021-01-27 DIAGNOSIS — R11.0 NAUSEA: ICD-10-CM

## 2021-01-27 LAB
BASOPHILS # BLD AUTO: 0.1 K/UL (ref 0–0.2)
BASOPHILS NFR BLD: 1.1 % (ref 0–1.9)
DIFFERENTIAL METHOD: ABNORMAL
EOSINOPHIL # BLD AUTO: 0.1 K/UL (ref 0–0.5)
EOSINOPHIL NFR BLD: 1.5 % (ref 0–8)
ERYTHROCYTE [DISTWIDTH] IN BLOOD BY AUTOMATED COUNT: 14.6 % (ref 11.5–14.5)
HCT VFR BLD AUTO: 45.9 % (ref 37–48.5)
HGB BLD-MCNC: 14.1 G/DL (ref 12–16)
IMM GRANULOCYTES # BLD AUTO: 0.13 K/UL (ref 0–0.04)
IMM GRANULOCYTES NFR BLD AUTO: 1.4 % (ref 0–0.5)
LYMPHOCYTES # BLD AUTO: 2.9 K/UL (ref 1–4.8)
LYMPHOCYTES NFR BLD: 32.3 % (ref 18–48)
MCH RBC QN AUTO: 32.5 PG (ref 27–31)
MCHC RBC AUTO-ENTMCNC: 30.7 G/DL (ref 32–36)
MCV RBC AUTO: 106 FL (ref 82–98)
MONOCYTES # BLD AUTO: 0.7 K/UL (ref 0.3–1)
MONOCYTES NFR BLD: 8 % (ref 4–15)
NEUTROPHILS # BLD AUTO: 5.1 K/UL (ref 1.8–7.7)
NEUTROPHILS NFR BLD: 55.7 % (ref 38–73)
NRBC BLD-RTO: 0 /100 WBC
PLATELET # BLD AUTO: 210 K/UL (ref 150–350)
PMV BLD AUTO: 12.5 FL (ref 9.2–12.9)
RBC # BLD AUTO: 4.34 M/UL (ref 4–5.4)
WBC # BLD AUTO: 9.11 K/UL (ref 3.9–12.7)

## 2021-01-27 PROCEDURE — 1125F PR PAIN SEVERITY QUANTIFIED, PAIN PRESENT: ICD-10-PCS | Mod: S$GLB,,, | Performed by: FAMILY MEDICINE

## 2021-01-27 PROCEDURE — 85025 COMPLETE CBC W/AUTO DIFF WBC: CPT

## 2021-01-27 PROCEDURE — 3074F PR MOST RECENT SYSTOLIC BLOOD PRESSURE < 130 MM HG: ICD-10-PCS | Mod: CPTII,S$GLB,, | Performed by: FAMILY MEDICINE

## 2021-01-27 PROCEDURE — 3008F BODY MASS INDEX DOCD: CPT | Mod: CPTII,S$GLB,, | Performed by: FAMILY MEDICINE

## 2021-01-27 PROCEDURE — 36415 COLL VENOUS BLD VENIPUNCTURE: CPT

## 2021-01-27 PROCEDURE — 99999 PR PBB SHADOW E&M-EST. PATIENT-LVL V: ICD-10-PCS | Mod: PBBFAC,,, | Performed by: FAMILY MEDICINE

## 2021-01-27 PROCEDURE — 99999 PR PBB SHADOW E&M-EST. PATIENT-LVL V: CPT | Mod: PBBFAC,,, | Performed by: FAMILY MEDICINE

## 2021-01-27 PROCEDURE — 1125F AMNT PAIN NOTED PAIN PRSNT: CPT | Mod: S$GLB,,, | Performed by: FAMILY MEDICINE

## 2021-01-27 PROCEDURE — 3078F DIAST BP <80 MM HG: CPT | Mod: CPTII,S$GLB,, | Performed by: FAMILY MEDICINE

## 2021-01-27 PROCEDURE — 80053 COMPREHEN METABOLIC PANEL: CPT

## 2021-01-27 PROCEDURE — 3078F PR MOST RECENT DIASTOLIC BLOOD PRESSURE < 80 MM HG: ICD-10-PCS | Mod: CPTII,S$GLB,, | Performed by: FAMILY MEDICINE

## 2021-01-27 PROCEDURE — 3074F SYST BP LT 130 MM HG: CPT | Mod: CPTII,S$GLB,, | Performed by: FAMILY MEDICINE

## 2021-01-27 PROCEDURE — 99213 OFFICE O/P EST LOW 20 MIN: CPT | Mod: S$GLB,,, | Performed by: FAMILY MEDICINE

## 2021-01-27 PROCEDURE — 3008F PR BODY MASS INDEX (BMI) DOCUMENTED: ICD-10-PCS | Mod: CPTII,S$GLB,, | Performed by: FAMILY MEDICINE

## 2021-01-27 PROCEDURE — 83690 ASSAY OF LIPASE: CPT

## 2021-01-27 PROCEDURE — 99213 PR OFFICE/OUTPT VISIT, EST, LEVL III, 20-29 MIN: ICD-10-PCS | Mod: S$GLB,,, | Performed by: FAMILY MEDICINE

## 2021-01-28 ENCOUNTER — PATIENT MESSAGE (OUTPATIENT)
Dept: INTERNAL MEDICINE | Facility: CLINIC | Age: 56
End: 2021-01-28

## 2021-01-28 LAB
ALBUMIN SERPL BCP-MCNC: 4.3 G/DL (ref 3.5–5.2)
ALP SERPL-CCNC: 84 U/L (ref 55–135)
ALT SERPL W/O P-5'-P-CCNC: 26 U/L (ref 10–44)
ANION GAP SERPL CALC-SCNC: 12 MMOL/L (ref 8–16)
AST SERPL-CCNC: 21 U/L (ref 10–40)
BILIRUB SERPL-MCNC: 0.2 MG/DL (ref 0.1–1)
BUN SERPL-MCNC: 9 MG/DL (ref 6–20)
CALCIUM SERPL-MCNC: 9.2 MG/DL (ref 8.7–10.5)
CHLORIDE SERPL-SCNC: 102 MMOL/L (ref 95–110)
CO2 SERPL-SCNC: 27 MMOL/L (ref 23–29)
CREAT SERPL-MCNC: 0.7 MG/DL (ref 0.5–1.4)
EST. GFR  (AFRICAN AMERICAN): >60 ML/MIN/1.73 M^2
EST. GFR  (NON AFRICAN AMERICAN): >60 ML/MIN/1.73 M^2
GLUCOSE SERPL-MCNC: 88 MG/DL (ref 70–110)
LIPASE SERPL-CCNC: 332 U/L (ref 4–60)
POTASSIUM SERPL-SCNC: 4.2 MMOL/L (ref 3.5–5.1)
PROT SERPL-MCNC: 7.2 G/DL (ref 6–8.4)
SODIUM SERPL-SCNC: 141 MMOL/L (ref 136–145)

## 2021-01-29 ENCOUNTER — TELEPHONE (OUTPATIENT)
Dept: INTERNAL MEDICINE | Facility: CLINIC | Age: 56
End: 2021-01-29

## 2021-01-29 ENCOUNTER — PATIENT MESSAGE (OUTPATIENT)
Dept: INTERNAL MEDICINE | Facility: CLINIC | Age: 56
End: 2021-01-29

## 2021-02-01 ENCOUNTER — PATIENT MESSAGE (OUTPATIENT)
Dept: INTERNAL MEDICINE | Facility: CLINIC | Age: 56
End: 2021-02-01

## 2021-02-01 ENCOUNTER — OFFICE VISIT (OUTPATIENT)
Dept: INTERNAL MEDICINE | Facility: CLINIC | Age: 56
End: 2021-02-01
Payer: COMMERCIAL

## 2021-02-01 VITALS
WEIGHT: 195.56 LBS | HEART RATE: 97 BPM | SYSTOLIC BLOOD PRESSURE: 102 MMHG | DIASTOLIC BLOOD PRESSURE: 78 MMHG | HEIGHT: 62 IN | BODY MASS INDEX: 35.99 KG/M2 | OXYGEN SATURATION: 98 % | TEMPERATURE: 99 F

## 2021-02-01 DIAGNOSIS — R10.10 UPPER ABDOMINAL PAIN: Primary | ICD-10-CM

## 2021-02-01 DIAGNOSIS — R11.0 NAUSEA: ICD-10-CM

## 2021-02-01 PROCEDURE — 3078F PR MOST RECENT DIASTOLIC BLOOD PRESSURE < 80 MM HG: ICD-10-PCS | Mod: CPTII,S$GLB,, | Performed by: INTERNAL MEDICINE

## 2021-02-01 PROCEDURE — 99214 OFFICE O/P EST MOD 30 MIN: CPT | Mod: S$GLB,,, | Performed by: INTERNAL MEDICINE

## 2021-02-01 PROCEDURE — 1125F AMNT PAIN NOTED PAIN PRSNT: CPT | Mod: S$GLB,,, | Performed by: INTERNAL MEDICINE

## 2021-02-01 PROCEDURE — 3008F PR BODY MASS INDEX (BMI) DOCUMENTED: ICD-10-PCS | Mod: CPTII,S$GLB,, | Performed by: INTERNAL MEDICINE

## 2021-02-01 PROCEDURE — 3078F DIAST BP <80 MM HG: CPT | Mod: CPTII,S$GLB,, | Performed by: INTERNAL MEDICINE

## 2021-02-01 PROCEDURE — 3074F SYST BP LT 130 MM HG: CPT | Mod: CPTII,S$GLB,, | Performed by: INTERNAL MEDICINE

## 2021-02-01 PROCEDURE — 3008F BODY MASS INDEX DOCD: CPT | Mod: CPTII,S$GLB,, | Performed by: INTERNAL MEDICINE

## 2021-02-01 PROCEDURE — 1125F PR PAIN SEVERITY QUANTIFIED, PAIN PRESENT: ICD-10-PCS | Mod: S$GLB,,, | Performed by: INTERNAL MEDICINE

## 2021-02-01 PROCEDURE — 99214 PR OFFICE/OUTPT VISIT, EST, LEVL IV, 30-39 MIN: ICD-10-PCS | Mod: S$GLB,,, | Performed by: INTERNAL MEDICINE

## 2021-02-01 PROCEDURE — 99999 PR PBB SHADOW E&M-EST. PATIENT-LVL V: CPT | Mod: PBBFAC,,, | Performed by: INTERNAL MEDICINE

## 2021-02-01 PROCEDURE — 3074F PR MOST RECENT SYSTOLIC BLOOD PRESSURE < 130 MM HG: ICD-10-PCS | Mod: CPTII,S$GLB,, | Performed by: INTERNAL MEDICINE

## 2021-02-01 PROCEDURE — 99999 PR PBB SHADOW E&M-EST. PATIENT-LVL V: ICD-10-PCS | Mod: PBBFAC,,, | Performed by: INTERNAL MEDICINE

## 2021-02-01 RX ORDER — MICONAZOLE NITRATE 2 %
2 CREAM (GRAM) TOPICAL DAILY PRN
COMMUNITY
Start: 2021-01-20 | End: 2021-02-19

## 2021-02-01 RX ORDER — IBUPROFEN 200 MG
1 TABLET ORAL
COMMUNITY
Start: 2021-01-20 | End: 2021-02-19

## 2021-02-01 RX ORDER — ONDANSETRON 4 MG/1
4 TABLET, FILM COATED ORAL EVERY 8 HOURS PRN
Qty: 15 TABLET | Refills: 0 | Status: SHIPPED | OUTPATIENT
Start: 2021-02-01 | End: 2021-02-06

## 2021-02-04 ENCOUNTER — PATIENT MESSAGE (OUTPATIENT)
Dept: INTERNAL MEDICINE | Facility: CLINIC | Age: 56
End: 2021-02-04

## 2021-02-04 ENCOUNTER — TELEPHONE (OUTPATIENT)
Dept: RADIOLOGY | Facility: HOSPITAL | Age: 56
End: 2021-02-04

## 2021-02-05 ENCOUNTER — TELEPHONE (OUTPATIENT)
Dept: INTERNAL MEDICINE | Facility: CLINIC | Age: 56
End: 2021-02-05

## 2021-02-05 ENCOUNTER — HOSPITAL ENCOUNTER (OUTPATIENT)
Dept: RADIOLOGY | Facility: HOSPITAL | Age: 56
Discharge: HOME | End: 2021-02-05
Attending: INTERNAL MEDICINE
Payer: COMMERCIAL

## 2021-02-05 DIAGNOSIS — R10.10 UPPER ABDOMINAL PAIN: Primary | ICD-10-CM

## 2021-02-05 DIAGNOSIS — R10.10 UPPER ABDOMINAL PAIN: ICD-10-CM

## 2021-02-05 PROCEDURE — 76700 US ABDOMEN COMPLETE: ICD-10-PCS | Mod: 26,,, | Performed by: RADIOLOGY

## 2021-02-05 PROCEDURE — 76700 US EXAM ABDOM COMPLETE: CPT | Mod: 26,,, | Performed by: RADIOLOGY

## 2021-02-05 PROCEDURE — 76700 US EXAM ABDOM COMPLETE: CPT | Mod: TC

## 2021-02-05 RX ORDER — HYDROCODONE BITARTRATE AND ACETAMINOPHEN 7.5; 325 MG/1; MG/1
1 TABLET ORAL EVERY 6 HOURS PRN
Qty: 20 TABLET | Refills: 0 | Status: SHIPPED | OUTPATIENT
Start: 2021-02-05 | End: 2021-02-10

## 2021-02-17 ENCOUNTER — OFFICE VISIT (OUTPATIENT)
Dept: INTERNAL MEDICINE | Facility: CLINIC | Age: 56
End: 2021-02-17
Payer: COMMERCIAL

## 2021-02-17 DIAGNOSIS — F33.9 MAJOR DEPRESSION, RECURRENT, CHRONIC: ICD-10-CM

## 2021-02-17 DIAGNOSIS — F41.9 ANXIETY: ICD-10-CM

## 2021-02-17 PROCEDURE — 99213 OFFICE O/P EST LOW 20 MIN: CPT | Mod: 95,,, | Performed by: INTERNAL MEDICINE

## 2021-02-17 PROCEDURE — 99213 PR OFFICE/OUTPT VISIT, EST, LEVL III, 20-29 MIN: ICD-10-PCS | Mod: 95,,, | Performed by: INTERNAL MEDICINE

## 2021-03-02 DIAGNOSIS — G25.81 RESTLESS LEGS SYNDROME (RLS): Chronic | ICD-10-CM

## 2021-03-02 RX ORDER — ROPINIROLE 1 MG/1
1 TABLET, FILM COATED ORAL NIGHTLY
Qty: 30 TABLET | Refills: 2 | Status: SHIPPED | OUTPATIENT
Start: 2021-03-02 | End: 2021-04-22

## 2021-03-08 ENCOUNTER — CLINICAL SUPPORT (OUTPATIENT)
Dept: SMOKING CESSATION | Facility: CLINIC | Age: 56
End: 2021-03-08
Payer: COMMERCIAL

## 2021-03-08 DIAGNOSIS — F17.200 NICOTINE DEPENDENCE: Primary | ICD-10-CM

## 2021-03-08 PROCEDURE — 99407 BEHAV CHNG SMOKING > 10 MIN: CPT | Mod: S$GLB,,,

## 2021-03-08 PROCEDURE — 99407 PR TOBACCO USE CESSATION INTENSIVE >10 MINUTES: ICD-10-PCS | Mod: S$GLB,,,

## 2021-03-24 ENCOUNTER — PATIENT OUTREACH (OUTPATIENT)
Dept: ADMINISTRATIVE | Facility: HOSPITAL | Age: 56
End: 2021-03-24

## 2021-03-26 DIAGNOSIS — F33.9 MAJOR DEPRESSION, RECURRENT, CHRONIC: ICD-10-CM

## 2021-03-26 DIAGNOSIS — F41.9 ANXIETY: ICD-10-CM

## 2021-03-28 RX ORDER — VENLAFAXINE HYDROCHLORIDE 75 MG/1
CAPSULE, EXTENDED RELEASE ORAL
Qty: 90 CAPSULE | Refills: 3 | Status: SHIPPED | OUTPATIENT
Start: 2021-03-28 | End: 2022-09-27

## 2021-03-30 ENCOUNTER — CLINICAL SUPPORT (OUTPATIENT)
Dept: DIABETES | Facility: CLINIC | Age: 56
End: 2021-03-30
Payer: COMMERCIAL

## 2021-03-30 VITALS — WEIGHT: 202.81 LBS | BODY MASS INDEX: 37.32 KG/M2 | HEIGHT: 62 IN

## 2021-03-30 DIAGNOSIS — E11.59 HYPERTENSION ASSOCIATED WITH DIABETES: Primary | ICD-10-CM

## 2021-03-30 DIAGNOSIS — E78.2 COMBINED HYPERLIPIDEMIA ASSOCIATED WITH TYPE 2 DIABETES MELLITUS: ICD-10-CM

## 2021-03-30 DIAGNOSIS — E11.69 COMBINED HYPERLIPIDEMIA ASSOCIATED WITH TYPE 2 DIABETES MELLITUS: ICD-10-CM

## 2021-03-30 DIAGNOSIS — I15.2 HYPERTENSION ASSOCIATED WITH DIABETES: Primary | ICD-10-CM

## 2021-03-30 PROCEDURE — 99999 PR PBB SHADOW E&M-EST. PATIENT-LVL II: CPT | Mod: PBBFAC,,, | Performed by: DIETITIAN, REGISTERED

## 2021-03-30 PROCEDURE — G0108 PR DIAB MANAGE TRN  PER INDIV: ICD-10-PCS | Mod: S$GLB,,, | Performed by: DIETITIAN, REGISTERED

## 2021-03-30 PROCEDURE — G0108 DIAB MANAGE TRN  PER INDIV: HCPCS | Mod: S$GLB,,, | Performed by: DIETITIAN, REGISTERED

## 2021-03-30 PROCEDURE — 99999 PR PBB SHADOW E&M-EST. PATIENT-LVL II: ICD-10-PCS | Mod: PBBFAC,,, | Performed by: DIETITIAN, REGISTERED

## 2021-04-12 ENCOUNTER — PATIENT OUTREACH (OUTPATIENT)
Dept: ADMINISTRATIVE | Facility: OTHER | Age: 56
End: 2021-04-12

## 2021-04-13 ENCOUNTER — OFFICE VISIT (OUTPATIENT)
Dept: OPHTHALMOLOGY | Facility: CLINIC | Age: 56
End: 2021-04-13
Payer: COMMERCIAL

## 2021-04-13 DIAGNOSIS — H52.4 BILATERAL PRESBYOPIA: ICD-10-CM

## 2021-04-13 DIAGNOSIS — E11.9 DIABETES MELLITUS TYPE 2 WITHOUT RETINOPATHY: Primary | ICD-10-CM

## 2021-04-13 DIAGNOSIS — H04.123 DRY EYES, BILATERAL: ICD-10-CM

## 2021-04-13 PROCEDURE — 92014 PR EYE EXAM, EST PATIENT,COMPREHESV: ICD-10-PCS | Mod: S$GLB,,, | Performed by: OPTOMETRIST

## 2021-04-13 PROCEDURE — 2023F PR DILATED RETINAL EXAM W/O EVID OF RETINOPATHY: ICD-10-PCS | Mod: S$GLB,,, | Performed by: OPTOMETRIST

## 2021-04-13 PROCEDURE — 3044F HG A1C LEVEL LT 7.0%: CPT | Mod: CPTII,S$GLB,, | Performed by: OPTOMETRIST

## 2021-04-13 PROCEDURE — 99999 PR PBB SHADOW E&M-EST. PATIENT-LVL III: ICD-10-PCS | Mod: PBBFAC,,, | Performed by: OPTOMETRIST

## 2021-04-13 PROCEDURE — 99999 PR PBB SHADOW E&M-EST. PATIENT-LVL III: CPT | Mod: PBBFAC,,, | Performed by: OPTOMETRIST

## 2021-04-13 PROCEDURE — 92015 PR REFRACTION: ICD-10-PCS | Mod: S$GLB,,, | Performed by: OPTOMETRIST

## 2021-04-13 PROCEDURE — 92014 COMPRE OPH EXAM EST PT 1/>: CPT | Mod: S$GLB,,, | Performed by: OPTOMETRIST

## 2021-04-13 PROCEDURE — 2023F DILAT RTA XM W/O RTNOPTHY: CPT | Mod: S$GLB,,, | Performed by: OPTOMETRIST

## 2021-04-13 PROCEDURE — 92015 DETERMINE REFRACTIVE STATE: CPT | Mod: S$GLB,,, | Performed by: OPTOMETRIST

## 2021-04-13 PROCEDURE — 3044F PR MOST RECENT HEMOGLOBIN A1C LEVEL <7.0%: ICD-10-PCS | Mod: CPTII,S$GLB,, | Performed by: OPTOMETRIST

## 2021-04-13 RX ORDER — PANTOPRAZOLE SODIUM 40 MG/1
TABLET, DELAYED RELEASE ORAL
COMMUNITY
Start: 2021-03-18 | End: 2022-09-27

## 2021-04-13 RX ORDER — SUCRALFATE 1 G/1
TABLET ORAL
COMMUNITY
Start: 2021-03-18 | End: 2022-09-27

## 2021-04-15 ENCOUNTER — TELEPHONE (OUTPATIENT)
Dept: ENDOSCOPY | Facility: HOSPITAL | Age: 56
End: 2021-04-15

## 2021-04-22 DIAGNOSIS — E78.2 COMBINED HYPERLIPIDEMIA ASSOCIATED WITH TYPE 2 DIABETES MELLITUS: ICD-10-CM

## 2021-04-22 DIAGNOSIS — E03.9 HYPOTHYROIDISM (ACQUIRED): ICD-10-CM

## 2021-04-22 DIAGNOSIS — F41.9 ANXIETY: ICD-10-CM

## 2021-04-22 DIAGNOSIS — E66.01 CLASS 2 SEVERE OBESITY DUE TO EXCESS CALORIES WITH SERIOUS COMORBIDITY AND BODY MASS INDEX (BMI) OF 35.0 TO 35.9 IN ADULT: Primary | ICD-10-CM

## 2021-04-22 DIAGNOSIS — E11.69 COMBINED HYPERLIPIDEMIA ASSOCIATED WITH TYPE 2 DIABETES MELLITUS: ICD-10-CM

## 2021-04-22 DIAGNOSIS — F33.9 MAJOR DEPRESSION, RECURRENT, CHRONIC: ICD-10-CM

## 2021-04-24 RX ORDER — VENLAFAXINE HYDROCHLORIDE 150 MG/1
CAPSULE, EXTENDED RELEASE ORAL
Qty: 90 CAPSULE | Refills: 3 | Status: SHIPPED | OUTPATIENT
Start: 2021-04-24 | End: 2022-09-27

## 2021-05-02 ENCOUNTER — PATIENT MESSAGE (OUTPATIENT)
Dept: ENDOSCOPY | Facility: HOSPITAL | Age: 56
End: 2021-05-02

## 2021-05-10 DIAGNOSIS — J45.20 MILD INTERMITTENT ASTHMA WITHOUT COMPLICATION: Chronic | ICD-10-CM

## 2021-05-10 RX ORDER — FLUTICASONE FUROATE AND VILANTEROL TRIFENATATE 200; 25 UG/1; UG/1
1 POWDER RESPIRATORY (INHALATION) DAILY
Qty: 180 EACH | Refills: 3 | Status: SHIPPED | OUTPATIENT
Start: 2021-05-10 | End: 2022-09-27

## 2021-05-17 DIAGNOSIS — M06.09 RHEUMATOID ARTHRITIS OF MULTIPLE SITES WITH NEGATIVE RHEUMATOID FACTOR: ICD-10-CM

## 2021-05-18 ENCOUNTER — TELEPHONE (OUTPATIENT)
Dept: RHEUMATOLOGY | Facility: CLINIC | Age: 56
End: 2021-05-18

## 2021-05-18 ENCOUNTER — OFFICE VISIT (OUTPATIENT)
Dept: RHEUMATOLOGY | Facility: CLINIC | Age: 56
End: 2021-05-18
Payer: COMMERCIAL

## 2021-05-18 DIAGNOSIS — G25.81 RESTLESS LEGS SYNDROME (RLS): ICD-10-CM

## 2021-05-18 DIAGNOSIS — M06.09 RHEUMATOID ARTHRITIS OF MULTIPLE SITES WITH NEGATIVE RHEUMATOID FACTOR: Primary | ICD-10-CM

## 2021-05-18 DIAGNOSIS — Z79.899 HIGH RISK MEDICATION USE: ICD-10-CM

## 2021-05-18 DIAGNOSIS — D84.9 IMMUNOSUPPRESSED STATUS: ICD-10-CM

## 2021-05-18 DIAGNOSIS — M15.9 PRIMARY OSTEOARTHRITIS INVOLVING MULTIPLE JOINTS: ICD-10-CM

## 2021-05-18 DIAGNOSIS — M79.7 FIBROMYALGIA: ICD-10-CM

## 2021-05-18 PROCEDURE — 99214 OFFICE O/P EST MOD 30 MIN: CPT | Mod: 95,,, | Performed by: INTERNAL MEDICINE

## 2021-05-18 PROCEDURE — 99214 PR OFFICE/OUTPT VISIT, EST, LEVL IV, 30-39 MIN: ICD-10-PCS | Mod: 95,,, | Performed by: INTERNAL MEDICINE

## 2021-05-18 RX ORDER — METHOTREXATE 2.5 MG/1
TABLET ORAL
Qty: 48 TABLET | Refills: 0 | Status: SHIPPED | OUTPATIENT
Start: 2021-05-18 | End: 2021-08-10

## 2021-05-19 ENCOUNTER — LAB VISIT (OUTPATIENT)
Dept: LAB | Facility: HOSPITAL | Age: 56
End: 2021-05-19
Attending: INTERNAL MEDICINE
Payer: COMMERCIAL

## 2021-05-19 DIAGNOSIS — M06.09 RHEUMATOID ARTHRITIS OF MULTIPLE SITES WITH NEGATIVE RHEUMATOID FACTOR: ICD-10-CM

## 2021-05-19 LAB
ALBUMIN SERPL BCP-MCNC: 4.2 G/DL (ref 3.5–5.2)
ALP SERPL-CCNC: 100 U/L (ref 55–135)
ALT SERPL W/O P-5'-P-CCNC: 29 U/L (ref 10–44)
ANION GAP SERPL CALC-SCNC: 10 MMOL/L (ref 8–16)
AST SERPL-CCNC: 20 U/L (ref 10–40)
BASOPHILS # BLD AUTO: 0.21 K/UL (ref 0–0.2)
BASOPHILS NFR BLD: 1.8 % (ref 0–1.9)
BILIRUB SERPL-MCNC: 0.2 MG/DL (ref 0.1–1)
BUN SERPL-MCNC: 10 MG/DL (ref 6–20)
CALCIUM SERPL-MCNC: 9 MG/DL (ref 8.7–10.5)
CHLORIDE SERPL-SCNC: 104 MMOL/L (ref 95–110)
CO2 SERPL-SCNC: 27 MMOL/L (ref 23–29)
CREAT SERPL-MCNC: 0.7 MG/DL (ref 0.5–1.4)
CRP SERPL-MCNC: 0.8 MG/L (ref 0–8.2)
DIFFERENTIAL METHOD: ABNORMAL
EOSINOPHIL # BLD AUTO: 0.1 K/UL (ref 0–0.5)
EOSINOPHIL NFR BLD: 1.2 % (ref 0–8)
ERYTHROCYTE [DISTWIDTH] IN BLOOD BY AUTOMATED COUNT: 13.8 % (ref 11.5–14.5)
ERYTHROCYTE [SEDIMENTATION RATE] IN BLOOD BY WESTERGREN METHOD: 5 MM/HR (ref 0–20)
EST. GFR  (AFRICAN AMERICAN): >60 ML/MIN/1.73 M^2
EST. GFR  (NON AFRICAN AMERICAN): >60 ML/MIN/1.73 M^2
GLUCOSE SERPL-MCNC: 95 MG/DL (ref 70–110)
HCT VFR BLD AUTO: 46.5 % (ref 37–48.5)
HGB BLD-MCNC: 14.3 G/DL (ref 12–16)
IMM GRANULOCYTES # BLD AUTO: 0.53 K/UL (ref 0–0.04)
IMM GRANULOCYTES NFR BLD AUTO: 4.5 % (ref 0–0.5)
LYMPHOCYTES # BLD AUTO: 3.2 K/UL (ref 1–4.8)
LYMPHOCYTES NFR BLD: 27.7 % (ref 18–48)
MCH RBC QN AUTO: 31.2 PG (ref 27–31)
MCHC RBC AUTO-ENTMCNC: 30.8 G/DL (ref 32–36)
MCV RBC AUTO: 102 FL (ref 82–98)
MONOCYTES # BLD AUTO: 1.1 K/UL (ref 0.3–1)
MONOCYTES NFR BLD: 9.4 % (ref 4–15)
NEUTROPHILS # BLD AUTO: 6.5 K/UL (ref 1.8–7.7)
NEUTROPHILS NFR BLD: 55.4 % (ref 38–73)
NRBC BLD-RTO: 0 /100 WBC
PLATELET # BLD AUTO: 213 K/UL (ref 150–450)
PMV BLD AUTO: 12.5 FL (ref 9.2–12.9)
POTASSIUM SERPL-SCNC: 4.7 MMOL/L (ref 3.5–5.1)
PROT SERPL-MCNC: 7.5 G/DL (ref 6–8.4)
RBC # BLD AUTO: 4.58 M/UL (ref 4–5.4)
SODIUM SERPL-SCNC: 141 MMOL/L (ref 136–145)
WBC # BLD AUTO: 11.69 K/UL (ref 3.9–12.7)

## 2021-05-19 PROCEDURE — 85025 COMPLETE CBC W/AUTO DIFF WBC: CPT | Performed by: INTERNAL MEDICINE

## 2021-05-19 PROCEDURE — 80053 COMPREHEN METABOLIC PANEL: CPT | Performed by: INTERNAL MEDICINE

## 2021-05-19 PROCEDURE — 86140 C-REACTIVE PROTEIN: CPT | Performed by: INTERNAL MEDICINE

## 2021-05-19 PROCEDURE — 36415 COLL VENOUS BLD VENIPUNCTURE: CPT | Mod: PO | Performed by: INTERNAL MEDICINE

## 2021-05-19 PROCEDURE — 85651 RBC SED RATE NONAUTOMATED: CPT | Performed by: INTERNAL MEDICINE

## 2021-05-26 DIAGNOSIS — E03.9 HYPOTHYROIDISM (ACQUIRED): ICD-10-CM

## 2021-05-26 DIAGNOSIS — R25.2 MUSCLE CRAMPS AT NIGHT: ICD-10-CM

## 2021-05-26 RX ORDER — GABAPENTIN 300 MG/1
300 CAPSULE ORAL 2 TIMES DAILY
Qty: 60 CAPSULE | Refills: 3 | Status: SHIPPED | OUTPATIENT
Start: 2021-05-26 | End: 2021-10-12

## 2021-06-01 RX ORDER — LEVOTHYROXINE SODIUM 50 UG/1
50 TABLET ORAL DAILY
Qty: 90 TABLET | Refills: 0 | Status: SHIPPED | OUTPATIENT
Start: 2021-06-01 | End: 2021-08-25

## 2021-07-12 LAB
CHOLEST SERPL-MSCNC: 164 MG/DL (ref 0–200)
HBA1C MFR BLD: 6.4 % (ref 4–6)
HDL/CHOLESTEROL RATIO: 4.2 % (ref 0–40)
HDLC SERPL-MCNC: 50 MG/DL (ref 35–70)
LDLC SERPL CALC-MCNC: 53 MG/DL (ref 0–100)
NON HDL CHOL. (LDL+VLDL): 125 (ref 0–130)
TRIGL SERPL-MCNC: 362 MG/DL (ref 0–150)

## 2021-07-15 DIAGNOSIS — I15.2 HYPERTENSION ASSOCIATED WITH DIABETES: ICD-10-CM

## 2021-07-15 DIAGNOSIS — E11.59 HYPERTENSION ASSOCIATED WITH DIABETES: ICD-10-CM

## 2021-07-17 RX ORDER — AMLODIPINE BESYLATE 5 MG/1
TABLET ORAL
Qty: 90 TABLET | Refills: 2 | Status: SHIPPED | OUTPATIENT
Start: 2021-07-17 | End: 2022-09-27

## 2021-07-29 ENCOUNTER — PATIENT OUTREACH (OUTPATIENT)
Dept: ADMINISTRATIVE | Facility: HOSPITAL | Age: 56
End: 2021-07-29

## 2021-08-14 DIAGNOSIS — E11.9 CONTROLLED TYPE 2 DIABETES MELLITUS WITHOUT COMPLICATION, WITHOUT LONG-TERM CURRENT USE OF INSULIN: ICD-10-CM

## 2021-08-17 RX ORDER — METFORMIN HYDROCHLORIDE 500 MG/1
500 TABLET ORAL 2 TIMES DAILY WITH MEALS
Qty: 180 TABLET | Refills: 0 | Status: SHIPPED | OUTPATIENT
Start: 2021-08-17 | End: 2021-11-16

## 2021-08-25 DIAGNOSIS — E03.9 HYPOTHYROIDISM (ACQUIRED): ICD-10-CM

## 2021-08-26 RX ORDER — LEVOTHYROXINE SODIUM 50 UG/1
TABLET ORAL
Qty: 90 TABLET | Refills: 1 | Status: SHIPPED | OUTPATIENT
Start: 2021-08-26 | End: 2022-03-03

## 2021-08-31 ENCOUNTER — TELEPHONE (OUTPATIENT)
Dept: RHEUMATOLOGY | Facility: CLINIC | Age: 56
End: 2021-08-31

## 2021-09-13 DIAGNOSIS — M06.09 RHEUMATOID ARTHRITIS OF MULTIPLE SITES WITH NEGATIVE RHEUMATOID FACTOR: Primary | ICD-10-CM

## 2021-09-13 RX ORDER — TOFACITINIB 11 MG/1
11 TABLET, FILM COATED, EXTENDED RELEASE ORAL DAILY
Qty: 30 TABLET | Refills: 11 | Status: SHIPPED | OUTPATIENT
Start: 2021-09-13 | End: 2022-09-27

## 2021-09-14 ENCOUNTER — DOCUMENTATION ONLY (OUTPATIENT)
Dept: RHEUMATOLOGY | Facility: CLINIC | Age: 56
End: 2021-09-14

## 2021-09-16 ENCOUNTER — DOCUMENTATION ONLY (OUTPATIENT)
Dept: RHEUMATOLOGY | Facility: CLINIC | Age: 56
End: 2021-09-16

## 2021-09-29 DIAGNOSIS — E11.9 TYPE 2 DIABETES MELLITUS WITHOUT COMPLICATION: ICD-10-CM

## 2021-11-16 DIAGNOSIS — E11.9 CONTROLLED TYPE 2 DIABETES MELLITUS WITHOUT COMPLICATION, WITHOUT LONG-TERM CURRENT USE OF INSULIN: ICD-10-CM

## 2021-11-16 RX ORDER — METFORMIN HYDROCHLORIDE 500 MG/1
TABLET ORAL
Qty: 60 TABLET | Refills: 2 | Status: SHIPPED | OUTPATIENT
Start: 2021-11-16 | End: 2022-02-11

## 2021-11-22 ENCOUNTER — PATIENT OUTREACH (OUTPATIENT)
Dept: ADMINISTRATIVE | Facility: HOSPITAL | Age: 56
End: 2021-11-22
Payer: COMMERCIAL

## 2021-12-01 ENCOUNTER — CLINICAL SUPPORT (OUTPATIENT)
Dept: SMOKING CESSATION | Facility: CLINIC | Age: 56
End: 2021-12-01
Payer: COMMERCIAL

## 2021-12-01 DIAGNOSIS — F17.200 NICOTINE DEPENDENCE: Primary | ICD-10-CM

## 2021-12-01 PROCEDURE — 99407 BEHAV CHNG SMOKING > 10 MIN: CPT | Mod: S$GLB,,,

## 2021-12-01 PROCEDURE — 99407 PR TOBACCO USE CESSATION INTENSIVE >10 MINUTES: ICD-10-PCS | Mod: S$GLB,,,

## 2022-01-19 ENCOUNTER — PATIENT OUTREACH (OUTPATIENT)
Dept: ADMINISTRATIVE | Facility: HOSPITAL | Age: 57
End: 2022-01-19
Payer: COMMERCIAL

## 2022-01-24 DIAGNOSIS — G25.81 RESTLESS LEGS SYNDROME (RLS): Chronic | ICD-10-CM

## 2022-01-24 RX ORDER — ROPINIROLE 1 MG/1
1 TABLET, FILM COATED ORAL NIGHTLY
Qty: 30 TABLET | Refills: 2 | Status: SHIPPED | OUTPATIENT
Start: 2022-01-24 | End: 2022-05-06 | Stop reason: SDUPTHER

## 2022-01-24 RX ORDER — ROPINIROLE 1 MG/1
1 TABLET, FILM COATED ORAL NIGHTLY
Qty: 30 TABLET | Refills: 2 | Status: SHIPPED | OUTPATIENT
Start: 2022-01-24 | End: 2022-01-24

## 2022-05-13 DIAGNOSIS — E03.9 HYPOTHYROIDISM (ACQUIRED): ICD-10-CM

## 2022-05-16 RX ORDER — LEVOTHYROXINE SODIUM 50 UG/1
50 TABLET ORAL DAILY
Qty: 30 TABLET | Refills: 1 | OUTPATIENT
Start: 2022-05-16

## 2022-07-18 PROCEDURE — 93010 EKG 12-LEAD: ICD-10-PCS | Mod: ,,, | Performed by: INTERNAL MEDICINE

## 2022-07-18 PROCEDURE — 87502 INFLUENZA DNA AMP PROBE: CPT

## 2022-07-18 PROCEDURE — 93010 ELECTROCARDIOGRAM REPORT: CPT | Mod: ,,, | Performed by: INTERNAL MEDICINE

## 2022-07-18 PROCEDURE — 93005 ELECTROCARDIOGRAM TRACING: CPT

## 2022-07-18 PROCEDURE — 99285 EMERGENCY DEPT VISIT HI MDM: CPT | Mod: 25

## 2022-07-19 ENCOUNTER — HOSPITAL ENCOUNTER (EMERGENCY)
Facility: HOSPITAL | Age: 57
Discharge: HOME | End: 2022-07-19
Attending: EMERGENCY MEDICINE
Payer: COMMERCIAL

## 2022-07-19 VITALS
TEMPERATURE: 98 F | RESPIRATION RATE: 18 BRPM | WEIGHT: 195.19 LBS | DIASTOLIC BLOOD PRESSURE: 76 MMHG | OXYGEN SATURATION: 95 % | SYSTOLIC BLOOD PRESSURE: 113 MMHG | HEART RATE: 78 BPM | BODY MASS INDEX: 35.71 KG/M2

## 2022-07-19 DIAGNOSIS — J44.1 COPD EXACERBATION: ICD-10-CM

## 2022-07-19 DIAGNOSIS — J06.9 UPPER RESPIRATORY TRACT INFECTION, UNSPECIFIED TYPE: Primary | ICD-10-CM

## 2022-07-19 DIAGNOSIS — R06.02 SHORTNESS OF BREATH: ICD-10-CM

## 2022-07-19 LAB
ALBUMIN SERPL BCP-MCNC: 3.3 G/DL (ref 3.5–5.2)
ALP SERPL-CCNC: 107 U/L (ref 55–135)
ALT SERPL W/O P-5'-P-CCNC: 36 U/L (ref 10–44)
ANION GAP SERPL CALC-SCNC: 16 MMOL/L (ref 8–16)
AST SERPL-CCNC: 30 U/L (ref 10–40)
BASOPHILS # BLD AUTO: 0.11 K/UL (ref 0–0.2)
BASOPHILS NFR BLD: 1.3 % (ref 0–1.9)
BILIRUB SERPL-MCNC: 0.3 MG/DL (ref 0.1–1)
BNP SERPL-MCNC: <10 PG/ML (ref 0–99)
BUN SERPL-MCNC: 18 MG/DL (ref 6–20)
CALCIUM SERPL-MCNC: 10.2 MG/DL (ref 8.7–10.5)
CHLORIDE SERPL-SCNC: 101 MMOL/L (ref 95–110)
CO2 SERPL-SCNC: 23 MMOL/L (ref 23–29)
CREAT SERPL-MCNC: 0.7 MG/DL (ref 0.5–1.4)
DIFFERENTIAL METHOD: ABNORMAL
EOSINOPHIL # BLD AUTO: 0.3 K/UL (ref 0–0.5)
EOSINOPHIL NFR BLD: 3.4 % (ref 0–8)
ERYTHROCYTE [DISTWIDTH] IN BLOOD BY AUTOMATED COUNT: 14.1 % (ref 11.5–14.5)
EST. GFR  (AFRICAN AMERICAN): >60 ML/MIN/1.73 M^2
EST. GFR  (NON AFRICAN AMERICAN): >60 ML/MIN/1.73 M^2
GLUCOSE SERPL-MCNC: 119 MG/DL (ref 70–110)
HCT VFR BLD AUTO: 41.7 % (ref 37–48.5)
HGB BLD-MCNC: 13.2 G/DL (ref 12–16)
IMM GRANULOCYTES # BLD AUTO: 0.14 K/UL (ref 0–0.04)
IMM GRANULOCYTES NFR BLD AUTO: 1.7 % (ref 0–0.5)
INFLUENZA A, MOLECULAR: NEGATIVE
INFLUENZA B, MOLECULAR: NEGATIVE
LYMPHOCYTES # BLD AUTO: 2.5 K/UL (ref 1–4.8)
LYMPHOCYTES NFR BLD: 29.1 % (ref 18–48)
MCH RBC QN AUTO: 30.9 PG (ref 27–31)
MCHC RBC AUTO-ENTMCNC: 31.7 G/DL (ref 32–36)
MCV RBC AUTO: 98 FL (ref 82–98)
MONOCYTES # BLD AUTO: 1 K/UL (ref 0.3–1)
MONOCYTES NFR BLD: 11.5 % (ref 4–15)
NEUTROPHILS # BLD AUTO: 4.5 K/UL (ref 1.8–7.7)
NEUTROPHILS NFR BLD: 53 % (ref 38–73)
NRBC BLD-RTO: 0 /100 WBC
PLATELET # BLD AUTO: 205 K/UL (ref 150–450)
PMV BLD AUTO: 11.9 FL (ref 9.2–12.9)
POTASSIUM SERPL-SCNC: 4.2 MMOL/L (ref 3.5–5.1)
PROT SERPL-MCNC: 7.7 G/DL (ref 6–8.4)
RBC # BLD AUTO: 4.27 M/UL (ref 4–5.4)
SARS-COV-2 RDRP RESP QL NAA+PROBE: NEGATIVE
SODIUM SERPL-SCNC: 140 MMOL/L (ref 136–145)
SPECIMEN SOURCE: NORMAL
TROPONIN I SERPL DL<=0.01 NG/ML-MCNC: <0.006 NG/ML (ref 0–0.03)
WBC # BLD AUTO: 8.46 K/UL (ref 3.9–12.7)

## 2022-07-19 PROCEDURE — 83880 ASSAY OF NATRIURETIC PEPTIDE: CPT | Performed by: REGISTERED NURSE

## 2022-07-19 PROCEDURE — 87502 INFLUENZA DNA AMP PROBE: CPT | Performed by: REGISTERED NURSE

## 2022-07-19 PROCEDURE — U0002 COVID-19 LAB TEST NON-CDC: HCPCS | Performed by: REGISTERED NURSE

## 2022-07-19 PROCEDURE — 84484 ASSAY OF TROPONIN QUANT: CPT | Performed by: REGISTERED NURSE

## 2022-07-19 PROCEDURE — 80053 COMPREHEN METABOLIC PANEL: CPT | Performed by: REGISTERED NURSE

## 2022-07-19 PROCEDURE — 85025 COMPLETE CBC W/AUTO DIFF WBC: CPT | Performed by: REGISTERED NURSE

## 2022-07-19 PROCEDURE — 25000003 PHARM REV CODE 250: Performed by: NURSE PRACTITIONER

## 2022-07-19 RX ORDER — ACETAMINOPHEN 325 MG/1
650 TABLET ORAL
Status: COMPLETED | OUTPATIENT
Start: 2022-07-19 | End: 2022-07-19

## 2022-07-19 RX ORDER — METHYLPREDNISOLONE 4 MG/1
TABLET ORAL
Qty: 1 EACH | Refills: 0 | Status: SHIPPED | OUTPATIENT
Start: 2022-07-19 | End: 2022-09-27

## 2022-07-19 RX ADMIN — ACETAMINOPHEN 650 MG: 325 TABLET ORAL at 01:07

## 2022-07-19 NOTE — DISCHARGE INSTRUCTIONS
Rest  Drink plenty of clear fluids   Nasal saline spray to clear nasal drainage and help with nasal congestion  Zyrtec or Claritin to help dry mucus and post nasal drip  Mucinex or Mucinex DM for cough and chest congestion  Tylenol or Ibuprofen for fever, headache and body aches  Warm salt water gargles for throat comfort  Chloraseptic spray or lozenges for throat comfort  RTC with no improvement or worsening    Regarding UPPER RESPIRATORY ILLNESS, for treatment, I encouraged patient to: drink plenty of fluids; get lots of rest; take medications as prescribed; use over-the-counter medications for symptomatic treatment;  and use a humidifier or steam in the bathroom to improve patency of upper airway.  Patient instructed to notify primary care provider, or return to the emergency department, if the they: have a cough most days or have a cough that returns frequently; begin coughing up blood; develop a high fever or shaking chills; have a low-grade fever for three or more days; develop thick, greenish mucus, especially if it has a bad smell; and experience shortness of breath or chest pain. For prevention, discussed with patient the importance of refraining from smoking (if applicable), getting annual influenza vaccines, reducing exposure to air pollution, and the need to frequently wash hands to avoid spread of infection.     Regarding COPD, I reviewed with patient the common COPD triggers including: Animals (pet hair or dander), dust, changes in weather (most often cold weather), chemicals in the air or in food, exercise, mold, pollen, stress, and tobacco smoke. Discussed ways to reduce COPD symptoms and by avoiding known triggers and substances that irritate the airways. Encouraged patient to seek immediate attention at an ED for symptoms that include: drowsiness or confusion, severe shortness of breath at rest, a peak flow measurement is less than 50% of personal best, severe chest pain, bluish color to the lips  and face, extreme difficulty breathing, rapid pulse, and severe anxiety due to shortness of breath. I Informed patient on the importance of complying with medication plan and reasons for use of control drugs (inhaled corticosteroid) to help prevent flare ups and need for using quick-relief medications during COPD attack.

## 2022-07-19 NOTE — FIRST PROVIDER EVALUATION
Medical screening exam completed.  I have conducted a focused provider triage encounter, findings are as follows:    Brief history of present illness:  HA, SOB on exertion    There were no vitals filed for this visit.    Pertinent physical exam:  No acute distress 93% RA    Brief workup plan:  Cardiac w/u    Preliminary workup initiated; this workup will be continued and followed by the physician or advanced practice provider that is assigned to the patient when roomed.

## 2022-07-19 NOTE — ED PROVIDER NOTES
SCRIBE #1 NOTE: I, Grant Scott, am scribing for, and in the presence of, King Miller Jr., MD. I have scribed the entire note.       History     Chief Complaint   Patient presents with    COVID-19 Concerns     Pt recently tested negative covid. She is here today due to having persistent SOB, with cough, headache, and weakness.      Review of patient's allergies indicates:   Allergen Reactions    Mobic [meloxicam]     Topamax [topiramate]     Adhesive Rash         History of Present Illness     HPI    7/19/2022, 2:31 AM  History obtained from the patient      History of Present Illness: Diana Escobar is a 56 y.o. female patient with a PMHx of asthma, COPD, DM, and HTN who presents to the Emergency Department for evaluation of COVID concerns. Pt reports having a fever 4 days ago with SOB and she went to the ER. She was discharged with cough syrup and abx. Pt reports that her sxs were improving, but then worsened last night. Symptoms are constant and moderate in severity. No mitigating or exacerbating factors reported. Associated sxs include body aches, HA, cough, and congestion. Patient denies any N/V/D, sore throat, weakness, and all other sxs at this time. No further complaints or concerns at this time.       Arrival mode: Personal vehicle    PCP: Imani Gomez MD        Past Medical History:  Past Medical History:   Diagnosis Date    Asthma     Chronic low back pain     COPD (chronic obstructive pulmonary disease) 12/03/2020    Degenerative disc disease, lumbar     Depression     Diabetes mellitus 2014    DM (diabetes mellitus) 2014     AM 04/13/2021    Essential hypertension 1/15/2019 1:48:43 PM    [facility] - Cardiovascular: Hypertension-No Additional Notes    Essential hypertension 1/15/2019 1:48:43 PM    [facility] - Cardiovascular: Hypertension-No Additional Notes    Fibromyalgia     Hiatal hernia     EGD done 3/18/21--[facility]/  [facility]--GI    Hyperlipidemia     Hypertension     Hypothyroidism     Hypothyroidism 1/15/2019 1:48:47 PM    [facility]: Hypothyroidism-No Additional Notes    Hypothyroidism 1/15/2019 1:48:47 PM    [facility]: Hypothyroidism-No Additional Notes    MRSA (methicillin resistant Staphylococcus aureus) carrier     Nodule of left lung 2/6/2017    CT chest : 04/06/16: Small 3 mm pleural-based nodule laterally in the left lower lobe.   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 Jesus Lao--cardiology    Pure hypercholesterolemia 1/15/2019 1:48:51 PM    [facility] - Unknown: High cholesterol-No Additional Notes    Pure hypercholesterolemia 1/15/2019 1:48:51 PM    [facility] - Unknown: High cholesterol-No Additional Notes    Sleep apnea     Stress incontinence     Dr. Zurdo Lopez--urology    Stroke     TIA    Transient cerebral ischemia 1/15/2019 1:49:39 PM    [facility] - Unknown: TIA on medication-No Additional Notes    Transient cerebral ischemia 1/15/2019 1:49:39 PM    [facility] - Unknown: TIA on medication-No Additional Notes    Type 2 or unspecified type diabetes mellitus 1/15/2019 1:49:17 PM    [facility] - Other: Type 2 diabetes mellitus-No Additional Notes    Type 2 or unspecified type diabetes mellitus 1/15/2019 1:49:17 PM    [facility] - Other: Type 2 diabetes mellitus-No Additional Notes    Vitamin D deficiency disease        Past Surgical History:  Past Surgical History:   Procedure Laterality Date    HYSTERECTOMY      INCISION AND DRAINAGE OF ABSCESS Right 10/20/2020    Procedure: INCISION  AND DRAINAGE, ABSCESS;  Surgeon: Mike Dye MD;  Location: [facility];  Service: General;  Laterality: Right;    left ankle surgery      loop recorder  05/2017    cardiac device    NECK SURGERY  06/2016    ROTATOR CUFF REPAIR      TONSILLECTOMY           Family History:  Family History   Problem Relation Age of Onset    Cancer Mother     Stroke Mother     Heart disease Mother     Diabetes Mother     Cataracts Mother     Hypertension Mother     Breast cancer Mother     Heart disease Father     COPD Father     Hypertension Father     Diabetes Maternal Aunt     Breast cancer Sister        Social History:  Social History     Tobacco Use    Smoking status: Current Every Day Smoker     Packs/day: 1.00     Years: 25.00     Pack years: 25.00     Types: Cigarettes    Smokeless tobacco: Never Used    Tobacco comment: in smoking cessation program used Chantix    Substance and Sexual Activity    Alcohol use: No     Alcohol/week: 0.0 standard drinks     Comment: OCC    Drug use: No    Sexual activity: Yes        Review of Systems     Review of Systems   Constitutional: Positive for fever.   HENT: Positive for congestion. Negative for sore throat.    Respiratory: Positive for cough and shortness of breath.    Cardiovascular: Negative for chest pain.   Gastrointestinal: Negative for diarrhea, nausea and vomiting.   Genitourinary: Negative for dysuria.   Musculoskeletal: Negative for back pain.        (+) Body aches     Skin: Negative for rash.   Neurological: Positive for headaches. Negative for weakness.   Hematological: Does not bruise/bleed easily.   All other systems reviewed and are negative.       Physical Exam     Initial Vitals [07/18/22 2103]   BP Pulse Resp Temp SpO2   (!) 141/90 97 18 97.9 °F (36.6 °C) 95 %      MAP       --          Physical Exam  Nursing Notes and Vital Signs Reviewed.  Constitutional: Patient is in no acute distress. Well-developed and well-nourished.  Head: Atraumatic.  Normocephalic.  Eyes: PERRL. EOM intact. Conjunctivae are not pale. No scleral icterus.  ENT: Mucous membranes are moist. Erythematous Oropharynx  Neck: Supple. Full ROM.   Cardiovascular: Regular rate. Regular rhythm. No murmurs, rubs, or gallops. Distal pulses are 2+ and symmetric.  Pulmonary/Chest: No respiratory distress. Clear to auscultation bilaterally. No wheezing or rales.  Abdominal: Soft and non-distended.  There is no tenderness.  No rebound, guarding, or rigidity.   Musculoskeletal: Moves all extremities. No obvious deformities. No edema.   Skin: Warm and dry.  Neurological:  Alert, awake, and appropriate.  Normal speech.  No acute focal neurological deficits are appreciated.  Psychiatric: Normal affect. Good eye contact. Appropriate in content.     ED Course   Procedures  ED Vital Signs:  Vitals:    07/18/22 2103 07/19/22 0224   BP: (!) 141/90 113/76   Pulse: 97 78   Resp: 18 18   Temp: 97.9 °F (36.6 °C)    TempSrc: Oral    SpO2: 95%    Weight: 88.6 kg (195 lb 3.5 oz)        Abnormal Lab Results:  Labs Reviewed   CBC W/ AUTO DIFFERENTIAL - Abnormal; Notable for the following components:       Result Value    MCHC 31.7 (*)     Immature Granulocytes 1.7 (*)     Immature Grans (Abs) 0.14 (*)     All other components within normal limits   COMPREHENSIVE METABOLIC PANEL - Abnormal; Notable for the following components:    Glucose 119 (*)     Albumin 3.3 (*)     All other components within normal limits   INFLUENZA A & B BY MOLECULAR   B-TYPE NATRIURETIC PEPTIDE   TROPONIN I   SARS-COV-2 RNA AMPLIFICATION, QUAL        All Lab Results:  Results for orders placed or performed during the hospital encounter of 07/19/22   Influenza A & B by Molecular    Specimen: Nasopharyngeal Swab   Result Value Ref Range    Influenza A, Molecular Negative Negative    Influenza B, Molecular Negative Negative    Flu A & B Source Nasal swab    CBC auto differential   Result Value Ref Range    WBC 8.46 3.90 - 12.70 K/uL    RBC  4.27 4.00 - 5.40 M/uL    Hemoglobin 13.2 12.0 - 16.0 g/dL    Hematocrit 41.7 37.0 - 48.5 %    MCV 98 82 - 98 fL    MCH 30.9 27.0 - 31.0 pg    MCHC 31.7 (L) 32.0 - 36.0 g/dL    RDW 14.1 11.5 - 14.5 %    Platelets 205 150 - 450 K/uL    MPV 11.9 9.2 - 12.9 fL    Immature Granulocytes 1.7 (H) 0.0 - 0.5 %    Gran # (ANC) 4.5 1.8 - 7.7 K/uL    Immature Grans (Abs) 0.14 (H) 0.00 - 0.04 K/uL    Lymph # 2.5 1.0 - 4.8 K/uL    Mono # 1.0 0.3 - 1.0 K/uL    Eos # 0.3 0.0 - 0.5 K/uL    Baso # 0.11 0.00 - 0.20 K/uL    nRBC 0 0 /100 WBC    Gran % 53.0 38.0 - 73.0 %    Lymph % 29.1 18.0 - 48.0 %    Mono % 11.5 4.0 - 15.0 %    Eosinophil % 3.4 0.0 - 8.0 %    Basophil % 1.3 0.0 - 1.9 %    Differential Method Automated    Comprehensive metabolic panel   Result Value Ref Range    Sodium 140 136 - 145 mmol/L    Potassium 4.2 3.5 - 5.1 mmol/L    Chloride 101 95 - 110 mmol/L    CO2 23 23 - 29 mmol/L    Glucose 119 (H) 70 - 110 mg/dL    BUN 18 6 - 20 mg/dL    Creatinine 0.7 0.5 - 1.4 mg/dL    Calcium 10.2 8.7 - 10.5 mg/dL    Total Protein 7.7 6.0 - 8.4 g/dL    Albumin 3.3 (L) 3.5 - 5.2 g/dL    Total Bilirubin 0.3 0.1 - 1.0 mg/dL    Alkaline Phosphatase 107 55 - 135 U/L    AST 30 10 - 40 U/L    ALT 36 10 - 44 U/L    Anion Gap 16 8 - 16 mmol/L    eGFR if African American >60 >60 mL/min/1.73 m^2    eGFR if non African American >60 >60 mL/min/1.73 m^2   Brain natriuretic peptide   Result Value Ref Range    BNP <10 0 - 99 pg/mL   Troponin I   Result Value Ref Range    Troponin I <0.006 0.000 - 0.026 ng/mL   COVID-19 Rapid Screening   Result Value Ref Range    SARS-CoV-2 RNA, Amplification, Qual Negative Negative         Imaging Results:  Imaging Results          X-Ray Chest 1 View (Final result)  Result time 07/18/22 21:37:29    Final result by Brian Dyer MD (07/18/22 21:37:29)                 Impression:      No acute abnormality.      Electronically signed by: Gomez Torres  Date:    07/18/2022  Time:    21:37             Narrative:     EXAMINATION:  XR CHEST 1 VIEW    CLINICAL HISTORY:  Shortness of breath    TECHNIQUE:  Single frontal view of the chest was performed.    COMPARISON:  None    FINDINGS:  Cervical hardware noted.  Cardiac monitor device no longer seen.The lungs are clear, with normal appearance of pulmonary vasculature and no pleural effusion or pneumothorax.    The cardiac silhouette is normal in size. The hilar and mediastinal contours are unremarkable.    Bones are intact.                                 The EKG was ordered, reviewed, and independently interpreted by the ED provider.  Interpretation time: 21:27  Rate: 87 BPM  Rhythm: normal sinus rhythm  Interpretation: No ST or  T wave changes. No STEMI.             The Emergency Provider reviewed the vital signs and test results, which are outlined above.     ED Discussion       2:38 AM: Reassessed pt at this time. Discussed with pt all pertinent ED information and results. Discussed pt dx and plan of tx. Gave pt all f/u and return to the ED instructions. All questions and concerns were addressed at this time. Pt expresses understanding of information and instructions, and is comfortable with plan to discharge. Pt is stable for discharge.    I discussed with patient and/or family/caretaker that evaluation in the ED does not suggest any emergent or life threatening medical conditions requiring immediate intervention beyond what was provided in the ED, and I believe patient is safe for discharge.  Regardless, an unremarkable evaluation in the ED does not preclude the development or presence of a serious of life threatening condition. As such, patient was instructed to return immediately for any worsening or change in current symptoms.    Regarding UPPER RESPIRATORY ILLNESS, for treatment, I encouraged patient to: drink plenty of fluids; get lots of rest; take medications as prescribed; use over-the-counter medications for symptomatic treatment;  and use a humidifier or steam in  the bathroom to improve patency of upper airway.  Patient instructed to notify primary care provider, or return to the emergency department, if the they: have a cough most days or have a cough that returns frequently; begin coughing up blood; develop a high fever or shaking chills; have a low-grade fever for three or more days; develop thick, greenish mucus, especially if it has a bad smell; and experience shortness of breath or chest pain. For prevention, discussed with patient the importance of refraining from smoking (if applicable), getting annual influenza vaccines, reducing exposure to air pollution, and the need to frequently wash hands to avoid spread of infection.     Regarding COPD, I reviewed with patient the common COPD triggers including: Animals (pet hair or dander), dust, changes in weather (most often cold weather), chemicals in the air or in food, exercise, mold, pollen, stress, and tobacco smoke. Discussed ways to reduce COPD symptoms and by avoiding known triggers and substances that irritate the airways. Encouraged patient to seek immediate attention at an ED for symptoms that include: drowsiness or confusion, severe shortness of breath at rest, a peak flow measurement is less than 50% of personal best, severe chest pain, bluish color to the lips and face, extreme difficulty breathing, rapid pulse, and severe anxiety due to shortness of breath. I Informed patient on the importance of complying with medication plan and reasons for use of control drugs (inhaled corticosteroid) to help prevent flare ups and need for using quick-relief medications during COPD attack.       Medical Decision Making:   Clinical Tests:   Lab Tests: Ordered and Reviewed  Radiological Study: Ordered and Reviewed  Medical Tests: Ordered and Reviewed           ED Medication(s):  Medications   acetaminophen tablet 650 mg (650 mg Oral Given 7/19/22 0126)       Discharge Medication List as of 7/19/2022  2:36 AM      START  taking these medications    Details   methylPREDNISolone (MEDROL DOSEPACK) 4 mg tablet use as directed, Print              Follow-up Information     Imani Gomez MD. Schedule an appointment as soon as possible for a visit in 1 week.    Specialty: Internal Medicine  Contact information:  [address] [facility] [address] [phone]             Counts include 234 beds at the [facility] Emergency Dept..    Specialty: Emergency Medicine  Why: As needed, If symptoms worsen  Contact information:  [address] [facility] [address] [phone]                           Scribe Attestation:   Scribe #1: I performed the above scribed service and the documentation accurately describes the services I performed. I attest to the accuracy of the note.     Attending:   Physician Attestation Statement for Scribe #1: I, King Miller Jr., MD, personally performed the services described in this documentation, as scribed by Grant Scott, in my presence, and it is both accurate and complete.           Clinical Impression       ICD-10-CM ICD-9-CM   1. Upper respiratory tract infection, unspecified type  J06.9 465.9   2. Shortness of breath  R06.02 786.05   3. COPD exacerbation  J44.1 491.21       Disposition:   Disposition: Discharged  Condition: Stable         King Miller Jr., MD  07/19/22 0594

## 2023-04-20 NOTE — PROGRESS NOTES
New patient    Subjective:      Patient ID: Diana Escobar is a 54 y.o. female.    Patient Active Problem List   Diagnosis    Diplopia    Hypertension associated with diabetes    Combined hyperlipidemia associated with type 2 diabetes mellitus    Major depression, recurrent, chronic    Nicotine dependence, cigarettes, uncomplicated    Fibromyalgia    Nodule of left lung    Positive anti-CCP test    History of transient cerebral ischemia    High risk medication use    Jaw pain    Paresthesia    Right sided weakness    HA (headache)    Seronegative rheumatoid arthritis    Osteopenia    Rheumatoid arthritis of multiple sites with negative rheumatoid factor    Myofacial muscle pain    Hypothyroidism (acquired)    Cervical spondylosis with radiculopathy    Bilateral foot pain    Restless leg syndrome    Class 1 obesity due to excess calories with serious comorbidity and body mass index (BMI) of 34.0 to 34.9 in adult    Left hand pain    Chronic maxillary sinusitis    Chronic ethmoidal sinusitis    Chronic sphenoidal sinusitis    Chronic frontal sinusitis    Deviated nasal septum    Nasal obstruction    Nasal turbinate hypertrophy    Dyspnea and respiratory abnormalities    SRAVAN (obstructive sleep apnea)       Problem list has been reviewed.    she has been referred by Ed Hsu III, P* for evaluation and management for   Chief Complaint   Patient presents with    Bronchitis       Chief Complaint: Bronchitis      HPI:  Diagnosed with bronchitis. Symptoms included cough, post-nasal drip, nasal congestion, nasal obstruction, ear pain (R), hoarseness, headache, and tinnitus. Treated with antibiotics with no improvement. Seen and evaluated by ENT. CT sinuses unremarkable. ENT prescribed SYMBICORT, FLONASE and ASTELIN.       Patient reports HOARSENESS and COUGHING at present. She denies hemoptysis. Patient denies recent sick contacts.   Patient does not have new pets. Patient does  The patient presents today for their INR check.  Please see the patient findings.    Patient is aware that their INR result was not within therapeutic range. Patient was instructed to take their Coumadin/Warfarin as follows: hold 1 dose, then resume 3 mg Thurs; 6 mg all other days  Next INR is due in 1 weeks on 4/2/2023.  Patient was instructed to contact me with any unusual bleeding, bruising, any changes in medications, diet or health status and if there are any other questions or concerns.   Patient verbalized understanding of above.       have a history of asthma. Patient does have a history of environmental allergens.  Patient has not traveled recently. Patient does have a history of smoking. She smokes 1 PPD. She is interested in quitting. She is currently enrolled in smoking cessation program.   Patient has had a previous chest x-ray. Patient has not had a PPD done.      Previous Report Reviewed: lab reports, office notes and radiology reports     Past Medical History: The following portions of the patient's history were reviewed and updated as appropriate:   She  has a past surgical history that includes Hysterectomy; Rotator cuff repair; left ankle surgery; Tonsillectomy; Neck surgery (06/2016); and loop recorder (05/2017).  Her family history includes Breast cancer in her mother and sister; COPD in her father; Cancer in her mother; Cataracts in her mother; Diabetes in her maternal aunt and mother; Heart disease in her father and mother; Hypertension in her father and mother; Stroke in her mother.  She  reports that she has been smoking cigarettes.  She has been smoking about 0.50 packs per day. She has never used smokeless tobacco. She reports that she does not drink alcohol or use drugs.  She has a current medication list which includes the following prescription(s): albuterol, amlodipine, atorvastatin, azelastine, blood sugar diagnostic, blood-glucose meter, budesonide-formoterol 160-4.5 mcg, buspirone, clopidogrel, diclofenac sodium, fluticasone propionate, folic acid, gabapentin, pen needle, diabetic, lancets, levothyroxine, metformin, methotrexate, myrbetriq, peg 400-propylene glycol (pf), pramipexole, prednisone, promethazine-dextromethorphan, and venlafaxine.  She is allergic to mobic [meloxicam]; topamax [topiramate]; and adhesive..    Review of Systems   Constitutional: Positive for appetite change and fatigue.   HENT: Positive for postnasal drip, trouble swallowing and congestion. Negative for rhinorrhea.    Eyes: Negative for redness  and itching.   Respiratory: Positive for cough, sputum production, wheezing and dyspnea on extertion. Negative for chest tightness.    Cardiovascular: Positive for leg swelling.   Genitourinary: Negative for difficulty urinating.   Endocrine: Negative for polydipsia, cold intolerance and heat intolerance.    Musculoskeletal: Positive for arthralgias, back pain and myalgias.   Skin: Negative for rash.   Gastrointestinal: Positive for acid reflux.   Neurological: Negative for dizziness, light-headedness and headaches.   Psychiatric/Behavioral: The patient is nervous/anxious.           Occupational History:  Worker at [employer]  Denies occupational exposure to asbestos, silica and petrochemicals.    Avocational Exposures:  none    Pet Exposures:  Cat, dog: Denies allergy to cat and dog dander.        Snoring / Sleep Apnea:     She presents for a sleep evaluation.  Patient has observed snoring .   Patient reports non restful' sleep.  she denies morning headache.   shereports impairment of activity during wakefulness.  she denies day time napping   Humphrey sleepiness score was 6.  Neck circumference is 13.  she denies recent weight gain.  Mallampati score 3  Cardiovascular risk factors: diabetes, hypertension, hyperlipidemia, history of TIA's and obesity  Bed time is 1900  Wake time is 0400  Sleep onset is within  1 Hour.  Sleep maintenance difficulties related to early morning awakening, frequent night time awakening and non-restful sleep  Wake after sleep onset occurs two times a night.  Nocturia occurs two times a night,   Sleep aids : No  Dry mouth : Yes,   Sleep walking: No,   Sleep talking : No,   Sleep eating:No  Vivid Dreams : No,   Cataplexy : No,   Hypnogogic hallucinations:  No    COMORBIDITIES:  BP Readings from Last 3 Encounters:   08/13/19 120/78   08/08/19 125/80   08/07/19 120/66         [place] Questionnaire (validated SRAVAN screening questionnaire)  Positive -- Snoring/apnea  Positive-- Fatigue    Body mass  "index is 34.48 kg/m².  (>25 is overweight, >30 is obese)  Blood Pressure = Hypertension    (PreHTN 120-139/80-89, Stg1 140-159/90-99, Stg2 >160/>100)  [place] = Three of three SRAVAN categories are positive (high risk is 2-3 positive categories)     Hurley Sleepiness Scale   EPWORTH SLEEPINESS SCALE 8/13/2019   Sitting and reading 2   Watching TV 1   Sitting, inactive in a public place (e.g. a theatre or a meeting) 0   As a passenger in a car for an hour without a break 2   Lying down to rest in the afternoon when circumstances permit 0   Sitting and talking to someone 0   Sitting quietly after a lunch without alcohol 0   In a car, while stopped for a few minutes in traffic 1   Total score 6       Reference: Miranda FRYE. A new method for measuring daytime sleepiness: The Hurley  Sleepiness Scale. Sleep 1991; 14(6):540-5.    STOP-Bang Questionnaire (validated SRAVAN screening questionnaire)  Negative unless checked off.  [x] Snoring    [x]  Tired/Fatigued/Sleepy  [x] Obstruction (apneas/choking)  [x] Pressure (HTN)  [] BMI >35  [x] Age >50  [] Neck >40 cm  [] Gender male   STOP-Bang = 5  (low risk 0-2,high risk 3-8)    References:   STOP Questionnaire   A Tool to Screen Patients for Obstructive Sleep Apnea: LYLA MaciasR.C.P.C., TRESA Kwon.B.B.S., Tommy Ruiz M.D.,Glenna Minor, Ph.D., [place] Pickens M.B.B.S.,_ Lyubov Sawant.,_ Ziggy Patiño M.D., Jose L Borrego F.R.C.P.C.; Anesthesiology 2008; 108:812-21 Copyright © 2008, the American Society of Anesthesiologists, Inc. [employer]      Neck circumference 33 cm [?SRAVAN risk if >43cm (17in) male or >41cm (15.5 in) female]    Sleep position Supine = occasional    Non-supine = occasional  Mouth breathing during sleep - possibly     Objective:     Vitals:    08/13/19 1518   BP: 120/78   Pulse: 100   Resp: 18   SpO2: (!) 94%   Weight: 85.5 kg (188 lb 7.9 oz)   Height: 5' 2" (1.575 m)     Body mass index is 34.48 " "kg/m².    Physical Exam   Constitutional: She is oriented to person, place, and time. She appears well-developed and well-nourished. No distress.   HENT:   Head: Normocephalic and atraumatic.   Right Ear: Tympanic membrane and ear canal normal.   Left Ear: Tympanic membrane and ear canal normal.   Nose: Mucosal edema and rhinorrhea present.   Mouth/Throat: Uvula is midline. Posterior oropharyngeal edema and posterior oropharyngeal erythema present. No tonsillar exudate.   Mallampati 3    Eyes: Pupils are equal, round, and reactive to light. EOM are normal.   Neck: Normal range of motion. Neck supple.   13"   Cardiovascular: Normal rate and regular rhythm. Exam reveals no gallop and no friction rub.   No murmur heard.  Pulmonary/Chest: Effort normal and breath sounds normal. No stridor. No respiratory distress. She has no wheezes. She has no rales.   Abdominal: Soft. Bowel sounds are normal.   Musculoskeletal: Normal range of motion. She exhibits no edema.   Neurological: She is alert and oriented to person, place, and time.   Skin: Skin is warm and dry. Capillary refill takes 2 to 3 seconds. She is not diaphoretic.   Psychiatric: She has a normal mood and affect.   Nursing note and vitals reviewed.      Personal Diagnostic Review      Pulmonary function tests: 03/17/15: FEV1: 2.00  (76 % predicted), FVC:  3.01 (91 % predicted), FEV1/FVC:  66, T.27 (115 % predicted), RV/TLVC: 43 (125 % predicted), DLCO: 17.4 (71 % predicted)   Spirometry reveals mild obstruction. No significant improvement after bronchodilator. The absence of an acute response to aerosolized bronchodilator does not necessarily mean that the subject will not respond to a clinical trial of aerosolized or oral bronchodilators. Plethysomgraphyc lung volumes reveal air trapping but no hyperinflation. Unadjusted diffusion capacity is mildly reduced. Diffusion capacity is normal when adjusted for alveolar volume.    CT chest without contrast: 16: " The lungs are essentially clear and free of infiltrates.  There is a small 3 mm pleural-based nodule laterally in the left lower lobe but there are no significant pulmonary nodule identified.  No significant interstitial lung disease.  There are no mediastinal masses or adenopathy.  The adrenal glands           Assessment /Plan:     Discussed diagnosis, its evaluation, treatment and usual course. All questions answered.    Problem List Items Addressed This Visit        Pulmonary    Nodule of left lung (Chronic)    Overview     CT chest : 04/06/16: Small 3 mm pleural-based nodule laterally in the left lower lobe.         Current Assessment & Plan     Repeat CT chest.    Fleischner Society Guidelines:    High risk patient:   Smoking history, history of malignancy or risk factors for malignancy.( non-solid ground glass opacities and partially solid nodules may require longer follow up to exclude indolent adenocarcinoma)      Nodule size Low risk patient High risk patient   4 mm  No follow up Follow up CT in 12 months. If unchanged, no further follow up.   4 - 6 mm Follow up CT in 12 months; if unchanged, no further follow up.  Initial follow up CT in 6 - 12 months, then at 18 - 24 months if no change.      6 - 8 mm  Initial follow up CT at 6 - 12 months and at 18 months if no change.  Initial follow up CT at 3 - 6 months. Then at 9-12 months and 24 months if no change.      > 8 mm Initial follow up CT at 3, 6, 9 and 24 months, dynamic contrast enhanced CT, PET-CT and/or biopsy. Initial follow up CT at 3, 6, 9 and 24 months, dynamic contrast enhanced CT, PET-CT and/or biopsy.            Relevant Orders    CT Chest With Contrast       Endocrine    Class 1 obesity due to excess calories with serious comorbidity and body mass index (BMI) of 34.0 to 34.9 in adult    Current Assessment & Plan     General weight loss/lifestyle modification strategies discussed (elicit support from others; identify saboteurs; non-food rewards,  etc).  Behavioral treatment: Slim Fast.  Diet interventions: low calorie (1000 kCal/d) deficit diet.  Informal exercise measures discussed, e.g. taking stairs instead of elevator.  Regular aerobic exercise program discussed.            Other    Nicotine dependence, cigarettes, uncomplicated (Chronic)    Current Assessment & Plan       I assessed Diana Escobar  to be in a pre-contemplative stage with respect to tobacco use.     The patient was advised of the adverse effects of cigarette smoking including increased risk of cancer and respiratory diseases. she is  ready to stop smoking. Smoking cessation techniques were reviewed. These include removing cigarettes and smoking materials from environment and stress management. I advised patient to quit, and offered support.  Educational material distributed.  Discussed current use pattern.. I reviewed smoking cessation  treatments including  nicotine gum, nicotine patch and willpower.  Side effects of medications including the risk of exacerbating underlying heart disease, nausea, GI upset, insomnia, sleep disturbance and possible suicidal ideation discussed.  I spent approximately 15 minutes counseling the patient.          Dyspnea and respiratory abnormalities - Primary (Chronic)    Current Assessment & Plan     Etiology unclear.  Multifactorial etiology suspected.  Likely contributors to etiology (checked)    [x] Pulmonary airway disease    [x]  Pulmonary parenchymal disease  [] Pulmonary vascular disease   [] Pleural disease  [] Pulmonary vasculitis  [] Hypoventilation ( chest wall deformity, neuromuscular disease, obesity etc)  [] Anemia  [] Thyroid disease.  [] Cardiac illess  []         Sleep disorder    EVALUATION:  []        Complete PFT with bronchodilator.  []        Bronchial challenge with methacholine.   [x]        Stress test, pulmonary.  [x]        PULM - Arterial Blood Gases  []        Chest X Ray  [x]        CT scan of chest.   [x]        STEVE  [x]         Sedimentation rate  [x]        C-reactive protein  [x]        Anti-neutrophilic cytoplasmic antibody          PLAN:  Discussed diagnosis, its evaluation, treatment and usual course.  All questions answered.        Call if shortness of breath worsens, blood in sputum, change in character of cough, development of fever or chills, inability to maintain nutrition and hydration.     Re evaluate in six weeks with results.           Relevant Orders    STEVE Screen w/Reflex    C-reactive protein    Sedimentation rate    Anti-neutrophilic cytoplasmic antibody    Complete PFT with bronchodilator    PULM - Arterial Blood Gases--in addition to PFT only    Pulmonary stress test    SRAVAN (obstructive sleep apnea)    Current Assessment & Plan     My recommendation at this point would be to set up a home sleep [facility].  We have discussed weight loss and how this may improve her situation.  We have discussed behavioral modifications, as well.  After her study, she  could certainly try a CPAP.          Relevant Orders    Home Sleep Studies              TIME SPENT WITH PATIENT: Time spent: 60 minutes in face to face  discussion concerning diagnosis, prognosis, review of lab and test results, benefits of treatment as well as management of disease, counseling of patient and coordination of care between various health  care providers . Greater than half the time spent was used for coordination of care and counseling of patient.     Follow up in about 6 weeks (around 9/24/2019) for SRAVAN, Obesity, Shortness of breath, Lung Nodule.

## 2023-07-07 ENCOUNTER — PATIENT MESSAGE (OUTPATIENT)
Dept: INFECTIOUS DISEASES | Facility: CLINIC | Age: 58
End: 2023-07-07
Payer: COMMERCIAL

## 2024-04-03 ENCOUNTER — PATIENT MESSAGE (OUTPATIENT)
Dept: PULMONOLOGY | Facility: CLINIC | Age: 59
End: 2024-04-03
Payer: COMMERCIAL
